# Patient Record
Sex: MALE | Race: WHITE | NOT HISPANIC OR LATINO | Employment: OTHER | URBAN - METROPOLITAN AREA
[De-identification: names, ages, dates, MRNs, and addresses within clinical notes are randomized per-mention and may not be internally consistent; named-entity substitution may affect disease eponyms.]

---

## 2019-10-10 ENCOUNTER — HOSPITAL ENCOUNTER (INPATIENT)
Facility: HOSPITAL | Age: 84
LOS: 27 days | Discharge: NURSING, REHAB OR LONG-TERM CARE | DRG: 064 | End: 2019-11-06
Attending: EMERGENCY MEDICINE | Admitting: PSYCHIATRY & NEUROLOGY
Payer: MEDICARE

## 2019-10-10 DIAGNOSIS — R00.1 BRADYCARDIA: ICD-10-CM

## 2019-10-10 DIAGNOSIS — I65.22 OCCLUSION OF LEFT CAROTID ARTERY: ICD-10-CM

## 2019-10-10 DIAGNOSIS — I50.42 CHRONIC COMBINED SYSTOLIC AND DIASTOLIC CONGESTIVE HEART FAILURE: Primary | ICD-10-CM

## 2019-10-10 DIAGNOSIS — Z92.82 S/P ADMN TPA IN DIFF FAC W/N LAST 24 HR BEF ADM TO CRNT FAC: ICD-10-CM

## 2019-10-10 DIAGNOSIS — I63.89 ACUTE ARTERIAL ISCHEMIC STROKE, MULTIFOCAL, MULTIPLE VASCULAR TERRITORIES: ICD-10-CM

## 2019-10-10 DIAGNOSIS — I63.9 CEREBROVASCULAR ACCIDENT (CVA), UNSPECIFIED MECHANISM: ICD-10-CM

## 2019-10-10 DIAGNOSIS — R07.89 CHEST DISCOMFORT: ICD-10-CM

## 2019-10-10 DIAGNOSIS — I48.0 PAROXYSMAL ATRIAL FIBRILLATION: ICD-10-CM

## 2019-10-10 DIAGNOSIS — N18.9 CHRONIC KIDNEY DISEASE, UNSPECIFIED CKD STAGE: ICD-10-CM

## 2019-10-10 PROBLEM — I50.9 CHF (CONGESTIVE HEART FAILURE): Status: ACTIVE | Noted: 2019-10-10

## 2019-10-10 PROBLEM — I10 HYPERTENSION: Status: ACTIVE | Noted: 2019-10-10

## 2019-10-10 PROBLEM — E07.9 THYROID DISEASE: Status: ACTIVE | Noted: 2019-10-10

## 2019-10-10 LAB
BILIRUB UR QL STRIP: NEGATIVE
CHOLEST SERPL-MCNC: 115 MG/DL (ref 120–199)
CHOLEST/HDLC SERPL: 3.4 {RATIO} (ref 2–5)
CLARITY UR REFRACT.AUTO: CLEAR
COLOR UR AUTO: YELLOW
ESTIMATED AVG GLUCOSE: 117 MG/DL (ref 68–131)
GLUCOSE UR QL STRIP: NEGATIVE
HBA1C MFR BLD HPLC: 5.7 % (ref 4–5.6)
HDLC SERPL-MCNC: 34 MG/DL (ref 40–75)
HDLC SERPL: 29.6 % (ref 20–50)
HGB UR QL STRIP: ABNORMAL
KETONES UR QL STRIP: NEGATIVE
LDLC SERPL CALC-MCNC: 69 MG/DL (ref 63–159)
LEUKOCYTE ESTERASE UR QL STRIP: NEGATIVE
MICROSCOPIC COMMENT: ABNORMAL
NITRITE UR QL STRIP: NEGATIVE
NONHDLC SERPL-MCNC: 81 MG/DL
PH UR STRIP: 6 [PH] (ref 5–8)
PROT UR QL STRIP: NEGATIVE
RBC #/AREA URNS AUTO: 17 /HPF (ref 0–4)
SP GR UR STRIP: 1.03 (ref 1–1.03)
SQUAMOUS #/AREA URNS AUTO: 0 /HPF
TRIGL SERPL-MCNC: 60 MG/DL (ref 30–150)
URN SPEC COLLECT METH UR: ABNORMAL

## 2019-10-10 PROCEDURE — 99291 CRITICAL CARE FIRST HOUR: CPT | Mod: ,,, | Performed by: EMERGENCY MEDICINE

## 2019-10-10 PROCEDURE — 99285 EMERGENCY DEPT VISIT HI MDM: CPT | Mod: 25

## 2019-10-10 PROCEDURE — 12000002 HC ACUTE/MED SURGE SEMI-PRIVATE ROOM

## 2019-10-10 PROCEDURE — 81001 URINALYSIS AUTO W/SCOPE: CPT

## 2019-10-10 PROCEDURE — 80061 LIPID PANEL: CPT

## 2019-10-10 PROCEDURE — 86901 BLOOD TYPING SEROLOGIC RH(D): CPT

## 2019-10-10 PROCEDURE — 84443 ASSAY THYROID STIM HORMONE: CPT

## 2019-10-10 PROCEDURE — 96360 HYDRATION IV INFUSION INIT: CPT

## 2019-10-10 PROCEDURE — 99291 PR CRITICAL CARE, E/M 30-74 MINUTES: ICD-10-PCS | Mod: ,,, | Performed by: EMERGENCY MEDICINE

## 2019-10-10 PROCEDURE — 25500020 PHARM REV CODE 255: Performed by: EMERGENCY MEDICINE

## 2019-10-10 PROCEDURE — 83036 HEMOGLOBIN GLYCOSYLATED A1C: CPT

## 2019-10-10 PROCEDURE — 63600175 PHARM REV CODE 636 W HCPCS: Performed by: PHYSICIAN ASSISTANT

## 2019-10-10 RX ORDER — POTASSIUM CHLORIDE 7.45 MG/ML
60 INJECTION INTRAVENOUS
Status: DISCONTINUED | OUTPATIENT
Start: 2019-10-10 | End: 2019-10-13

## 2019-10-10 RX ORDER — ATORVASTATIN CALCIUM 20 MG/1
40 TABLET, FILM COATED ORAL DAILY
Status: DISCONTINUED | OUTPATIENT
Start: 2019-10-11 | End: 2019-11-06 | Stop reason: HOSPADM

## 2019-10-10 RX ORDER — MAGNESIUM SULFATE HEPTAHYDRATE 40 MG/ML
2 INJECTION, SOLUTION INTRAVENOUS
Status: DISCONTINUED | OUTPATIENT
Start: 2019-10-10 | End: 2019-10-13

## 2019-10-10 RX ORDER — POTASSIUM CHLORIDE 7.45 MG/ML
80 INJECTION INTRAVENOUS
Status: DISCONTINUED | OUTPATIENT
Start: 2019-10-10 | End: 2019-10-13

## 2019-10-10 RX ORDER — POTASSIUM CHLORIDE 7.45 MG/ML
40 INJECTION INTRAVENOUS
Status: DISCONTINUED | OUTPATIENT
Start: 2019-10-10 | End: 2019-10-13

## 2019-10-10 RX ORDER — LEVOTHYROXINE SODIUM 25 UG/1
25 TABLET ORAL DAILY
Status: DISCONTINUED | OUTPATIENT
Start: 2019-10-11 | End: 2019-11-06 | Stop reason: HOSPADM

## 2019-10-10 RX ORDER — SODIUM CHLORIDE 0.9 % (FLUSH) 0.9 %
10 SYRINGE (ML) INJECTION
Status: DISCONTINUED | OUTPATIENT
Start: 2019-10-10 | End: 2019-11-06 | Stop reason: HOSPADM

## 2019-10-10 RX ORDER — MAGNESIUM SULFATE HEPTAHYDRATE 40 MG/ML
4 INJECTION, SOLUTION INTRAVENOUS
Status: DISCONTINUED | OUTPATIENT
Start: 2019-10-10 | End: 2019-10-13

## 2019-10-10 RX ADMIN — SODIUM CHLORIDE 1000 ML: 0.9 INJECTION, SOLUTION INTRAVENOUS at 09:10

## 2019-10-10 RX ADMIN — IOHEXOL 100 ML: 350 INJECTION, SOLUTION INTRAVENOUS at 09:10

## 2019-10-11 PROBLEM — I25.10 CAD (CORONARY ARTERY DISEASE): Status: ACTIVE | Noted: 2019-10-11

## 2019-10-11 PROBLEM — E03.9 ACQUIRED HYPOTHYROIDISM: Status: ACTIVE | Noted: 2019-10-10

## 2019-10-11 PROBLEM — I48.91 ATRIAL FIBRILLATION: Status: ACTIVE | Noted: 2019-10-11

## 2019-10-11 PROBLEM — I48.0 PAROXYSMAL ATRIAL FIBRILLATION: Status: ACTIVE | Noted: 2019-10-11

## 2019-10-11 PROBLEM — I50.42 CHRONIC COMBINED SYSTOLIC AND DIASTOLIC CONGESTIVE HEART FAILURE: Status: ACTIVE | Noted: 2019-10-10

## 2019-10-11 PROBLEM — G93.6 CYTOTOXIC BRAIN EDEMA: Status: ACTIVE | Noted: 2019-10-11

## 2019-10-11 PROBLEM — I63.412 CEREBROVASCULAR ACCIDENT (CVA) DUE TO EMBOLISM OF LEFT MIDDLE CEREBRAL ARTERY: Status: ACTIVE | Noted: 2019-10-10

## 2019-10-11 LAB
ABO + RH BLD: NORMAL
ALBUMIN SERPL BCP-MCNC: 3.5 G/DL (ref 3.5–5.2)
ALP SERPL-CCNC: 69 U/L (ref 55–135)
ALT SERPL W/O P-5'-P-CCNC: 11 U/L (ref 10–44)
ANION GAP SERPL CALC-SCNC: 11 MMOL/L (ref 8–16)
ASCENDING AORTA: 3.33 CM
AST SERPL-CCNC: 16 U/L (ref 10–40)
AV INDEX (PROSTH): 0.44
AV MEAN GRADIENT: 4 MMHG
AV PEAK GRADIENT: 7 MMHG
AV VALVE AREA: 1.39 CM2
AV VELOCITY RATIO: 0.52
BASOPHILS # BLD AUTO: 0.03 K/UL (ref 0–0.2)
BASOPHILS NFR BLD: 0.3 % (ref 0–1.9)
BILIRUB SERPL-MCNC: 0.7 MG/DL (ref 0.1–1)
BLD GP AB SCN CELLS X3 SERPL QL: NORMAL
BSA FOR ECHO PROCEDURE: 2.04 M2
BUN SERPL-MCNC: 27 MG/DL (ref 8–23)
CALCIUM SERPL-MCNC: 8.8 MG/DL (ref 8.7–10.5)
CHLORIDE SERPL-SCNC: 103 MMOL/L (ref 95–110)
CO2 SERPL-SCNC: 23 MMOL/L (ref 23–29)
CREAT SERPL-MCNC: 2 MG/DL (ref 0.5–1.4)
CV ECHO LV RWT: 0.43 CM
DIFFERENTIAL METHOD: ABNORMAL
DOP CALC AO PEAK VEL: 1.28 M/S
DOP CALC AO VTI: 25 CM
DOP CALC LVOT AREA: 3.1 CM2
DOP CALC LVOT DIAMETER: 2 CM
DOP CALC LVOT PEAK VEL: 0.67 M/S
DOP CALC LVOT STROKE VOLUME: 34.73 CM3
DOP CALCLVOT PEAK VEL VTI: 11.06 CM
ECHO LV POSTERIOR WALL: 1.01 CM (ref 0.6–1.1)
EOSINOPHIL # BLD AUTO: 0.1 K/UL (ref 0–0.5)
EOSINOPHIL NFR BLD: 1 % (ref 0–8)
ERYTHROCYTE [DISTWIDTH] IN BLOOD BY AUTOMATED COUNT: 13.5 % (ref 11.5–14.5)
EST. GFR  (AFRICAN AMERICAN): 34.2 ML/MIN/1.73 M^2
EST. GFR  (NON AFRICAN AMERICAN): 29.6 ML/MIN/1.73 M^2
FRACTIONAL SHORTENING: 14 % (ref 28–44)
GLUCOSE SERPL-MCNC: 101 MG/DL (ref 70–110)
HCT VFR BLD AUTO: 42.8 % (ref 40–54)
HGB BLD-MCNC: 12.9 G/DL (ref 14–18)
IMM GRANULOCYTES # BLD AUTO: 0.04 K/UL (ref 0–0.04)
IMM GRANULOCYTES NFR BLD AUTO: 0.4 % (ref 0–0.5)
INTERVENTRICULAR SEPTUM: 1.03 CM (ref 0.6–1.1)
LA MAJOR: 7.76 CM
LA MINOR: 6.41 CM
LA WIDTH: 4.46 CM
LEFT ATRIUM SIZE: 4.5 CM
LEFT ATRIUM VOLUME INDEX: 58.8 ML/M2
LEFT ATRIUM VOLUME: 119.77 CM3
LEFT INTERNAL DIMENSION IN SYSTOLE: 4.07 CM (ref 2.1–4)
LEFT VENTRICLE DIASTOLIC VOLUME INDEX: 50.62 ML/M2
LEFT VENTRICLE DIASTOLIC VOLUME: 103.11 ML
LEFT VENTRICLE MASS INDEX: 83 G/M2
LEFT VENTRICLE SYSTOLIC VOLUME INDEX: 35.8 ML/M2
LEFT VENTRICLE SYSTOLIC VOLUME: 72.91 ML
LEFT VENTRICULAR INTERNAL DIMENSION IN DIASTOLE: 4.71 CM (ref 3.5–6)
LEFT VENTRICULAR MASS: 169.55 G
LYMPHOCYTES # BLD AUTO: 1.1 K/UL (ref 1–4.8)
LYMPHOCYTES NFR BLD: 11.9 % (ref 18–48)
MAGNESIUM SERPL-MCNC: 1.9 MG/DL (ref 1.6–2.6)
MCH RBC QN AUTO: 26.8 PG (ref 27–31)
MCHC RBC AUTO-ENTMCNC: 30.1 G/DL (ref 32–36)
MCV RBC AUTO: 89 FL (ref 82–98)
MONOCYTES # BLD AUTO: 1.1 K/UL (ref 0.3–1)
MONOCYTES NFR BLD: 11 % (ref 4–15)
NEUTROPHILS # BLD AUTO: 7.2 K/UL (ref 1.8–7.7)
NEUTROPHILS NFR BLD: 75.4 % (ref 38–73)
NRBC BLD-RTO: 0 /100 WBC
PHOSPHATE SERPL-MCNC: 4.3 MG/DL (ref 2.7–4.5)
PLATELET # BLD AUTO: 158 K/UL (ref 150–350)
PMV BLD AUTO: 10.3 FL (ref 9.2–12.9)
POCT GLUCOSE: 97 MG/DL (ref 70–110)
POTASSIUM SERPL-SCNC: 4.4 MMOL/L (ref 3.5–5.1)
PROT SERPL-MCNC: 6.3 G/DL (ref 6–8.4)
RA MAJOR: 5.01 CM
RA PRESSURE: 15 MMHG
RA WIDTH: 3.97 CM
RBC # BLD AUTO: 4.81 M/UL (ref 4.6–6.2)
RETIRED EF AND QEF - SEE NOTES: 30 %
RIGHT VENTRICULAR END-DIASTOLIC DIMENSION: 4.45 CM
SINUS: 3.72 CM
SODIUM SERPL-SCNC: 137 MMOL/L (ref 136–145)
STJ: 3.3 CM
TDI LATERAL: 0.09 M/S
TDI SEPTAL: 0.06 M/S
TDI: 0.08 M/S
TRICUSPID ANNULAR PLANE SYSTOLIC EXCURSION: 1.37 CM
TSH SERPL DL<=0.005 MIU/L-ACNC: 3.03 UIU/ML (ref 0.4–4)
WBC # BLD AUTO: 9.56 K/UL (ref 3.9–12.7)

## 2019-10-11 PROCEDURE — 12000002 HC ACUTE/MED SURGE SEMI-PRIVATE ROOM

## 2019-10-11 PROCEDURE — 97530 THERAPEUTIC ACTIVITIES: CPT

## 2019-10-11 PROCEDURE — 25000003 PHARM REV CODE 250: Performed by: STUDENT IN AN ORGANIZED HEALTH CARE EDUCATION/TRAINING PROGRAM

## 2019-10-11 PROCEDURE — 99223 1ST HOSP IP/OBS HIGH 75: CPT | Mod: AI,GC,, | Performed by: PSYCHIATRY & NEUROLOGY

## 2019-10-11 PROCEDURE — 97161 PT EVAL LOW COMPLEX 20 MIN: CPT

## 2019-10-11 PROCEDURE — 63600175 PHARM REV CODE 636 W HCPCS: Performed by: PSYCHIATRY & NEUROLOGY

## 2019-10-11 PROCEDURE — 83735 ASSAY OF MAGNESIUM: CPT

## 2019-10-11 PROCEDURE — 99223 PR INITIAL HOSPITAL CARE,LEVL III: ICD-10-PCS | Mod: AI,GC,, | Performed by: PSYCHIATRY & NEUROLOGY

## 2019-10-11 PROCEDURE — 25000003 PHARM REV CODE 250: Performed by: PSYCHIATRY & NEUROLOGY

## 2019-10-11 PROCEDURE — 85025 COMPLETE CBC W/AUTO DIFF WBC: CPT

## 2019-10-11 PROCEDURE — 84100 ASSAY OF PHOSPHORUS: CPT

## 2019-10-11 PROCEDURE — 97165 OT EVAL LOW COMPLEX 30 MIN: CPT

## 2019-10-11 PROCEDURE — 92523 SPEECH SOUND LANG COMPREHEN: CPT

## 2019-10-11 PROCEDURE — 92610 EVALUATE SWALLOWING FUNCTION: CPT

## 2019-10-11 PROCEDURE — 80053 COMPREHEN METABOLIC PANEL: CPT

## 2019-10-11 RX ORDER — HYDRALAZINE HYDROCHLORIDE 20 MG/ML
10 INJECTION INTRAMUSCULAR; INTRAVENOUS EVERY 6 HOURS PRN
Status: DISCONTINUED | OUTPATIENT
Start: 2019-10-11 | End: 2019-10-20

## 2019-10-11 RX ORDER — DONEPEZIL HYDROCHLORIDE 5 MG/1
5 TABLET, FILM COATED ORAL NIGHTLY
Status: DISCONTINUED | OUTPATIENT
Start: 2019-10-11 | End: 2019-11-06 | Stop reason: HOSPADM

## 2019-10-11 RX ORDER — CARVEDILOL 6.25 MG/1
6.25 TABLET ORAL 2 TIMES DAILY
Status: DISCONTINUED | OUTPATIENT
Start: 2019-10-11 | End: 2019-10-19

## 2019-10-11 RX ADMIN — LEVOTHYROXINE SODIUM 25 MCG: 25 TABLET ORAL at 09:10

## 2019-10-11 RX ADMIN — HYDRALAZINE HYDROCHLORIDE 10 MG: 20 INJECTION INTRAMUSCULAR; INTRAVENOUS at 07:10

## 2019-10-11 RX ADMIN — ATORVASTATIN CALCIUM 40 MG: 20 TABLET, FILM COATED ORAL at 09:10

## 2019-10-11 RX ADMIN — CARVEDILOL 6.25 MG: 6.25 TABLET, FILM COATED ORAL at 08:10

## 2019-10-11 RX ADMIN — CARVEDILOL 6.25 MG: 6.25 TABLET, FILM COATED ORAL at 11:10

## 2019-10-11 RX ADMIN — DONEPEZIL HYDROCHLORIDE 5 MG: 5 TABLET, FILM COATED ORAL at 10:10

## 2019-10-11 NOTE — HPI
86 y/o PMH of hypothyroidism, AFib, CAD with stents? on Plavix, sternotomy scar (unknown procedure), dementia, HTN presenting via transfer from [facility] for stroke evaluation s/p TPA. History provided mostly by daughter. According to his her, patient noted to have right-sided weakness while eating dinner at [facility] this evening at approximately 7:00pm. Patient was evaluated by telestroke at OSH and decision was made to give TPA. TPA bolus given at 8pm and infusion started at 8:10pm. Upon arrival to [facility], patient AAOx3 and following commands. His responses are somewhat delayed but he is able to verbalized correct answers. Daughter at bedside unable to elaborate on PMH and is a poor historian. Patient lives in [place] but all medical care in [place].

## 2019-10-11 NOTE — CONSULTS
[facility]  Vascular Neurology  Comprehensive Stroke Center  Consult Note    Inpatient consult to Vascular (Stroke) Neurology  Consult performed by: Santiago Donnelly MD  Consult ordered by: Leela Quintero MD        Assessment/Plan:     Patient is a 85 y.o. year old male with:    * Cerebrovascular accident (CVA) due to embolism of left middle cerebral artery  86 y/o male with PMH of chronic Afib, CAD s/p stent, HTN, CHF, hypothyroidism who presented with acute onset RSW, aphasia and L gaze. LKN 7:05pm. Was seen at [facility] where his NIHSS was documented as 12. Given tPA and transferred to [facility]. NIHSS on arrival was 8. CTA MP showed a chronically occluded L ICA from it's origin to the supraclinoid segment with filling of the MCA via the AComm. He was also noted to have an occluded V4 with an intermittently occluded basilar. No EVT.    Does not appear to be on AC. Has been prescribed Plavix and statin but unclear if he takes them.     Etiology - Atheroembolic vs Cardioembolic    -- Admit to NCC  -- Recommend VAS US of bilateral carotids with Vascular surgery consult. Could be amenable to intervention if there is still some flow. If not, initiate maximal medical therapy with DAPT (after post tPA 24 hr period) + statin   -- SBP<180. Cardene as needed.    Antithrombotics for secondary stroke prevention: Antiplatelets: None: Hold all Antithrombotics x 24 hours after IV t-PA administration    Statins for secondary stroke prevention and hyperlipidemia, if present:   Statins: Atorvastatin- 40 mg daily    Aggressive risk factor modification: HTN     Rehab efforts: The patient has been evaluated by a stroke team provider and the therapy needs have been fully considered based off the presenting complaints and exam findings. The following therapy evaluations are needed: PT evaluate and treat, OT evaluate and treat, SLP evaluate and treat, PM&R evaluate for appropriate  placement    Diagnostics ordered/pending: Carotid ultrasound to assess vasculature, MRI head without contrast to assess brain parenchyma, TTE to assess cardiac function/status     VTE prophylaxis: None: Reason for No Pharmacological VTE Prophylaxis: Mechanical prophylaxis: Place SCDs, post-tPA    BP parameters: Infarct: Post tPA, SBP <180        CAD (coronary artery disease)  Stroke risk factor  -- Noted on Care Everywhere  -- S/p RCA stent  -- Prescribed Plavix, statin and Ranexa but unclear if he's taking them.      Atrial fibrillation  Stroke risk factor  -- Per Care Everywhere; not on AC.  -- Noted on EKG at admit.  -- Rate controlled.      CHF (congestive heart failure)  Stroke risk factor  -- pro BNP from Feb 2019 >3000  -- On Lasix and Aldactone at home.  -- TTE pending    Hypothyroidism  Stroke risk factor  -- Continue Synthroid 25mcg daily    Hypertension  Stroke risk factor  -- SBP<180      STROKE DOCUMENTATION     Acute Stroke Times   Last Known Normal Date: 10/10/19  Last Known Normal Time: 1905  Symptom Onset Date: 10/10/19  Symptom Onset Time: 1905  Stroke Team Called Date: 10/10/19  Stroke Team Called Time: 2120  Stroke Team Arrival Date: 10/10/19  Stroke Team Arrival Time: 2122    NIH Scale:  1a. Level of Consciousness: 0-->Alert, keenly responsive  1b. LOC Questions: 2-->Answers neither question correctly  1c. LOC Commands: 2-->Performs neither task correctly  2. Best Gaze: 0-->Normal  3. Visual: 0-->No visual loss  4. Facial Palsy: 0-->Normal symmetrical movements  5a. Motor Arm, Left: 1-->Drift, limb holds 90 (or 45) degrees, but drifts down before full 10 seconds, does not hit bed or other support  5b. Motor Arm, Right: 0-->No drift, limb holds 90 (or 45) degrees for full 10 secs  6a. Motor Leg, Left: 1-->Drift, leg falls by the end of the 5-sec period but does not hit bed  6b. Motor Leg, Right: 1-->Drift, leg falls by the end of the 5-sec period but does not hit bed  7. Limb Ataxia:  0-->Absent  8. Sensory: 0-->Normal, no sensory loss  9. Best Language: 1-->Mild-to-moderate aphasia, some obvious loss of fluency or facility of comprehension, without significant limitation on ideas expressed or form of expression. Reduction of speech and/or comprehension, however, makes conversation. . . (see row details)  10. Dysarthria: 0-->Normal  11. Extinction and Inattention (formerly Neglect): 0-->No abnormality  Total (NIH Stroke Scale): 8    Modified Isabel Score: 1  [place] Coma Scale:14   ABCD2 Score:    [id number] Score:   HAS -BLED Score:   ICH Score:   Hunt & Stroud Classification:       Thrombolysis Candidate? Yes, given prior to arrival at outside hospital    Delays to Thrombolysis?  No    Interventional Revascularization Candidate?   Is the patient eligible for mechanical endovascular reperfusion (LYNSEY)?  Chronically occluded L ICA      Hemorrhagic change of an Ischemic Stroke: Does this patient have an ischemic stroke with hemorrhagic changes? No     Subjective:     History of Present Illness:  84 y/o male with PMH of chronic Afib, CAD s/p RCA stent, HTN, CHF hypothyroidism who was transferred from [facility] for post-tPA care and possible IR intervention.  LKN 7:05pm. History obtained from chart review and daughter at bedside.    Patient was with his daughter at a steakhouse today. He got up to use the bathroom and upon returning, she noted that he was leaning to the right. He was unable to move the right side and was not following commands. He was taken to [facility] ER where he was noted to have RSW with aphasia and L gaze preference. NIHSS per ED physician's note was 12. CT head showed no bleed. Was given tPA after a telestroke consultation and transferred to [facility]. Upon arrival, his NIHSS was 8. CTA MP showed a chronically occluded L ICA with retrograde filling through the AComm, severe stenosis of V4 and intermittently occluded basilar. He was admitted to [facility] for further care.      Patient's  daughter reports that he takes Aricept, Lasix, Synthroid, Aldactone and a few others which she is unaware of. Patient does not recall if he takes any other meds.  Compliance unclear.  Per Care everywhere, he has a history of Afib but does not appear to be on AC.   Has a RCA stent and Plavix + statin are listed among his meds but unclear if he is taking them.    Chews tobacco daily.         Past Medical History:   Diagnosis Date    CHF (congestive heart failure)     Hypertension     Stroke     TIA    Thyroid disease      Past Surgical History:   Procedure Laterality Date    AORTA SURGERY      CORONARY ANGIOPLASTY WITH STENT PLACEMENT      REPAIR OF ANEURYSM       History reviewed. No pertinent family history.  Social History     Tobacco Use    Smoking status: Never Smoker    Smokeless tobacco: Current User     Types: Chew   Substance Use Topics    Alcohol use: Not Currently    Drug use: Never     Review of patient's allergies indicates:   Allergen Reactions    Iodine and iodide containing products        Medications: I have reviewed the current medication administration record.      (Not in a hospital admission)    Review of Systems   Constitutional: Negative for chills and fever.   Musculoskeletal: Positive for arthralgias.        L shoulder pain   Neurological: Positive for speech difficulty. Negative for facial asymmetry.   Psychiatric/Behavioral: Positive for confusion. Negative for agitation.     Objective:     Vital Signs (Most Recent):  Pulse: 90 (10/10/19 2310)  Resp: 20 (10/10/19 2310)  BP: 122/86 (10/10/19 2310)  SpO2: 96 % (10/10/19 2310)    Vital Signs Range (Last 24H):  Temp:  [98.5 °F (36.9 °C)]   Pulse:  [82-96]   Resp:  [12-25]   BP: (111-183)/()   SpO2:  [95 %-99 %]     Physical Exam   Constitutional: No distress.   Eyes: EOM are normal.   Cardiovascular: Normal rate.   Pulmonary/Chest: Effort normal.   Neurological: He is alert.   Nursing note and vitals reviewed.      Neurological  Exam:   LOC: alert  Language: Mild receptive aphasia.    Articulation: No dysarthria  Orientation: Not oriented to place, Not oriented to time  EOM (CN III, IV, VI): Full/intact  Facial Sensation (CN V): Normal  Facial Movement (CN VII): Symmetric facial expression    Motor: Leg left  Normal 5/5  Arm right  Normal 5/5  Leg right Normal 5/5  Cebellar: No evidence of appendicular or axial ataxia  Sensation: Intact to light touch, temperature and vibration      Laboratory:  CMP:   Recent Labs   Lab 10/10/19  1934   CALCIUM 9.2   ALBUMIN 4.2   PROT 7.3   *   K 4.1   CO2 30   CL 96   BUN 25*   CREATININE 2.01*   ALKPHOS 67   ALT 16   AST 24   BILITOT 0.9     CBC:   Recent Labs   Lab 10/10/19  1934   WBC 7.41   RBC 5.01   HGB 13.5*   HCT 43.1      MCV 86   MCH 26.9*   MCHC 31.3*     Lipid Panel:   Recent Labs   Lab 10/10/19  2249   CHOL 115*   LDLCALC 69.0   HDL 34*   TRIG 60     Coagulation:   Recent Labs   Lab 10/10/19  1934   INR 1.2   APTT 34.8     Hgb A1C:   Recent Labs   Lab 10/10/19  2249   HGBA1C 5.7*     TSH: No results for input(s): TSH in the last 168 hours.    Diagnostic Results:      Brain/Vessel imaging:    CTA Head and Neck (10/10/2019) -          Occluded left ICA with reconstitution of the distal/supraclinoid segment by retrograde flow through [id number].  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  Right vertebral artery supply the basilar artery.  Basilar artery is small in caliber with wall irregularity and intermittent occlusions.    Partially calcified hyperattenuating lesion in the lateral aspect of the cavernous sinus abutting the distal right ICA, likely representing calcified meningioma.    Generalized cerebral volume loss and remote lacunar type infarct in the right caudate head.    Cardiac Evaluation:     TTE pending      Santiago Villalobos  MD [place]  Comprehensive Stroke Center  Department of Vascular Neurology   [facility]

## 2019-10-11 NOTE — PLAN OF CARE
Problem: Occupational Therapy Goal  Goal: Occupational Therapy Goal  Description  Goals to be met by: 10/21     Patient will increase functional independence with ADLs by performing:    UE Dressing with Modified [place].  LE Dressing with Modified [place].  Grooming while standing with Modified [place].  Toileting from toilet with Modified Wilkin for hygiene and clothing management.   Supine to sit with Modified Wilkin.  Step transfer with Modified Wilkin     Outcome: Ongoing, Progressing       OT eval completed.

## 2019-10-11 NOTE — ED TRIAGE NOTES
Avery Douglas, a 85 y.o. male presents to the ED as a transfer from [facility] post TPA administration. Per PT's daughter PT was confused yesterday when he got off plane but, that the confusion was intermittent and tonight when they were eating at a restaurant PT went to the restroom and when he came back he began to shaking on the right side, feel out the morales to the floor and then couldn't speak or get himself up off the ground. PT was taken to the ED and given TPA. PT daughter stated she did not know PT was given TPA.     Triage note:  Chief Complaint   Patient presents with    Stoke Tx     Pt arrives via EMS as tx from [facility]. TPA bolus at 2010. TPA infusion at 2011     Review of patient's allergies indicates:   Allergen Reactions    Iodine and iodide containing products      Past Medical History:   Diagnosis Date    CHF (congestive heart failure)     Hypertension     Stroke     TIA    Thyroid disease      Adult Physical Assessment  LOC: Avery Douglas, 85 y.o. male verified via two identifiers.  The patient is awake, alert, can answer orientation questions correctly but does seem confused. PT mistook call light for denture container.   APPEARANCE: Patient resting comfortably and appears to be in no acute distress at this time. Patient is clean and well groomed, patient's clothing is properly fastened.  SKIN:The skin is warm and dry, color consistent with ethnicity, patient has normal skin turgor and moist mucus membranes, large skin tear to left forearm, bruises to right elbow, left thigh (from foleybag being under PT's leg during transport with EMS), and bilateral forearms.   MUSCULOSKELETAL: Patient moving all extremities well, no obvious swelling or deformities noted.  RESPIRATORY: Airway is open and patent, respirations are spontaneous, patient has a normal effort and rate, no accessory muscle use noted.  CARDIAC: Patient has a normal rate and rhythm, no periphreal edema noted in any extremity,  capillary refill < 3 seconds in all extremities  ABDOMEN: Soft and non tender to palpation, no abdominal distention noted. Bowel sounds present in all four quadrants.  NEUROLOGIC: see TPA monitoring

## 2019-10-11 NOTE — ASSESSMENT & PLAN NOTE
Stroke risk factor  -- pro BNP from Feb 2019 >3000  -- On Lasix and Aldactone at home.  -- TTE pending

## 2019-10-11 NOTE — PT/OT/SLP EVAL
Speech Language Pathology Evaluation  Cognitive/Bedside Swallow    Patient Name:  Avery Douglas   MRN:  [id number]   ED 15/15    Admitting Diagnosis: Cerebrovascular accident (CVA) due to embolism of left middle cerebral artery    Recommendations:                  General Recommendations:  Dysphagia therapy and Cognitive-linguistic therapy  Diet recommendations:  Mechanical soft, Thin   Aspiration Precautions: 1 bite/sip at a time, Eliminate distractions, Feed only when awake/alert, Frequent oral care, HOB to 90 degrees, Meds whole 1 at a time, Monitor for s/s of aspiration, Remain upright 30 minutes post meal, Small bites/sips and Standard aspiration precautions   General Precautions: Standard, aspiration, fall  Communication strategies:  none    History:     Past Medical History:   Diagnosis Date    CHF (congestive heart failure)     Hypertension     Stroke     TIA    Thyroid disease        Past Surgical History:   Procedure Laterality Date    AORTA SURGERY      CORONARY ANGIOPLASTY WITH STENT PLACEMENT      REPAIR OF ANEURYSM       MD Note 10/11: Cerebrovascular accident (CVA) due to embolism of left middle cerebral artery  86 y/o male with PMH of chronic Afib, CAD s/p stent, HTN, CHF, hypothyroidism who presented with acute onset RSW, aphasia and L gaze. AVELINO 7:05pm. Was seen at [facility] where his NIHSS was documented as 12. Given tPA and transferred to [facility]. NIHSS on arrival was 8. CTA MP showed a chronically occluded L ICA from it's origin to the supraclinoid segment with filling of the MCA via the AComm. He was also noted to have an occluded V4 with an intermittently occluded basilar. No EVT.     Does not appear to be on AC. Has been prescribed Plavix and statin but unclear if he takes them.      Etiology - Atheroembolic vs Cardioembolic      Chest X-Rays: 1. Right pleural effusion with right basilar consolidation/atelectasis.  2. Suspected skin fold artifact overlying the left lung.    Prior diet:  "reg/thin per patient    Occupation/hobbies/homemaking: Patient retired .     Subjective     Patient awake and cooperative with family member sleeping on floor of ED room. RN aware and looking for recliner.    Patient states, "Some kind of oil rig." -when asked where he was located    Objective:     COGNITIVE STATUS:  Behavioral Observations: cooperative and distractible-  Memory:  · Immediate: WFL  · Short Term: impaired  · Long Term: impaired  Orientation: patient oriented to name, , age; not oriented to current year, month, date, time, situation, or place  Attention: impaired sustained and divided attention  Problem Solving: impaired problem solving  Insight Awareness: poor insight into deficits  Sequencing:   · Functional Events: tba  · Mental Manipulation: tba  Pragmatics: wfl  Simple Money/Time Management: tba    LANGUAGE:   Receptive Language:  Simple y/n Questions: WFL  Complex y/n Questions: impaired; 70% acc  Identification: wfl  1 Step Directions: wfl  2 Step Directions: impaired; 50% acc  Complex Directions: impaired; 0% acc    Expressive Language:   Naming:   · Divergent: impaired  · Convergent: wfl  · Confrontational: wfl-delayed responses  Automatic Speech: wfl  Sentence Completion: wfl  Responsive Speech: wfl  Repetition: wfl    Motor Speech: wfl    Voice: wfl    Reading: tba     Writing: tba    Visual-Spatial: tba      Oral Musculature Evaluation  · Oral Musculature: WFL  · Dentition: upper and lower dentures, uses dentures to eat(loose dentures)  · Mucosal Quality: adequate  · Lingual Strength and Mobility: WFL  · Buccal Strength and Mobility: WFL  · Volitional Cough: elicited  · Volitional Swallow: timely  · Voice Prior to PO Intake: clear    Bedside Swallow Eval:   Consistencies Assessed:  · Thin liquids ice chip x1, teaspoon sip x1, cup sips x3, straw sips x6  · Puree teaspoon bites x3  · Solids bites of nash cracker x2     Oral Phase:   With nash cracker; likely 2/2 loose " dentition; patient report requiring denture adhesive which is not present in hospital  · Oral residue  · Slow oral transit time    Pharyngeal Phase:   · O2 sats remained consistent  · no overt clinical signs/symptoms of aspiration  · no overt clinical signs/symptoms of pharyngeal dysphagia    Compensatory Strategies  · None    Treatment: SLP provided patient education on SLP recommendations, SLP role, s/s and risks of aspiration, safe swallow precautions, and POC. Patient v/u of all discussed, but will likely require reinforcement 2/2 cognitive impairments. Family member sleeping throughout assessment.    Assessment:     Avery Douglas is a 85 y.o. male with an SLP diagnosis of Dysphagia and Cognitive-Linguistic Impairment. ST will continue to follow.     Goals:   Multidisciplinary Problems     SLP Goals        Problem: SLP Goal    Goal Priority Disciplines Outcome   SLP Goal     SLP Ongoing, Progressing   Description:  Speech Therapy Short Term Goals  Goal expected to be met by 10/25  1. Pt will participate in an ongoing assessment to determine the least restrictive and safest diet with possible updated goals to follow pending results.  2. The patient will answer orientation questions x4 with 90% acc no cues.   3. The patient will name 8 items in a concrete category given min cues.   4. The patient will answer complex y/n questions with 85% acc no cues.   5. The patient will follow 2+ step directions with 75% acc given 1 redirection.   6. The patient will participate in an ongoing assessment of speech, language, and cognition with updated goals to follow pending results and progress.                     Plan:     · Patient to be seen:  4 x/week   · Plan of Care expires:  11/09/19  · Plan of Care reviewed with:  patient   · SLP Follow-Up:  Yes       Discharge recommendations:  Discharge Facility/Level of Care Needs: (tbd)   Barriers to Discharge:  Level of Skilled Assistance Needed   and Safety Awareness      Time  Tracking:     SLP Treatment Date:   10/11/19  Speech Start Time:  0808  Speech Stop Time:  0833     Speech Total Time (min):  25 min    Billable Minutes: Eval 15  and Eval Swallow and Oral Function 10    THIAGO Zimmerman, CCC-SLP   Pager: [phone]  10/11/2019

## 2019-10-11 NOTE — ASSESSMENT & PLAN NOTE
Stroke risk factor  -- Per Care Everywhere; not on AC.  -- Noted on EKG at admit.  -- Rate controlled.

## 2019-10-11 NOTE — ASSESSMENT & PLAN NOTE
Stroke risk factor  -- Noted on Care Everywhere  -- S/p RCA stent  -- Prescribed Plavix, statin and Ranexa but unclear if he's taking them.

## 2019-10-11 NOTE — ASSESSMENT & PLAN NOTE
86 y/o male with PMH of chronic Afib, CAD s/p stent, HTN, CHF, hypothyroidism who presented with acute onset RSW, aphasia and L gaze. MAURON 7:05pm. Was seen at [facility] where his NIHSS was documented as 12. Given tPA and transferred to [facility] [place] [facility]. NIHSS on arrival was 8. CTA MP showed a chronically occluded L ICA from it's origin to the supraclinoid segment with filling of the MCA via the AComm. He was also noted to have an occluded V4 with an intermittently occluded basilar. No EVT.    Does not appear to be on AC. Has been prescribed Plavix and statin but unclear if he takes them.     Etiology - Atheroembolic vs Cardioembolic    -- Admit to NCC  -- Recommend VAS US of bilateral carotids with Vascular surgery consult. Could be amenable to intervention if there is still some flow. If not, initiate maximal medical therapy with DAPT (after post tPA 24 hr period) + statin   -- SBP<180. Cardene as needed.    Antithrombotics for secondary stroke prevention: Antiplatelets: None: Hold all Antithrombotics x 24 hours after IV t-PA administration    Statins for secondary stroke prevention and hyperlipidemia, if present:   Statins: Atorvastatin- 40 mg daily    Aggressive risk factor modification: HTN     Rehab efforts: The patient has been evaluated by a stroke team provider and the therapy needs have been fully considered based off the presenting complaints and exam findings. The following therapy evaluations are needed: PT evaluate and treat, OT evaluate and treat, SLP evaluate and treat, PM&R evaluate for appropriate placement    Diagnostics ordered/pending: Carotid ultrasound to assess vasculature, MRI head without contrast to assess brain parenchyma, TTE to assess cardiac function/status     VTE prophylaxis: None: Reason for No Pharmacological VTE Prophylaxis: Mechanical prophylaxis: Place SCDs, post-tPA    BP parameters: Infarct: Post tPA, SBP <180

## 2019-10-11 NOTE — SUBJECTIVE & OBJECTIVE
Past Medical History:   Diagnosis Date    CHF (congestive heart failure)     Hypertension     Stroke     TIA    Thyroid disease      Past Surgical History:   Procedure Laterality Date    AORTA SURGERY      CORONARY ANGIOPLASTY WITH STENT PLACEMENT      REPAIR OF ANEURYSM       History reviewed. No pertinent family history.  Social History     Tobacco Use    Smoking status: Never Smoker    Smokeless tobacco: Current User     Types: Chew   Substance Use Topics    Alcohol use: Not Currently    Drug use: Never     Review of patient's allergies indicates:   Allergen Reactions    Iodine and iodide containing products        Medications: I have reviewed the current medication administration record.      (Not in a hospital admission)    Review of Systems   Constitutional: Negative for chills and fever.   Musculoskeletal: Positive for arthralgias.        L shoulder pain   Neurological: Positive for speech difficulty. Negative for facial asymmetry.   Psychiatric/Behavioral: Positive for confusion. Negative for agitation.     Objective:     Vital Signs (Most Recent):  Pulse: 90 (10/10/19 2310)  Resp: 20 (10/10/19 2310)  BP: 122/86 (10/10/19 2310)  SpO2: 96 % (10/10/19 2310)    Vital Signs Range (Last 24H):  Temp:  [98.5 °F (36.9 °C)]   Pulse:  [82-96]   Resp:  [12-25]   BP: (111-183)/()   SpO2:  [95 %-99 %]     Physical Exam   Constitutional: No distress.   Eyes: EOM are normal.   Cardiovascular: Normal rate.   Pulmonary/Chest: Effort normal.   Neurological: He is alert.   Nursing note and vitals reviewed.      Neurological Exam:   LOC: alert  Language: Mild receptive aphasia.    Articulation: No dysarthria  Orientation: Not oriented to place, Not oriented to time  EOM (CN III, IV, VI): Full/intact  Facial Sensation (CN V): Normal  Facial Movement (CN VII): Symmetric facial expression    Motor: Leg left  Normal 5/5  Arm right  Normal 5/5  Leg right Normal 5/5  Cebellar: No evidence of appendicular or  axial ataxia  Sensation: Intact to light touch, temperature and vibration      Laboratory:  CMP:   Recent Labs   Lab 10/10/19  1934   CALCIUM 9.2   ALBUMIN 4.2   PROT 7.3   *   K 4.1   CO2 30   CL 96   BUN 25*   CREATININE 2.01*   ALKPHOS 67   ALT 16   AST 24   BILITOT 0.9     CBC:   Recent Labs   Lab 10/10/19  1934   WBC 7.41   RBC 5.01   HGB 13.5*   HCT 43.1      MCV 86   MCH 26.9*   MCHC 31.3*     Lipid Panel:   Recent Labs   Lab 10/10/19  2249   CHOL 115*   LDLCALC 69.0   HDL 34*   TRIG 60     Coagulation:   Recent Labs   Lab 10/10/19  1934   INR 1.2   APTT 34.8     Hgb A1C:   Recent Labs   Lab 10/10/19  2249   HGBA1C 5.7*     TSH: No results for input(s): TSH in the last 168 hours.    Diagnostic Results:      Brain/Vessel imaging:    CTA Head and Neck (10/10/2019) -          Occluded left ICA with reconstitution of the distal/supraclinoid segment by retrograde flow through [id number].  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  Right vertebral artery supply the basilar artery.  Basilar artery is small in caliber with wall irregularity and intermittent occlusions.    Partially calcified hyperattenuating lesion in the lateral aspect of the cavernous sinus abutting the distal right ICA, likely representing calcified meningioma.    Generalized cerebral volume loss and remote lacunar type infarct in the right caudate head.    Cardiac Evaluation:     TTE pending

## 2019-10-11 NOTE — ED NOTES
Assumed care for pt after recieving report from night RN. Pt. resting in bed in NAD. RR e/u. Continuous cardiac, BP, and O2 monitoring in progress. VS being monitoring continuously per MD orders. Bed in low, locked position with rails up and call bell in reach. Will continue to monitor.

## 2019-10-11 NOTE — PLAN OF CARE
Problem: SLP Goal  Goal: SLP Goal  Description  Speech Therapy Short Term Goals  Goal expected to be met by 10/25  1. Pt will participate in an ongoing assessment to determine the least restrictive and safest diet with possible updated goals to follow pending results.  2. The patient will answer orientation questions x4 with 90% acc no cues.   3. The patient will name 8 items in a concrete category given min cues.   4. The patient will answer complex y/n questions with 85% acc no cues.   5. The patient will follow 2+ step directions with 75% acc given 1 redirection.   6. The patient will participate in an ongoing assessment of speech, language, and cognition with updated goals to follow pending results and progress.    Outcome: Ongoing, Progressing     Bedside swallow study and Fvfvrl-Ddxnxexp-Ciaqztbpu Evaluation completed. Recommend: mechanical soft diet, thin liquids, whole po medications, dentures in place, assistance setting up meal tray, standard aspiration precautions, and monitor with meals. ST will continue to follow for an ongoing assessment of swallowing and for noted moderate cognitive-linguistic impairments.   AROLDO Jensen., CCC-SLP  Pager: [phone]  10/11/2019

## 2019-10-11 NOTE — ED NOTES
Pt found standing out of bed. Pt assisted back into bed. Pt resting in bed on continuous pulseox, cardiac monitor, and bp cuff. Call light within reach.

## 2019-10-11 NOTE — PT/OT/SLP EVAL
Occupational Therapy   Evaluation    Name: Izaiah Douglas  MRN: [id number]  Admitting Diagnosis:  Cerebrovascular accident (CVA) due to embolism of left middle cerebral artery      Recommendations:     Discharge Recommendations: rehabilitation facility  Discharge Equipment Recommendations:  (TBD)  Barriers to discharge:  (unknown)    Assessment:     Izaiah Douglas is a 85 y.o. male with a medical diagnosis of Cerebrovascular accident (CVA) due to embolism of left middle cerebral artery.  He presents with decreased independence with ADL's & mobility.  Pt is at risk for falls. Performance deficits affecting function: weakness, impaired endurance, impaired self care skills, impaired sensation, impaired functional mobilty, impaired balance, impaired cognition, decreased coordination, decreased lower extremity function, decreased upper extremity function, decreased safety awareness, abnormal tone, impaired coordination, impaired fine motor, decreased ROM.      Rehab Prognosis: Fair; patient would benefit from acute skilled OT services to address these deficits and reach maximum level of function.       Plan:     Patient to be seen 3 x/week to address the above listed problems via self-care/home management, therapeutic activities, therapeutic exercises, cognitive retraining, sensory integration, neuromuscular re-education  · Plan of Care Expires: 11/10/19  · Plan of Care Reviewed with: patient    Subjective     Chief Complaint: none stated  Patient/Family Comments/goals: none stated    Occupational Profile:  Living Environment & PLOF: Pt resides with spouse in [place], [place] in 2 story house with bed & bath on 1st floor & 2-3 steps 1 rail to enter.  Pt was independent with ADL's & uses 2 canes with ambulation.  Pt has a walk-in shower for bathing. Pt reported that he does not drive much.  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  Will need to verify history with family  due to noted confusion at times.  Equipment Used at Home:  (2 canes)  Assistance upon Discharge: unknown    Pain/Comfort:  · Pain Rating 1: 0/10  · Pain Rating Post-Intervention 1: 0/10    Patients cultural, spiritual, Shinto conflicts given the current situation: no    Objective:     Communicated with: RN prior to session.  Patient found supine with blood pressure cuff, mario catheter, telemetry, pulse ox (continuous), peripheral IV upon OT entry to room. Family member sleeping on floor in room.    General Precautions: Standard, aspiration, fall, NPO     Occupational Performance:    Bed Mobility:    · Patient completed Supine to Sit with maximal assistance  · Patient completed Sit to Supine with moderate assistance    Functional Mobility/Transfers:  · Patient completed Sit <> Stand Transfer with minimum assistance  with  no assistive device   · Functional Mobility: CGA taking side steps to the left    Activities of Daily Living:  · Upper Body Dressing: maximal assistance seated EOB    Cognitive/Visual Perceptual:  Cognitive/Psychosocial Skills:     -       Oriented to: Person   -       Follows Commands/attention:Inattentive and Follows one-step commands  -       Communication: dysarthria  -       Memory: Impaired STM  -       Safety awareness/insight to disability: impaired   -       Mood/Affect/Coping skills/emotional control: Appropriate to situation    Physical Exam:  Postural examination/scapula alignment:    -       Rounded shoulders  -       Forward head  -       Posterior pelvic tilt  Sensation:    -       Impaired  light/touch LUE  Dominant hand:    -       right  Upper Extremity Range of Motion:  WFL BUE except chronic decreased left shoulder flexion  Upper Extremity Strength: WFL except 2/5 left shoulder flexion   Strength: BUE WFL    AMPAC 6 Click ADL:  AMPAC Total Score: 12    Treatment & Education:  Provided education regarding role of OT, POC, & discharge recommendations with pt verbalizing  understanding.  Pt had no further questions & when asked whether there were any concerns pt reported none.      Education:    Patient left supine with all lines intact, call button in reach, RN notified, RN & pt's family member present and white board updated.    GOALS:   Multidisciplinary Problems     Occupational Therapy Goals        Problem: Occupational Therapy Goal    Goal Priority Disciplines Outcome Interventions   Occupational Therapy Goal     OT, PT/OT Ongoing, Progressing    Description:  Goals to be met by: 10/21     Patient will increase functional independence with ADLs by performing:    UE Dressing with Modified Wilkinson.  LE Dressing with Modified Wilkinson.  Grooming while standing with Modified Wilkinson.  Toileting from toilet with Modified Wilkinson for hygiene and clothing management.   Supine to sit with Modified Wilkinson.  Step transfer with Modified Wilkinson                      History:     Past Medical History:   Diagnosis Date    CHF (congestive heart failure)     Hypertension     Stroke     TIA    Thyroid disease        Past Surgical History:   Procedure Laterality Date    AORTA SURGERY      CORONARY ANGIOPLASTY WITH STENT PLACEMENT      REPAIR OF ANEURYSM         Time Tracking:     OT Date of Treatment: 10/11/19  OT Start Time: 0915  OT Stop Time: 0936  OT Total Time (min): 21 min    Billable Minutes:Evaluation 21    BRIGHT Mancilla  10/11/2019

## 2019-10-11 NOTE — ED NOTES
Provider called about PT's SBP being 211; provider gave verbal to wait 15-30 minutes to confirm high reading then call back to get order for IV hydralazine.

## 2019-10-11 NOTE — H&P
[facility]  Neurocritical Care  History & Physical    Admit Date: 10/10/2019  Service Date: 10/11/2019  Length of Stay: 1    Subjective:     Chief Complaint: <principal problem not specified>    History of Present Illness: 86 y/o PMH of hypothyroidism, AFib, CAD with stents? on Plavix, sternotomy scar (unknown procedure), dementia, HTN presenting via transfer from [facility] for stroke evaluation s/p TPA. History provided mostly by daughter. According to his her, patient noted to have right-sided weakness while eating dinner at [facility] this evening at approximately 7:00pm. Patient was evaluated by telestroke at OSH and decision was made to give TPA. TPA bolus given at 8pm and infusion started at 8:10pm. Upon arrival to [facility], patient AAOx3 and following commands. His responses are somewhat delayed but he is able to verbalized correct answers. Daughter at bedside unable to elaborate on PMH and is a poor historian. Patient lives in [place] but all medical care in [place].     Past Medical History:   Diagnosis Date    CHF (congestive heart failure)     Hypertension     Stroke     TIA    Thyroid disease      Past Surgical History:   Procedure Laterality Date    AORTA SURGERY      CORONARY ANGIOPLASTY WITH STENT PLACEMENT      REPAIR OF ANEURYSM        Current Facility-Administered Medications on File Prior to Encounter   Medication Dose Route Frequency Provider Last Rate Last Dose    [COMPLETED] alteplase (ACTIVASE) 100 mg injection 73 mg  0.81 mg/kg Intravenous ED 1 Time Fidel Butterfield MD 73 mL/hr at 10/10/19 2011 73 mg at 10/10/19 2011    [COMPLETED] ALTEPLASE IV BOLUS bolus from vial 8 mg  0.09 mg/kg Intravenous ED 1 Time Fidel Butterfield MD   8 mg at 10/10/19 2010     Current Outpatient Medications on File Prior to Encounter   Medication Sig Dispense Refill    donepezil (ARICEPT ODT) 5 MG TbDL Take 5 mg by mouth nightly.      furosemide (LASIX) 20 MG tablet Take 20 mg by mouth  2 (two) times daily.      levothyroxine (SYNTHROID) 25 MCG tablet Take 25 mcg by mouth once daily.      spironolactone (ALDACTONE) 50 MG tablet Take 50 mg by mouth once daily.        Allergies: Iodine and iodide containing products    History reviewed. No pertinent family history.  Social History     Tobacco Use    Smoking status: Never Smoker    Smokeless tobacco: Current User     Types: Chew   Substance Use Topics    Alcohol use: Not Currently    Drug use: Never     Review of Systems   Constitutional: Positive for activity change.   HENT: Negative.    Eyes: Negative.    Respiratory: Negative for shortness of breath.    Cardiovascular: Negative for chest pain.   Gastrointestinal: Negative for abdominal distention and abdominal pain.   Endocrine: Negative.    Genitourinary: Negative.    Musculoskeletal: Negative.    Skin: Negative.    Allergic/Immunologic: Negative.    Neurological: Negative for dizziness, facial asymmetry, numbness and headaches.   Hematological: Negative.      Objective:     Vitals:    Pulse: 92  BP: (!) 176/106  Resp: 14  SpO2: 98 %    Temp  Min: 98.5 °F (36.9 °C)  Max: 98.5 °F (36.9 °C)  Pulse  Min: 82  Max: 96  BP  Min: 144/110  Max: 183/95  MAP (mmHg)  Min: 118  Max: 130  Resp  Min: 14  Max: 25  SpO2  Min: 96 %  Max: 99 %    No intake/output data recorded.           Physical Exam   Constitutional: He is oriented to person, place, and time. He appears well-developed and well-nourished.   HENT:   Head: Normocephalic and atraumatic.   Eyes: Pupils are equal, round, and reactive to light. Conjunctivae and EOM are normal.   Neck: Normal range of motion. Neck supple.   Cardiovascular: Normal rate.   Pulmonary/Chest: Effort normal and breath sounds normal. No respiratory distress.   Abdominal: Soft. Bowel sounds are normal. He exhibits no distension. There is no tenderness.   Musculoskeletal: Normal range of motion.   Neurological: He is alert and oriented to person, place, and time. No cranial  nerve deficit or sensory deficit.   5/5 strength in both upper and lower extremities   Skin: Skin is warm and dry.           Assessment/Plan:     Neuro  Cerebrovascular accident (CVA)  - s/p tPA at 2000 at OSH  - Admit to NICU for q1 neuro checks  - SBP goal > 180  - CT with no acute abnormalities, chronic ischemic changes  - CTA with occlusion of left ICA, bilateral carotid dopplers pending  - MRI ordered 18 hours post tPA  - TTE pending  - A1C pending          The patient is being Prophylaxed for:  Venous Thromboembolism with: Mechanical  Stress Ulcer with: Not Applicable   Ventilator Pneumonia with: none    Activity Orders          Diet NPO: NPO starting at 10/10 2221        Full Code    Leela Quintero MD  Neurocritical Care  [facility]

## 2019-10-11 NOTE — ED NOTES
Pt. Resting in bed in NAD. RR e/u. Continuous cardiac, BP, and O2 monitoring in progress. VS being monitoring continuously per MD orders. Explanation of care/wait provided to family at bedside. Bed in low, locked position with rails up and call bell in reach. Will continue to monitor.

## 2019-10-11 NOTE — ASSESSMENT & PLAN NOTE
- s/p tPA at 2000 at OSH  - Admit to NICU for q1 neuro checks  - SBP goal > 180  - CT with no acute abnormalities, chronic ischemic changes  - CTA with occlusion of left ICA, bilateral carotid dopplers pending  - MRI ordered 18 hours post tPA  - TTE pending  - A1C pending

## 2019-10-11 NOTE — HPI
86 y/o male with PMH of chronic Afib, CAD s/p RCA stent, HTN, CHF hypothyroidism who was transferred from [facility] for post-tPA care and possible IR intervention.  LKN 7:05pm. History obtained from chart review and daughter at bedside.    Patient was with his daughter at a steakhouse today. He got up to use the bathroom and upon returning, she noted that he was leaning to the right. He was unable to move the right side and was not following commands. He was taken to [facility] ER where he was noted to have RSW with aphasia and L gaze preference. NIHSS per ED physician's note was 12. CT head showed no bleed. Was given tPA after a telestroke consultation and [place] [facility] Upon arrival, his NIHSS was 8. CTA MP showed a chronically occluded L ICA with retrograde filling through the AComm, severe stenosis of V4 and intermittently occluded basilar. He was admitted to [facility] for further care.      Patient's daughter reports that he takes Aricept, Lasix, Synthroid, Aldactone and a few others which she is unaware of. Patient does not recall if he takes any other meds.  Compliance unclear.  Per Care everywhere, he has a history of Afib but does not appear to be on AC.   Has a RCA stent and Plavix + statin are listed among his meds but unclear if he is taking them.    Chews tobacco daily.

## 2019-10-11 NOTE — CONSULTS
Inpatient consult to Physical Medicine Rehab  Consult performed by: ANGELES Mast  Consult ordered by: Leela Quintero MD  Reason for consult: assess rehab needs      Consult received.  Reviewed patient history and current admission.  Rehab team following.  Full consult to follow.    ROGELIO Coates, KARENP-C  Physical Medicine & Rehabilitation   10/11/2019  Spectralink: [id number]

## 2019-10-11 NOTE — ED NOTES
Provider made aware of PT's increasing B/P. Provider ok with BP and stated to call back if it gets over 200 SBP.

## 2019-10-11 NOTE — SUBJECTIVE & OBJECTIVE
Past Medical History:   Diagnosis Date    CHF (congestive heart failure)     Hypertension     Stroke     TIA    Thyroid disease      Past Surgical History:   Procedure Laterality Date    AORTA SURGERY      CORONARY ANGIOPLASTY WITH STENT PLACEMENT      REPAIR OF ANEURYSM        Current Facility-Administered Medications on File Prior to Encounter   Medication Dose Route Frequency Provider Last Rate Last Dose    [COMPLETED] alteplase (ACTIVASE) 100 mg injection 73 mg  0.81 mg/kg Intravenous ED 1 Time Fidel Butterfield MD 73 mL/hr at 10/10/19 2011 73 mg at 10/10/19 2011    [COMPLETED] ALTEPLASE IV BOLUS bolus from vial 8 mg  0.09 mg/kg Intravenous ED 1 Time Fidel Butterfield MD   8 mg at 10/10/19 2010     Current Outpatient Medications on File Prior to Encounter   Medication Sig Dispense Refill    donepezil (ARICEPT ODT) 5 MG TbDL Take 5 mg by mouth nightly.      furosemide (LASIX) 20 MG tablet Take 20 mg by mouth 2 (two) times daily.      levothyroxine (SYNTHROID) 25 MCG tablet Take 25 mcg by mouth once daily.      spironolactone (ALDACTONE) 50 MG tablet Take 50 mg by mouth once daily.        Allergies: Iodine and iodide containing products    History reviewed. No pertinent family history.  Social History     Tobacco Use    Smoking status: Never Smoker    Smokeless tobacco: Current User     Types: Chew   Substance Use Topics    Alcohol use: Not Currently    Drug use: Never     Review of Systems   Constitutional: Positive for activity change.   HENT: Negative.    Eyes: Negative.    Respiratory: Negative for shortness of breath.    Cardiovascular: Negative for chest pain.   Gastrointestinal: Negative for abdominal distention and abdominal pain.   Endocrine: Negative.    Genitourinary: Negative.    Musculoskeletal: Negative.    Skin: Negative.    Allergic/Immunologic: Negative.    Neurological: Negative for dizziness, facial asymmetry, numbness and headaches.   Hematological: Negative.      Objective:      Vitals:    Pulse: 92  BP: (!) 176/106  Resp: 14  SpO2: 98 %    Temp  Min: 98.5 °F (36.9 °C)  Max: 98.5 °F (36.9 °C)  Pulse  Min: 82  Max: 96  BP  Min: 144/110  Max: 183/95  MAP (mmHg)  Min: 118  Max: 130  Resp  Min: 14  Max: 25  SpO2  Min: 96 %  Max: 99 %    No intake/output data recorded.           Physical Exam   Constitutional: He is oriented to person, place, and time. He appears well-developed and well-nourished.   HENT:   Head: Normocephalic and atraumatic.   Eyes: Pupils are equal, round, and reactive to light. Conjunctivae and EOM are normal.   Neck: Normal range of motion. Neck supple.   Cardiovascular: Normal rate.   Pulmonary/Chest: Effort normal and breath sounds normal. No respiratory distress.   Abdominal: Soft. Bowel sounds are normal. He exhibits no distension. There is no tenderness.   Musculoskeletal: Normal range of motion.   Neurological: He is alert and oriented to person, place, and time. No cranial nerve deficit or sensory deficit.   5/5 strength in both upper and lower extremities   Skin: Skin is warm and dry.

## 2019-10-11 NOTE — ED PROVIDER NOTES
Encounter Date: 10/10/2019       History     Chief Complaint   Patient presents with    Stoke Tx     Pt arrives via EMS as tx from [facility]. TPA bolus at 2010. TPA infusion at 2011     85 year old male with medical history of hypothyroidism, AFib, CAD with stents on Plavix, dementia, HTN presenting to the ED via transfer from [facility] for stroke evaluation s/p TPA. History provided by patient and medical records. No family at bedside on initial evaluation. Patient noted to have right-sided weakness while eating dinner at [facility] [place] this evening. Onset of symptoms unclear, OSH records state symptoms began approximately 7:06pm. Patient was evaluated by telestroke at OSH and decision was made to give TPA.        Review of patient's allergies indicates:   Allergen Reactions    Iodine and iodide containing products      Past Medical History:   Diagnosis Date    CHF (congestive heart failure)     Hypertension     Stroke     TIA    Thyroid disease      Past Surgical History:   Procedure Laterality Date    AORTA SURGERY      CORONARY ANGIOPLASTY WITH STENT PLACEMENT      REPAIR OF ANEURYSM       History reviewed. No pertinent family history.  Social History     Tobacco Use    Smoking status: Never Smoker    Smokeless tobacco: Current User     Types: Chew   Substance Use Topics    Alcohol use: Not Currently    Drug use: Never     Review of Systems   Unable to perform ROS: Acuity of condition   Psychiatric/Behavioral: Positive for confusion.     Physical Exam     Initial Vitals [10/10/19 2125]   BP Pulse Resp Temp SpO2   (!) 176/106 92 14 -- 98 %      MAP       --         Physical Exam    Constitutional: He appears well-developed and well-nourished. He is not diaphoretic.   HENT:   Head: Normocephalic and atraumatic.   Mouth/Throat: Oropharynx is clear and moist. No oropharyngeal exudate.   Eyes: EOM are normal. Pupils are equal, round, and reactive to light.   Neck: Normal range of motion. Neck  supple.   Cardiovascular: Normal rate and regular rhythm.   Pulmonary/Chest: Breath sounds normal. No respiratory distress. He has no wheezes.   Abdominal: Soft. He exhibits no distension. There is no tenderness.   Genitourinary:   Genitourinary Comments: Langston in place   Neurological: He is alert.   Oriented to person only. Difficult neurological exam. Intermittently responds to commands appropriately. Able to lift all extremities against gravity.    Skin: Skin is warm and dry.   Superficial skin tear to L distal forearm       ED Course   Procedures  Labs Reviewed   LIPID PANEL - Abnormal; Notable for the following components:       Result Value    Cholesterol 115 (*)     HDL 34 (*)     All other components within normal limits   HEMOGLOBIN A1C - Abnormal; Notable for the following components:    Hemoglobin A1C 5.7 (*)     All other components within normal limits   URINALYSIS, REFLEX TO URINE CULTURE - Abnormal; Notable for the following components:    Occult Blood UA 2+ (*)     All other components within normal limits    Narrative:     Preferred Collection Type->Urine, Clean Catch   URINALYSIS MICROSCOPIC - Abnormal; Notable for the following components:    RBC, UA 17 (*)     All other components within normal limits    Narrative:     Preferred Collection Type->Urine, Clean Catch   TSH   TYPE & SCREEN   TYPE & SCREEN   POCT GLUCOSE MONITORING CONTINUOUS          Imaging Results          CTA STROKE MULTI-PHASE (Final result)  Result time 10/10/19 23:39:43    Final result by Samy Raman MD (10/10/19 23:39:43)                 Impression:      CT head:    No acute intracranial abnormality.    Generalized cerebral volume loss and remote lacunar type infarct in the right caudate head.    Cyst in the posterior fossa with mass effect on the left cerebellar hemisphere.    Partially calcified hyperattenuating lesion in the lateral aspect of the cavernous sinus abutting the distal right ICA, likely representing  calcified meningioma.    CTA head:    Limited evaluation due to suboptimal contrast timing.    Occluded left ICA with reconstitution of the distal/supraclinoid segment by retrograde flow through [id number].    Occluded intracranial segment of the left vertebral terminating at the level of left PICA origin.  Right vertebral artery supply the basilar artery.  Basilar artery is small in caliber with wall irregularity and intermittent occlusions.    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    Miscellaneous:    Right pleural effusion with associated compressive atelectasis.    1 cm sub solid nodule in the right lung apex.  For a part solid nodule 6 mm or greater, Fleischner Society 2017 guidelines recommend follow up with non-contrast chest CT at 3-6 months to confirm persistence. If this nodule is unchanged and the solid component remains <6 mm, annual follow up CT should be performed for 5 years; however, persistence of a part-solid nodule with solid component ?6 mm should be considered highly suspicious and may warrant further workup with PET/CT, biopsy, or resection.    COMMUNICATION  This critical result was discovered/received at 11:15.  The critical information above was relayed directly by Dr. Mckenna By telephone to Santiago VALLADARES on 10/10/2019 at 11:32.    Electronically signed by resident: Telly Mckenna  Date:    10/10/2019  Time:    22:01    Electronically signed by: Samy Raman MD  Date:    10/10/2019  Time:    23:39             Narrative:    EXAMINATION:  CTA STROKE MULTI-PHASE    CLINICAL HISTORY:  Focal neuro deficit, new, fixed or worsening, <6 hours;    TECHNIQUE:  Non contrast low dose axial images were obtained thought the head. CT angiogram was performed from the level of the kristin to the top of the head following the IV administration of 100mL of Omnipaque 350.   Sagittal and coronal reconstructions and maximum intensity projection  reconstructions were performed. Arterial stenosis percentages are based on NASCET measurement criteria.  Two additional phases of immediate post-contrast CTA images were performed through the head alone.    COMPARISON:  CT head 10/10/2019    FINDINGS:  Examination mildly degraded by patient motion artifact.    Intracranial Compartment:    Prominence of the ventricles and sulci compatible with mild generalized cerebral volume loss.  No hydrocephalus. No extra-axial blood or fluid collections.    Remote lacunar type infarct in the right caudate head.  Cyst in the posterior fossa with mass effect on the left cerebellar hemisphere.  Partially calcified hyperattenuating lesion in the right aspect of the cavernous sinus demonstrate hypoenhancement in relation to the adjacent vessels, likely representing calcified meningioma.  No hemorrhage, edema, or major vascular distribution infarct.    Skull/Extracranial Contents (limited evaluation): No fracture. Orbital and intra orbital content are unremarkable.  Mastoid air cells and paranasal sinuses are essentially clear.    Non-Vascular Structures of the Neck/Thoracic Inlet (limited evaluation): Partially visualized sternotomy wires.  Right pleural effusion with associated compressive atelectasis.  1.3 sub solid nodule in the right lung apex (series 5, image 231).  Ground-glass opacity in the lingula.      CTA:    Suboptimal contrast timing with contrast mainly in the pulmonary artery.    Three vessels left aortic arch.  Atherosclerotic calcification at the carotid bulbs bilaterally without significant stenosis on the right ICA.  Non opacification of the left ICA, consistent with proximal ICA occlusion.  Reconstitution of the distal/supraclinoid segment from retrograde flow through [id number].  Right ICA is normal in caliber without significant stenosis or dissection.  Note is made of saccular aneurysm projecting superiorly from the right supraclinoid ICA, measures 0.4 cm  in transverse dimension and 0.3 cm in craniocaudad dimension (coronal series 13, image 89 and sagittal series 14, image 106).    Partially calcified hyperattenuating mass abutting the inferior and lateral margin of the supraclinoid segment of the right ICA.  This lesion demonstrate hypoenhancement in correlation to the ICA and adjacent vessel, likely representing partially calcified meningioma.    Limited evaluation of the cervical vertebral arteries due to suboptimal contrast timing.    Extensive atherosclerotic plaques throughout the vertebrobasilar system with severe atherosclerotic plaque of the intracranial segment of the left vertebral artery and probable occlusion terminating at the level of left PICA origin.  Fetal origin of the right and left PCA.  Basilar artery is small in caliber with wall irregularity and intermittent occlusions.    The anterior, middle, and posterior cerebral arteries are within normal limits, without evidence of significant stenosis or focal occlusion.                                 Medical Decision Making:   History:   Old Medical Records: I decided to obtain old medical records.  Old Records Summarized: records from clinic visits.  Clinical Tests:   Lab Tests: Ordered and Reviewed  Radiological Study: Ordered and Reviewed  Medical Tests: Ordered and Reviewed  Other:   I have discussed this case with another health care provider.       <> Summary of the Discussion: Vascular Neurology       APC / Resident Notes:   85 year old male with medical history of hypothyroidism, AFib, CAD with stents on Plavix, dementia, HTN presenting to the ED via transfer from [facility] for stroke evaluation s/p TPA. DDx includes but not limited to ischemic stroke, TIA, UTI, complex migraine, seizure d/o. Patient evaluated by stroke team on ED arrival and brought emergently for CTA multi-phase. CKD noted at baseline. Will give contrast given acuity of patient's condition. Will give fluids.     CTA  without acute intracranial abnormalities. Left ICA occluded with reconstitution flow. Vascular Neurology evaluated patient and is he is not an IR candidate. MRI brain pending. Patient will be admitted to NICU for further management s/p TPA administration. Patient expresses understanding and agreeable to the plan. I have discussed the care of this patient with my supervising physician.          Attending Attestation:     Physician Attestation Statement for NP/PA:   I have conducted a face to face encounter with this patient in addition to the NP/PA, due to Medical Complexity    Other NP/PA Attestation Additions:    History of Present Illness: Patient presents with acute stroke.  Was at home and had confusion right-sided weakness.  Given tPA at outside facility and sent here.   Physical Exam: Patient has an inconsistent neuro exam.  Moves his arms equally.  At times he has right leg weakness at times he has left leg weakness.   Medical Decision Making: Patient with acute stroke after tPA.  Symptoms seem to be improving.  Sent emergently for CT a multi phase.  Noted his low creatinine.  GFR was on the lower side of the border line.  Considering his acute stroke and potential morbidity from this will order the CTA and give IV fluids.  Vascular neurology is at the bedside.     Attending Critical Care:   Critical Care Times:   Direct Patient Care (initial evaluation, reassessments, and time considering the case)................................................................18 minutes.   Additional History from reviewing old medical records or taking additional history from the family, EMS, PCP, etc.......................4 minutes.   Ordering, Reviewing, and Interpreting Diagnostic Studies...............................................................................................................3 minutes.    Documentation..................................................................................................................................................................................3 minutes.   Consultation with other Physicians. .................................................................................................................................................4 minutes.   ==============================================================  · Total Critical Care Time - exclusive of procedural time: 32 minutes.  ==============================================================                 Clinical Impression:       ICD-10-CM ICD-9-CM   1. Cerebrovascular accident (CVA), unspecified mechanism I63.9 434.91   2. S/P admn tPA in diff fac w/n last 24 hr bef adm to crnt fac Z92.82 V45.88   3. Chronic kidney disease, unspecified CKD stage N18.9 585.9         Disposition:   Disposition: Admitted  Condition: Serious                        Isaias Morris PA-C  10/10/19 0160       Júnior Haider MD  10/15/19 7591

## 2019-10-11 NOTE — PLAN OF CARE
Problem: Physical Therapy Goal  Goal: Physical Therapy Goal  Description  Goals to be met by: 10/25/19     Patient will increase functional independence with mobility by performin. Supine to sit with Minimal Assistance.  2. Sit to supine with Minimal Assistance.  3. Sit to stand transfer with Stand-by Assistance.  4. Bed to chair transfer with Supervision using Single-point Cane PRN.  5. Gait  x 100 feet with Supervision using Single-point Cane PRN.   6. Ascend/descend 3 stair with right Handrails Supervision.   7. Lower extremity exercise program x 20 reps per handout, with assistance as needed.     Outcome: Ongoing, Progressing     PT goals established

## 2019-10-11 NOTE — PT/OT/SLP EVAL
"Physical Therapy Evaluation    Patient Name:  Izaiah Douglas   MRN:  [id number]    Recommendations:     Discharge Recommendations:  home health PT   Discharge Equipment Recommendations: none   Barriers to discharge: None    Assessment:     Izaiah Douglas is a 85 y.o. male admitted with a medical diagnosis of Cerebrovascular accident (CVA) due to embolism of left middle cerebral artery.  He presents with the following impairments/functional limitations:  weakness, impaired endurance, impaired self care skills, impaired sensation, impaired functional mobilty, gait instability, impaired balance, impaired cognition, decreased ROM, decreased upper extremity function, decreased safety awareness, edema.    No amb outside of room, as pt is being closely monitored in the ER.  Currently requires CGA-Lars with upright mobility, increased A for bed mobility.  Pt follows directions well, though noted confusion and impaired orientation.     Rehab Prognosis: Good; patient would benefit from acute skilled PT services to address these deficits and reach maximum level of function.    Recent Surgery: * No surgery found *      Plan:     During this hospitalization, patient to be seen 3 x/week to address the identified rehab impairments via gait training, therapeutic activities, therapeutic exercises, neuromuscular re-education and progress toward the following goals:    · Plan of Care Expires:  11/08/19    Subjective     Chief Complaint: Fatigue  Patient/Family Comments/goals: "Oh, that must be my dog." - referring to his family member sleeping on the floor  Pain/Comfort:  · Pain Rating 1: 0/10    Patients cultural, spiritual, Zoroastrian conflicts given the current situation: no    Living Environment:  As per the pt:  Pt lives with spouse, 2SH, sleeps and bathes on the 1st floor, 3 ALLIE with RHRs.   Prior to admission, patients level of function required use of 2 canes to amb.  Equipment used at home: cane, straight.  DME owned (not " currently used): rolling walker and none.  Upon discharge, patient will have assistance from spouse.    Objective:     Communicated with nursing prior to session.  Patient found supine with peripheral IV, mario catheter  upon PT entry to room.    General Precautions: Standard, aspiration, fall   Orthopedic Precautions:N/A   Braces: N/A     Exams:  · Cognitive Exam:  Patient is oriented to Person  · Fine Motor Coordination:    · -       Intact  Left hand thumb/finger opposition skills and Right hand thumb/finger opposition skills  · Gross Motor Coordination:  WFL  · Postural Exam:  Patient presented with the following abnormalities:    · -       Rounded shoulders  · Sensation:    · -       Impaired: Diminished  light/touch L LE  · Skin Integrity/Edema:      · -       Edema: Pitting B LEs L>R    · RUE ROM: WFL  · RUE Strength: WFL  · LUE ROM: Deficits: sh ROM impaired, approx 5 deg sh flex with AAROM  · LUE Strength: WFL except sh MMT  · RLE ROM: WFL  · RLE Strength: WFL  · LLE ROM: WFL  · LLE Strength: WFL    Functional Mobility:  · Bed Mobility:     · Rolling Left:  minimum assistance  · Scooting: moderate assistance  · Supine to Sit: maximal assistance  · Sit to Supine: moderate assistance  · Transfers:     · Sit to Stand:  minimum assistance with ed on hand placement  · Gait: Pt amb a few steps laterally, at EOB, to the R, CGA without AD  · Balance: CGA: dynamic standing balance without AD      Therapeutic Activities and Exercises:   Whiteboard updated    AM-PAC 6 CLICK MOBILITY  Total Score:18     Patient left supine with all lines intact, call button in reach, nursing notified and sleeping family member present.    GOALS:   Multidisciplinary Problems     Physical Therapy Goals        Problem: Physical Therapy Goal    Goal Priority Disciplines Outcome Goal Variances Interventions   Physical Therapy Goal     PT, PT/OT Ongoing, Progressing     Description:  Goals to be met by: 10/25/19     Patient will increase  functional independence with mobility by performin. Supine to sit with Minimal Assistance.  2. Sit to supine with Minimal Assistance.  3. Sit to stand transfer with Stand-by Assistance.  4. Bed to chair transfer with Supervision using Single-point Cane PRN.  5. Gait  x 100 feet with Supervision using Single-point Cane PRN.   6. Ascend/descend 3 stair with right Handrails Supervision.   7. Lower extremity exercise program x 20 reps per handout, with assistance as needed.                      History:     Past Medical History:   Diagnosis Date    CHF (congestive heart failure)     Hypertension     Stroke     TIA    Thyroid disease        Past Surgical History:   Procedure Laterality Date    AORTA SURGERY      CORONARY ANGIOPLASTY WITH STENT PLACEMENT      REPAIR OF ANEURYSM         Time Tracking:     PT Received On: 10/11/19  PT Start Time: 915     PT Stop Time: 40  PT Total Time (min): 25 min     Billable Minutes: Evaluation 10 and Therapeutic Activity 11      Cecilia Francis, PT  10/11/2019

## 2019-10-11 NOTE — ED NOTES
Neuro critical care paged, notified pt family asking if pt able to eat. MD states will look over SPL note before making a decision.

## 2019-10-12 PROBLEM — I67.2 INTRACRANIAL ATHEROSCLEROSIS: Status: ACTIVE | Noted: 2019-10-12

## 2019-10-12 PROBLEM — I63.89 ACUTE ARTERIAL ISCHEMIC STROKE, MULTIFOCAL, MULTIPLE VASCULAR TERRITORIES: Status: ACTIVE | Noted: 2019-10-10

## 2019-10-12 PROBLEM — R91.1 NODULE OF RIGHT LUNG: Status: ACTIVE | Noted: 2019-10-12

## 2019-10-12 PROBLEM — I65.22 OCCLUSION OF LEFT CAROTID ARTERY: Status: ACTIVE | Noted: 2019-10-12

## 2019-10-12 PROBLEM — R79.89 CREATININE ELEVATION: Status: ACTIVE | Noted: 2019-10-12

## 2019-10-12 LAB
ALBUMIN SERPL BCP-MCNC: 3.4 G/DL (ref 3.5–5.2)
ALP SERPL-CCNC: 72 U/L (ref 55–135)
ALT SERPL W/O P-5'-P-CCNC: 9 U/L (ref 10–44)
ANION GAP SERPL CALC-SCNC: 12 MMOL/L (ref 8–16)
AST SERPL-CCNC: 16 U/L (ref 10–40)
BASOPHILS # BLD AUTO: 0.03 K/UL (ref 0–0.2)
BASOPHILS NFR BLD: 0.3 % (ref 0–1.9)
BILIRUB SERPL-MCNC: 1.1 MG/DL (ref 0.1–1)
BUN SERPL-MCNC: 25 MG/DL (ref 8–23)
CALCIUM SERPL-MCNC: 9.2 MG/DL (ref 8.7–10.5)
CHLORIDE SERPL-SCNC: 101 MMOL/L (ref 95–110)
CO2 SERPL-SCNC: 22 MMOL/L (ref 23–29)
CREAT SERPL-MCNC: 1.6 MG/DL (ref 0.5–1.4)
DIFFERENTIAL METHOD: ABNORMAL
EOSINOPHIL # BLD AUTO: 0.2 K/UL (ref 0–0.5)
EOSINOPHIL NFR BLD: 1.8 % (ref 0–8)
ERYTHROCYTE [DISTWIDTH] IN BLOOD BY AUTOMATED COUNT: 13.5 % (ref 11.5–14.5)
EST. GFR  (AFRICAN AMERICAN): 44.7 ML/MIN/1.73 M^2
EST. GFR  (NON AFRICAN AMERICAN): 38.7 ML/MIN/1.73 M^2
GLUCOSE SERPL-MCNC: 84 MG/DL (ref 70–110)
HCT VFR BLD AUTO: 41.9 % (ref 40–54)
HGB BLD-MCNC: 13.1 G/DL (ref 14–18)
IMM GRANULOCYTES # BLD AUTO: 0.04 K/UL (ref 0–0.04)
IMM GRANULOCYTES NFR BLD AUTO: 0.4 % (ref 0–0.5)
LYMPHOCYTES # BLD AUTO: 1.1 K/UL (ref 1–4.8)
LYMPHOCYTES NFR BLD: 11 % (ref 18–48)
MAGNESIUM SERPL-MCNC: 1.9 MG/DL (ref 1.6–2.6)
MCH RBC QN AUTO: 26.9 PG (ref 27–31)
MCHC RBC AUTO-ENTMCNC: 31.3 G/DL (ref 32–36)
MCV RBC AUTO: 86 FL (ref 82–98)
MONOCYTES # BLD AUTO: 1 K/UL (ref 0.3–1)
MONOCYTES NFR BLD: 10.2 % (ref 4–15)
NEUTROPHILS # BLD AUTO: 7.8 K/UL (ref 1.8–7.7)
NEUTROPHILS NFR BLD: 76.3 % (ref 38–73)
NRBC BLD-RTO: 0 /100 WBC
PHOSPHATE SERPL-MCNC: 4.1 MG/DL (ref 2.7–4.5)
PLATELET # BLD AUTO: 163 K/UL (ref 150–350)
PMV BLD AUTO: 10.7 FL (ref 9.2–12.9)
POCT GLUCOSE: 106 MG/DL (ref 70–110)
POCT GLUCOSE: 129 MG/DL (ref 70–110)
POCT GLUCOSE: 84 MG/DL (ref 70–110)
POCT GLUCOSE: 98 MG/DL (ref 70–110)
POTASSIUM SERPL-SCNC: 4.3 MMOL/L (ref 3.5–5.1)
PROT SERPL-MCNC: 6.3 G/DL (ref 6–8.4)
RBC # BLD AUTO: 4.87 M/UL (ref 4.6–6.2)
SODIUM SERPL-SCNC: 135 MMOL/L (ref 136–145)
WBC # BLD AUTO: 10.21 K/UL (ref 3.9–12.7)

## 2019-10-12 PROCEDURE — 36415 COLL VENOUS BLD VENIPUNCTURE: CPT

## 2019-10-12 PROCEDURE — 25000003 PHARM REV CODE 250: Performed by: STUDENT IN AN ORGANIZED HEALTH CARE EDUCATION/TRAINING PROGRAM

## 2019-10-12 PROCEDURE — 25000003 PHARM REV CODE 250: Performed by: PSYCHIATRY & NEUROLOGY

## 2019-10-12 PROCEDURE — 25000003 PHARM REV CODE 250: Performed by: NURSE PRACTITIONER

## 2019-10-12 PROCEDURE — 83735 ASSAY OF MAGNESIUM: CPT

## 2019-10-12 PROCEDURE — 99233 PR SUBSEQUENT HOSPITAL CARE,LEVL III: ICD-10-PCS | Mod: GC,,, | Performed by: PSYCHIATRY & NEUROLOGY

## 2019-10-12 PROCEDURE — 85025 COMPLETE CBC W/AUTO DIFF WBC: CPT

## 2019-10-12 PROCEDURE — 63600175 PHARM REV CODE 636 W HCPCS: Performed by: PSYCHIATRY & NEUROLOGY

## 2019-10-12 PROCEDURE — 80053 COMPREHEN METABOLIC PANEL: CPT

## 2019-10-12 PROCEDURE — 84100 ASSAY OF PHOSPHORUS: CPT

## 2019-10-12 PROCEDURE — 20600001 HC STEP DOWN PRIVATE ROOM

## 2019-10-12 PROCEDURE — 99233 SBSQ HOSP IP/OBS HIGH 50: CPT | Mod: GC,,, | Performed by: PSYCHIATRY & NEUROLOGY

## 2019-10-12 RX ADMIN — CARVEDILOL 6.25 MG: 6.25 TABLET, FILM COATED ORAL at 08:10

## 2019-10-12 RX ADMIN — LEVOTHYROXINE SODIUM 25 MCG: 25 TABLET ORAL at 08:10

## 2019-10-12 RX ADMIN — ATORVASTATIN CALCIUM 40 MG: 20 TABLET, FILM COATED ORAL at 08:10

## 2019-10-12 RX ADMIN — APIXABAN 2.5 MG: 2.5 TABLET, FILM COATED ORAL at 08:10

## 2019-10-12 RX ADMIN — HYDRALAZINE HYDROCHLORIDE 10 MG: 20 INJECTION INTRAMUSCULAR; INTRAVENOUS at 08:10

## 2019-10-12 RX ADMIN — DONEPEZIL HYDROCHLORIDE 5 MG: 5 TABLET, FILM COATED ORAL at 08:10

## 2019-10-12 NOTE — HOSPITAL COURSE
10/12: Etiology likely cardioembolic. Will start Eliquis 2.5 mg BID. Creatinine trending down - may start patient on Eliquis 5 mg BID if continue to decrease. Left ICA chronically occluded; no need to vasculare intervention.   10/13: Spoke with PT; patient would benefit from inpatient rehab placement. Norvasc 5 mg ordered. Urine studies ordered for hyponatremia - suspect to be due to dehydration.   10/14: Patient with SIADH but sodium stable; 1 L fluid restriction ordered  10/15: Irritation around right hand peripheral IV site. Sodium 131 today. Discussed with daughter that he is on fluid restriction and she will stop providing him with outside drinks.     10/16: Sodium increasing to 132. Bactroban ordered for right hand IV infiltration. Working on placement.   10/17: Sodium continues to improve. Creatinine 1.5 likely near baseline. Decreased Eliquis to 2.5 mg. Right hand erythema stable.   10/19: Sodium 134. Awake and alert but remains confused. No pain. Pending placement  10/20: Complained of chest discomfort overnight. EKG showed no ST wave changes; troponin normal.   10/21: Renal function improving. Hyponatremic, continue 1L fluid restriction. Will likely restart lasix tomorrow; no current signs of fluid overload. Neurological exam unchanged.   10/22: Reviewed outside imaging, renal function likely at baseline; restarted home Lasix at reduced dose of 20 mg daily and continue to monitor daily weights and strict I/O's.   10/23: Attempting to transfer patient to a bed on NSU floor. Staff physician to contact patient's insurance company to discuss further details regarding SS-SNF placement.  10/24: Staff physician contacted patient's insurance company yesterday, however no answer; left voicemail. Will re-attempt phone call today. Remains medically and neurologically stable for discharge.   10/25: Called Dr. Krishna; no answer. Will re-attempt tomorrow. Patient's daughter contacted by SAW[facility]MALINA. She is unable to provided 24  hour care. Patient accepted to [facility], need insurance authorization  10/26-10/30: No acute events overnight. Neurologically unchanged. Awaiting placement.   10/31: No acute events overnight. CM informed patient will have insurance as of 11/01.   11/04: Patient agitated today. Once time dose of seroquel ordered. Will monitor for effect and consider adding day time dose if necessary.   11/05: Patient agitated overnight. UA ordered to rule out confusion r/t UTI.   11/6 elevated WBC on U/A.  Discharged on 3 days of bactrim.

## 2019-10-12 NOTE — PROGRESS NOTES
Pt transferred from ED. Family at bedside. VSS. Bed alarm set. Skin intact. Will continue to monitor.

## 2019-10-12 NOTE — ASSESSMENT & PLAN NOTE
86 y/o male with PMH of chronic Afib, CAD s/p stent, HTN, CHF, hypothyroidism who presented with acute onset RSW, aphasia and L gaze. LKN 7:05pm. Was seen at [facility] where his NIHSS was documented as 12. Given tPA and transferred to [facility]. NIHSS on arrival was 8. CTA MP showed a chronically occluded L ICA from it's origin to the supraclinoid segment with filling of the MCA via the AComm. He was also noted to have an occluded V4 with an intermittently occluded basilar. No EVT.    Per chart review patient has documented Afib since 2018 and was not started on AC. He appears to have chronic occluded ICA w/ substantial burden of both extracranial and intracranial atherosclerotic disease. Etiology at this time likely cardioembolic. Patient started on Eliquis 2.5 mg BID.     Antithrombotics for secondary stroke prevention:  Eliquis 2.5 mg BID    Statins for secondary stroke prevention and hyperlipidemia, if present:   Statins: Atorvastatin- 40 mg daily    Aggressive risk factor modification: HTN     Rehab efforts: The patient has been evaluated by a stroke team provider and the therapy needs have been fully considered based off the presenting complaints and exam findings. The following therapy evaluations are needed: PT evaluate and treat, OT evaluate and treat, SLP evaluate and treat, PM&R evaluate for appropriate placement - rehab     Diagnostics ordered/pending: NA    VTE prophylaxis: Eliquis 2.5 mg BID    BP parameters: Infarct: Post tPA, SBP <180

## 2019-10-12 NOTE — CONSULTS
[facility]  Adult Nutrition  Consult Note    SUMMARY     Recommendations    Recommendation/Intervention:   1.) ADAT to renal (predialysis) diabetic diet.   2.) If nutrition support warranted, suggest Suplena @ 20mL/hr advancing 10mL q4h to goal rate 50mL/hr to provide 2160 kcals, 54gm protein and 886mL of free water. If GRV >500mL, hold TF q4h then restart regimen. TF to meet 100% EEN and 83% EPN.   3.) Daily weights.     Goals: 1.) Pt to return to nutritionally adequate diet by follow up  Nutrition Goal Status: new  Communication of RD Recs: (POC)    Reason for Assessment    Reason For Assessment: consult  Diagnosis: cardiac disease(CVA)  Relevant Medical History: HTN, thyroid disease, CHF, stroke, dementia  General Information Comments: Pt currently NPO with RN and MD to bedside. Unable to complete assessment. Per chart, s/p tPA infusion awaiting SLP swallow evaluation prior to advancing diet. NFPE unable to be performed.   Nutrition Discharge Planning: Unable to determine at this time    Nutrition Risk Screen    Nutrition Risk Screen: dysphagia or difficulty swallowing    Nutrition/Diet History    Spiritual, Cultural Beliefs, Yazdanism Practices, Values that Affect Care: no    Anthropometrics    Temp: 98 °F (36.7 °C)  Height Method: Stated  Height: 6' (182.9 cm)  Height (inches): 72 in  Weight Method: Stated  Weight: 81.6 kg (180 lb)  Weight (lb): 180 lb  Ideal Body Weight (IBW), Male: 178 lb  % Ideal Body Weight, Male (lb): 101.12 lb  BMI (Calculated): 24.5       Lab/Procedures/Meds    Pertinent Labs Reviewed: reviewed  Pertinent Labs Comments: HbA1C 5.7, Na 135, BUN 25, Cr 1.6, GFR 38.7, ALT 9  Pertinent Medications Reviewed: reviewed  Pertinent Medications Comments: statin      Estimated/Assessed Needs    Weight Used For Calorie Calculations: 81.6 kg (179 lb 14.3 oz)  Energy Calorie Requirements (kcal): 5652-1326  Energy Need Method: Kcal/kg(25-30 kcal/kg)  Protein Requirements:  65-82(g/day)  Weight Used For Protein Calculations: 81.6 kg (179 lb 14.3 oz)(0.8-1.0 g/kg)     Estimated Fluid Requirement Method: RDA Method(or per MD)  RDA Method (mL): 2040  CHO Requirement: 50% of total kcals      Nutrition Prescription Ordered    Current Diet Order: NPO    Evaluation of Received Nutrient/Fluid Intake    I/O: -0.5L since admit  Comments: LBM 10/9  % Intake of Estimated Energy Needs: 0 - 25 %  % Meal Intake: NPO    Nutrition Risk    Level of Risk/Frequency of Follow-up: high     Assessment and Plan  Nutrition Problem  Inadequate oral intake    Related to (etiology):   Current diet    Signs and Symptoms (as evidenced by):   <75% of nutritional needs being met    Interventions(treatment strategy):  Collaboration of nutrition care with other providers  Referral of care  Modified diet- renal, DM  Initiate enteral nutrition-Suplena @ 50mL/hr if diet not advanced within 48hr.     Nutrition Diagnosis Status:   New         Monitor and Evaluation    Food and Nutrient Intake: energy intake  Food and Nutrient Adminstration: diet order  Anthropometric Measurements: weight, weight change, body mass index  Biochemical Data, Medical Tests and Procedures: electrolyte and renal panel, gastrointestinal profile, glucose/endocrine profile  Nutrition-Focused Physical Findings: overall appearance     Malnutrition Assessment  Unable to assess at this time.     Nutrition Follow-Up    RD Follow-up?: Yes

## 2019-10-12 NOTE — ASSESSMENT & PLAN NOTE
Trending down   2.01-->2.0-->1.6  Continue to monitor  Encouraging PO intake - at this time holding IV fluids due to low EF

## 2019-10-12 NOTE — ASSESSMENT & PLAN NOTE
Area of cytotoxic cerebral edema identified when reviewing brain imaging in the territory of the R middle cerebral artery. There is not mass effect associated with it. We will continue to monitor the patients clinical exam for any worsening of symptoms which may indicate expansion of the stroke or the area of the edema resulting in the clinical change. 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

## 2019-10-12 NOTE — SUBJECTIVE & OBJECTIVE
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 Will start Eliquis 2.5 mg BID. Creatinine trending down - may start patient on Eliquis 5 mg BID if continue to decrease. L ICA chronically occluded - no need to vascular intervention.     HPI, Past Medical, Family, and Social History remains the same as documented in the initial encounter.     Review of Systems   Constitutional: Negative for fever.   Eyes: Negative for visual disturbance.   Respiratory: Negative for shortness of breath.    Cardiovascular: Negative for chest pain.   Gastrointestinal: Negative for nausea and vomiting.   Neurological: Positive for weakness. Negative for facial asymmetry and speech difficulty.   Psychiatric/Behavioral: Negative for agitation and confusion.     Scheduled Meds:   apixaban  5 mg Oral BID    atorvastatin  40 mg Oral Daily    carvedilol  6.25 mg Oral BID    donepezil  5 mg Oral QHS    levothyroxine  25 mcg Oral Daily     Continuous Infusions:  PRN Meds:calcium gluconate IVPB, calcium gluconate IVPB, calcium gluconate IVPB, hydrALAZINE, magnesium sulfate IVPB, magnesium sulfate IVPB, potassium chloride in water **AND** potassium chloride in water **AND** potassium chloride in water, sodium chloride 0.9%, sodium phosphate IVPB, sodium phosphate IVPB, sodium phosphate IVPB    Objective:     Vital Signs (Most Recent):  Temp: 98 °F (36.7 °C) (10/12/19 1118)  Pulse: 73 (10/12/19 1118)  Resp: 18 (10/12/19 1118)  BP: (!) 174/84 (10/12/19 1118)  SpO2: (!) 92 % (10/12/19 1118)  BP Location: Right arm    Vital Signs Range (Last 24H):  Temp:  [97.5 °F (36.4 °C)-98 °F (36.7 °C)]   Pulse:  [67-82]   Resp:  [14-23]   BP: (146-189)/()   SpO2:  [92 %-98 %]   BP Location: Right arm    Physical Exam   HENT:   Head: Normocephalic.   Eyes: Pupils are equal, round, and reactive  to light. EOM are normal.   Cardiovascular: Normal rate.   Pulmonary/Chest: Effort normal.   Abdominal: Soft.   Musculoskeletal:   LUE weakness   Neurological: He is alert.   Skin: Skin is warm. Capillary refill takes less than 2 seconds.   Psychiatric: He has a normal mood and affect.       Neurological Exam:   LOC: alert  Attention Span: Good   Language: No aphasia  Articulation: No dysarthria  Orientation: Person, Place, Time   Visual Fields: Full  EOM (CN III, IV, VI): Full/intact  Pupils (CN II, III): PERRL  Facial Movement (CN VII): Symmetric facial expression    Motor: Arm left  Paresis: 3/5  Leg left  Normal 5/5  Arm right  Normal 5/5  Leg right Normal 5/5  Cebellar: No evidence of appendicular or axial ataxia  Sensation: Intact to light touch, temperature and vibration    Laboratory:  CMP:   Recent Labs   Lab 10/12/19  0447   CALCIUM 9.2   ALBUMIN 3.4*   PROT 6.3   *   K 4.3   CO2 22*      BUN 25*   CREATININE 1.6*   ALKPHOS 72   ALT 9*   AST 16   BILITOT 1.1*     BMP:   Recent Labs   Lab 10/12/19  0447   *   K 4.3      CO2 22*   BUN 25*   CREATININE 1.6*   CALCIUM 9.2     CBC:   Recent Labs   Lab 10/12/19  0447   WBC 10.21   RBC 4.87   HGB 13.1*   HCT 41.9      MCV 86   MCH 26.9*   MCHC 31.3*     Lipid Panel:   Recent Labs   Lab 10/10/19  2249   CHOL 115*   LDLCALC 69.0   HDL 34*   TRIG 60     Coagulation:   Recent Labs   Lab 10/10/19  1934   INR 1.2   APTT 34.8     Platelet Aggregation Study: No results for input(s): PLTAGG, PLTAGINTERP, PLTAGREGLACO, ADPPLTAGGREG in the last 168 hours.  Hgb A1C:   Recent Labs   Lab 10/10/19  2249   HGBA1C 5.7*     TSH:   Recent Labs   Lab 10/10/19  2249   TSH 3.032       Diagnostic Results     Brain Imaging   MRI brain 10/11  The examination is limited due to motion artifact however there are areas of abnormal T2/FLAIR/diffusion signal abnormality consistent with areas of acute ischemia/infarct involving the cerebral hemispheres  bilaterally, as discussed above.    The suspected intracranial meningioma adjacent to the supraclinoid right internal carotid artery seen on the CT examination is not well evaluated on this examination in part due to the motion artifact.    There is signal abnormality associated with the left internal carotid artery likely corresponding to the appearance of occlusion on the CTA examination.    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    Clinical and historical correlation is needed however when the patient can better tolerate imaging additional follow-up brain MRI can be done if clinically warranted.      Vessel Imaging   CTA   CT head:    No acute intracranial abnormality.    Generalized cerebral volume loss and remote lacunar type infarct in the right caudate head.    Cyst in the posterior fossa with mass effect on the left cerebellar hemisphere.    Partially calcified hyperattenuating lesion in the lateral aspect of the cavernous sinus abutting the distal right ICA, likely representing calcified meningioma.    CTA head:    Limited evaluation due to suboptimal contrast timing.    Occluded left ICA with reconstitution of the distal/supraclinoid segment by retrograde flow through [id number].    Occluded intracranial segment of the left vertebral terminating at the level of left PICA origin.  Right vertebral artery supply the basilar artery.  Basilar artery is small in caliber with wall irregularity and intermittent occlusions.    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    Miscellaneous:    Right pleural effusion with associated compressive atelectasis.    1 cm sub solid nodule in the right lung apex.  For a part solid nodule 6 mm or greater, Fleischner Society 2017 guidelines recommend follow up with non-contrast chest CT at 3-6 months to confirm persistence. If  this nodule is unchanged and the solid component remains <6 mm, annual follow up CT should be performed for 5 years; however, persistence of a part-solid nodule with solid component ?6 mm should be considered highly suspicious and may warrant further workup with PET/CT, biopsy, or resection.    Cardiac Imaging   TTE 10/11  · Moderately decreased left ventricular systolic function. The estimated ejection fraction is 35%  · Concentric left ventricular remodeling.  · Left ventricular diastolic dysfunction.  · Local segmental wall motion abnormalities.  · Severe left atrial enlargement.  · Mild right atrial enlargement.  · Mild aortic regurgitation.  · Moderate aortic valve stenosis but difficult to appreciate in the setting of AF, depressed EF, and low stroke volume.  · Aortic valve area is 1.39 cm2; peak velocity is 1.28 m/s; mean gradient is 4 mmHg.  · Elevated central venous pressure (15 mm Hg).

## 2019-10-12 NOTE — ASSESSMENT & PLAN NOTE
Stroke risk factor  -- Noted on Care Everywhere  -- S/p RCA stent  -- Prescribed Plavix, statin and Ranexa but unclear if he's taking them  -- Eliquis started 10/12  -- Atorvastatin 40 mg daily

## 2019-10-12 NOTE — ASSESSMENT & PLAN NOTE
Stroke risk factor  -- Per Care Everywhere; not on AC.  -- Noted on EKG at admit.  -- Rate controlled  -- Eliquis 2.5 mg started 10/12 - low dose due to age and creatinine; however, creantinine improving (will increase dosage if continue to improve)

## 2019-10-12 NOTE — ED NOTES
Telemetry Verification   Patient placed on Telemetry Box  Verified with War Room  Box # [id number] csu   Monitor Tech kathleen   Rate 70   Rhythm afib

## 2019-10-12 NOTE — PLAN OF CARE
Pt free of falls/trauma/injuries.  Denies c/o SOB; O2Sats remain stable on room air. I  Generalized skin remains CDI;  with the exception of some bruising that the family or pt were unable to identify cause. 2+ edema noted to BLEs.  TEDs applied / in place.  I   Wt  remains stable.  Electrolytes replaced as ordered.  PT/OT following; pt has been a 1 person assist with transfers from the bed to the bedside commode. Is confused to situation and time but easily reoriented . Family staying with him states he does have a history of dementia. Pt tolerating plan of care.

## 2019-10-12 NOTE — PLAN OF CARE
Recommendations     Recommendation/Intervention:   1.) ADAT to renal (predialysis) diabetic diet.   2.) If nutrition support warranted, suggest Suplena @ 20mL/hr advancing 10mL q4h to goal rate 50mL/hr to provide 2160 kcals, 54gm protein and 886mL of free water. If GRV >500mL, hold TF q4h then restart regimen. TF to meet 100% EEN and 83% EPN.   3.) Daily weights.     Goals: 1.) Pt to return to nutritionally adequate diet by follow up  Nutrition Goal Status: new  Communication of RD Recs: (POC)

## 2019-10-13 PROBLEM — F03.90 DEMENTIA: Status: ACTIVE | Noted: 2019-10-13

## 2019-10-13 PROBLEM — E87.1 HYPONATREMIA: Status: ACTIVE | Noted: 2019-10-13

## 2019-10-13 LAB
ALBUMIN SERPL BCP-MCNC: 3.4 G/DL (ref 3.5–5.2)
ALP SERPL-CCNC: 71 U/L (ref 55–135)
ALT SERPL W/O P-5'-P-CCNC: 10 U/L (ref 10–44)
ANION GAP SERPL CALC-SCNC: 10 MMOL/L (ref 8–16)
AST SERPL-CCNC: 18 U/L (ref 10–40)
BASOPHILS # BLD AUTO: 0.02 K/UL (ref 0–0.2)
BASOPHILS NFR BLD: 0.2 % (ref 0–1.9)
BILIRUB SERPL-MCNC: 1.1 MG/DL (ref 0.1–1)
BUN SERPL-MCNC: 27 MG/DL (ref 8–23)
CALCIUM SERPL-MCNC: 9.2 MG/DL (ref 8.7–10.5)
CHLORIDE SERPL-SCNC: 99 MMOL/L (ref 95–110)
CO2 SERPL-SCNC: 22 MMOL/L (ref 23–29)
CREAT SERPL-MCNC: 1.5 MG/DL (ref 0.5–1.4)
DIFFERENTIAL METHOD: ABNORMAL
EOSINOPHIL # BLD AUTO: 0.2 K/UL (ref 0–0.5)
EOSINOPHIL NFR BLD: 2.8 % (ref 0–8)
ERYTHROCYTE [DISTWIDTH] IN BLOOD BY AUTOMATED COUNT: 13.6 % (ref 11.5–14.5)
EST. GFR  (AFRICAN AMERICAN): 48.4 ML/MIN/1.73 M^2
EST. GFR  (NON AFRICAN AMERICAN): 41.8 ML/MIN/1.73 M^2
GLUCOSE SERPL-MCNC: 87 MG/DL (ref 70–110)
HCT VFR BLD AUTO: 45.6 % (ref 40–54)
HGB BLD-MCNC: 13.4 G/DL (ref 14–18)
IMM GRANULOCYTES # BLD AUTO: 0.03 K/UL (ref 0–0.04)
IMM GRANULOCYTES NFR BLD AUTO: 0.3 % (ref 0–0.5)
LYMPHOCYTES # BLD AUTO: 1.2 K/UL (ref 1–4.8)
LYMPHOCYTES NFR BLD: 13.4 % (ref 18–48)
MAGNESIUM SERPL-MCNC: 2 MG/DL (ref 1.6–2.6)
MCH RBC QN AUTO: 26.1 PG (ref 27–31)
MCHC RBC AUTO-ENTMCNC: 29.4 G/DL (ref 32–36)
MCV RBC AUTO: 89 FL (ref 82–98)
MONOCYTES # BLD AUTO: 1 K/UL (ref 0.3–1)
MONOCYTES NFR BLD: 11.2 % (ref 4–15)
NEUTROPHILS # BLD AUTO: 6.2 K/UL (ref 1.8–7.7)
NEUTROPHILS NFR BLD: 72.1 % (ref 38–73)
NRBC BLD-RTO: 0 /100 WBC
PHOSPHATE SERPL-MCNC: 3.7 MG/DL (ref 2.7–4.5)
PLATELET # BLD AUTO: 147 K/UL (ref 150–350)
PMV BLD AUTO: 10.4 FL (ref 9.2–12.9)
POCT GLUCOSE: 103 MG/DL (ref 70–110)
POCT GLUCOSE: 80 MG/DL (ref 70–110)
POTASSIUM SERPL-SCNC: 4.8 MMOL/L (ref 3.5–5.1)
PROT SERPL-MCNC: 6.4 G/DL (ref 6–8.4)
RBC # BLD AUTO: 5.13 M/UL (ref 4.6–6.2)
SODIUM SERPL-SCNC: 131 MMOL/L (ref 136–145)
WBC # BLD AUTO: 8.66 K/UL (ref 3.9–12.7)

## 2019-10-13 PROCEDURE — 36415 COLL VENOUS BLD VENIPUNCTURE: CPT

## 2019-10-13 PROCEDURE — 83935 ASSAY OF URINE OSMOLALITY: CPT

## 2019-10-13 PROCEDURE — 83735 ASSAY OF MAGNESIUM: CPT

## 2019-10-13 PROCEDURE — 97530 THERAPEUTIC ACTIVITIES: CPT

## 2019-10-13 PROCEDURE — 20600001 HC STEP DOWN PRIVATE ROOM

## 2019-10-13 PROCEDURE — 99233 SBSQ HOSP IP/OBS HIGH 50: CPT | Mod: GC,,, | Performed by: PSYCHIATRY & NEUROLOGY

## 2019-10-13 PROCEDURE — 93005 ELECTROCARDIOGRAM TRACING: CPT

## 2019-10-13 PROCEDURE — 25000003 PHARM REV CODE 250: Performed by: PSYCHIATRY & NEUROLOGY

## 2019-10-13 PROCEDURE — 80053 COMPREHEN METABOLIC PANEL: CPT

## 2019-10-13 PROCEDURE — 84100 ASSAY OF PHOSPHORUS: CPT

## 2019-10-13 PROCEDURE — 99233 PR SUBSEQUENT HOSPITAL CARE,LEVL III: ICD-10-PCS | Mod: GC,,, | Performed by: PSYCHIATRY & NEUROLOGY

## 2019-10-13 PROCEDURE — 97116 GAIT TRAINING THERAPY: CPT

## 2019-10-13 PROCEDURE — 84300 ASSAY OF URINE SODIUM: CPT

## 2019-10-13 PROCEDURE — 63600175 PHARM REV CODE 636 W HCPCS: Performed by: PSYCHIATRY & NEUROLOGY

## 2019-10-13 PROCEDURE — 25000003 PHARM REV CODE 250: Performed by: STUDENT IN AN ORGANIZED HEALTH CARE EDUCATION/TRAINING PROGRAM

## 2019-10-13 PROCEDURE — 93010 EKG 12-LEAD: ICD-10-PCS | Mod: ,,, | Performed by: INTERNAL MEDICINE

## 2019-10-13 PROCEDURE — 93010 ELECTROCARDIOGRAM REPORT: CPT | Mod: ,,, | Performed by: INTERNAL MEDICINE

## 2019-10-13 PROCEDURE — 85025 COMPLETE CBC W/AUTO DIFF WBC: CPT

## 2019-10-13 PROCEDURE — 25000003 PHARM REV CODE 250: Performed by: NURSE PRACTITIONER

## 2019-10-13 RX ORDER — SENNOSIDES 8.6 MG/1
8.6 TABLET ORAL DAILY
Status: DISCONTINUED | OUTPATIENT
Start: 2019-10-13 | End: 2019-11-06 | Stop reason: HOSPADM

## 2019-10-13 RX ORDER — AMLODIPINE BESYLATE 5 MG/1
5 TABLET ORAL DAILY
Status: DISCONTINUED | OUTPATIENT
Start: 2019-10-13 | End: 2019-10-19

## 2019-10-13 RX ADMIN — APIXABAN 2.5 MG: 2.5 TABLET, FILM COATED ORAL at 09:10

## 2019-10-13 RX ADMIN — ATORVASTATIN CALCIUM 40 MG: 20 TABLET, FILM COATED ORAL at 08:10

## 2019-10-13 RX ADMIN — CARVEDILOL 6.25 MG: 6.25 TABLET, FILM COATED ORAL at 09:10

## 2019-10-13 RX ADMIN — DONEPEZIL HYDROCHLORIDE 5 MG: 5 TABLET, FILM COATED ORAL at 09:10

## 2019-10-13 RX ADMIN — APIXABAN 2.5 MG: 2.5 TABLET, FILM COATED ORAL at 08:10

## 2019-10-13 RX ADMIN — CARVEDILOL 6.25 MG: 6.25 TABLET, FILM COATED ORAL at 08:10

## 2019-10-13 RX ADMIN — LEVOTHYROXINE SODIUM 25 MCG: 25 TABLET ORAL at 08:10

## 2019-10-13 RX ADMIN — HYDRALAZINE HYDROCHLORIDE 10 MG: 20 INJECTION INTRAMUSCULAR; INTRAVENOUS at 08:10

## 2019-10-13 RX ADMIN — AMLODIPINE BESYLATE 5 MG: 5 TABLET ORAL at 02:10

## 2019-10-13 NOTE — NURSING
Pt did stand and void 400 ml family member forgot that the urine was needed for testing. Will get specimen on next void. Eunice PERALTA is aware urine hasn't been collected and stated to collect next time he voids. Clean urinal has been provided for pt.

## 2019-10-13 NOTE — PLAN OF CARE
Pt free of falls and injury during shift. POC reviewed with pt. VS stable. A-fib on telemetry. Pt disoriented to place, time, situation. Pt swallowed meds with ease.Bed alarm set, family at bedside. No acute events noted at this time. No complaints. Bed low and locked, call light with in reach. Will continue to monitor.

## 2019-10-13 NOTE — PLAN OF CARE
"Izaiah Douglas tolerated treatment well today. He was OOBTC with his friend present upon my entry to room. He is pleasantly confused, has difficulty answering more complex questions in regards to prior level and discharge options but he can follow simple commands and answer how he's feeling, etc. Ambulates 120 ft in hallways today with rolling walker and CGA of therapist, requires min (A) for all turning with walker today; pt stating "I can walk better with my two walking sticks." Considering his LUE weakness (more weak proximally, only able to lift L shoulder to ~60 deg flex actively at best), I don't feel he can coordinate use of bilateral canes for ambulation (which is how he walks at baseline). Discussed d/c options with friend at bedside, states he's unable to provide 24/7 assistance (lives in [place], [place]). He would benefit from IP rehab to improve LUE strength/ROM, improve overall balance, endurance mobility post stroke event. Discussed PT role, POC, goals and recommendations (IP rehab; increased POC frequency from 3x to 4x/week) with patient, friend, and NP Eunice; verbalized understanding. I ordered rolling walker from C&D to come to patient's room, safe to use with nursing to assist with mobility (updated RN Kimberley on his mobility and use of walker with staff). Izaiah Douglas will continue to benefit from acute PT services to promote mobility during this admission and improve return to PLOF.    Problem: Physical Therapy Goal  Goal: Physical Therapy Goal  Description  Goals to be met by: 10/25/19     Patient will increase functional independence with mobility by performin. Supine to sit with Minimal Assistance - Not met  2. Sit to supine with Minimal Assistance - Not met  3. Sit to stand transfer with Stand-by Assistance - Not met  4. Bed to chair transfer with Supervision using Single-point Cane PRN - Discontinue  5. Gait  x 100 feet with Supervision using Single-point Cane PRN - Discontinue  6. " Ascend/descend 3 stairs with right Handrail with Supervision - Not met  7. Lower extremity exercise program x 20 reps per handout, with assistance as needed - Not met    8. Added on 10/13: Bed to chair transfer with RW and supervision - Not met  9. Added on 10/13: Gait x 300 ft with RW and SBA (including turns and straight lines) - Not met   Outcome: Ongoing, Progressing    Ivan Barnett, PT  10/13/2019

## 2019-10-13 NOTE — PROGRESS NOTES
[facility]  Vascular Neurology  Comprehensive Stroke Center  Progress Note    Assessment/Plan:     * Acute arterial ischemic stroke, multifocal, multiple vascular territories  84 y/o male with PMH of chronic Afib, CAD s/p stent, HTN, CHF, hypothyroidism who presented with acute onset RSW, aphasia and L gaze. AVELINO 7:05pm. Was seen at [facility] where his NIHSS was documented as 12. Given tPA and transferred to [facility]. NIHSS on arrival was 8. CTA MP showed a chronically occluded L ICA from it's origin to the supraclinoid segment with filling of the MCA via the AComm. He was also noted to have an occluded V4 with an intermittently occluded basilar. No EVT.    Per chart review patient has documented Afib since 2018 and was not started on AC. He appears to have chronic occluded ICA w/ substantial burden of both extracranial and intracranial atherosclerotic disease. Etiology at this time likely cardioembolic. Patient started on Eliquis 2.5 mg BID.     Antithrombotics for secondary stroke prevention:  Eliquis 2.5 mg BID    Statins for secondary stroke prevention and hyperlipidemia, if present:   Statins: Atorvastatin- 40 mg daily    Aggressive risk factor modification: HTN     Rehab efforts: The patient has been evaluated by a stroke team provider and the therapy needs have been fully considered based off the presenting complaints and exam findings. The following therapy evaluations are needed: PT evaluate and treat, OT evaluate and treat, SLP evaluate and treat, PM&R evaluate for appropriate placement - rehab     Diagnostics ordered/pending: NA    VTE prophylaxis: Eliquis 2.5 mg BID    BP parameters: Infarct: Post tPA, SBP <180        Hyponatremia  Sodium 131  Urine studies ordered     Intracranial atherosclerosis  Noted on CTA head   Started on eliquis and Atorvastatin 40 mg     Occlusion of left carotid artery  Seen on CTA  Suspect chronic occlusion   No acute concern at this time     Nodule of right  lung  1 cm nodule of R lung seen on CTA  Will need follow up in 3- 6 months     Creatinine elevation  Trending down   2.01-->2.0-->1.6-->1.5  Continue to monitor    Per Care Everywhere patient with intermittent increased creatinine - patient with admission to [facility] in May with creatinine of 3.0 likely due to dehydration. When he was admitted in July creatinine 1.5    Encouraging PO intake - at this time holding IV fluids due to low EF      Cytotoxic brain edema  Area of cytotoxic cerebral edema identified when reviewing brain imaging in the territory of the R middle cerebral artery. There is not mass effect associated with it. We will continue to monitor the patients clinical exam for any worsening of symptoms which may indicate expansion of the stroke or the area of the edema resulting in the clinical change. The pattern is suggestive of cardioembolic etiology        CAD (coronary artery disease)  Stroke risk factor  -- Noted on Care Everywhere  -- S/p RCA stent  -- Prescribed Plavix, statin and Ranexa but unclear if he's taking them  -- Eliquis started 10/12  -- Atorvastatin 40 mg daily       Paroxysmal atrial fibrillation  Stroke risk factor  -- Per Care Everywhere; not on AC - Dr. Menjivar cardiologist reports patient not a good candidate due to risk of falls, non-compliance, and dementia   -- Noted on EKG at admit.  -- Rate controlled  -- Eliquis 2.5 mg started 10/12 - low dose due to age and creatinine    Chronic combined systolic and diastolic congestive heart failure  Stroke risk factor  -- pro BNP from Feb 2019 >3000  -- On Lasix and Aldactone at home.  -- TTE 35%  -- Per chart review EF previously 35%   -- Will slowly start to resume home CHF medications if creatinine continues to improve     Acquired hypothyroidism  Stroke risk factor  -- Continue Synthroid 25mcg daily    Essential hypertension  Stroke risk factor  -- SBP<180  -- Coreg 6.25 mg BID  -- Norvasc 5 mg daily started 10/13  -- Monitor heart rate           10/12 - Etiology likely cardioembolic. Will start Eliquis 2.5 mg BID. Creatinine trending down - may start patient on Eliquis 5 mg BID if continue to decrease. L ICA chronically occluded - no need to vasculare intervention.   10/13 - Spoke with PT - patient would benefit from inpatient rehab placement. Norvasc 5 mg ordered. Urine studies ordered for hyponatremia - suspect to be due to dehydration.     STROKE DOCUMENTATION   Acute Stroke Times   Last Known Normal Date: 10/10/19  Last Known Normal Time: 1905  Symptom Onset Date: 10/10/19  Symptom Onset Time: 1905  Stroke Team Called Date: 10/10/19  Stroke Team Called Time: 2120  Stroke Team Arrival Date: 10/10/19  Stroke Team Arrival Time: 2122    NIH Scale:  1a. Level of Consciousness: 0-->Alert, keenly responsive  1b. LOC Questions: 1-->Answers one question correctly  1c. LOC Commands: 0-->Performs both tasks correctly  2. Best Gaze: 0-->Normal  3. Visual: 0-->No visual loss  4. Facial Palsy: 0-->Normal symmetrical movements  5a. Motor Arm, Left: 2-->Some effort against gravity, limb cannot get to or maintain (if cued) 90 (or 45) degrees, drifts down to bed, but has some effort against gravity  5b. Motor Arm, Right: 0-->No drift, limb holds 90 (or 45) degrees for full 10 secs  6a. Motor Leg, Left: 0-->No drift, leg holds 30 degree position for full 5 secs  6b. Motor Leg, Right: 0-->No drift, leg holds 30 degree position for full 5 secs  7. Limb Ataxia: 0-->Absent  8. Sensory: 0-->Normal, no sensory loss  9. Best Language: 0-->No aphasia, normal  10. Dysarthria: 0-->Normal  11. Extinction and Inattention (formerly Neglect): 0-->No abnormality  Total (NIH Stroke Scale): 3       Modified Collingsworth Score: 1  Holley Coma Scale:    ABCD2 Score:    [id number] Score:   HAS -BLED Score:   ICH Score:   Hunt & Stroud Classification:      Hemorrhagic change of an Ischemic Stroke: Does this patient have an ischemic stroke with hemorrhagic changes? No     Neurologic Chief  Complaint: Bilateral acute infarcts    Subjective:     Interval History: Patient is seen for follow-up neurological assessment and treatment recommendations:     IVYON. Started on Eliquis 10/12. Family updated.     HPI, Past Medical, Family, and Social History remains the same as documented in the initial encounter.     Review of Systems   Constitutional: Negative for fever.   Eyes: Negative for visual disturbance.   Respiratory: Negative for shortness of breath.    Cardiovascular: Negative for chest pain.   Gastrointestinal: Negative for nausea and vomiting.   Neurological: Positive for weakness. Negative for facial asymmetry and speech difficulty.   Psychiatric/Behavioral: Negative for agitation and confusion.     Scheduled Meds:   apixaban  2.5 mg Oral BID    atorvastatin  40 mg Oral Daily    carvedilol  6.25 mg Oral BID    donepezil  5 mg Oral QHS    levothyroxine  25 mcg Oral Daily     Continuous Infusions:  PRN Meds:calcium gluconate IVPB, calcium gluconate IVPB, calcium gluconate IVPB, hydrALAZINE, magnesium sulfate IVPB, magnesium sulfate IVPB, potassium chloride in water **AND** potassium chloride in water **AND** potassium chloride in water, sodium chloride 0.9%, sodium phosphate IVPB, sodium phosphate IVPB, sodium phosphate IVPB    Objective:     Vital Signs (Most Recent):  Temp: 98 °F (36.7 °C) (10/13/19 0728)  Pulse: 72 (10/13/19 0728)  Resp: 18 (10/13/19 0728)  BP: (!) 164/81 (10/13/19 0900)  SpO2: (!) 93 % (10/13/19 0900)  BP Location: Right arm    Vital Signs Range (Last 24H):  Temp:  [97.6 °F (36.4 °C)-98 °F (36.7 °C)]   Pulse:  [54-77]   Resp:  [14-18]   BP: (162-195)/(79-99)   SpO2:  [93 %-97 %]   BP Location: Right arm    Physical Exam   HENT:   Head: Normocephalic.   Eyes: Pupils are equal, round, and reactive to light. EOM are normal.   Cardiovascular: Normal rate.   Pulmonary/Chest: Effort normal.   Abdominal: Soft.   Musculoskeletal:   LUE weakness   Neurological: He is alert.   Skin:  Skin is warm. Capillary refill takes less than 2 seconds.   Psychiatric: He has a normal mood and affect.       Neurological Exam:   LOC: alert  Attention Span: Good   Language: No aphasia  Articulation: No dysarthria  Orientation: Person  Visual Fields: Full  EOM (CN III, IV, VI): Full/intact  Pupils (CN II, III): PERRL  Facial Movement (CN VII): Symmetric facial expression    Motor: Arm left  Paresis: 3/5  Leg left  Normal 5/5  Arm right  Normal 5/5  Leg right Normal 5/5  Cebellar: No evidence of appendicular or axial ataxia  Sensation: Intact to light touch, temperature and vibration    Laboratory:  CMP:   Recent Labs   Lab 10/13/19  0510   CALCIUM 9.2   ALBUMIN 3.4*   PROT 6.4   *   K 4.8   CO2 22*   CL 99   BUN 27*   CREATININE 1.5*   ALKPHOS 71   ALT 10   AST 18   BILITOT 1.1*     BMP:   Recent Labs   Lab 10/13/19  0510   *   K 4.8   CL 99   CO2 22*   BUN 27*   CREATININE 1.5*   CALCIUM 9.2     CBC:   Recent Labs   Lab 10/13/19  0510   WBC 8.66   RBC 5.13   HGB 13.4*   HCT 45.6   *   MCV 89   MCH 26.1*   MCHC 29.4*     Lipid Panel:   Recent Labs   Lab 10/10/19  2249   CHOL 115*   LDLCALC 69.0   HDL 34*   TRIG 60     Coagulation:   Recent Labs   Lab 10/10/19  1934   INR 1.2   APTT 34.8     Platelet Aggregation Study: No results for input(s): PLTAGG, PLTAGINTERP, PLTAGREGLACO, ADPPLTAGGREG in the last 168 hours.  Hgb A1C:   Recent Labs   Lab 10/10/19  2249   HGBA1C 5.7*     TSH:   Recent Labs   Lab 10/10/19  2249   TSH 3.032       Diagnostic Results     Brain Imaging   MRI brain 10/11  The examination is limited due to motion artifact however there are areas of abnormal T2/FLAIR/diffusion signal abnormality consistent with areas of acute ischemia/infarct involving the cerebral hemispheres bilaterally, as discussed above.    The suspected intracranial meningioma adjacent to the supraclinoid right internal carotid artery seen on the CT examination is not well evaluated on this examination in  part due to the motion artifact.    There is signal abnormality associated with the left internal carotid artery likely corresponding to the appearance of occlusion on the CTA examination.    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    Clinical and historical correlation is needed however when the patient can better tolerate imaging additional follow-up brain MRI can be done if clinically warranted.      Vessel Imaging   CTA   CT head:    No acute intracranial abnormality.    Generalized cerebral volume loss and remote lacunar type infarct in the right caudate head.    Cyst in the posterior fossa with mass effect on the left cerebellar hemisphere.    Partially calcified hyperattenuating lesion in the lateral aspect of the cavernous sinus abutting the distal right ICA, likely representing calcified meningioma.    CTA head:    Limited evaluation due to suboptimal contrast timing.    Occluded left ICA with reconstitution of the distal/supraclinoid segment by retrograde flow through [id number].    Occluded intracranial segment of the left vertebral terminating at the level of left PICA origin.  Right vertebral artery supply the basilar artery.  Basilar artery is small in caliber with wall irregularity and intermittent occlusions.    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    Miscellaneous:    Right pleural effusion with associated compressive atelectasis.    1 cm sub solid nodule in the right lung apex.  For a part solid nodule 6 mm or greater, Fleischner Society 2017 guidelines recommend follow up with non-contrast chest CT at 3-6 months to confirm persistence. If this nodule is unchanged and the solid component remains <6 mm, annual follow up CT should be performed for 5 years; however, persistence of a part-solid nodule with solid component ?6 mm should be  considered highly suspicious and may warrant further workup with PET/CT, biopsy, or resection.    Cardiac Imaging   TTE 10/11  · Moderately decreased left ventricular systolic function. The estimated ejection fraction is 35%  · Concentric left ventricular remodeling.  · Left ventricular diastolic dysfunction.  · Local segmental wall motion abnormalities.  · Severe left atrial enlargement.  · Mild right atrial enlargement.  · Mild aortic regurgitation.  · Moderate aortic valve stenosis but difficult to appreciate in the setting of AF, depressed EF, and low stroke volume.  · Aortic valve area is 1.39 cm2; peak velocity is 1.28 m/s; mean gradient is 4 mmHg.  · Elevated central venous pressure (15 mm Hg).           Eunice Downs NP  Comprehensive Stroke Center  Department of Vascular Neurology   [facility]

## 2019-10-13 NOTE — ASSESSMENT & PLAN NOTE
Stroke risk factor  -- Per Care Everywhere; not on AC - Dr. Menjivar cardiologist reports patient not a good candidate due to risk of falls, non-compliance, and dementia   -- Noted on EKG at admit.  -- Rate controlled  -- Eliquis 2.5 mg started 10/12 - low dose due to age and creatinine

## 2019-10-13 NOTE — ASSESSMENT & PLAN NOTE
Stroke risk factor  -- SBP<180  -- Coreg 6.25 mg BID  -- Norvasc 5 mg daily started 10/13  -- Monitor heart rate

## 2019-10-13 NOTE — PROGRESS NOTES
Felicia PERALTA made aware of pt having a 2.2 second  pause on tele, pt baseline rhythm afib. Pt sleeping asymptomatic.

## 2019-10-13 NOTE — ASSESSMENT & PLAN NOTE
86 y/o male with PMH of chronic Afib, CAD s/p stent, HTN, CHF, hypothyroidism who presented with acute onset RSW, aphasia and L gaze. LKN 7:05pm. Was seen at [facility] where his NIHSS was documented as 12. Given tPA and transferred to [facility]. NIHSS on arrival was 8. CTA MP showed a chronically occluded L ICA from it's origin to the supraclinoid segment with filling of the MCA via the AComm. He was also noted to have an occluded V4 with an intermittently occluded basilar. No EVT.    Per chart review patient has documented Afib since 2018 and was not started on AC. He appears to have chronic occluded ICA w/ substantial burden of both extracranial and intracranial atherosclerotic disease. Etiology at this time likely cardioembolic. Patient started on Eliquis 2.5 mg BID.     Antithrombotics for secondary stroke prevention:  Eliquis 2.5 mg BID    Statins for secondary stroke prevention and hyperlipidemia, if present:   Statins: Atorvastatin- 40 mg daily    Aggressive risk factor modification: HTN     Rehab efforts: The patient has been evaluated by a stroke team provider and the therapy needs have been fully considered based off the presenting complaints and exam findings. The following therapy evaluations are needed: PT evaluate and treat, OT evaluate and treat, SLP evaluate and treat, PM&R evaluate for appropriate placement - rehab     Diagnostics ordered/pending: NA    VTE prophylaxis: Eliquis 2.5 mg BID    BP parameters: Infarct: Post tPA, SBP <180

## 2019-10-13 NOTE — PT/OT/SLP PROGRESS
"Physical Therapy  Treatment    Izaiah Douglas   [id number]    Time Tracking:     PT Received On: 10/13/19   PT Start Time: 1050   PT Stop Time: 1115   PT Total Time (min): 25 min    Billable Minutes: Gait Training 10 and Therapeutic Activity 15      Recommendations:     Discharge recommendations: Inpatient Rehabilitation     Equipment recommendations: None (has 2 single point canes and rolling walker at home)    Barriers to Discharge: LUE weakness; discussed d/c possiblities with patient and his friend at bedside today. Friend is unable to provide 24/7 assistance at home, I fear due to patient's baseline dementia he'll attempt to get up on his own and fall considering his LUE weakness, uses 2 canes to ambulate at baseline. Discussed with NP Eunice    Patient Information:     Recent Surgery: * No surgery found *      Diagnosis: Acute arterial ischemic stroke, multifocal, multiple vascular territories    Length of Stay: 3 days    General Precautions: Standard, aspiration, fall  Orthopedic Precautions: None    Assessment:     Izaiah Douglas tolerated treatment well today. He was OOBTC with his friend present upon my entry to room. He is pleasantly confused, has difficulty answering more complex questions in regards to prior level and discharge options but he can follow simple commands and answer how he's feeling, etc. Ambulates 120 ft in hallways today with rolling walker and CGA of therapist, requires min (A) for all turning with walker today; pt stating "I can walk better with my two walking sticks." Considering his LUE weakness (more weak proximally, only able to lift L shoulder to ~60 deg flex actively at best), I don't feel he can coordinate use of bilateral canes for ambulation. Discussed d/c options with friend at bedside, states he's unable to provide 24/7 assistance (lives in [place], [place]). He would benefit from IP rehab to improve LUE strength/ROM, improve overall balance, endurance mobility post stroke event. " Discussed PT role, POC, goals and recommendations (IP rehab; increased POC frequency from 3x to 4x/week) with patient, friend, and NP Eunice; verbalized understanding. I ordered rolling walker from C&D to come to patient's room, safe to use with nursing to assist with mobility (updated HEATHER Chu on his mobility and use of walker with staff). Izaiah Douglas will continue to benefit from acute PT services to promote mobility during this admission and improve return to PLOF.    Problem List: weakness, decreased endurance, impaired self-care skills, impaired mobility, decreased sitting or standing balance, gait instability, decreased cognition and decreased coordination    Rehab Prognosis: Good; patient would benefit from acute skilled PT services to address these deficits and reach maximum level of function.    Plan:     Alter POC for patient to be seen 4x/week (increase frequency) to address the above listed problems via gait training, therapeutic activities, therapeutic exercises, neuromuscular re-education    Plan of Care Expires: 11/08/19  Plan of Care reviewed with: patient, friend    Subjective:     Communicated with HEATHER Chu prior to treatment, appropriate to see for treatment.    Pt found sitting up in bedside chair upon PT entry to room, agreeable to treatment.    Does this patient have any cultural, spiritual, [employer] conflicts given the current situation? Patient/family has no barriers to learning. Patient/family verbalizes understanding of his/her program and goals and demonstrates them correctly. No cultural, spiritual, or educational needs identified.    Objective:     Patient found with: telemetry    Pain:  Pain Rating 1: 0/10  Pain Rating Post-Intervention 1: 0/10    Functional Mobility:    · Bed Mobility:  · OOBTC before and after; NT today    · Transfers:  · Sit to Stand: CGA from bedside chair with RW x 1 trial(s)  · Stand to Sit: CGA to bedside chair with RW x 1 trial(s)    · Gait:  · 120 feet  " in hallways today with rolling walker and CGA of therapist, requires min (A) for all turning with walker today; pt stating "I can walk better with my two walking sticks." Considering his LUE weakness (more weak proximally, only able to lift L shoulder to ~60 deg flex actively at best), I don't feel he can coordinate use of bilateral canes for ambulation    · Assist level: Min Assist  · Device: Rolling walker    · Balance:  · Static Sit: Stand-By Assist at edge of BS chair    · Static Stand: Contact-Guard Assist with Rolling walker    Additional Therapeutic Activity/Exercises:     1. Assessed UE and LE ROM and strength at edge of bedside chair prior to mobility today.   A. RUE is WFL for ROM and strength   B. LUE shoulder flex 2/5, elbow flex/ext 3+/5, wrist/digit flex/ext 3+/5(stronger distally than proximally)   C. 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 LLE is WFL for ROM and strength    2. Discussed PT role, POC, goals and recommendations (IP rehab; increased POC frequency from 3x to 4x/week) with patient, friend, and NP Eunice; verbalized understanding.    3. I ordered rolling walker from C&D to come to patient's room, safe to use with nursing to assist with mobility (updated RN Kimberley on his mobility and use of walker with staff).    4. Spoke with friend about time OOBTC, encouraged him to get into chair with nsg for meals during day and stay in chair for 1.5-2 hours after each meal. Friend verbalized understanding. Also encouraged patient to perform AROM of LUE as much as possible during the day to assist with return of motor function.    5. Whiteboard was updated.    AM-PAC 6 CLICK MOBILITY  Turning over in bed (including adjusting bedclothes, sheets and blankets)?: 3  Sitting down on and standing up from a chair with arms (e.g., wheelchair, bedside commode, etc.): 3  Moving from lying on back to sitting on the side of the bed?: 3  Moving to and from a bed to a chair " (including a wheelchair)?: 3  Need to walk in hospital room?: 3  Climbing 3-5 steps with a railing?: 2  Basic Mobility Total Score: 17    Patient was left reclined in bedside chair with all lines intact, call button in reach, RN notified and friend present.    GOALS:   Multidisciplinary Problems     Physical Therapy Goals        Problem: Physical Therapy Goal    Goal Priority Disciplines Outcome Goal Variances Interventions   Physical Therapy Goal     PT, PT/OT Ongoing, Progressing     Description:  Goals to be met by: 10/25/19     Patient will increase functional independence with mobility by performin. Supine to sit with Minimal Assistance - Not met  2. Sit to supine with Minimal Assistance - Not met  3. Sit to stand transfer with Stand-by Assistance - Not met  4. Bed to chair transfer with Supervision using Single-point Cane PRN - Discontinue  5. Gait  x 100 feet with Supervision using Single-point Cane PRN - Discontinue  6. Ascend/descend 3 stairs with right Handrail with Supervision - Not met  7. Lower extremity exercise program x 20 reps per handout, with assistance as needed - Not met    8. Added on 10/13: Bed to chair transfer with RW and supervision - Not met  9. Added on 10/13: Gait x 300 ft with RW and SBA (including turns and straight lines) - Not met                  Ivan Barnett, PT   10/13/2019

## 2019-10-13 NOTE — PLAN OF CARE
Pt free of falls/trauma/injuries.  Denies c/o SOB; O2Sats remain stable on room air. I  Generalized skin remains CDI;  with the exception of some bruising that the family or pt were unable to identify cause. 2+ edema noted to BLEs.  TEDs applied / in place.  Pt did have tech to not reapply the YOCASTA hose or SCDs stating they were just to tight. I   Wt  remains stable.  Electrolytes replaced as ordered.  PT/OT following; pt has been a 1 person assist with transfers from the bed to the bedside commode. Is confused to situation and time but easily reoriented . Family staying with him states he does have a history of dementia. Family at bedside continues to encourage PT to get back in bed but wants to stay up in chair with feet reclined. He will then call someone to the room and states he needs to go to the bathroom and the Pt will be asleep.  Pt tolerating plan of care.

## 2019-10-13 NOTE — ASSESSMENT & PLAN NOTE
Stroke risk factor  -- pro BNP from Feb 2019 >3000  -- On Lasix and Aldactone at home.  -- TTE 35%  -- Per chart review EF previously 35%   -- Will slowly start to resume home CHF medications if creatinine continues to improve

## 2019-10-13 NOTE — ASSESSMENT & PLAN NOTE
Trending down   2.01-->2.0-->1.6-->1.5  Continue to monitor    Per Care Everywhere patient with intermittent increased creatinine - patient with admission to [facility] in May with creatinine of 3.0 likely due to dehydration. When he was admitted in July creatinine 1.5    Encouraging PO intake - at this time holding IV fluids due to low EF

## 2019-10-13 NOTE — SUBJECTIVE & OBJECTIVE
Neurologic Chief Complaint: Bilateral acute infarcts    Subjective:     Interval History: Patient is seen for follow-up neurological assessment and treatment recommendations:     SIRIA. Started on Eliquis 10/12. Family updated.     HPI, Past Medical, Family, and Social History remains the same as documented in the initial encounter.     Review of Systems   Constitutional: Negative for fever.   Eyes: Negative for visual disturbance.   Respiratory: Negative for shortness of breath.    Cardiovascular: Negative for chest pain.   Gastrointestinal: Negative for nausea and vomiting.   Neurological: Positive for weakness. Negative for facial asymmetry and speech difficulty.   Psychiatric/Behavioral: Negative for agitation and confusion.     Scheduled Meds:   apixaban  2.5 mg Oral BID    atorvastatin  40 mg Oral Daily    carvedilol  6.25 mg Oral BID    donepezil  5 mg Oral QHS    levothyroxine  25 mcg Oral Daily     Continuous Infusions:  PRN Meds:calcium gluconate IVPB, calcium gluconate IVPB, calcium gluconate IVPB, hydrALAZINE, magnesium sulfate IVPB, magnesium sulfate IVPB, potassium chloride in water **AND** potassium chloride in water **AND** potassium chloride in water, sodium chloride 0.9%, sodium phosphate IVPB, sodium phosphate IVPB, sodium phosphate IVPB    Objective:     Vital Signs (Most Recent):  Temp: 98 °F (36.7 °C) (10/13/19 0728)  Pulse: 72 (10/13/19 0728)  Resp: 18 (10/13/19 0728)  BP: (!) 164/81 (10/13/19 0900)  SpO2: (!) 93 % (10/13/19 0900)  BP Location: Right arm    Vital Signs Range (Last 24H):  Temp:  [97.6 °F (36.4 °C)-98 °F (36.7 °C)]   Pulse:  [54-77]   Resp:  [14-18]   BP: (162-195)/(79-99)   SpO2:  [93 %-97 %]   BP Location: Right arm    Physical Exam   HENT:   Head: Normocephalic.   Eyes: Pupils are equal, round, and reactive to light. EOM are normal.   Cardiovascular: Normal rate.   Pulmonary/Chest: Effort normal.   Abdominal: Soft.   Musculoskeletal:   LUE weakness   Neurological: He is  alert.   Skin: Skin is warm. Capillary refill takes less than 2 seconds.   Psychiatric: He has a normal mood and affect.       Neurological Exam:   LOC: alert  Attention Span: Good   Language: No aphasia  Articulation: No dysarthria  Orientation: Person  Visual Fields: Full  EOM (CN III, IV, VI): Full/intact  Pupils (CN II, III): PERRL  Facial Movement (CN VII): Symmetric facial expression    Motor: Arm left  Paresis: 3/5  Leg left  Normal 5/5  Arm right  Normal 5/5  Leg right Normal 5/5  Cebellar: No evidence of appendicular or axial ataxia  Sensation: Intact to light touch, temperature and vibration    Laboratory:  CMP:   Recent Labs   Lab 10/13/19  0510   CALCIUM 9.2   ALBUMIN 3.4*   PROT 6.4   *   K 4.8   CO2 22*   CL 99   BUN 27*   CREATININE 1.5*   ALKPHOS 71   ALT 10   AST 18   BILITOT 1.1*     BMP:   Recent Labs   Lab 10/13/19  0510   *   K 4.8   CL 99   CO2 22*   BUN 27*   CREATININE 1.5*   CALCIUM 9.2     CBC:   Recent Labs   Lab 10/13/19  0510   WBC 8.66   RBC 5.13   HGB 13.4*   HCT 45.6   *   MCV 89   MCH 26.1*   MCHC 29.4*     Lipid Panel:   Recent Labs   Lab 10/10/19  2249   CHOL 115*   LDLCALC 69.0   HDL 34*   TRIG 60     Coagulation:   Recent Labs   Lab 10/10/19  1934   INR 1.2   APTT 34.8     Platelet Aggregation Study: No results for input(s): PLTAGG, PLTAGINTERP, PLTAGREGLACO, ADPPLTAGGREG in the last 168 hours.  Hgb A1C:   Recent Labs   Lab 10/10/19  2249   HGBA1C 5.7*     TSH:   Recent Labs   Lab 10/10/19  2249   TSH 3.032       Diagnostic Results     Brain Imaging   MRI brain 10/11  The examination is limited due to motion artifact however there are areas of abnormal T2/FLAIR/diffusion signal abnormality consistent with areas of acute ischemia/infarct involving the cerebral hemispheres bilaterally, as discussed above.    The suspected intracranial meningioma adjacent to the supraclinoid right internal carotid artery seen on the CT examination is not well evaluated on this  examination in part due to the motion artifact.    There is signal abnormality associated with the left internal carotid artery likely corresponding to the appearance of occlusion on the CTA examination.    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    Clinical and historical correlation is needed however when the patient can better tolerate imaging additional follow-up brain MRI can be done if clinically warranted.      Vessel Imaging   CTA   CT head:    No acute intracranial abnormality.    Generalized cerebral volume loss and remote lacunar type infarct in the right caudate head.    Cyst in the posterior fossa with mass effect on the left cerebellar hemisphere.    Partially calcified hyperattenuating lesion in the lateral aspect of the cavernous sinus abutting the distal right ICA, likely representing calcified meningioma.    CTA head:    Limited evaluation due to suboptimal contrast timing.    Occluded left ICA with reconstitution of the distal/supraclinoid segment by retrograde flow through [id number].    Occluded intracranial segment of the left vertebral terminating at the level of left PICA origin.  Right vertebral artery supply the basilar artery.  Basilar artery is small in caliber with wall irregularity and intermittent occlusions.    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    Miscellaneous:    Right pleural effusion with associated compressive atelectasis.    1 cm sub solid nodule in the right lung apex.  For a part solid nodule 6 mm or greater, Fleischner Society 2017 guidelines recommend follow up with non-contrast chest CT at 3-6 months to confirm persistence. If this nodule is unchanged and the solid component remains <6 mm, annual follow up CT should be performed for 5 years; however, persistence of a part-solid nodule with solid component ?6 mm  should be considered highly suspicious and may warrant further workup with PET/CT, biopsy, or resection.    Cardiac Imaging   TTE 10/11  · Moderately decreased left ventricular systolic function. The estimated ejection fraction is 35%  · Concentric left ventricular remodeling.  · Left ventricular diastolic dysfunction.  · Local segmental wall motion abnormalities.  · Severe left atrial enlargement.  · Mild right atrial enlargement.  · Mild aortic regurgitation.  · Moderate aortic valve stenosis but difficult to appreciate in the setting of AF, depressed EF, and low stroke volume.  · Aortic valve area is 1.39 cm2; peak velocity is 1.28 m/s; mean gradient is 4 mmHg.  · Elevated central venous pressure (15 mm Hg).

## 2019-10-14 PROBLEM — E22.2 SIADH (SYNDROME OF INAPPROPRIATE ADH PRODUCTION): Status: ACTIVE | Noted: 2019-10-14

## 2019-10-14 LAB
ALBUMIN SERPL BCP-MCNC: 3.2 G/DL (ref 3.5–5.2)
ALP SERPL-CCNC: 67 U/L (ref 55–135)
ALT SERPL W/O P-5'-P-CCNC: 9 U/L (ref 10–44)
ANION GAP SERPL CALC-SCNC: 10 MMOL/L (ref 8–16)
AST SERPL-CCNC: 17 U/L (ref 10–40)
BASOPHILS # BLD AUTO: 0.02 K/UL (ref 0–0.2)
BASOPHILS NFR BLD: 0.2 % (ref 0–1.9)
BILIRUB SERPL-MCNC: 1.2 MG/DL (ref 0.1–1)
BUN SERPL-MCNC: 25 MG/DL (ref 8–23)
CALCIUM SERPL-MCNC: 8.8 MG/DL (ref 8.7–10.5)
CHLORIDE SERPL-SCNC: 99 MMOL/L (ref 95–110)
CO2 SERPL-SCNC: 23 MMOL/L (ref 23–29)
CREAT SERPL-MCNC: 1.4 MG/DL (ref 0.5–1.4)
DIFFERENTIAL METHOD: ABNORMAL
EOSINOPHIL # BLD AUTO: 0.3 K/UL (ref 0–0.5)
EOSINOPHIL NFR BLD: 3.6 % (ref 0–8)
ERYTHROCYTE [DISTWIDTH] IN BLOOD BY AUTOMATED COUNT: 13.5 % (ref 11.5–14.5)
EST. GFR  (AFRICAN AMERICAN): 52.6 ML/MIN/1.73 M^2
EST. GFR  (NON AFRICAN AMERICAN): 45.5 ML/MIN/1.73 M^2
GLUCOSE SERPL-MCNC: 86 MG/DL (ref 70–110)
HCT VFR BLD AUTO: 42 % (ref 40–54)
HGB BLD-MCNC: 13.1 G/DL (ref 14–18)
IMM GRANULOCYTES # BLD AUTO: 0.02 K/UL (ref 0–0.04)
IMM GRANULOCYTES NFR BLD AUTO: 0.2 % (ref 0–0.5)
LYMPHOCYTES # BLD AUTO: 1.2 K/UL (ref 1–4.8)
LYMPHOCYTES NFR BLD: 14.9 % (ref 18–48)
MAGNESIUM SERPL-MCNC: 1.9 MG/DL (ref 1.6–2.6)
MCH RBC QN AUTO: 27 PG (ref 27–31)
MCHC RBC AUTO-ENTMCNC: 31.2 G/DL (ref 32–36)
MCV RBC AUTO: 86 FL (ref 82–98)
MONOCYTES # BLD AUTO: 0.9 K/UL (ref 0.3–1)
MONOCYTES NFR BLD: 10.4 % (ref 4–15)
NEUTROPHILS # BLD AUTO: 5.9 K/UL (ref 1.8–7.7)
NEUTROPHILS NFR BLD: 70.7 % (ref 38–73)
NRBC BLD-RTO: 0 /100 WBC
OSMOLALITY UR: 524 MOSM/KG (ref 50–1200)
PHOSPHATE SERPL-MCNC: 3.3 MG/DL (ref 2.7–4.5)
PLATELET # BLD AUTO: 156 K/UL (ref 150–350)
PMV BLD AUTO: 10.7 FL (ref 9.2–12.9)
POTASSIUM SERPL-SCNC: 4 MMOL/L (ref 3.5–5.1)
PROT SERPL-MCNC: 6.1 G/DL (ref 6–8.4)
RBC # BLD AUTO: 4.86 M/UL (ref 4.6–6.2)
SODIUM SERPL-SCNC: 132 MMOL/L (ref 136–145)
SODIUM UR-SCNC: 105 MMOL/L (ref 20–250)
WBC # BLD AUTO: 8.33 K/UL (ref 3.9–12.7)

## 2019-10-14 PROCEDURE — 99233 SBSQ HOSP IP/OBS HIGH 50: CPT | Mod: GC,,, | Performed by: PSYCHIATRY & NEUROLOGY

## 2019-10-14 PROCEDURE — 97116 GAIT TRAINING THERAPY: CPT

## 2019-10-14 PROCEDURE — 25000003 PHARM REV CODE 250: Performed by: STUDENT IN AN ORGANIZED HEALTH CARE EDUCATION/TRAINING PROGRAM

## 2019-10-14 PROCEDURE — 97535 SELF CARE MNGMENT TRAINING: CPT

## 2019-10-14 PROCEDURE — 84100 ASSAY OF PHOSPHORUS: CPT

## 2019-10-14 PROCEDURE — 25000003 PHARM REV CODE 250: Performed by: NURSE PRACTITIONER

## 2019-10-14 PROCEDURE — 99222 1ST HOSP IP/OBS MODERATE 55: CPT | Mod: ,,, | Performed by: NURSE PRACTITIONER

## 2019-10-14 PROCEDURE — 36415 COLL VENOUS BLD VENIPUNCTURE: CPT

## 2019-10-14 PROCEDURE — 83735 ASSAY OF MAGNESIUM: CPT

## 2019-10-14 PROCEDURE — 97530 THERAPEUTIC ACTIVITIES: CPT

## 2019-10-14 PROCEDURE — 20600001 HC STEP DOWN PRIVATE ROOM

## 2019-10-14 PROCEDURE — 25000003 PHARM REV CODE 250: Performed by: PSYCHIATRY & NEUROLOGY

## 2019-10-14 PROCEDURE — 85025 COMPLETE CBC W/AUTO DIFF WBC: CPT

## 2019-10-14 PROCEDURE — 99233 PR SUBSEQUENT HOSPITAL CARE,LEVL III: ICD-10-PCS | Mod: GC,,, | Performed by: PSYCHIATRY & NEUROLOGY

## 2019-10-14 PROCEDURE — 80053 COMPREHEN METABOLIC PANEL: CPT

## 2019-10-14 PROCEDURE — 25000003 PHARM REV CODE 250: Performed by: PHYSICIAN ASSISTANT

## 2019-10-14 PROCEDURE — 99222 PR INITIAL HOSPITAL CARE,LEVL II: ICD-10-PCS | Mod: ,,, | Performed by: NURSE PRACTITIONER

## 2019-10-14 RX ADMIN — SENNOSIDES 8.6 MG: 8.6 TABLET, FILM COATED ORAL at 08:10

## 2019-10-14 RX ADMIN — APIXABAN 5 MG: 5 TABLET, FILM COATED ORAL at 08:10

## 2019-10-14 RX ADMIN — ATORVASTATIN CALCIUM 40 MG: 20 TABLET, FILM COATED ORAL at 08:10

## 2019-10-14 RX ADMIN — CARVEDILOL 6.25 MG: 6.25 TABLET, FILM COATED ORAL at 08:10

## 2019-10-14 RX ADMIN — APIXABAN 2.5 MG: 2.5 TABLET, FILM COATED ORAL at 08:10

## 2019-10-14 RX ADMIN — LEVOTHYROXINE SODIUM 25 MCG: 25 TABLET ORAL at 08:10

## 2019-10-14 RX ADMIN — AMLODIPINE BESYLATE 5 MG: 5 TABLET ORAL at 08:10

## 2019-10-14 RX ADMIN — DONEPEZIL HYDROCHLORIDE 5 MG: 5 TABLET, FILM COATED ORAL at 08:10

## 2019-10-14 NOTE — PLAN OF CARE
Problem: Occupational Therapy Goal  Goal: Occupational Therapy Goal  Description  Goals to be met by: 10/21     Patient will increase functional independence with ADLs by performing:    UE Dressing with Modified [place].  LE Dressing with Modified [place].  Grooming while standing with Modified [place].  Toileting from toilet with Modified [place] for hygiene and clothing management.   Supine to sit with Modified [place].  Step transfer with Modified [place]     Outcome: Ongoing, Progressing     Goals remain appropriate

## 2019-10-14 NOTE — ASSESSMENT & PLAN NOTE
-  Presented with confusion, speech difficulty, and right-sided weakness s/p tPA  -  Found to have chronic L ICA occlusion and acute infarction within bilateral cerebral hemispheres  -  Management per Vascular Neurology--> etiology likely cardioembolic- started on Eliquis  See hospital course for functional, cognitive/speech/language, and nutrition/swallow status.    Recommendations  -  Encourage mobility, OOB in chair, and early ambulation as appropriate   -  PT/OT evaluate and treat  -  SLP speech and cognitive evaluate and treat  -  Monitor mood and sleep disturbances  -  Establish consistent sleep-wake cycle  -  Monitor for bowel and bladder dysfunction  -  Monitor for shoulder pain and subluxation  -  Monitor for spasticity  -  DVT prophylaxis  -  Monitor for and prevent skin breakdown and pressure ulcers  · Early mobility, repositioning/weight shifting every 20-30 minutes when sitting, turn patient every 2 hours, proper mattress/overlay and chair cushioning, pressure relief/heel protector boots

## 2019-10-14 NOTE — PLAN OF CARE
Problem: Physical Therapy Goal  Goal: Physical Therapy Goal  Description  Goals to be met by: 10/25/19     Patient will increase functional independence with mobility by performin. Supine to sit with Minimal Assistance - Not met  2. Sit to supine with Minimal Assistance - Not met  3. Sit to stand transfer with Stand-by Assistance - Not met  4. Bed to chair transfer with Supervision using Single-point Cane PRN - Discontinue  5. Gait  x 100 feet with Supervision using Single-point Cane PRN - Discontinue  6. Ascend/descend 3 stairs with right Handrail with Supervision - Not met  7. Lower extremity exercise program x 20 reps per handout, with assistance as needed - Not met    8. Added on 10/13: Bed to chair transfer with RW and supervision - Not met  9. Added on 10/13: Gait x 300 ft with RW and SBA (including turns and straight lines) - Not met   Outcome: Ongoing, Progressing.  Patient shows improved ednurance, but decreased judgement affects transfer ability and safety with functional mobility.

## 2019-10-14 NOTE — PLAN OF CARE
Patient is alert and oriented to self only. Patient has no complaints of pain or discomfort. Patient has left side hand grasp moderately weak. No facial dropping noted. Fall precautions in place including side rails x3 and bed alarm/chair alarm and [employer] camera in room. Patient is ambulating with assist x1 and walker. Vital signs are WNL, SBP <180. Plan of care discussed with patient. Patient is free of falls/ trauma/ injury. All questions and concerns addressed.

## 2019-10-14 NOTE — HOSPITAL COURSE
10/11/19:  Evaluated by PT, OT, and SLP.  Found to have cognitive-linguistic impairment and dysphagia.  SLP recommendation: mechanical soft diet and thin liquids.  Bed mobility min-maxA.  Sit to stand Murphy.  Ambulated a few lateral steps CGA.  UBD maxA.      10/13/19:  Participated with PT.  Sit to stand and stand to sit CGA with RW.  Ambulated 120 ft CGA with RW and Murphy for all turning with walker.  10/14/19:  Participated with PT and OT.  Bed mobility SBA-Murphy.  Sit to stand and transfers Murphy with RW.  Ambulated 110 ft x 2 Murphy with RW and chair to follow.  LBD modA while seated EOB.

## 2019-10-14 NOTE — CONSULTS
[facility]  Physical Medicine & Rehab  Consult Note    Patient Name: Izaiah Douglas  MRN: [id number]  Admission Date: 10/10/2019  Hospital Length of Stay: 4 days  Attending Physician: Izaiah Gonzales MD     Inpatient consult to Physical Medicine & Rehabilitation  Consult performed by: Bhargavi Gamboa NP  Consult requested by:  Iaziah Gonzales MD    Collaborating Physician: Neisha Alexander MD  Reason for Consult:  assess rehabilitation needs    Consults  Subjective:     Principal Problem: Acute arterial ischemic stroke, multifocal, multiple vascular territories    HPI: Per chart review, Avery Douglas is an 85-year-old male with PMHx of HTN, CAD s/p stents, a-fib, CHF, TIA, hypothyroidism, and dementia.  Patient presented to [facility] with confusion, speech difficulty, and right-sided weakness.  On arrival, CTH impression without acute pathology, and IV tPA administered.  Transferred to [facility] on 10/10/19 for further evaluation and management.  Found to have chronic L ICA occlusion and acute infarction within bilateral cerebral hemispheres.  Vascular Neurology following and etiology likely cardioembolic, and he was started on Eliquis.      Functional History: Patient lives in [place], [place], with his wife in a 2 story home with 3 steps to enter.  Bed and bathroom on 1st floor.  Prior to admission, he was (I) with ADLs and mod(I) with mobility.  Used 2 canes with functional mobility.  DME: [facility].    Hospital Course:   10/11/19:  Evaluated by PT, OT, and SLP.  Found to have cognitive-linguistic impairment and dysphagia.  SLP recommendation: mechanical soft diet and thin liquids.  Bed mobility min-maxA.  Sit to stand Murphy.  Ambulated a few lateral steps CGA.  UBD maxA.      10/13/19:  Participated with PT.  Sit to stand and stand to sit CGA with RW.  Ambulated 120 ft CGA with RW and Murphy for all turning with walker.    Past Medical History:   Diagnosis Date    CHF (congestive heart failure)      Hypertension     Stroke     TIA    Thyroid disease      Past Surgical History:   Procedure Laterality Date    AORTA SURGERY      CORONARY ANGIOPLASTY WITH STENT PLACEMENT      REPAIR OF ANEURYSM       Review of patient's allergies indicates:   Allergen Reactions    Iodine and iodide containing products        Scheduled Medications:    amLODIPine  5 mg Oral Daily    apixaban  5 mg Oral BID    atorvastatin  40 mg Oral Daily    carvedilol  6.25 mg Oral BID    donepezil  5 mg Oral QHS    levothyroxine  25 mcg Oral Daily    senna  8.6 mg Oral Daily       PRN Medications: hydrALAZINE, sodium chloride 0.9%    Family History     None        Tobacco Use    Smoking status: Never Smoker    Smokeless tobacco: Current User     Types: Chew   Substance and Sexual Activity    Alcohol use: Not Currently    Drug use: Never    Sexual activity: Yes     Review of Systems   Constitutional: Positive for activity change. Negative for fatigue and fever.   HENT: Negative for drooling, hearing loss, trouble swallowing and voice change.    Eyes: Negative for pain and visual disturbance.   Respiratory: Negative for cough and shortness of breath.    Cardiovascular: Negative for chest pain and palpitations.   Gastrointestinal: Negative for abdominal pain, nausea and vomiting.   Genitourinary: Negative for difficulty urinating and flank pain.   Musculoskeletal: Positive for gait problem. Negative for back pain and neck pain.   Skin: Negative for rash and wound.   Neurological: Positive for weakness. Negative for numbness and headaches.   Psychiatric/Behavioral: Positive for confusion. Negative for agitation. The patient is not nervous/anxious.      Objective:     Vital Signs (Most Recent):  Temp: 97.6 °F (36.4 °C) (10/14/19 0753)  Pulse: 81 (10/14/19 0753)  Resp: 18 (10/14/19 0753)  BP: (!) 175/91 (10/14/19 0753)  SpO2: 96 % (10/14/19 0753)    Vital Signs (24h Range):  Temp:  [97.1 °F (36.2 °C)-98 °F (36.7 °C)] 97.6 °F (36.4  °C)  Pulse:  [70-85] 81  Resp:  [16-18] 18  SpO2:  [96 %-97 %] 96 %  BP: (152-175)/(74-92) 175/91     Body mass index is 26.13 kg/m².    Physical Exam   Constitutional: He appears well-developed and well-nourished. No distress.   HENT:   Head: Normocephalic and atraumatic.   Right Ear: External ear normal.   Left Ear: External ear normal.   Nose: Nose normal.   Eyes: Right eye exhibits no discharge. Left eye exhibits no discharge. No scleral icterus.   Neck: Normal range of motion.   Cardiovascular: Normal rate and intact distal pulses.   Pulmonary/Chest: Effort normal. No respiratory distress.   Abdominal: Soft. He exhibits no distension. There is no tenderness.   Musculoskeletal: He exhibits deformity. He exhibits no edema or tenderness.   Neurological: He is alert. He is disoriented. No sensory deficit. He exhibits normal muscle tone.   -  Mental Status:  Follows commands.  Identifies 2/3 objects correctly.  -  Speech and language:  no aphasia or dysarthria.    -  Facial movement (CN VII): symmetrical.  -  Motor:  RUE: 5/5.  LUE: proximal 2/5, distal 4-/5.  RLE: 5/5.  LLE: 5/5.   Skin: Skin is warm and dry. No rash noted.   Psychiatric: He has a normal mood and affect. His behavior is normal. Cognition and memory are impaired.   Camera sitter at bedside   Vitals reviewed.    Diagnostic Results:   Labs: Reviewed  X-Ray: Reviewed  CT: Reviewed  MRI: Reviewed    Assessment/Plan:     * Acute arterial ischemic stroke, multifocal, multiple vascular territories  -  Presented with confusion, speech difficulty, and right-sided weakness s/p tPA  -  Found to have chronic L ICA occlusion and acute infarction within bilateral cerebral hemispheres  -  Management per Vascular Neurology--> etiology likely cardioembolic- started on Eliquis  See hospital course for functional, cognitive/speech/language, and nutrition/swallow status.    Recommendations  -  Encourage mobility, OOB in chair, and early ambulation as appropriate   -   PT/OT evaluate and treat  -  SLP speech and cognitive evaluate and treat  -  Monitor mood and sleep disturbances  -  Establish consistent sleep-wake cycle  -  Monitor for bowel and bladder dysfunction  -  Monitor for shoulder pain and subluxation  -  Monitor for spasticity  -  DVT prophylaxis  -  Monitor for and prevent skin breakdown and pressure ulcers  · Early mobility, repositioning/weight shifting every 20-30 minutes when sitting, turn patient every 2 hours, proper mattress/overlay and chair cushioning, pressure relief/heel protector boots    Occlusion of left carotid artery  -  Found on imaging  -  Management per Vascular Neurology--> suspect chronic occlusion- no acute intervention    Paroxysmal atrial fibrillation  -  Stroke risk factor  -  Management per Vascular Neurology--> started on Eliquis    Post-acute recommendation: Inpatient Rehab    Thank you for your consult.     ANGELES Berg  Department of Physical Medicine & Rehab  [facility]

## 2019-10-14 NOTE — SUBJECTIVE & OBJECTIVE
Neurologic Chief Complaint: right sided weakness and aphasia    Subjective:     Interval History: Patient is seen for follow-up neurological assessment and treatment recommendations: Patient with SIADH but sodium stable.  1 liter fluid restriction.      HPI, Past Medical, Family, and Social History remains the same as documented in the initial encounter.     Review of Systems   Constitutional: Negative for fever.   Eyes: Negative for visual disturbance.   Respiratory: Negative for shortness of breath.    Cardiovascular: Negative for chest pain.   Gastrointestinal: Negative for nausea and vomiting.   Neurological: Positive for weakness. Negative for facial asymmetry and speech difficulty.   Psychiatric/Behavioral: Negative for agitation and confusion.     Scheduled Meds:   amLODIPine  5 mg Oral Daily    apixaban  5 mg Oral BID    atorvastatin  40 mg Oral Daily    carvedilol  6.25 mg Oral BID    donepezil  5 mg Oral QHS    levothyroxine  25 mcg Oral Daily    senna  8.6 mg Oral Daily     Continuous Infusions:  PRN Meds:hydrALAZINE, sodium chloride 0.9%    Objective:     Vital Signs (Most Recent):  Temp: 98.7 °F (37.1 °C) (10/14/19 1129)  Pulse: 71 (10/14/19 1129)  Resp: 18 (10/14/19 1129)  BP: (!) 156/82 (10/14/19 1129)  SpO2: 96 % (10/14/19 1129)  BP Location: Left arm    Vital Signs Range (Last 24H):  Temp:  [97.1 °F (36.2 °C)-98.7 °F (37.1 °C)]   Pulse:  [54-81]   Resp:  [16-18]   BP: (153-175)/(74-91)   SpO2:  [96 %-97 %]   BP Location: Left arm    Physical Exam   HENT:   Head: Normocephalic.   Eyes: Pupils are equal, round, and reactive to light. EOM are normal.   Cardiovascular: Normal rate.   Pulmonary/Chest: Effort normal.   Abdominal: Soft.   Musculoskeletal:   LUE weakness   Neurological: He is alert.   Skin: Skin is warm. Capillary refill takes less than 2 seconds.   Psychiatric: He has a normal mood and affect.       Neurological Exam:   LOC: alert  Attention Span: Good   Language: No  aphasia  Articulation: No dysarthria  Orientation: Person, not month or age   Visual Fields: Full  EOM (CN III, IV, VI): Full/intact  Pupils (CN II, III): PERRL  Facial Movement (CN VII): Symmetric facial expression    Motor: Arm left  Paresis: 5/5  Leg left  Normal 5/5  Arm right  Normal 5/5  Leg right Normal 5/5  Cebellar: No evidence of appendicular or axial ataxia  Sensation: Intact to light touch, temperature and vibration    Laboratory:  CMP:   Recent Labs   Lab 10/14/19  0501   CALCIUM 8.8   ALBUMIN 3.2*   PROT 6.1   *   K 4.0   CO2 23   CL 99   BUN 25*   CREATININE 1.4   ALKPHOS 67   ALT 9*   AST 17   BILITOT 1.2*     BMP:   Recent Labs   Lab 10/14/19  0501   *   K 4.0   CL 99   CO2 23   BUN 25*   CREATININE 1.4   CALCIUM 8.8     CBC:   Recent Labs   Lab 10/14/19  0501   WBC 8.33   RBC 4.86   HGB 13.1*   HCT 42.0      MCV 86   MCH 27.0   MCHC 31.2*     Lipid Panel:   Recent Labs   Lab 10/10/19  2249   CHOL 115*   LDLCALC 69.0   HDL 34*   TRIG 60     Coagulation:   Recent Labs   Lab 10/10/19  1934   INR 1.2   APTT 34.8     Platelet Aggregation Study: No results for input(s): PLTAGG, PLTAGINTERP, PLTAGREGLACO, ADPPLTAGGREG in the last 168 hours.  Hgb A1C:   Recent Labs   Lab 10/10/19  2249   HGBA1C 5.7*     TSH:   Recent Labs   Lab 10/10/19  2249   TSH 3.032       Diagnostic Results     Brain Imaging   MRI brain 10/10  Restricted diffusion in the right frontal lobe and small cortical area of the left MCA  Cytotoxic cerebral edema        Vessel Imaging   CTA MP 10/10/19 2154  Occluded left ICA with reconstitution of the distal/supraclinoid segment by retrograde flow through [id number].    Occluded intracranial segment of the left vertebral terminating at the level of left PICA origin.  Right vertebral artery supply the basilar artery.  Basilar artery is small in caliber with wall irregularity and intermittent occlusions.    Saccular aneurysm arising from the supraclinoid segment of the  right ICA, as detailed above.    Miscellaneous:    Right pleural effusion with associated compressive atelectasis.    1 cm sub solid nodule in the right lung apex.  For a part solid nodule 6 mm or greater, Fleischner Society 2017 guidelines recommend follow up with non-contrast chest CT at 3-6 months to confirm persistence. If this nodule is unchanged and the solid component remains <6 mm, annual follow up CT should be performed for 5 years; however, persistence of a part-solid nodule with solid component ?6 mm should be considered highly suspicious and may warrant further workup with PET/CT, biopsy, or resection    CT head 10/10/19 1950  Chronic involutional and chronic ischemic changes.  There is partially calcified mass demonstrated lateral to the cavernous sinus medial right temporal lobe adjacent to ICA.  This would favor calcified meningioma.       Cardiac Imaging   TTE 10/11  · Moderately decreased left ventricular systolic function. The estimated ejection fraction is 35%  · Concentric left ventricular remodeling.  · Left ventricular diastolic dysfunction.  · Local segmental wall motion abnormalities.  · Severe left atrial enlargement.  · Mild right atrial enlargement.  · Mild aortic regurgitation.  · Moderate aortic valve stenosis but difficult to appreciate in the setting of AF, depressed EF, and low stroke volume.  · Aortic valve area is 1.39 cm2; peak velocity is 1.28 m/s; mean gradient is 4 mmHg.  · Elevated central venous pressure (15 mm Hg).

## 2019-10-14 NOTE — ASSESSMENT & PLAN NOTE
Stroke risk factor  -- pro BNP from Feb 2019 >3000  -- On Lasix and Aldactone at home.  -- TTE 35%   -- Per chart review EF previously 35%   -- Will slowly start to resume home CHF medications if creatinine continues to improve   Needs to follow up with cardiologist as outpatient

## 2019-10-14 NOTE — ASSESSMENT & PLAN NOTE
Na 132 today  Urine studies suggestive of SIADH  Urine osm 105  Osmolality 524  I liter fluid restriction

## 2019-10-14 NOTE — PLAN OF CARE
POST ACUTE HOSPITALIZATION PLAN IS FOR REHAB      10/14/19 1234   Discharge Reassessment   Assessment Type Discharge Planning Reassessment   Provided patient/caregiver education on the expected discharge date and the discharge plan No   Do you have any problems affording any of your prescribed medications? No   Discharge Plan A Rehab   Discharge Plan B Rehab

## 2019-10-14 NOTE — ASSESSMENT & PLAN NOTE
Area of cytotoxic cerebral edema identified when reviewing brain imaging in the territory of the R/L middle cerebral artery. There is not mass effect associated with it. We will continue to monitor the patients clinical exam for any worsening of symptoms which may indicate expansion of the stroke or the area of the edema resulting in the clinical change. 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

## 2019-10-14 NOTE — SUBJECTIVE & OBJECTIVE
Past Medical History:   Diagnosis Date    CHF (congestive heart failure)     Hypertension     Stroke     TIA    Thyroid disease      Past Surgical History:   Procedure Laterality Date    AORTA SURGERY      CORONARY ANGIOPLASTY WITH STENT PLACEMENT      REPAIR OF ANEURYSM       Review of patient's allergies indicates:   Allergen Reactions    Iodine and iodide containing products        Scheduled Medications:    amLODIPine  5 mg Oral Daily    apixaban  5 mg Oral BID    atorvastatin  40 mg Oral Daily    carvedilol  6.25 mg Oral BID    donepezil  5 mg Oral QHS    levothyroxine  25 mcg Oral Daily    senna  8.6 mg Oral Daily       PRN Medications: hydrALAZINE, sodium chloride 0.9%    Family History     None        Tobacco Use    Smoking status: Never Smoker    Smokeless tobacco: Current User     Types: Chew   Substance and Sexual Activity    Alcohol use: Not Currently    Drug use: Never    Sexual activity: Yes     Review of Systems   Constitutional: Positive for activity change. Negative for fatigue and fever.   HENT: Negative for drooling, hearing loss, trouble swallowing and voice change.    Eyes: Negative for pain and visual disturbance.   Respiratory: Negative for cough and shortness of breath.    Cardiovascular: Negative for chest pain and palpitations.   Gastrointestinal: Negative for abdominal pain, nausea and vomiting.   Genitourinary: Negative for difficulty urinating and flank pain.   Musculoskeletal: Positive for gait problem. Negative for back pain and neck pain.   Skin: Negative for rash and wound.   Neurological: Positive for weakness. Negative for numbness and headaches.   Psychiatric/Behavioral: Positive for confusion. Negative for agitation. The patient is not nervous/anxious.      Objective:     Vital Signs (Most Recent):  Temp: 97.6 °F (36.4 °C) (10/14/19 0753)  Pulse: 81 (10/14/19 0753)  Resp: 18 (10/14/19 0753)  BP: (!) 175/91 (10/14/19 0753)  SpO2: 96 % (10/14/19 0753)    Vital  Signs (24h Range):  Temp:  [97.1 °F (36.2 °C)-98 °F (36.7 °C)] 97.6 °F (36.4 °C)  Pulse:  [70-85] 81  Resp:  [16-18] 18  SpO2:  [96 %-97 %] 96 %  BP: (152-175)/(74-92) 175/91     Body mass index is 26.13 kg/m².    Physical Exam   Constitutional: He appears well-developed and well-nourished. No distress.   HENT:   Head: Normocephalic and atraumatic.   Right Ear: External ear normal.   Left Ear: External ear normal.   Nose: Nose normal.   Eyes: Right eye exhibits no discharge. Left eye exhibits no discharge. No scleral icterus.   Neck: Normal range of motion.   Cardiovascular: Normal rate and intact distal pulses.   Pulmonary/Chest: Effort normal. No respiratory distress.   Abdominal: Soft. He exhibits no distension. There is no tenderness.   Musculoskeletal: He exhibits deformity. He exhibits no edema or tenderness.   Neurological: He is alert. He is disoriented. No sensory deficit. He exhibits normal muscle tone.   -  Mental Status:  Follows commands.  Identifies 2/3 objects correctly.  -  Speech and language:  no aphasia or dysarthria.    -  Facial movement (CN VII): symmetrical.  -  Motor:  RUE: 5/5.  LUE: proximal 2/5, distal 4-/5.  RLE: 5/5.  LLE: 5/5.   Skin: Skin is warm and dry. No rash noted.   Psychiatric: He has a normal mood and affect. His behavior is normal. Cognition and memory are impaired.   Camera sitter at bedside   Vitals reviewed.         Diagnostic Results:   Labs: Reviewed  X-Ray: Reviewed  CT: Reviewed  MRI: Reviewed

## 2019-10-14 NOTE — PLAN OF CARE
Pt remained free of injuries, falls, and trauma. VS stable. Pt in Afib on tele monitor. Eliquis given. Pt able to take medicine and swallow without difficulty. Neuro checks performed q4hrs. Pt still only oriented to self. No changes in neuro status. No complaints of pain or sob. Tele sitter in room. Had to redirect pt back to bed several times and reorient pt. Dentures in cups beside bed. Pt had bowel movement at 0100. Will continue to monitor.

## 2019-10-14 NOTE — PROGRESS NOTES
Notified NOMAN Hernandez that patient's HR 48-53, manual HR obtained at 65. Will continue to monitor.

## 2019-10-14 NOTE — PLAN OF CARE
10/14/19 1028   Post-Acute Status   Post-Acute Authorization Placement   Post-Acute Placement Status Referrals Sent       [facility]  SW in contact with CM and Medical staff. Will continue to follow and offer support as needed.     Noah Thomas LMSW  [facility]   Ext. [phone]

## 2019-10-14 NOTE — ASSESSMENT & PLAN NOTE
Stroke risk factor  -- Noted on Care Everywhere  -- S/p RCA stent 7/2018  -- Prescribed Plavix, statin and Ranexa but unclear if he's taking them  -- Eliquis started 10/12  -- Atorvastatin 40 mg daily

## 2019-10-14 NOTE — PT/OT/SLP PROGRESS
Occupational Therapy   Treatment    Name: Izaiah Douglas  MRN: [id number]  Admitting Diagnosis:  Acute arterial ischemic stroke, multifocal, multiple vascular territories       Recommendations:     Discharge Recommendations: rehabilitation facility  Discharge Equipment Recommendations:  (TBD)  Barriers to discharge:  (unknown)    Assessment:     Izaiah Douglas is a 85 y.o. male with a medical diagnosis of Acute arterial ischemic stroke, multifocal, multiple vascular territories.  He presents with fair participation and motivation.  Pt continues to demonstrate deficits in ADL's & balance.  Pt continues to be at risk for falls. Performance deficits affecting function are weakness, impaired endurance, impaired self care skills, impaired functional mobilty, impaired balance, impaired cognition, decreased upper extremity function, decreased lower extremity function, decreased coordination, decreased safety awareness.     Rehab Prognosis:  Fair; patient would benefit from acute skilled OT services to address these deficits and reach maximum level of function.       Plan:     Patient to be seen 3 x/week to address the above listed problems via self-care/home management, therapeutic activities, therapeutic exercises, neuromuscular re-education, cognitive retraining, sensory integration  · Plan of Care Expires: 11/10/19  · Plan of Care Reviewed with: patient    Subjective     Pain/Comfort:  · Pain Rating 1: 0/10  · Pain Rating Post-Intervention 1: 0/10    Objective:     Communicated with: RN prior to session.  Patient found supine with telemetry, bed alarm upon OT entry to room.  No family present.    General Precautions: Standard, aspiration, fall     Occupational Performance:     Bed Mobility:    · Patient completed Scooting/Bridging with minimum assistance scooting forward on EOB & up HOB while supine  · Patient completed Supine to Sit with stand by assistance  · Patient completed Sit to Supine with contact guard assistance      Functional Mobility/Transfers:  · Patient completed Sit <> Stand Transfer with contact guard assistance  with  no assistive device from EOB  · Functional Mobility: CGA taking steps to the right along EOB    Activities of Daily Living:  · Feeding:  set up (A) while seated in bed with HOB elevated fully    · Lower Body Dressing: moderate assistance donning socks seated EOB with (A) for balance & to initiate socks over toes      AMPAC 6 Click ADL: 15    Treatment & Education:  Pt required SBA-CGA for postural control while seated EOB.  Provided verbal & physical cues to facilitate postural control.  Provided education regarding safety during ADL's & transfers.  Pt had no further questions & when asked whether there were any concerns pt reported none.    Patient left supine with all lines intact, call button in reach, bed alarm on, RN notified, [employer] (notified [employer] personnel of end of session) present and white board updated.Education:      GOALS:   Multidisciplinary Problems     Occupational Therapy Goals        Problem: Occupational Therapy Goal    Goal Priority Disciplines Outcome Interventions   Occupational Therapy Goal     OT, PT/OT Ongoing, Progressing    Description:  Goals to be met by: 10/21     Patient will increase functional independence with ADLs by performing:    UE Dressing with Modified [place].  LE Dressing with Modified [place].  Grooming while standing with Modified [place].  Toileting from toilet with Modified [place] for hygiene and clothing management.   Supine to sit with Modified [place].  Step transfer with Modified [place]                      Time Tracking:     OT Date of Treatment: 10/14/19  OT Start Time: 1131  OT Stop Time: 1152  OT Total Time (min): 21 min    Billable Minutes:Self Care/Home Management 20    BRIGHT Mancilla  10/14/2019

## 2019-10-14 NOTE — HPI
Per chart review, Avery Douglas is an 85-year-old male with PMHx of HTN, CAD s/p stents, a-fib, CHF, TIA, hypothyroidism, and dementia.  Patient presented to [facility] with confusion, speech difficulty, and right-sided weakness.  On arrival, CTH impression without acute pathology, and IV tPA administered.  Transferred to [facility] on 10/10/19 for further evaluation and management.  Found to have chronic L ICA occlusion and acute infarction within bilateral cerebral hemispheres.  Vascular Neurology following and etiology likely cardioembolic, and he was started on Eliquis.      Functional History: Patient lives in [place], [place], with his wife in a 2 story home with 3 steps to enter.  Bed and bathroom on 1st floor.  Prior to admission, he was (I) with ADLs and mod(I) with mobility.  Used 2 canes with functional mobility.  DME: [facility].

## 2019-10-14 NOTE — PT/OT/SLP PROGRESS
"Physical Therapy Treatment    Patient Name:  Izaiah Douglas   MRN:  [id number]    Recommendations:     Discharge Recommendations:  rehabilitation facility   Discharge Equipment Recommendations: none   Barriers to discharge: Inaccessible home    Assessment:     Izaiah Douglas is a 85 y.o. male admitted with a medical diagnosis of Acute arterial ischemic stroke, multifocal, multiple vascular territories.  He presents with the following impairments/functional limitations:  weakness, impaired endurance, impaired self care skills, impaired functional mobilty, gait instability, decreased safety awareness, impaired cognition, decreased lower extremity function, impaired cardiopulmonary response to activity, decreased coordination . Patient required max cues to transfer safely, no due to strength deficits, but due to decreased motor planning. Patient ambulates with decreased step length and base of support, with min unsteadiness noted.    Rehab Prognosis: Fair; patient would benefit from acute skilled PT services to address these deficits and reach maximum level of function.    Recent Surgery: * No surgery found *      Plan:     During this hospitalization, patient to be seen 4 x/week to address the identified rehab impairments via gait training, therapeutic activities, therapeutic exercises, neuromuscular re-education and progress toward the following goals:    · Plan of Care Expires:  11/08/19    Subjective     Chief Complaint: none stated  Patient/Family Comments/goals: care giver " For him to get stronger".  Pain/Comfort:  · Pain Rating 1: 0/10  · Pain Rating Post-Intervention 1: 0/10      Objective:     Communicated with nsg prior to session.  Patient found HOB elevated with telemetry, bed alarm upon PT entry to room.     General Precautions: Standard, aspiration, fall   Orthopedic Precautions:N/A   Braces: N/A     Functional Mobility:  · Bed Mobility:     · Rolling Left:  minimum assistance  · Scooting: minimum " assistance  · Supine to Sit: minimum assistance  · Transfers:     · Sit to Stand:  minimum assistance with rolling walker  · Bed to Chair: minimum assistance with  rolling walker  using  Stand Pivot  · Gait: 110 ft x 2 trials with RW and min assistance, with chair follow and cues to correct posture and proper RW management.      AM-PAC 6 CLICK MOBILITY  Turning over in bed (including adjusting bedclothes, sheets and blankets)?: 3  Sitting down on and standing up from a chair with arms (e.g., wheelchair, bedside commode, etc.): 3  Moving from lying on back to sitting on the side of the bed?: 3  Moving to and from a bed to a chair (including a wheelchair)?: 3  Need to walk in hospital room?: 3  Climbing 3-5 steps with a railing?: 2  Basic Mobility Total Score: 17       Therapeutic Activities and Exercises:   Donned a second gown and gripping socks. Patient sat at EOB x 10 minutes to preapre for treatment.    Patient left up in chair with all lines intact, call button in reach, chair alarm on, RN notified and caregiver present..    GOALS:   Multidisciplinary Problems     Physical Therapy Goals        Problem: Physical Therapy Goal    Goal Priority Disciplines Outcome Goal Variances Interventions   Physical Therapy Goal     PT, PT/OT Ongoing, Progressing     Description:  Goals to be met by: 10/25/19     Patient will increase functional independence with mobility by performin. Supine to sit with Minimal Assistance - Not met  2. Sit to supine with Minimal Assistance - Not met  3. Sit to stand transfer with Stand-by Assistance - Not met  4. Bed to chair transfer with Supervision using Single-point Cane PRN - Discontinue  5. Gait  x 100 feet with Supervision using Single-point Cane PRN - Discontinue  6. Ascend/descend 3 stairs with right Handrail with Supervision - Not met  7. Lower extremity exercise program x 20 reps per handout, with assistance as needed - Not met    8. Added on 10/13: Bed to chair transfer with  RW and supervision - Not met  9. Added on 10/13: Gait x 300 ft with RW and SBA (including turns and straight lines) - Not met                    Time Tracking:     PT Received On: 10/14/19  PT Start Time: 1438     PT Stop Time: 1504  PT Total Time (min): 26 min     Billable Minutes: Gait Training 15 and Therapeutic Activity 11    Treatment Type: Treatment  PT/PTA: PTA     PTA Visit Number: 1     Mike Alex, ESTELA  10/14/2019

## 2019-10-14 NOTE — ASSESSMENT & PLAN NOTE
-  Found on imaging  -  Management per Vascular Neurology--> suspect chronic occlusion- no acute intervention

## 2019-10-14 NOTE — PLAN OF CARE
10/14/19 1430   Post-Acute Status   Post-Acute Authorization Placement   Post-Acute Placement Status Pending Payor Review        [facility] [place] [facility] and get approval for inpt rehab.  Awaiting their auth.  SW in contact with CM and Medical staff. Will continue to follow and offer support as needed.     Noah Thomas, JOLENE  [facility]   Ext. [phone]

## 2019-10-14 NOTE — ASSESSMENT & PLAN NOTE
86 y/o male with PMH of chronic Afib, CAD s/p stent, HTN, CHF, hypothyroidism who presented who received IV-tPA at OSH for RSW, aphasia and L gaze deviation.  CTA MP showed a chronically occluded L ICA from it's origin to the supraclinoid segment with filling of the MCA via the AComm. He was also noted to have an occluded V4 with an intermittently occluded basilar. No EVT.    Documented Afib since 2018 and was not started on AC. He appears to have chronic occluded ICA w/ substantial burden of both extracranial and intracranial atherosclerotic disease. Etiology  likely cardioembolic.    Antithrombotics for secondary stroke prevention:  Eliquis 2.5 mg BID (creatine clearance 47)    Statins for secondary stroke prevention and hyperlipidemia, if present:   Statins: Atorvastatin- 40 mg daily    Aggressive risk factor modification: HTN     Rehab efforts: The patient has been evaluated by a stroke team provider and the therapy needs have been fully considered based off the presenting complaints and exam findings. The following therapy evaluations are needed: PT evaluate and treat, OT evaluate and treat, SLP evaluate and treat, PM&R evaluate for appropriate placement - rehab     Diagnostics ordered/pending: NA    VTE prophylaxis: Eliquis 2.5 mg BID    BP parameters: Infarct: Post tPA, SBP <180

## 2019-10-14 NOTE — PROGRESS NOTES
[facility]  Vascular Neurology  Comprehensive Stroke Center  Progress Note    Assessment/Plan:     * Acute arterial ischemic stroke, multifocal, multiple vascular territories  86 y/o male with PMH of chronic Afib, CAD s/p stent, HTN, CHF, hypothyroidism who presented who received IV-tPA at OSH for RSW, aphasia and L gaze deviation.  CTA MP showed a chronically occluded L ICA from it's origin to the supraclinoid segment with filling of the MCA via the AComm. He was also noted to have an occluded V4 with an intermittently occluded basilar. No EVT.    Documented Afib since 2018 and was not started on AC. He appears to have chronic occluded ICA w/ substantial burden of both extracranial and intracranial atherosclerotic disease. Etiology likely cardioembolic.    Antithrombotics for secondary stroke prevention:  Eliquis 2.5 mg BID (creatine clearance 47)    Statins for secondary stroke prevention and hyperlipidemia, if present:   Statins: Atorvastatin- 40 mg daily    Aggressive risk factor modification: HTN     Rehab efforts: The patient has been evaluated by a stroke team provider and the therapy needs have been fully considered based off the presenting complaints and exam findings. The following therapy evaluations are needed: PT evaluate and treat, OT evaluate and treat, SLP evaluate and treat, PM&R evaluate for appropriate placement - rehab     Diagnostics ordered/pending: NA    VTE prophylaxis: Eliquis 2.5 mg BID    BP parameters: Infarct: Post tPA, SBP <180        SIADH (syndrome of inappropriate ADH production)  Na 132 today  Urine studies suggestive of SIADH  Urine osm 105  Osmolality 524  I liter fluid restriction       Dementia  Continue Donepezil 5 mg daily       Occlusion of left carotid artery  Seen on CTA  Suspect chronic occlusion   No acute concern at this time     Nodule of right lung  1 cm nodule of R lung seen on CTA  Will need follow up in 3- 6 months       Cytotoxic brain  edema  Area of cytotoxic cerebral edema identified when reviewing brain imaging in the territory of the R/L middle cerebral artery. There is not mass effect associated with it. We will continue to monitor the patients clinical exam for any worsening of symptoms which may indicate expansion of the stroke or the area of the edema resulting in the clinical change. The pattern is suggestive of cardioembolic etiology      CAD (coronary artery disease)  Stroke risk factor  -- Noted on Care Everywhere  -- S/p RCA stent 7/2018  -- Prescribed Plavix, statin and Ranexa but unclear if he's taking them  -- Eliquis started 10/12  -- Atorvastatin 40 mg daily       Paroxysmal atrial fibrillation  Stroke risk factor  -- Per Care Everywhere; not on AC - Dr. Menjivar cardiologist reports patient not a good candidate due to risk of falls, non-compliance, and dementia   -- Noted on EKG at admit.  -- Rate controlled  -- Eliquis 2.5 mg started 10/12 - low dose due to age and creatinine    Chronic combined systolic and diastolic congestive heart failure  Stroke risk factor  -- pro BNP from Feb 2019 >3000  -- On Lasix and Aldactone at home.  -- TTE 35%   -- Per chart review EF previously 35%   -- Will slowly start to resume home CHF medications if creatinine continues to improve   Needs to follow up with cardiologist as outpatient     Acquired hypothyroidism  Stroke risk factor  -- Continue Synthroid 25mcg daily    Essential hypertension  Stroke risk factor  -- SBP<180  -- Coreg 6.25 mg BID  -- Norvasc 5 mg daily started 10/13           10/12 - Etiology likely cardioembolic. Will start Eliquis 2.5 mg BID. Creatinine trending down - may start patient on Eliquis 5 mg BID if continue to decrease. L ICA chronically occluded - no need to vasculare intervention.   10/13 - Spoke with PT - patient would benefit from inpatient rehab placement. Norvasc 5 mg ordered. Urine studies ordered for hyponatremia - suspect to be due to dehydration.   10/14  Patient with SIADH but sodium stable.  1 liter fluid restriction.      STROKE DOCUMENTATION   Acute Stroke Times   Last Known Normal Date: 10/10/19  Last Known Normal Time: 1905  Symptom Onset Date: 10/10/19  Symptom Onset Time: 1905  Stroke Team Called Date: 10/10/19  Stroke Team Called Time: 2120  Stroke Team Arrival Date: 10/10/19  Stroke Team Arrival Time: 2122    NIH Scale:  1a. Level of Consciousness: 0-->Alert, keenly responsive  1b. LOC Questions: 2-->Answers neither question correctly  1c. LOC Commands: 0-->Performs both tasks correctly  2. Best Gaze: 0-->Normal  3. Visual: 0-->No visual loss  4. Facial Palsy: 0-->Normal symmetrical movements  5a. Motor Arm, Left: 3-->No effort against gravity, limb falls(good strength but with significant neglect )  5b. Motor Arm, Right: 0-->No drift, limb holds 90 (or 45) degrees for full 10 secs  6a. Motor Leg, Left: 1-->Drift, leg falls by the end of the 5-sec period but does not hit bed  6b. Motor Leg, Right: 0-->No drift, leg holds 30 degree position for full 5 secs  7. Limb Ataxia: 0-->Absent  8. Sensory: 0-->Normal, no sensory loss  9. Best Language: 0-->No aphasia, normal  10. Dysarthria: 0-->Normal  11. Extinction and Inattention (formerly Neglect): 1-->Visual, tactile, auditory, spatial, or personal inattention or extinction to bilateral simultaneous stimulation in one of the sensory modalities  Total (NIH Stroke Scale): 7       Modified Isabel Score: 1  Holley Coma Scale:    ABCD2 Score:    [id number] Score:   HAS -BLED Score:   ICH Score:   Hunt & Strodu Classification:      Hemorrhagic change of an Ischemic Stroke: Does this patient have an ischemic stroke with hemorrhagic changes? No     Neurologic Chief Complaint: right sided weakness and aphasia    Subjective:     Interval History: Patient is seen for follow-up neurological assessment and treatment recommendations: Patient with SIADH but sodium stable.  1 liter fluid restriction.      HPI, Past Medical,  Family, and Social History remains the same as documented in the initial encounter.     Review of Systems   Constitutional: Negative for fever.   Eyes: Negative for visual disturbance.   Respiratory: Negative for shortness of breath.    Cardiovascular: Negative for chest pain.   Gastrointestinal: Negative for nausea and vomiting.   Neurological: Positive for weakness. Negative for facial asymmetry and speech difficulty.   Psychiatric/Behavioral: Negative for agitation and confusion.     Scheduled Meds:   amLODIPine  5 mg Oral Daily    apixaban  5 mg Oral BID    atorvastatin  40 mg Oral Daily    carvedilol  6.25 mg Oral BID    donepezil  5 mg Oral QHS    levothyroxine  25 mcg Oral Daily    senna  8.6 mg Oral Daily     Continuous Infusions:  PRN Meds:hydrALAZINE, sodium chloride 0.9%    Objective:     Vital Signs (Most Recent):  Temp: 98.7 °F (37.1 °C) (10/14/19 1129)  Pulse: 71 (10/14/19 1129)  Resp: 18 (10/14/19 1129)  BP: (!) 156/82 (10/14/19 1129)  SpO2: 96 % (10/14/19 1129)  BP Location: Left arm    Vital Signs Range (Last 24H):  Temp:  [97.1 °F (36.2 °C)-98.7 °F (37.1 °C)]   Pulse:  [54-81]   Resp:  [16-18]   BP: (153-175)/(74-91)   SpO2:  [96 %-97 %]   BP Location: Left arm    Physical Exam   HENT:   Head: Normocephalic.   Eyes: Pupils are equal, round, and reactive to light. EOM are normal.   Cardiovascular: Normal rate.   Pulmonary/Chest: Effort normal.   Abdominal: Soft.   Musculoskeletal:   LUE weakness   Neurological: He is alert.   Skin: Skin is warm. Capillary refill takes less than 2 seconds.   Psychiatric: He has a normal mood and affect.       Neurological Exam:   LOC: alert  Attention Span: Good   Language: No aphasia  Articulation: No dysarthria  Orientation: Person, not month or age   Visual Fields: Full  EOM (CN III, IV, VI): Full/intact  Pupils (CN II, III): PERRL  Facial Movement (CN VII): Symmetric facial expression    Motor: Arm left  Paresis: 5/5  Leg left  Normal 5/5  Arm right   Normal 5/5  Leg right Normal 5/5  Cebellar: No evidence of appendicular or axial ataxia  Sensation: Intact to light touch, temperature and vibration    Laboratory:  CMP:   Recent Labs   Lab 10/14/19  0501   CALCIUM 8.8   ALBUMIN 3.2*   PROT 6.1   *   K 4.0   CO2 23   CL 99   BUN 25*   CREATININE 1.4   ALKPHOS 67   ALT 9*   AST 17   BILITOT 1.2*     BMP:   Recent Labs   Lab 10/14/19  0501   *   K 4.0   CL 99   CO2 23   BUN 25*   CREATININE 1.4   CALCIUM 8.8     CBC:   Recent Labs   Lab 10/14/19  0501   WBC 8.33   RBC 4.86   HGB 13.1*   HCT 42.0      MCV 86   MCH 27.0   MCHC 31.2*     Lipid Panel:   Recent Labs   Lab 10/10/19  2249   CHOL 115*   LDLCALC 69.0   HDL 34*   TRIG 60     Coagulation:   Recent Labs   Lab 10/10/19  1934   INR 1.2   APTT 34.8     Platelet Aggregation Study: No results for input(s): PLTAGG, PLTAGINTERP, PLTAGREGLACO, ADPPLTAGGREG in the last 168 hours.  Hgb A1C:   Recent Labs   Lab 10/10/19  2249   HGBA1C 5.7*     TSH:   Recent Labs   Lab 10/10/19  2249   TSH 3.032       Diagnostic Results     Brain Imaging   MRI brain 10/10  Restricted diffusion in the right frontal lobe and small cortical area of the left MCA  Cytotoxic cerebral edema        Vessel Imaging   CTA MP 10/10/19 2154  Occluded left ICA with reconstitution of the distal/supraclinoid segment by retrograde flow through [id number].    Occluded intracranial segment of the left vertebral terminating at the level of left PICA origin.  Right vertebral artery supply the basilar artery.  Basilar artery is small in caliber with wall irregularity and intermittent occlusions.    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    Miscellaneous:    Right pleural effusion with associated compressive atelectasis.    1 cm sub solid nodule in the right lung apex.  For a part solid nodule 6 mm or greater, Fleischner Society 2017 guidelines recommend follow up with non-contrast  chest CT at 3-6 months to confirm persistence. If this nodule is unchanged and the solid component remains <6 mm, annual follow up CT should be performed for 5 years; however, persistence of a part-solid nodule with solid component ?6 mm should be considered highly suspicious and may warrant further workup with PET/CT, biopsy, or resection    CT head 10/10/19 1950  Chronic involutional and chronic ischemic changes.  There is partially calcified mass demonstrated lateral to the cavernous sinus medial right temporal lobe adjacent to ICA.  This would favor calcified meningioma.       Cardiac Imaging   TTE 10/11  · Moderately decreased left ventricular systolic function. The estimated ejection fraction is 35%  · Concentric left ventricular remodeling.  · Left ventricular diastolic dysfunction.  · Local segmental wall motion abnormalities.  · Severe left atrial enlargement.  · Mild right atrial enlargement.  · Mild aortic regurgitation.  · Moderate aortic valve stenosis but difficult to appreciate in the setting of AF, depressed EF, and low stroke volume.  · Aortic valve area is 1.39 cm2; peak velocity is 1.28 m/s; mean gradient is 4 mmHg.  · Elevated central venous pressure (15 mm Hg).           Gertrudis Hernandez PA-C  Comprehensive Stroke Center  Department of Vascular Neurology   [facility]

## 2019-10-14 NOTE — PLAN OF CARE
CM contacted by Cristine Marks from the patients insurance company THe insurance company is requesting all therapy notes and the request for I/P rehab SW brandon Thomas is faxing the requested info to [phone] Ms Moranlio stated that a peer to peer would be necessary after the information was reviewed CM notified Gertrudis Hernandez PA-C of the above with the contact info as follows Dr Krishna @ [phone]   CM will continue to follow

## 2019-10-15 LAB
ALBUMIN SERPL BCP-MCNC: 3.2 G/DL (ref 3.5–5.2)
ALP SERPL-CCNC: 69 U/L (ref 55–135)
ALT SERPL W/O P-5'-P-CCNC: 10 U/L (ref 10–44)
ANION GAP SERPL CALC-SCNC: 9 MMOL/L (ref 8–16)
AST SERPL-CCNC: 16 U/L (ref 10–40)
BASOPHILS # BLD AUTO: 0.03 K/UL (ref 0–0.2)
BASOPHILS NFR BLD: 0.4 % (ref 0–1.9)
BILIRUB SERPL-MCNC: 1.1 MG/DL (ref 0.1–1)
BUN SERPL-MCNC: 22 MG/DL (ref 8–23)
CALCIUM SERPL-MCNC: 8.7 MG/DL (ref 8.7–10.5)
CHLORIDE SERPL-SCNC: 98 MMOL/L (ref 95–110)
CO2 SERPL-SCNC: 24 MMOL/L (ref 23–29)
CREAT SERPL-MCNC: 1.4 MG/DL (ref 0.5–1.4)
DIFFERENTIAL METHOD: ABNORMAL
EOSINOPHIL # BLD AUTO: 0.3 K/UL (ref 0–0.5)
EOSINOPHIL NFR BLD: 3.5 % (ref 0–8)
ERYTHROCYTE [DISTWIDTH] IN BLOOD BY AUTOMATED COUNT: 13.4 % (ref 11.5–14.5)
EST. GFR  (AFRICAN AMERICAN): 52.6 ML/MIN/1.73 M^2
EST. GFR  (NON AFRICAN AMERICAN): 45.5 ML/MIN/1.73 M^2
GLUCOSE SERPL-MCNC: 79 MG/DL (ref 70–110)
HCT VFR BLD AUTO: 41.9 % (ref 40–54)
HGB BLD-MCNC: 12.6 G/DL (ref 14–18)
IMM GRANULOCYTES # BLD AUTO: 0.03 K/UL (ref 0–0.04)
IMM GRANULOCYTES NFR BLD AUTO: 0.4 % (ref 0–0.5)
LYMPHOCYTES # BLD AUTO: 1.4 K/UL (ref 1–4.8)
LYMPHOCYTES NFR BLD: 18.1 % (ref 18–48)
MAGNESIUM SERPL-MCNC: 2 MG/DL (ref 1.6–2.6)
MCH RBC QN AUTO: 26.3 PG (ref 27–31)
MCHC RBC AUTO-ENTMCNC: 30.1 G/DL (ref 32–36)
MCV RBC AUTO: 88 FL (ref 82–98)
MONOCYTES # BLD AUTO: 0.8 K/UL (ref 0.3–1)
MONOCYTES NFR BLD: 10.6 % (ref 4–15)
NEUTROPHILS # BLD AUTO: 5.2 K/UL (ref 1.8–7.7)
NEUTROPHILS NFR BLD: 67 % (ref 38–73)
NRBC BLD-RTO: 0 /100 WBC
PHOSPHATE SERPL-MCNC: 3.4 MG/DL (ref 2.7–4.5)
PLATELET # BLD AUTO: 163 K/UL (ref 150–350)
PMV BLD AUTO: 10.7 FL (ref 9.2–12.9)
POTASSIUM SERPL-SCNC: 4.4 MMOL/L (ref 3.5–5.1)
PROT SERPL-MCNC: 6 G/DL (ref 6–8.4)
RBC # BLD AUTO: 4.79 M/UL (ref 4.6–6.2)
SODIUM SERPL-SCNC: 131 MMOL/L (ref 136–145)
WBC # BLD AUTO: 7.75 K/UL (ref 3.9–12.7)

## 2019-10-15 PROCEDURE — 97803 MED NUTRITION INDIV SUBSEQ: CPT

## 2019-10-15 PROCEDURE — 25000003 PHARM REV CODE 250: Performed by: STUDENT IN AN ORGANIZED HEALTH CARE EDUCATION/TRAINING PROGRAM

## 2019-10-15 PROCEDURE — 25000003 PHARM REV CODE 250: Performed by: PHYSICIAN ASSISTANT

## 2019-10-15 PROCEDURE — 36415 COLL VENOUS BLD VENIPUNCTURE: CPT

## 2019-10-15 PROCEDURE — 20600001 HC STEP DOWN PRIVATE ROOM

## 2019-10-15 PROCEDURE — 80053 COMPREHEN METABOLIC PANEL: CPT

## 2019-10-15 PROCEDURE — 25000003 PHARM REV CODE 250: Performed by: PSYCHIATRY & NEUROLOGY

## 2019-10-15 PROCEDURE — 99233 PR SUBSEQUENT HOSPITAL CARE,LEVL III: ICD-10-PCS | Mod: GC,,, | Performed by: PSYCHIATRY & NEUROLOGY

## 2019-10-15 PROCEDURE — 99232 PR SUBSEQUENT HOSPITAL CARE,LEVL II: ICD-10-PCS | Mod: ,,, | Performed by: NURSE PRACTITIONER

## 2019-10-15 PROCEDURE — 92526 ORAL FUNCTION THERAPY: CPT

## 2019-10-15 PROCEDURE — 83735 ASSAY OF MAGNESIUM: CPT

## 2019-10-15 PROCEDURE — 99232 SBSQ HOSP IP/OBS MODERATE 35: CPT | Mod: ,,, | Performed by: NURSE PRACTITIONER

## 2019-10-15 PROCEDURE — 25000003 PHARM REV CODE 250: Performed by: NURSE PRACTITIONER

## 2019-10-15 PROCEDURE — 84100 ASSAY OF PHOSPHORUS: CPT

## 2019-10-15 PROCEDURE — 99233 SBSQ HOSP IP/OBS HIGH 50: CPT | Mod: GC,,, | Performed by: PSYCHIATRY & NEUROLOGY

## 2019-10-15 PROCEDURE — 85025 COMPLETE CBC W/AUTO DIFF WBC: CPT

## 2019-10-15 RX ORDER — FUROSEMIDE 20 MG/1
20 TABLET ORAL DAILY
Status: DISCONTINUED | OUTPATIENT
Start: 2019-10-15 | End: 2019-10-20

## 2019-10-15 RX ADMIN — CARVEDILOL 6.25 MG: 6.25 TABLET, FILM COATED ORAL at 09:10

## 2019-10-15 RX ADMIN — FUROSEMIDE 20 MG: 20 TABLET ORAL at 09:10

## 2019-10-15 RX ADMIN — SENNOSIDES 8.6 MG: 8.6 TABLET, FILM COATED ORAL at 09:10

## 2019-10-15 RX ADMIN — ATORVASTATIN CALCIUM 40 MG: 20 TABLET, FILM COATED ORAL at 09:10

## 2019-10-15 RX ADMIN — LEVOTHYROXINE SODIUM 25 MCG: 25 TABLET ORAL at 09:10

## 2019-10-15 RX ADMIN — DONEPEZIL HYDROCHLORIDE 5 MG: 5 TABLET, FILM COATED ORAL at 09:10

## 2019-10-15 RX ADMIN — APIXABAN 5 MG: 5 TABLET, FILM COATED ORAL at 09:10

## 2019-10-15 RX ADMIN — AMLODIPINE BESYLATE 5 MG: 5 TABLET ORAL at 09:10

## 2019-10-15 NOTE — SUBJECTIVE & OBJECTIVE
Neurologic Chief Complaint: right sided weakness and aphasia    Subjective:     Interval History: Patient is seen for follow-up neurological assessment and treatment recommendations: Irritation around right hand peripheral IV site.  Sodium 131 today due to SIADH.  Discussed with daughter that he is on fluid restriction and she will stop providing him with outside drinks.          HPI, Past Medical, Family, and Social History remains the same as documented in the initial encounter.     Review of Systems   Constitutional: Negative for fever.   Eyes: Negative for visual disturbance.   Respiratory: Negative for shortness of breath.    Cardiovascular: Negative for chest pain.   Gastrointestinal: Negative for nausea and vomiting.   Neurological: Positive for weakness. Negative for facial asymmetry and speech difficulty.   Psychiatric/Behavioral: Negative for agitation and confusion.     Scheduled Meds:   amLODIPine  5 mg Oral Daily    apixaban  5 mg Oral BID    atorvastatin  40 mg Oral Daily    carvedilol  6.25 mg Oral BID    donepezil  5 mg Oral QHS    furosemide  20 mg Oral Daily    levothyroxine  25 mcg Oral Daily    senna  8.6 mg Oral Daily     Continuous Infusions:  PRN Meds:hydrALAZINE, influenza, sodium chloride 0.9%    Objective:     Vital Signs (Most Recent):  Temp: 97.4 °F (36.3 °C) (10/15/19 0931)  Pulse: 76 (10/15/19 1154)  Resp: 18 (10/15/19 1154)  BP: (!) 151/72 (10/15/19 1154)  SpO2: 95 % (10/15/19 1154)  BP Location: Right arm    Vital Signs Range (Last 24H):  Temp:  [97.4 °F (36.3 °C)-98.6 °F (37 °C)]   Pulse:  [55-85]   Resp:  [16-18]   BP: (148-174)/(72-90)   SpO2:  [94 %-98 %]   BP Location: Right arm    Physical Exam   HENT:   Head: Normocephalic.   Eyes: Pupils are equal, round, and reactive to light. EOM are normal.   Cardiovascular: Normal rate.   Pulmonary/Chest: Effort normal.   Abdominal: Soft.   Musculoskeletal:   Good strength but with neglect of left arm    Neurological: He is alert.    Skin: Skin is warm. Capillary refill takes less than 2 seconds.   Psychiatric: He has a normal mood and affect.       Neurological Exam:   LOC: alert  Attention Span: Good   Language: mild aphasia  Articulation: No dysarthria  Orientation: Person, not month or age   Visual Fields: Full  EOM (CN III, IV, VI): Full/intact  Pupils (CN II, III): PERRL  Facial Movement (CN VII): Symmetric facial expression    Motor: Arm left  Paresis: 5/5  Leg left  Normal 5/5  Arm right  Normal 5/5  Leg right Normal 5/5  Cebellar: No evidence of appendicular or axial ataxia  Sensation: Intact to light touch, temperature and vibration    Laboratory:  CMP:   Recent Labs   Lab 10/15/19  0430   CALCIUM 8.7   ALBUMIN 3.2*   PROT 6.0   *   K 4.4   CO2 24   CL 98   BUN 22   CREATININE 1.4   ALKPHOS 69   ALT 10   AST 16   BILITOT 1.1*     BMP:   Recent Labs   Lab 10/15/19  0430   *   K 4.4   CL 98   CO2 24   BUN 22   CREATININE 1.4   CALCIUM 8.7     CBC:   Recent Labs   Lab 10/15/19  0430   WBC 7.75   RBC 4.79   HGB 12.6*   HCT 41.9      MCV 88   MCH 26.3*   MCHC 30.1*     Lipid Panel:   Recent Labs   Lab 10/10/19  2249   CHOL 115*   LDLCALC 69.0   HDL 34*   TRIG 60     Coagulation:   Recent Labs   Lab 10/10/19  1934   INR 1.2   APTT 34.8     Platelet Aggregation Study: No results for input(s): PLTAGG, PLTAGINTERP, PLTAGREGLACO, ADPPLTAGGREG in the last 168 hours.  Hgb A1C:   Recent Labs   Lab 10/10/19  2249   HGBA1C 5.7*     TSH:   Recent Labs   Lab 10/10/19  2249   TSH 3.032       Diagnostic Results     Brain Imaging   MRI brain 10/10  Restricted diffusion in the right frontal lobe and small cortical area of the left MCA  Cytotoxic cerebral edema        Vessel Imaging   CTA MP 10/10/19 2154  Occluded left ICA with reconstitution of the distal/supraclinoid segment by retrograde flow through [id number].    Occluded intracranial segment of the left vertebral terminating at the level of left PICA origin.  Right  vertebral artery supply the basilar artery.  Basilar artery is small in caliber with wall irregularity and intermittent occlusions.    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    Miscellaneous:    Right pleural effusion with associated compressive atelectasis.    1 cm sub solid nodule in the right lung apex.  For a part solid nodule 6 mm or greater, Fleischner Society 2017 guidelines recommend follow up with non-contrast chest CT at 3-6 months to confirm persistence. If this nodule is unchanged and the solid component remains <6 mm, annual follow up CT should be performed for 5 years; however, persistence of a part-solid nodule with solid component ?6 mm should be considered highly suspicious and may warrant further workup with PET/CT, biopsy, or resection    CT head 10/10/19 1950  Chronic involutional and chronic ischemic changes.  There is partially calcified mass demonstrated lateral to the cavernous sinus medial right temporal lobe adjacent to ICA.  This would favor calcified meningioma.       Cardiac Imaging   TTE 10/11  · Moderately decreased left ventricular systolic function. The estimated ejection fraction is 35%  · Concentric left ventricular remodeling.  · Left ventricular diastolic dysfunction.  · Local segmental wall motion abnormalities.  · Severe left atrial enlargement.  · Mild right atrial enlargement.  · Mild aortic regurgitation.  · Moderate aortic valve stenosis but difficult to appreciate in the setting of AF, depressed EF, and low stroke volume.  · Aortic valve area is 1.39 cm2; peak velocity is 1.28 m/s; mean gradient is 4 mmHg.  · Elevated central venous pressure (15 mm Hg).

## 2019-10-15 NOTE — PHYSICIAN QUERY
PT Name: Izaiah Douglas  MR #: [id number]  Physician Query Form - Renal Condition Clarification     CDS/: Charlotte Burton RN, CDS              Contact information: [email or web address]    This form is a permanent document in the medical record.     QueryDate: October 15, 2019    By submitting this query, we are merely seeking further clarification of documentation. Please utilize your independent clinical judgment when addressing the question(s) below.    The Medical record contains the following:   Indicator Supporting Clinical Findings Location in Medical Record    Kidney (Renal) Insufficiency     x Kidney (Renal) Failure / Injury -- CKD noted at baseline.   Noted his low creatinine.  GFR was on the lower side of the border line.     --Clinical Impression:          -Chronic kidney disease, unspecified CKD stage    ED Provider Note 10/10    Nephrotoxic Agents     x BUN/Creatinine GFR --Creatinine elevation        -Trending down        -2.01-->2.0-->1.6        -Continue to monitor        -Encouraging PO intake - at this time holding IV fluids due to low EF      --Per Care Everywhere patient with intermittent increased creatinine - patient with admission to [facility] in May with creatinine of 3.0 likely due to dehydration. When he was admitted in July creatinine 1.5     --SCr: 2->1.6->1.4  --BUN: 25->22  --GFR: 29->38.7->45.5   Vas Neuro Note 10/12              Vas Neuro Note 10/13              Labs 10/10->10/15    Urine: Casts         Eosinophils     x Dehydration --Urine studies ordered for hyponatremia - suspect to be due todehydration.   10/14 Patient with SIADH but sodium stable.    Vas Neuro Note 10/14    Nausea/Vomiting      Dialysis/CRRT      Treatment:     x Other:  --Recommendation/Intervention:      1.) ADAT to renal (predialysis) diabetic diet.  --Modified diet- renal, DM     RD C/s 10/12   Acute Kidney Injury / Acute Renal Failure has different defining criteria. A generally accepted guideline   is:   A greater than 100% (2X) rise in serum creatinine from baseline* occurring during the course of a single hospital stay.   *Baseline as determined by the providers judgment and consideration of previous lab values and other documentation, if available.    A diagnosis of Acute Kidney Injury/ Acute Renal Failure should incorporate abnormal labs and clinical findings that are clinically significant      References: 1. Lexis et al. Acute renal failure-definition, outcome measures, animal models, fluid therapy and information technology needs: the Second International Consensus Conference of the Acute Dialysis Quality Initiative (ADQI) Group. Crit Care 2004; 8:B204; 2. Solomon et al. Acute Kidney Injury Network: report of an initiative to improve outcomes in acute kidney injury. Crit Care 2007; 11:R31; 3. Kidney Disease: Improving Global Outcomes (KDIGO). Acute Kidney Injury Work Group. KDIGO clinical practice guidelines for acute kidney injury. Kidney Int Suppl 2012; 2:1.    The clinical guidelines noted below is only a system guideline, it does not replace the providers clinical judgment.  Provider, please specify the diagnosis or diagnoses associated with above clinical findings.      Please clarify the renal diagnosis (select all that apply):     [   ] Other Acute Kidney Failure/Injury (please specify): ____________     [   ] Unspecified Acute Kidney Failure/Injury      [ x  ] Acute Renal Insufficiency  Consider if SCr rise is transient and normalizes quickly with no efforts at real resuscitation of vital signs and perfusion   [   ] Chronic Kidney Disease (CKD) stage 3   Moderately reduced kidney function eGFR 30-59    [   ] Chronic Kidney Disease (CKD) stage 4  Severely reduced kidney function eGFR 15-29   [   ] Other (please specify): _________________________________   [   ]  Clinically Undetermined       Please document in your progress notes daily for the duration of treatment until resolved and include  in your discharge summary.

## 2019-10-15 NOTE — PLAN OF CARE
Advance diet to regular with thin liquids.     Problem: SLP Goal  Goal: SLP Goal  Description  Speech Therapy Short Term Goals  Goal expected to be met by 10/25  1. Pt will participate in an ongoing assessment to determine the least restrictive and safest diet with possible updated goals to follow pending results.  2. The patient will answer orientation questions x4 with 90% acc no cues.   3. The patient will name 8 items in a concrete category given min cues.   4. The patient will answer complex y/n questions with 85% acc no cues.   5. The patient will follow 2+ step directions with 75% acc given 1 redirection.   6. The patient will participate in an ongoing assessment of speech, language, and cognition with updated goals to follow pending results and progress.    Outcome: Ongoing, Progressing

## 2019-10-15 NOTE — PLAN OF CARE
Recommendations     Recommendation:   1. Continue current diet as tolerated, with texture per SLP recommendations and fluid restriction per MD. Consider adding cardiac diet restrictions. If renal labs are a concern, consider renal diet restrictions.   2. RD to monitor & follow up.     Goals: 1.) Pt to return to nutritionally adequate diet by follow up  Nutrition Goal Status: goal met  Communication of RD Recs: other (comment)(POC)

## 2019-10-15 NOTE — PLAN OF CARE
Pt remained free of injuries, falls, and trauma. VS stable. Pt O2 sats remaining above 97% on room air. Neuro checks performed q4h. No changes in neuro status. Pt still disoriented to time, place, and situation. Pt reoriented multiple times during shift. Bed alarm set and Tele sitter at bedside. Eliquis dose increased to 5mg and given with bedtime meds. All questions and concerns addressed. Will continue to monitor.

## 2019-10-15 NOTE — PROGRESS NOTES
[facility]  Adult Nutrition  Progress Note    SUMMARY       Recommendations    Recommendation:   1. Continue current diet as tolerated, with texture per SLP recommendations and fluid restriction per MD. Consider adding cardiac diet restrictions. If renal labs are a concern, consider renal diet restrictions.   2. RD to monitor & follow up.    Goals: 1.) Pt to return to nutritionally adequate diet by follow up  Nutrition Goal Status: goal met  Communication of RD Recs: other (comment)(POC)    Reason for Assessment    Reason For Assessment: RD follow-up  Diagnosis: cardiac disease(CVA)  Relevant Medical History: HTN, thyroid disease, CHF, stroke, dementia  Interdisciplinary Rounds: did not attend  General Information Comments: Pt advanced to mechanical soft diet yesterday. Pt endorses good appetite/intake PTA and during this admission. Pt denies wt changes PTA and states his UBW is 190#. Wt history is limited per chart. Pt appears nourished, NPFE not indicated at this time.   Nutrition Discharge Planning: d/c on cardiac diet, texture per SLP recommendations    Nutrition Risk Screen    Nutrition Risk Screen: no indicators present    Nutrition/Diet History    Spiritual, Cultural Beliefs, Quaker Practices, Values that Affect Care: no    Anthropometrics    Temp: 97.6 °F (36.4 °C)  Height Method: Stated  Height: 6' (182.9 cm)  Height (inches): 72 in  Weight Method: Standard Scale  Weight: 87.1 kg (191 lb 14.6 oz)  Weight (lb): 191.91 lb  Ideal Body Weight (IBW), Male: 178 lb  % Ideal Body Weight, Male (lb): 100.82 lb  BMI (Calculated): 24.4    Lab/Procedures/Meds    Pertinent Labs Reviewed: reviewed  Pertinent Labs Comments: Na 131, GFR 45.5, Alb 3.2, A1c 5.7  Pertinent Medications Reviewed: reviewed  Pertinent Medications Comments: statin, lasix    Estimated/Assessed Needs    Weight Used For Calorie Calculations: 87.1 kg (192 lb 0.3 oz)  Energy Calorie Requirements (kcal): 1993 kcal/day  Energy Need  Method: [place](x 1.25 PAL)  Protein Requirements: 87 g/day(1 g/kg)  Weight Used For Protein Calculations: 87.1 kg (192 lb 0.3 oz)     Estimated Fluid Requirement Method: RDA Method(or per MD)  RDA Method (mL): 1993  CHO Requirement: 50% of total kcals    Nutrition Prescription Ordered    Current Diet Order: Mechanical soft  Nutrition Order Comments: 1000 mL FR    Evaluation of Received Nutrient/Fluid Intake    I/O: -654 mL since admit  Comments: LBM 10/13  Tolerance: tolerating  % Intake of Estimated Energy Needs: 75 - 100 %  % Meal Intake: 75 - 100 %    Nutrition Risk    Level of Risk/Frequency of Follow-up: low     Assessment and Plan    Nutrition Problem  Inadequate oral intake     Related to (etiology):   Current diet     Signs and Symptoms (as evidenced by):   <75% of nutritional needs being met     Interventions(treatment strategy):  Collaboration of nutrition care with other providers  Referral of care  Modified diet- renal, DM  Initiate enteral nutrition-Suplena @ 50mL/hr if diet not advanced within 48hr.      Nutrition Diagnosis Status:   Improving - diet advanced 10/14    Monitor and Evaluation    Food and Nutrient Intake: energy intake  Food and Nutrient Adminstration: diet order  Anthropometric Measurements: weight, weight change, body mass index  Biochemical Data, Medical Tests and Procedures: electrolyte and renal panel, gastrointestinal profile, glucose/endocrine profile  Nutrition-Focused Physical Findings: overall appearance     Malnutrition Assessment  Pt does not meet criteria for malnutrition at this time.    Nutrition Follow-Up    RD Follow-up?: Yes

## 2019-10-15 NOTE — PLAN OF CARE
Neuro checks q 4 hours. Pt ambulated today down the hallway using walker as assistance. No complaints of SOB reported. No falls occurred. Pt up in chair during shift. Plan of care reviewed with pt and daughter. VSS, no reports of pain, no N/V. Pt in bed at lowest position with wheels locked and 2x upper side rails raised. Tele sitter in place, bed alarm activated, and call light within reach. Will continue to monitor.

## 2019-10-15 NOTE — SUBJECTIVE & OBJECTIVE
Interval History 10/15/2019:  Patient is seen for follow-up rehab evaluation and recommendations: Camera sitter at bedside.  Participating with therapy.      HPI, Past Medical, Family, and Social History remains the same as documented in the initial encounter.    Scheduled Medications:    amLODIPine  5 mg Oral Daily    apixaban  5 mg Oral BID    atorvastatin  40 mg Oral Daily    carvedilol  6.25 mg Oral BID    donepezil  5 mg Oral QHS    furosemide  20 mg Oral Daily    levothyroxine  25 mcg Oral Daily    senna  8.6 mg Oral Daily     PRN Medications: hydrALAZINE, influenza, sodium chloride 0.9%    Review of Systems   Constitutional: Positive for activity change. Negative for fatigue and fever.   HENT: Negative for trouble swallowing and voice change.    Eyes: Negative for pain and visual disturbance.   Respiratory: Negative for cough and shortness of breath.    Cardiovascular: Negative for chest pain and palpitations.   Gastrointestinal: Negative for nausea and vomiting.   Genitourinary: Negative for difficulty urinating and flank pain.   Musculoskeletal: Negative for back pain and neck pain.   Skin: Negative for rash and wound.   Neurological: Positive for weakness. Negative for numbness and headaches.   Psychiatric/Behavioral: Positive for confusion. Negative for agitation. The patient is not nervous/anxious.      Objective:     Vital Signs (Most Recent):  Temp: 97.4 °F (36.3 °C) (10/15/19 0931)  Pulse: (!) 55 (10/15/19 0931)  Resp: 16 (10/15/19 0931)  BP: (!) 148/73 (10/15/19 0931)  SpO2: 95 % (10/15/19 0931)    Vital Signs (24h Range):  Temp:  [97.4 °F (36.3 °C)-98.7 °F (37.1 °C)] 97.4 °F (36.3 °C)  Pulse:  [54-85] 55  Resp:  [16-18] 16  SpO2:  [94 %-98 %] 95 %  BP: (148-174)/(73-90) 148/73     Physical Exam   Constitutional: He appears well-developed and well-nourished. No distress.   HENT:   Head: Normocephalic and atraumatic.   Right Ear: External ear normal.   Left Ear: External ear normal.   Nose:  Nose normal.   Eyes: Right eye exhibits no discharge. Left eye exhibits no discharge. No scleral icterus.   Neck: Normal range of motion.   Cardiovascular: Normal rate and intact distal pulses.   Pulmonary/Chest: Effort normal. No respiratory distress.   Abdominal: Soft. He exhibits no distension. There is no tenderness.   Musculoskeletal: He exhibits deformity. He exhibits no edema or tenderness.   Neurological: He is alert. He is disoriented. No sensory deficit. He exhibits normal muscle tone.   LUE weakness   Skin: Skin is warm and dry. No rash noted.   Psychiatric: He has a normal mood and affect. His behavior is normal. Cognition and memory are impaired.   Camera sitter at bedside   Vitals reviewed.        Diagnostic Results:   Labs: Reviewed  X-Ray: Reviewed  CT: Reviewed  MRI: Reviewed

## 2019-10-15 NOTE — PT/OT/SLP PROGRESS
"Speech Language Pathology Treatment    Patient Name:  Izaiah Douglas   MRN:  [id number]  Admitting Diagnosis: Acute arterial ischemic stroke, multifocal, multiple vascular territories    Recommendations:                 General Recommendations:  Cognitive-linguistic therapy  Diet recommendations:  Regular, Liquid Diet Level: Thin   Aspiration Precautions: Standard aspiration precautions   General Precautions: Standard, aspiration, fall  Communication strategies:  none    Subjective     "The food is not too good"  Patient goals: rehab  Pain/Comfort:  · Pain Rating 1: 0/10  · Pain Rating Post-Intervention 1: 0/10    Objective:     Has the patient been evaluated by SLP for swallowing?   Yes  Keep patient NPO? No   Current Respiratory Status: room air      Pt. Seen at bedside with upper and low dental plates present.  HOB was raised to 90 degree angle and pt. Was observed self feeding cracker and cup of water.  Speech was 100% intelligible and vocal qualityand intensity were wfl.  Oral and pharyngeal phases of swallow were wfl with adequate mastication and bolus formation and no delay in transit or anterior loss of bolus.  No coughing,throat clearing or change in vocal quality was noted following the swallow.  Education provided to pt. And daughter re poc.     Assessment:     Izaiah Douglas is a 85 y.o. male with an SLP diagnosis of Cognitive-Linguistic Impairment.    Goals:   Multidisciplinary Problems     SLP Goals        Problem: SLP Goal    Goal Priority Disciplines Outcome   SLP Goal     SLP Ongoing, Progressing   Description:  Speech Therapy Short Term Goals  Goal expected to be met by 10/25  1. Pt will participate in an ongoing assessment to determine the least restrictive and safest diet with possible updated goals to follow pending results.  2. The patient will answer orientation questions x4 with 90% acc no cues.   3. The patient will name 8 items in a concrete category given min cues.   4. The patient will answer " complex y/n questions with 85% acc no cues.   5. The patient will follow 2+ step directions with 75% acc given 1 redirection.   6. The patient will participate in an ongoing assessment of speech, language, and cognition with updated goals to follow pending results and progress.                     Plan:     · Patient to be seen:  4 x/week   · Plan of Care expires:  11/09/19  · Plan of Care reviewed with:  daughter, patient   · SLP Follow-Up:  Yes       Discharge recommendations:  rehabilitation facility       Time Tracking:     SLP Treatment Date:   10/15/19  Speech Start Time:  1000  Speech Stop Time:  1008     Speech Total Time (min):  8 min    Billable Minutes:Treatment swallowing dysfunction 8  Alba Pressley MA, CCC-SLP  10/15/2019

## 2019-10-15 NOTE — PHYSICIAN QUERY
"PT Name: Izaiah Douglas  MR #: [id number]    Physician Query Form - Atrial Fibrillation Specificity     CDS/: Charlotte Burton RN, CDS               Contact information: [email or web address]     This form is a permanent document in the medical record.     Query Date: October 15, 2019    By submitting this query, we are merely seeking further clarification of documentation. Please utilize your independent clinical judgment when addressing the question(s) below.    The medical record contains the following:   Indicators     Supporting Clinical Findings Location in Medical Record   x Atrial Fibrillation --Chronic atrial fibrillation in addition to substantial burden of both extracranial atherosclerotic disease and intracranialatherosclerotic disease   --Atrial fibrillation         - Per Care Everywhere; not on AC.         - Noted on EKG at admit.          - Rate controlled.      -- PMH of chronic Afib   --Paroxysmal atrial fibrillation       Vas Neuro c/s 10/11                Vas Neuro Note 10/14   x EKG results --Measurements  Intervals                              Axis            Rate:         88                       P:              HI:                                    QRS:          72  QRSD:         110                      T:            214  QT:           386                                      QTc:          467                                                                 Interpretive Statements  Atrial fibrillation  Minimal voltage criteria for LVH, may be normal variant    --Vent. Rate : 070 BPM     Atrial Rate : 357 BPM     P-R Int : 000 ms          QRS Dur : 114 ms      QT Int : 410 ms       P-R-T Axes : 000 057 141 degrees     QTc Int : 442 ms  Atrial fibrillation  Incomplete left bundle branch block   EKG OSH 10/10                      EKG 10/13   x Medication --Unclear re: why no listed home antithrombotic use, some indication from daughter that he may not be "up to date" on his medical " care      --Eliquis 2.5 mg started 10/12 - low dose due to age and creatinine    Vas Neuro c/s 10/11        Vas Neuro Note 10/14    Treatment      Other         Provider, please clarify the Atrial Fibrillation diagnosis.    [x  ] Chronic   [  ] Paroxysmal   [  ] Other (please specify):   [  ] Clinically Undetermined       Please document in your progress notes daily for the duration of treatment until resolved, and include in your discharge summary.

## 2019-10-15 NOTE — ASSESSMENT & PLAN NOTE
Na 131 today  Urine studies suggestive of SIADH  Urine osm 105  Osmolality 524  I liter fluid restriction

## 2019-10-15 NOTE — PROGRESS NOTES
[facility]  Vascular Neurology  Comprehensive Stroke Center  Progress Note    Assessment/Plan:     * Acute arterial ischemic stroke, multifocal, multiple vascular territories  86 y/o male with PMH of chronic Afib, CAD s/p stent, HTN, CHF, hypothyroidism who presented who received IV-tPA at OSH for RSW, aphasia and L gaze deviation.  CTA MP showed a chronically occluded L ICA from it's origin to the supraclinoid segment with filling of the MCA via the AComm. He was also noted to have an occluded V4 with an intermittently occluded basilar. No EVT.    Documented Afib since 2018 and was not started on AC. He appears to have chronic occluded ICA w/ substantial burden of both extracranial and intracranial atherosclerotic disease. Etiology  likely cardioembolic.    Antithrombotics for secondary stroke prevention:  Eliquis 2.5 mg BID (creatine clearance 47)     Statins for secondary stroke prevention and hyperlipidemia, if present:   Statins: Atorvastatin- 40 mg daily    Aggressive risk factor modification: HTN     Rehab efforts: The patient has been evaluated by a stroke team provider and the therapy needs have been fully considered based off the presenting complaints and exam findings. The following therapy evaluations are needed: PT evaluate and treat, OT evaluate and treat, SLP evaluate and treat, PM&R evaluate for appropriate placement - rehab     Diagnostics ordered/pending: NA    VTE prophylaxis: Eliquis 2.5 mg BID    BP parameters: Infarct: Post tPA, SBP <180        SIADH (syndrome of inappropriate ADH production)  Na 131 today  Urine studies suggestive of SIADH  Urine osm 105  Osmolality 524  I liter fluid restriction       Dementia  Continue Donepezil 5 mg daily     Intracranial atherosclerosis  Noted on CTA head   Started on eliquis and Atorvastatin 40 mg     Occlusion of left carotid artery  Seen on CTA  Suspect chronic occlusion   No acute concern at this time     Nodule of right lung  1 cm  nodule of R lung seen on CTA  Will need follow up in 3- 6 months   Discussed with daughter     Cytotoxic brain edema  Area of cytotoxic cerebral edema identified when reviewing brain imaging in the territory of the R/L middle cerebral artery. There is not mass effect associated with it. We will continue to monitor the patients clinical exam for any worsening of symptoms which may indicate expansion of the stroke or the area of the edema resulting in the clinical change. The pattern is suggestive of cardioembolic etiology        CAD (coronary artery disease)  Stroke risk factor  -- Noted on Care Everywhere  -- S/p RCA stent 7/2018  -- Prescribed Plavix, statin and Ranexa but unclear if he's taking them  -- Eliquis started 10/12  -- Atorvastatin 40 mg daily       Paroxysmal atrial fibrillation  Stroke risk factor  -- Per Care Everywhere; not on AC - Dr. Menjivar cardiologist reports patient not a good candidate due to risk of falls, non-compliance, and dementia   -- Noted on EKG at admit.  -- Rate controlled  -- Eliquis 2.5 mg started 10/12 - low dose due to age and creatinine    Chronic combined systolic and diastolic congestive heart failure  Stroke risk factor  -- pro BNP from Feb 2019 >3000  -- On Lasix and Aldactone at home.  -- TTE 35%   -- Per chart review EF previously 35%   -- Will slowly start to resume home CHF medications if creatinine continues to improve   Needs to follow up with cardiologist as outpatient     Acquired hypothyroidism  Stroke risk factor  -- Continue Synthroid 25mcg daily    Essential hypertension  Stroke risk factor  -- SBP<180  -- Coreg 6.25 mg BID  -- Norvasc 5 mg daily started 10/13           10/12 - Etiology likely cardioembolic. Will start Eliquis 2.5 mg BID. Creatinine trending down - may start patient on Eliquis 5 mg BID if continue to decrease. L ICA chronically occluded - no need to vasculare intervention.   10/13 - Spoke with PT - patient would benefit from inpatient rehab  placement. Norvasc 5 mg ordered. Urine studies ordered for hyponatremia - suspect to be due to dehydration.   10/14 Patient with SIADH but sodium stable.  1 liter fluid restriction.    10/15  Irritation around right hand peripheral IV site.  Sodium 131 today.  Discussed with daughter that he is on fluid restriction and she will stop providing him with outside drinks.         STROKE DOCUMENTATION   Acute Stroke Times   Last Known Normal Date: 10/10/19  Last Known Normal Time: 1905  Symptom Onset Date: 10/10/19  Symptom Onset Time: 1905  Stroke Team Called Date: 10/10/19  Stroke Team Called Time: 2120  Stroke Team Arrival Date: 10/10/19  Stroke Team Arrival Time: 2122    NIH Scale:  1a. Level of Consciousness: 0-->Alert, keenly responsive  1b. LOC Questions: 2-->Answers neither question correctly  1c. LOC Commands: 0-->Performs both tasks correctly  2. Best Gaze: 0-->Normal  3. Visual: 0-->No visual loss  4. Facial Palsy: 0-->Normal symmetrical movements  5a. Motor Arm, Left: 3-->No effort against gravity, limb falls  5b. Motor Arm, Right: 0-->No drift, limb holds 90 (or 45) degrees for full 10 secs  6a. Motor Leg, Left: 1-->Drift, leg falls by the end of the 5-sec period but does not hit bed  6b. Motor Leg, Right: 0-->No drift, leg holds 30 degree position for full 5 secs  7. Limb Ataxia: 0-->Absent  8. Sensory: 0-->Normal, no sensory loss  9. Best Language: 1-->Mild-to-moderate aphasia, some obvious loss of fluency or facility of comprehension, without significant limitation on ideas expressed or form of expression. Reduction of speech and/or comprehension, however, makes conversation. . . (see row details)  10. Dysarthria: 0-->Normal  11. Extinction and Inattention (formerly Neglect): 1-->Visual, tactile, auditory, spatial, or personal inattention or extinction to bilateral simultaneous stimulation in one of the sensory modalities  Total (NIH Stroke Scale): 8       Modified Isabel Score: 1  Holley Coma Scale:     ABCD2 Score:    [id number] Score:   HAS -BLED Score:   ICH Score:   Hunt & Stroud Classification:      Hemorrhagic change of an Ischemic Stroke: Does this patient have an ischemic stroke with hemorrhagic changes? No     Neurologic Chief Complaint: right sided weakness and aphasia    Subjective:     Interval History: Patient is seen for follow-up neurological assessment and treatment recommendations: Irritation around right hand peripheral IV site.  Sodium 131 today due to SIADH.  Discussed with daughter that he is on fluid restriction and she will stop providing him with outside drinks.          HPI, Past Medical, Family, and Social History remains the same as documented in the initial encounter.     Review of Systems   Constitutional: Negative for fever.   Eyes: Negative for visual disturbance.   Respiratory: Negative for shortness of breath.    Cardiovascular: Negative for chest pain.   Gastrointestinal: Negative for nausea and vomiting.   Neurological: Positive for weakness. Negative for facial asymmetry and speech difficulty.   Psychiatric/Behavioral: Negative for agitation and confusion.     Scheduled Meds:   amLODIPine  5 mg Oral Daily    apixaban  5 mg Oral BID    atorvastatin  40 mg Oral Daily    carvedilol  6.25 mg Oral BID    donepezil  5 mg Oral QHS    furosemide  20 mg Oral Daily    levothyroxine  25 mcg Oral Daily    senna  8.6 mg Oral Daily     Continuous Infusions:  PRN Meds:hydrALAZINE, influenza, sodium chloride 0.9%    Objective:     Vital Signs (Most Recent):  Temp: 97.4 °F (36.3 °C) (10/15/19 0931)  Pulse: 76 (10/15/19 1154)  Resp: 18 (10/15/19 1154)  BP: (!) 151/72 (10/15/19 1154)  SpO2: 95 % (10/15/19 1154)  BP Location: Right arm    Vital Signs Range (Last 24H):  Temp:  [97.4 °F (36.3 °C)-98.6 °F (37 °C)]   Pulse:  [55-85]   Resp:  [16-18]   BP: (148-174)/(72-90)   SpO2:  [94 %-98 %]   BP Location: Right arm    Physical Exam   HENT:   Head: Normocephalic.   Eyes: Pupils are equal,  round, and reactive to light. EOM are normal.   Cardiovascular: Normal rate.   Pulmonary/Chest: Effort normal.   Abdominal: Soft.   Musculoskeletal:   Good strength but with neglect of left arm    Neurological: He is alert.   Skin: Skin is warm. Capillary refill takes less than 2 seconds.   Psychiatric: He has a normal mood and affect.       Neurological Exam:   LOC: alert  Attention Span: Good   Language: mild aphasia  Articulation: No dysarthria  Orientation: Person, not month or age   Visual Fields: Full  EOM (CN III, IV, VI): Full/intact  Pupils (CN II, III): PERRL  Facial Movement (CN VII): Symmetric facial expression    Motor: Arm left  Paresis: 5/5  Leg left  Normal 5/5  Arm right  Normal 5/5  Leg right Normal 5/5  Cebellar: No evidence of appendicular or axial ataxia  Sensation: Intact to light touch, temperature and vibration    Laboratory:  CMP:   Recent Labs   Lab 10/15/19  0430   CALCIUM 8.7   ALBUMIN 3.2*   PROT 6.0   *   K 4.4   CO2 24   CL 98   BUN 22   CREATININE 1.4   ALKPHOS 69   ALT 10   AST 16   BILITOT 1.1*     BMP:   Recent Labs   Lab 10/15/19  0430   *   K 4.4   CL 98   CO2 24   BUN 22   CREATININE 1.4   CALCIUM 8.7     CBC:   Recent Labs   Lab 10/15/19  0430   WBC 7.75   RBC 4.79   HGB 12.6*   HCT 41.9      MCV 88   MCH 26.3*   MCHC 30.1*     Lipid Panel:   Recent Labs   Lab 10/10/19  2249   CHOL 115*   LDLCALC 69.0   HDL 34*   TRIG 60     Coagulation:   Recent Labs   Lab 10/10/19  1934   INR 1.2   APTT 34.8     Platelet Aggregation Study: No results for input(s): PLTAGG, PLTAGINTERP, PLTAGREGLACO, ADPPLTAGGREG in the last 168 hours.  Hgb A1C:   Recent Labs   Lab 10/10/19  2249   HGBA1C 5.7*     TSH:   Recent Labs   Lab 10/10/19  2249   TSH 3.032       Diagnostic Results     Brain Imaging   MRI brain 10/10  Restricted diffusion in the right frontal lobe and small cortical area of the left MCA  Cytotoxic cerebral edema        Vessel Imaging   CTA MP 10/10/19 2154  Occluded  left ICA with reconstitution of the distal/supraclinoid segment by retrograde flow through [id number].    Occluded intracranial segment of the left vertebral terminating at the level of left PICA origin.  Right vertebral artery supply the basilar artery.  Basilar artery is small in caliber with wall irregularity and intermittent occlusions.    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    Miscellaneous:    Right pleural effusion with associated compressive atelectasis.    1 cm sub solid nodule in the right lung apex.  For a part solid nodule 6 mm or greater, Fleischner Society 2017 guidelines recommend follow up with non-contrast chest CT at 3-6 months to confirm persistence. If this nodule is unchanged and the solid component remains <6 mm, annual follow up CT should be performed for 5 years; however, persistence of a part-solid nodule with solid component ?6 mm should be considered highly suspicious and may warrant further workup with PET/CT, biopsy, or resection    CT head 10/10/19 1950  Chronic involutional and chronic ischemic changes.  There is partially calcified mass demonstrated lateral to the cavernous sinus medial right temporal lobe adjacent to ICA.  This would favor calcified meningioma.       Cardiac Imaging   TTE 10/11  · Moderately decreased left ventricular systolic function. The estimated ejection fraction is 35%  · Concentric left ventricular remodeling.  · Left ventricular diastolic dysfunction.  · Local segmental wall motion abnormalities.  · Severe left atrial enlargement.  · Mild right atrial enlargement.  · Mild aortic regurgitation.  · Moderate aortic valve stenosis but difficult to appreciate in the setting of AF, depressed EF, and low stroke volume.  · Aortic valve area is 1.39 cm2; peak velocity is 1.28 m/s; mean gradient is 4 mmHg.  · Elevated central venous pressure (15 mm Hg).           Gertrudis Hernandez PA-C  Comprehensive  Stroke Center  Department of Vascular Neurology   [facility]

## 2019-10-15 NOTE — PLAN OF CARE
10/15/19 0853   Post-Acute Status   Post-Acute Authorization Placement   Post-Acute Placement Status Pending Payor Review       Pt from [place]. Pt's insurance is a [place] plan and they are reviewing the request for auth for rehab. The reviewer is Cristine Marks ([phone]).  Awaiting their determination.  SW in contact with CM and Medical staff. Will continue to follow and offer support as needed.     Noah Thomas, JOLENE  [facility]   Ext. [phone]

## 2019-10-15 NOTE — PLAN OF CARE
10/15/19 1600   Post-Acute Status   Post-Acute Authorization Placement   Post-Acute Placement Status Referrals Sent       Called the daughter Jose ([phone]) and she stated that her Dad moved down here to live. I instructed her to call a Medicare advantage plan to change her father.  Also instructed her to call medicare as well.  She doesn't want him to go back to [place] and he isn't safe to go home at this time.  SW in contact with CM and Medical staff. Will continue to follow and offer support as needed.     Noah Thomas, JOLENE  [facility]   Ext. [phone]

## 2019-10-15 NOTE — PROGRESS NOTES
[facility]  Physical Medicine & Rehab  Progress Note    Patient Name: Izaiah Douglas  MRN: [id number]  Admission Date: 10/10/2019  Length of Stay: 5 days  Attending Physician: Izaiah Gonzales MD    Subjective:     Principal Problem:Acute arterial ischemic stroke, multifocal, multiple vascular territories    Hospital Course:   10/11/19:  Evaluated by PT, OT, and SLP.  Found to have cognitive-linguistic impairment and dysphagia.  SLP recommendation: mechanical soft diet and thin liquids.  Bed mobility min-maxA.  Sit to stand Murphy.  Ambulated a few lateral steps CGA.  UBD maxA.      10/13/19:  Participated with PT.  Sit to stand and stand to sit CGA with RW.  Ambulated 120 ft CGA with RW and Murphy for all turning with walker.  10/14/19:  Participated with PT and OT.  Bed mobility SBA-Murphy.  Sit to stand and transfers Murphy with RW.  Ambulated 110 ft x 2 Murphy with RW and chair to follow.  LBD modA while seated EOB.       Interval History 10/15/2019:  Patient is seen for follow-up rehab evaluation and recommendations: Camera sitter at bedside.  Participating with therapy.      HPI, Past Medical, Family, and Social History remains the same as documented in the initial encounter.    Scheduled Medications:    amLODIPine  5 mg Oral Daily    apixaban  5 mg Oral BID    atorvastatin  40 mg Oral Daily    carvedilol  6.25 mg Oral BID    donepezil  5 mg Oral QHS    furosemide  20 mg Oral Daily    levothyroxine  25 mcg Oral Daily    senna  8.6 mg Oral Daily     PRN Medications: hydrALAZINE, influenza, sodium chloride 0.9%    Review of Systems   Constitutional: Positive for activity change. Negative for fatigue and fever.   HENT: Negative for trouble swallowing and voice change.    Eyes: Negative for pain and visual disturbance.   Respiratory: Negative for cough and shortness of breath.    Cardiovascular: Negative for chest pain and palpitations.   Gastrointestinal: Negative for nausea and vomiting.    Genitourinary: Negative for difficulty urinating and flank pain.   Musculoskeletal: Negative for back pain and neck pain.   Skin: Negative for rash and wound.   Neurological: Positive for weakness. Negative for numbness and headaches.   Psychiatric/Behavioral: Positive for confusion. Negative for agitation. The patient is not nervous/anxious.      Objective:     Vital Signs (Most Recent):  Temp: 97.4 °F (36.3 °C) (10/15/19 0931)  Pulse: (!) 55 (10/15/19 0931)  Resp: 16 (10/15/19 0931)  BP: (!) 148/73 (10/15/19 0931)  SpO2: 95 % (10/15/19 0931)    Vital Signs (24h Range):  Temp:  [97.4 °F (36.3 °C)-98.7 °F (37.1 °C)] 97.4 °F (36.3 °C)  Pulse:  [54-85] 55  Resp:  [16-18] 16  SpO2:  [94 %-98 %] 95 %  BP: (148-174)/(73-90) 148/73     Physical Exam   Constitutional: He appears well-developed and well-nourished. No distress.   HENT:   Head: Normocephalic and atraumatic.   Right Ear: External ear normal.   Left Ear: External ear normal.   Nose: Nose normal.   Eyes: Right eye exhibits no discharge. Left eye exhibits no discharge. No scleral icterus.   Neck: Normal range of motion.   Cardiovascular: Normal rate and intact distal pulses.   Pulmonary/Chest: Effort normal. No respiratory distress.   Abdominal: Soft. He exhibits no distension. There is no tenderness.   Musculoskeletal: He exhibits deformity. He exhibits no edema or tenderness.   Neurological: He is alert. He is disoriented. No sensory deficit. He exhibits normal muscle tone.   LUE weakness   Skin: Skin is warm and dry. No rash noted.   Psychiatric: He has a normal mood and affect. His behavior is normal. Cognition and memory are impaired.   Camera sitter at bedside   Vitals reviewed.    Diagnostic Results:   Labs: Reviewed  X-Ray: Reviewed  CT: Reviewed  MRI: Reviewed    Assessment/Plan:      * Acute arterial ischemic stroke, multifocal, multiple vascular territories  -  Presented with confusion, speech difficulty, and right-sided weakness s/p tPA  -  Found to  have chronic L ICA occlusion and acute infarction within bilateral cerebral hemispheres  -  Management per Vascular Neurology--> etiology likely cardioembolic- started on Eliquis  See hospital course for functional, cognitive/speech/language, and nutrition/swallow status.    Recommendations  -  Encourage mobility, OOB in chair, and early ambulation as appropriate   -  PT/OT evaluate and treat  -  SLP speech and cognitive evaluate and treat  -  Monitor mood and sleep disturbances  -  Establish consistent sleep-wake cycle  -  Monitor for bowel and bladder dysfunction  -  Monitor for shoulder pain and subluxation  -  Monitor for spasticity  -  DVT prophylaxis  -  Monitor for and prevent skin breakdown and pressure ulcers  · Early mobility, repositioning/weight shifting every 20-30 minutes when sitting, turn patient every 2 hours, proper mattress/overlay and chair cushioning, pressure relief/heel protector boots    Occlusion of left carotid artery  -  Found on imaging  -  Management per Vascular Neurology--> suspect chronic occlusion- no acute intervention    Paroxysmal atrial fibrillation  -  Stroke risk factor  -  Management per Vascular Neurology--> started on Eliquis    Post-acute recommendation: IRF- prefers facility on [facility]    ANGELES Berg  Department of Physical Medicine & Rehab   [facility]

## 2019-10-16 PROBLEM — L53.9 ERYTHEMA OF HAND: Status: ACTIVE | Noted: 2019-10-16

## 2019-10-16 PROBLEM — Z75.1 PERSON AWAITING ADMISSION TO ADEQUATE FACILITY ELSEWHERE: Status: ACTIVE | Noted: 2019-10-16

## 2019-10-16 LAB
ANION GAP SERPL CALC-SCNC: 8 MMOL/L (ref 8–16)
BUN SERPL-MCNC: 25 MG/DL (ref 8–23)
CALCIUM SERPL-MCNC: 8.8 MG/DL (ref 8.7–10.5)
CHLORIDE SERPL-SCNC: 99 MMOL/L (ref 95–110)
CO2 SERPL-SCNC: 25 MMOL/L (ref 23–29)
CREAT SERPL-MCNC: 1.4 MG/DL (ref 0.5–1.4)
EST. GFR  (AFRICAN AMERICAN): 52.6 ML/MIN/1.73 M^2
EST. GFR  (NON AFRICAN AMERICAN): 45.5 ML/MIN/1.73 M^2
GLUCOSE SERPL-MCNC: 83 MG/DL (ref 70–110)
POTASSIUM SERPL-SCNC: 4.4 MMOL/L (ref 3.5–5.1)
SODIUM SERPL-SCNC: 132 MMOL/L (ref 136–145)

## 2019-10-16 PROCEDURE — 92507 TX SP LANG VOICE COMM INDIV: CPT

## 2019-10-16 PROCEDURE — 36415 COLL VENOUS BLD VENIPUNCTURE: CPT

## 2019-10-16 PROCEDURE — 80048 BASIC METABOLIC PNL TOTAL CA: CPT

## 2019-10-16 PROCEDURE — 97530 THERAPEUTIC ACTIVITIES: CPT

## 2019-10-16 PROCEDURE — 25000003 PHARM REV CODE 250: Performed by: STUDENT IN AN ORGANIZED HEALTH CARE EDUCATION/TRAINING PROGRAM

## 2019-10-16 PROCEDURE — 20600001 HC STEP DOWN PRIVATE ROOM

## 2019-10-16 PROCEDURE — 99233 SBSQ HOSP IP/OBS HIGH 50: CPT | Mod: GC,,, | Performed by: PSYCHIATRY & NEUROLOGY

## 2019-10-16 PROCEDURE — 25000003 PHARM REV CODE 250: Performed by: PHYSICIAN ASSISTANT

## 2019-10-16 PROCEDURE — 99233 PR SUBSEQUENT HOSPITAL CARE,LEVL III: ICD-10-PCS | Mod: GC,,, | Performed by: PSYCHIATRY & NEUROLOGY

## 2019-10-16 PROCEDURE — 25000003 PHARM REV CODE 250: Performed by: NURSE PRACTITIONER

## 2019-10-16 PROCEDURE — 97116 GAIT TRAINING THERAPY: CPT

## 2019-10-16 PROCEDURE — 97535 SELF CARE MNGMENT TRAINING: CPT

## 2019-10-16 PROCEDURE — 92526 ORAL FUNCTION THERAPY: CPT

## 2019-10-16 PROCEDURE — 25000003 PHARM REV CODE 250: Performed by: PSYCHIATRY & NEUROLOGY

## 2019-10-16 RX ORDER — MUPIROCIN 20 MG/G
OINTMENT TOPICAL 3 TIMES DAILY
Status: DISPENSED | OUTPATIENT
Start: 2019-10-16 | End: 2019-10-26

## 2019-10-16 RX ADMIN — APIXABAN 5 MG: 5 TABLET, FILM COATED ORAL at 09:10

## 2019-10-16 RX ADMIN — FUROSEMIDE 20 MG: 20 TABLET ORAL at 09:10

## 2019-10-16 RX ADMIN — LEVOTHYROXINE SODIUM 25 MCG: 25 TABLET ORAL at 09:10

## 2019-10-16 RX ADMIN — AMLODIPINE BESYLATE 5 MG: 5 TABLET ORAL at 09:10

## 2019-10-16 RX ADMIN — ATORVASTATIN CALCIUM 40 MG: 20 TABLET, FILM COATED ORAL at 09:10

## 2019-10-16 RX ADMIN — SENNOSIDES 8.6 MG: 8.6 TABLET, FILM COATED ORAL at 09:10

## 2019-10-16 RX ADMIN — DONEPEZIL HYDROCHLORIDE 5 MG: 5 TABLET, FILM COATED ORAL at 09:10

## 2019-10-16 RX ADMIN — CARVEDILOL 6.25 MG: 6.25 TABLET, FILM COATED ORAL at 09:10

## 2019-10-16 RX ADMIN — MUPIROCIN: 20 OINTMENT TOPICAL at 04:10

## 2019-10-16 RX ADMIN — MUPIROCIN: 20 OINTMENT TOPICAL at 09:10

## 2019-10-16 NOTE — PT/OT/SLP PROGRESS
Physical Therapy Treatment    Patient Name:  Izaiah Douglas   MRN:  [id number]  Admitting Diagnosis:  Acute arterial ischemic stroke, multifocal, multiple vascular territories   Recent Surgery: * No surgery found *    Admit Date: 10/10/2019  Length of Stay: 6 days    Recommendations:     Discharge Recommendations:  rehabilitation facility   Discharge Equipment Recommendations: none   Barriers to discharge: None    Assessment:     Izaiah Douglas is a 85 y.o. male admitted with a medical diagnosis of Acute arterial ischemic stroke, multifocal, multiple vascular territories.  He presents with the following impairments/functional limitations:  weakness, impaired endurance, gait instability, impaired cognition, decreased safety awareness, impaired cardiopulmonary response to activity. Pt tolerated session well on this date. Pt demonstrates good functional strength and balance on this date, as seen  By total distance ambulated as well as decreased level of assist during transfers and ambulation. However, pt demonstrated poor endurance and required two standing rest breaks during ambulation. However, pt does have the strength to progress assistive device to bilateral SPC, which is what pt was using prior to admit. Pt is also a high fall risk at this time. Pt is very confused at this time and agitated when corrected on things such as AD usage, safety techniques, and direction. Pt also has poor safety awareness and does not recognize current functional limitations of poor endurance as well as balance. Pt is an excellent candidate for inpatient rehabilitation, as pt has a qualifying diagnosis, displays good activity tolerance, has experienced decline from PLOF, has good family support, and is motivated to participate in therapy.     Rehab Prognosis: Good; patient would benefit from acute skilled PT services to address these deficits and reach maximum level of function.    Recent Surgery: * No surgery found *      Plan:     During  this hospitalization, patient to be seen 4 x/week to address the identified rehab impairments via gait training, therapeutic activities, therapeutic exercises, neuromuscular re-education and progress toward the following goals:    · Plan of Care Expires:  11/08/19    Subjective     RN notified prior to session. Friend present upon PT entrance into room.    Chief Complaint: Pt had no complaints. Was willing to go for a walk and work with PT.   Patient/Family Comments/goals: Family hopes for pt to be able to go to rehab to return home safely.  Pain/Comfort:  · Pain Rating 1: 0/10  · Pain Rating Post-Intervention 1: 0/10      Objective:     Additional staff present: none    Patient found HOB elevated with: telemetry, bed alarm   Mental Status: Patient is oriented to AxOx1 (self only) and follows single step commands. Patient is Alert, Distractible, Cooperative and Confused during session.    General Precautions: Standard, Cardiac fall   Orthopedic Precautions:N/A   Braces: N/A   Body mass index is 25.91 kg/m².  Oxygen Device: Room Air    Outcome Measures:  AM-PAC 6 CLICK MOBILITY  Turning over in bed (including adjusting bedclothes, sheets and blankets)?: 4  Sitting down on and standing up from a chair with arms (e.g., wheelchair, bedside commode, etc.): 3  Moving from lying on back to sitting on the side of the bed?: 4  Moving to and from a bed to a chair (including a wheelchair)?: 4  Need to walk in hospital room?: 3  Climbing 3-5 steps with a railing?: 3  Basic Mobility Total Score: 21     Functional Mobility:    Bed Mobility:   · Rolling/Turning to Left: stand by assistance  · Scooting to HOB via supine bridge: supervision  · Supine to Sit: supervision; from Lt side of bed  · Scooting anteriorly to EOB to have both feet planted on floor: supervision  · Sit to Supine: supervision; to Lt side of bed    Transfers:   · Sit <> Stand Transfer: stand by assistance with rolling walker   · Stand <> Sit Transfer: stand by  assistance with rolling walker   · [id number] from EOB    Gait:  · Patient ambulated: 150 ft; 150 ft; 100 ft (standing rest breaks between trials)   · Patient required: standy by assistance  · Patient used:  rolling walker   · Gait Pattern observed: reciprocal gait  · Gait Deviation(s): occasional unsteady gait, decreased step length, wide base of support, flexed posture and increased benitez  · Impairments due to: impaired balance and confusion  · Comments: Pt was able to perform two 180 degree and one 90 degree turn with no overt LOB. Pt req'd cueing for safety and direction as well as RW steering. Pt was generally unsteady throughout ambulation trial which increased due to pt's impulsive ambulation nature and speed.    Education:   All questions answered within PT scope of practice   PT role in POC   Pt educated on proper body mechanics, safety techniques, and energy conservation with PT facilitation and cueing throughout session   Whiteboard updated with pt's current mobility status listed below   Safe to perform step transfer to/from chair SBA and RW w/ nursing/PCT   Pt to ambulate 3x/day SBA and RW with nsg/pct in order to maintain functional mobility   Call nursing/pct to transfer to chair/use bathroom.     Patient left HOB elevated with all lines intact, call button in reach, bed alarm on and daughter and friend present.    GOALS:   Multidisciplinary Problems     Physical Therapy Goals        Problem: Physical Therapy Goal    Goal Priority Disciplines Outcome Goal Variances Interventions   Physical Therapy Goal     PT, PT/OT Ongoing, Progressing     Description:  Goals to be met by: 10/25/19     Patient will increase functional independence with mobility by performin. Supine to sit with Minimal Assistance - met 10/16  1a. Supine to sit with Supervision with bed flat - not met  2. Sit to supine with Minimal Assistance - Not met  3. Sit to stand transfer with Stand-by Assistance - Not met  4. Bed  to chair transfer with Supervision using Single-point Cane PRN - Discontinue  5. Gait  x 100 feet with Supervision using Single-point Cane PRN - Discontinue  6. Ascend/descend 3 stairs with right Handrail with Supervision - Not met  7. Lower extremity exercise program x 20 reps per handout, with assistance as needed - Not met    8. Added on 10/13: Bed to chair transfer with RW and supervision - Not met  9. Added on 10/13: Gait x 300 ft with RW and SBA (including turns and straight lines) - met 10/16  9a. Gait x 300 ft with LRAD and SBA (including turns and straight lines) - not met                  Time Tracking:     PT Received On: 10/16/19  PT Start Time: 1430     PT Stop Time: 1443  PT Total Time (min): 13 min       Billable Minutes:   · Gait Training 13    Treatment Type: Treatment  PT/PTA: PT       Bhargavi Pollard PT, DPT  10/16/2019   Pager: [phone]

## 2019-10-16 NOTE — PLAN OF CARE
Problem: SLP Goal  Goal: SLP Goal  Description  Speech Therapy Short Term Goals  Goal expected to be met by 10/25  1. Pt will participate in an ongoing assessment to determine the least restrictive and safest diet with possible updated goals to follow pending results.  2. The patient will answer orientation questions x4 with 90% acc no cues.   3. The patient will name 8 items in a concrete category given min cues.   4. The patient will answer complex y/n questions with 85% acc no cues.   5. The patient will follow 2+ step directions with 75% acc given 1 redirection.   6. The patient will participate in an ongoing assessment of speech, language, and cognition with updated goals to follow pending results and progress.    Outcome: Ongoing, Progressing    Continue current  plan of care as goals remain appropriate.        THIAGO Camacho, CCC-SLP  Speech Language Pathologist  [phone]

## 2019-10-16 NOTE — PLAN OF CARE
"Spoke with Marilee, patient's wife, regarding patient's residential status. Wife states "whatever is needed for him for rehab". CM redirected question, " are you aware if he is becoming a resident". States "I do not know talk to his daughter Selam, she is the one making decisions. ". CM clarified and Marilee states she is "ok with what Selam decides".  "

## 2019-10-16 NOTE — ASSESSMENT & PLAN NOTE
Erythema of right hand from IV infiltration  Bactroban cream TID X 10 days  Nursing to elevate right hand during the day

## 2019-10-16 NOTE — PT/OT/SLP PROGRESS
"Occupational Therapy   Treatment    Name: Izaiah Douglas  MRN: [id number]  Admitting Diagnosis:  Acute arterial ischemic stroke, multifocal, multiple vascular territories       Recommendations:     Discharge Recommendations: rehabilitation facility  Discharge Equipment Recommendations:  (TBD)      Assessment:     Izaiah Douglas is a 85 y.o. male with a medical diagnosis of Acute arterial ischemic stroke, multifocal, multiple vascular territories.Performance deficits affecting function are weakness, impaired functional mobilty, gait instability, impaired self care skills, impaired endurance, impaired balance, decreased safety awareness, impaired cognition.   Pt tolerated session fairly well.   Rehab Prognosis:  Good; patient would benefit from acute skilled OT services to address these deficits and reach maximum level of function.       Plan:     Patient to be seen 3 x/week to address the above listed problems via self-care/home management, therapeutic activities, therapeutic exercises  · Plan of Care Expires: 11/10/19  · Plan of Care Reviewed with: patient    Subjective     "Give me my roller. I'll go walk" pt reports.     Pain/Comfort:  · Pain Rating 1: 0/10    Objective:     Communicated with: nsg prior to session.   Pt found sleeping in chair with daughter present.     General Precautions: Standard, fall     Occupational Performance:     Bed Mobility:    Sit>supine with SBA    Functional Mobility/Transfers:  · Sit>stand from chair with CGA x 2 trials  · Sit>stand from commode with MIN A     Activities of Daily Living:  · G/H: standing with CGA  LE dressing: pt refused. OT provided education re: purpose of performance of this task and pt stated "what part of NO do you not understand."  · UE dressing: MOD A   · Toileting MIN A       AMPA 6 Click ADL: 15    Treatment & Education:  Pt oriented to self and place as "hospital". Pt unaware of year or month and unable to provide PLOF. Daughter reports he was residing in " [place] with his spouse but will not be returning there at this time.     Pt completed functional mobility in room and hallway with SBA with RW approx 200 feet. Pt without LOB. Pt did need directional cues and cues to slow speed of movement.  Pt t/f to chair and then requested to return to bed.  Education provided re: OT POC and safety with functional mobility/ADL skills.     Patient left supine with all lines intact, call button in reach, bed alarm on and nsg presentEducation:      GOALS:   Multidisciplinary Problems     Occupational Therapy Goals        Problem: Occupational Therapy Goal    Goal Priority Disciplines Outcome Interventions   Occupational Therapy Goal     OT, PT/OT Ongoing, Progressing    Description:  Goals to be met by: 10/21     Patient will increase functional independence with ADLs by performing:    UE Dressing with Modified [place].  LE Dressing with Modified [place].  Grooming while standing with Modified [place].  Toileting from toilet with Modified [place] for hygiene and clothing management.   Supine to sit with Modified [place].  Step transfer with Modified [place]                      Time Tracking:     OT Date of Treatment: 10/16/19  OT Start Time: 1330  OT Stop Time:1400  OT Total Time (min): 30 min    Billable Minutes:Self Care/Home Management 15  Therapeutic Activity 15    BRIGHT Rodriguez  10/16/2019

## 2019-10-16 NOTE — PROGRESS NOTES
[facility]  Vascular Neurology  Comprehensive Stroke Center  Progress Note    Assessment/Plan:     * Acute arterial ischemic stroke, multifocal, multiple vascular territories  84 y/o male with PMH of chronic Afib, CAD s/p stent, HTN, CHF, hypothyroidism who presented who received IV-tPA at OSH for RSW, aphasia and L gaze deviation.  CTA MP showed a chronically occluded L ICA from it's origin to the supraclinoid segment with filling of the MCA via the AComm. He was also noted to have an occluded V4 with an intermittently occluded basilar. No EVT.    Documented Afib since 2018 and was not started on AC. He appears to have chronic occluded ICA w/ substantial burden of both extracranial and intracranial atherosclerotic disease. Etiology  likely cardioembolic.    Antithrombotics for secondary stroke prevention:  Eliquis 2.5 mg BID (creatine clearance 47)     Statins for secondary stroke prevention and hyperlipidemia, if present:   Statins: Atorvastatin- 40 mg daily    Aggressive risk factor modification: HTN     Rehab efforts: The patient has been evaluated by a stroke team provider and the therapy needs have been fully considered based off the presenting complaints and exam findings. The following therapy evaluations are needed: PT evaluate and treat, OT evaluate and treat, SLP evaluate and treat, PM&R evaluate for appropriate placement - rehab     Diagnostics ordered/pending: NA    VTE prophylaxis: Eliquis 2.5 mg BID    BP parameters: Infarct: Post tPA, SBP <180        Person awaiting admission to adequate facility elsewhere  Pending rehab placement  Difficult placement due to patient living in [place] and visiting daughter when stroke occurred - therapy teams recommending inpatient rehab    At this time patient is unable to fly back to [place]. He is not capable of flying due to inability to ambulate without trained medical assistance, urine and bowel incontinence, and decreased mental  capacity related to stroke and underlying diagnosis of dementia.     Erythema of hand  Erythema of right hand from IV infiltration  Bactroban cream TID X 10 days  Nursing to elevate right hand during the day     SIADH (syndrome of inappropriate ADH production)  Na 132 today  Urine studies suggestive of SIADH  Urine osm 105  Osmolality 524  1 liter fluid restriction       Dementia  Continue Donepezil 5 mg daily     Intracranial atherosclerosis  Noted on CTA head   Started on eliquis and Atorvastatin 40 mg     Occlusion of left carotid artery  Seen on CTA  Suspect chronic occlusion   No acute concern at this time     Nodule of right lung  1 cm nodule of R lung seen on CTA  Will need follow up in 3- 6 months   Discussed with daughter     Cytotoxic brain edema  Area of cytotoxic cerebral edema identified when reviewing brain imaging in the territory of the R/L middle cerebral artery. There is not mass effect associated with it. We will continue to monitor the patients clinical exam for any worsening of symptoms which may indicate expansion of the stroke or the area of the edema resulting in the clinical change. The pattern is suggestive of cardioembolic etiology        CAD (coronary artery disease)  Stroke risk factor  -- Noted on Care Everywhere  -- S/p RCA stent 7/2018  -- Prescribed Plavix, statin and Ranexa but unclear if he's taking them  -- Eliquis started 10/12  -- Atorvastatin 40 mg daily       Paroxysmal atrial fibrillation  Stroke risk factor  -- Per Care Everywhere; not on AC - Dr. Menjivar cardiologist reports patient not a good candidate due to risk of falls, non-compliance, and dementia   -- Noted on EKG at admit.  -- Rate controlled  -- Eliquis 2.5 mg started 10/12 - low dose due to age and creatinine    Chronic combined systolic and diastolic congestive heart failure  Stroke risk factor  -- pro BNP from Feb 2019 >3000  -- On Lasix and Aldactone at home.  -- TTE 35%   -- Per chart review EF previously 35%    -- Will slowly start to resume home CHF medications if creatinine continues to improve - lasix 20 mg resumed  Needs to follow up with cardiologist as outpatient     Acquired hypothyroidism  Stroke risk factor  -- Continue Synthroid 25mcg daily    Essential hypertension  Stroke risk factor  -- SBP<180  -- Coreg 6.25 mg BID  -- Norvasc 5 mg daily started 10/13           10/12 - Etiology likely cardioembolic. Will start Eliquis 2.5 mg BID. Creatinine trending down - may start patient on Eliquis 5 mg BID if continue to decrease. L ICA chronically occluded - no need to vasculare intervention.   10/13 - Spoke with PT - patient would benefit from inpatient rehab placement. Norvasc 5 mg ordered. Urine studies ordered for hyponatremia - suspect to be due to dehydration.   10/14 Patient with SIADH but sodium stable.  1 liter fluid restriction.    10/15  Irritation around right hand peripheral IV site.  Sodium 131 today.  Discussed with daughter that he is on fluid restriction and she will stop providing him with outside drinks.     10/16 - Sodium increasing 132. Bactroban ordered for right hand IV infiltration. Working on placement.     STROKE DOCUMENTATION   Acute Stroke Times   Last Known Normal Date: 10/10/19  Last Known Normal Time: 1905  Symptom Onset Date: 10/10/19  Symptom Onset Time: 1905  Stroke Team Called Date: 10/10/19  Stroke Team Called Time: 2120  Stroke Team Arrival Date: 10/10/19  Stroke Team Arrival Time: 2122    NIH Scale:  1a. Level of Consciousness: 0-->Alert, keenly responsive  1b. LOC Questions: 2-->Answers neither question correctly  1c. LOC Commands: 0-->Performs both tasks correctly  2. Best Gaze: 0-->Normal  3. Visual: 0-->No visual loss  4. Facial Palsy: 0-->Normal symmetrical movements  5a. Motor Arm, Left: 2-->Some effort against gravity, limb cannot get to or maintain (if cued) 90 (or 45) degrees, drifts down to bed, but has some effort against gravity  5b. Motor Arm, Right: 0-->No drift, limb  holds 90 (or 45) degrees for full 10 secs  6a. Motor Leg, Left: 1-->Drift, leg falls by the end of the 5-sec period but does not hit bed  6b. Motor Leg, Right: 0-->No drift, leg holds 30 degree position for full 5 secs  7. Limb Ataxia: 0-->Absent  8. Sensory: 0-->Normal, no sensory loss  9. Best Language: 1-->Mild-to-moderate aphasia, some obvious loss of fluency or facility of comprehension, without significant limitation on ideas expressed or form of expression. Reduction of speech and/or comprehension, however, makes conversation. . . (see row details)  10. Dysarthria: 0-->Normal  11. Extinction and Inattention (formerly Neglect): 1-->Visual, tactile, auditory, spatial, or personal inattention or extinction to bilateral simultaneous stimulation in one of the sensory modalities  Total (NIH Stroke Scale): 7       Modified Guthrie Score: 1  Cheney Coma Scale:    ABCD2 Score:    [id number] Score:   HAS -BLED Score:   ICH Score:   Hunt & Stroud Classification:      Hemorrhagic change of an Ischemic Stroke: Does this patient have an ischemic stroke with hemorrhagic changes? No     Neurologic Chief Complaint: right sided weakness and aphasia    Subjective:     Interval History: Patient is seen for follow-up neurological assessment and treatment recommendations:    Sodium increasing 132. Bactroban ordered for right hand IV infiltration. Working on placement.      HPI, Past Medical, Family, and Social History remains the same as documented in the initial encounter.     Review of Systems   Constitutional: Negative for fever.   Eyes: Negative for visual disturbance.   Respiratory: Negative for shortness of breath.    Cardiovascular: Negative for chest pain.   Gastrointestinal: Negative for nausea and vomiting.   Neurological: Positive for weakness. Negative for facial asymmetry and speech difficulty.   Psychiatric/Behavioral: Negative for agitation and confusion.     Scheduled Meds:   amLODIPine  5 mg Oral Daily     apixaban  5 mg Oral BID    atorvastatin  40 mg Oral Daily    carvedilol  6.25 mg Oral BID    donepezil  5 mg Oral QHS    furosemide  20 mg Oral Daily    levothyroxine  25 mcg Oral Daily    mupirocin   Topical (Top) TID    senna  8.6 mg Oral Daily     Continuous Infusions:  PRN Meds:hydrALAZINE, influenza, sodium chloride 0.9%    Objective:     Vital Signs (Most Recent):  Temp: 97.4 °F (36.3 °C) (10/16/19 0011)  Pulse: 76 (10/16/19 0750)  Resp: 16 (10/16/19 0011)  BP: (!) 166/89 (10/16/19 0011)  SpO2: (!) 92 % (10/16/19 0011)  BP Location: Right arm    Vital Signs Range (Last 24H):  Temp:  [97.4 °F (36.3 °C)-97.6 °F (36.4 °C)]   Pulse:  [54-76]   Resp:  [16-18]   BP: (143-166)/(72-89)   SpO2:  [92 %-97 %]   BP Location: Right arm    Physical Exam   HENT:   Head: Normocephalic.   Eyes: Pupils are equal, round, and reactive to light. EOM are normal.   Cardiovascular: Normal rate.   Pulmonary/Chest: Effort normal.   Abdominal: Soft.   Musculoskeletal:   Good strength but with neglect of left arm    Neurological: He is alert.   Skin: Skin is warm. Capillary refill takes less than 2 seconds.   Psychiatric: He has a normal mood and affect.       Neurological Exam:   LOC: alert  Attention Span: Good   Language: mild aphasia  Articulation: No dysarthria  Orientation: Person, not month or age   Visual Fields: Full  EOM (CN III, IV, VI): Full/intact  Pupils (CN II, III): PERRL  Facial Movement (CN VII): Symmetric facial expression    Motor: Arm left  Paresis: 3/5  Leg left  Normal 4/5  Arm right  Normal 5/5  Leg right Normal 5/5  Cebellar: No evidence of appendicular or axial ataxia  Sensation: Intact to light touch, temperature and vibration    Laboratory:  CMP:   Recent Labs   Lab 10/16/19  0433   CALCIUM 8.8   *   K 4.4   CO2 25   CL 99   BUN 25*   CREATININE 1.4     BMP:   Recent Labs   Lab 10/16/19  0433   *   K 4.4   CL 99   CO2 25   BUN 25*   CREATININE 1.4   CALCIUM 8.8     CBC:   Recent Labs   Lab  10/15/19  0430   WBC 7.75   RBC 4.79   HGB 12.6*   HCT 41.9      MCV 88   MCH 26.3*   MCHC 30.1*     Lipid Panel:   Recent Labs   Lab 10/10/19  2249   CHOL 115*   LDLCALC 69.0   HDL 34*   TRIG 60     Coagulation:   Recent Labs   Lab 10/10/19  1934   INR 1.2   APTT 34.8     Platelet Aggregation Study: No results for input(s): PLTAGG, PLTAGINTERP, PLTAGREGLACO, ADPPLTAGGREG in the last 168 hours.  Hgb A1C:   Recent Labs   Lab 10/10/19  2249   HGBA1C 5.7*     TSH:   Recent Labs   Lab 10/10/19  2249   TSH 3.032       Diagnostic Results     Brain Imaging   MRI brain 10/10  Restricted diffusion in the right frontal lobe and small cortical area of the left MCA  Cytotoxic cerebral edema        Vessel Imaging   CTA MP 10/10/19 2154  Occluded left ICA with reconstitution of the distal/supraclinoid segment by retrograde flow through [id number].    Occluded intracranial segment of the left vertebral terminating at the level of left PICA origin.  Right vertebral artery supply the basilar artery.  Basilar artery is small in caliber with wall irregularity and intermittent occlusions.    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    Miscellaneous:    Right pleural effusion with associated compressive atelectasis.    1 cm sub solid nodule in the right lung apex.  For a part solid nodule 6 mm or greater, Fleischner Society 2017 guidelines recommend follow up with non-contrast chest CT at 3-6 months to confirm persistence. If this nodule is unchanged and the solid component remains <6 mm, annual follow up CT should be performed for 5 years; however, persistence of a part-solid nodule with solid component ?6 mm should be considered highly suspicious and may warrant further workup with PET/CT, biopsy, or resection    CT head 10/10/19 1950  Chronic involutional and chronic ischemic changes.  There is partially calcified mass demonstrated lateral to the cavernous sinus medial  right temporal lobe adjacent to ICA.  This would favor calcified meningioma.       Cardiac Imaging   TTE 10/11  · Moderately decreased left ventricular systolic function. The estimated ejection fraction is 35%  · Concentric left ventricular remodeling.  · Left ventricular diastolic dysfunction.  · Local segmental wall motion abnormalities.  · Severe left atrial enlargement.  · Mild right atrial enlargement.  · Mild aortic regurgitation.  · Moderate aortic valve stenosis but difficult to appreciate in the setting of AF, depressed EF, and low stroke volume.  · Aortic valve area is 1.39 cm2; peak velocity is 1.28 m/s; mean gradient is 4 mmHg.  · Elevated central venous pressure (15 mm Hg).           Eunice Downs NP  Comprehensive Stroke Center  Department of Vascular Neurology   [facility]

## 2019-10-16 NOTE — PLAN OF CARE
Patient free from falls and injuries throughout shift.  AAO and VSS.  Patient denies chest pain and SOB.    Patient continues on eliquis 5 mg BID and Lasix PO 20 mg daily. No acute events overnight. Patient resting well at this time.  Plan of care discussed with patient.  Patient verbalizes understanding.  Will continue to monitor.

## 2019-10-16 NOTE — PT/OT/SLP PROGRESS
Speech Language Pathology Treatment    Patient Name:  Izaiah Douglas   MRN:  [id number]  Admitting Diagnosis: Acute arterial ischemic stroke, multifocal, multiple vascular territories    Recommendations:                 General Recommendations:  Dysphagia therapy and Cognitive-linguistic therapy  Diet recommendations:  Regular, Liquid Diet Level: Thin   Aspiration Precautions: 1 bite/sip at a time, small bites/sips, Feed only when awake/alert, HOB to 90 degrees, Meds whole 1 at a time, Monitor for s/s of aspiration, Remain upright 30 minutes post meal, and Standard aspiration precautions   General Precautions: Standard, aspiration, fall  Communication strategies:  provide increased time to answer    Subjective     Pt seen this am with daughter present.  Pt's daughter indicated pt with limited sleep last night.    Pain/Comfort:  · Pain Rating 1: 0/10  · Pain Rating Post-Intervention 1: 0/10    Objective:     Has the patient been evaluated by SLP for swallowing?   Yes  Keep patient NPO? No   Current Respiratory Status: room air      Pt roused to stimulation and participated in session.  Daughter reported 1 isolated event of pt coughing with a bite of pizza that was brought in to pt.  Daughter denied other difficulty.  Education was provided to pt/daughter regarding aspiration precautions, strategies and recommended consistencies to reduce risk for difficulty.  Encouraged pt/daughter to avoid dry/bready consistencies if possible at this time.  Daughter verbalized desire to remain on regular diet and assist pt with menu selection.  Pt was observed swallowing cracker trials with thin water rinses for ongoing assessment this session.  No overt s/s of aspiration were observed.    Mr. Douglas was oriented to self only. He had difficulty stating accurate place, month or year.  Information was reviewed.  Pt recalled basic personal information with fair to good ability.  Pt followed 1 and 2 step verbal commands but had difficulty  following 3 steps.  He required mod-max assistance to list 8 items in given categories. Education provided regarding risk for neglect and strategies to improve.  Further education provided regarding cognitive / linguistic stimulation.  Daughter verbalized understanding. Pt left resting comfortably with daughter present.    Assessment:     Izaiah Douglas is a 85 y.o. male with an SLP diagnosis of Dysphagia and Cognitive-Linguistic Impairment.      Goals:   Multidisciplinary Problems     SLP Goals        Problem: SLP Goal    Goal Priority Disciplines Outcome   SLP Goal     SLP Ongoing, Progressing   Description:  Speech Therapy Short Term Goals  Goal expected to be met by 10/25  1. Pt will participate in an ongoing assessment to determine the least restrictive and safest diet with possible updated goals to follow pending results.  2. The patient will answer orientation questions x4 with 90% acc no cues.   3. The patient will name 8 items in a concrete category given min cues.   4. The patient will answer complex y/n questions with 85% acc no cues.   5. The patient will follow 2+ step directions with 75% acc given 1 redirection.   6. The patient will participate in an ongoing assessment of speech, language, and cognition with updated goals to follow pending results and progress.                     Plan:     · Patient to be seen:  4 x/week   · Plan of Care expires:  11/09/19  · Plan of Care reviewed with:  patient, daughter   · SLP Follow-Up:  Yes       Discharge recommendations:  rehabilitation facility       Time Tracking:     SLP Treatment Date:   10/16/19  Speech Start Time:  0915  Speech Stop Time:  0934     Speech Total Time (min):  19 min    Billable Minutes: Speech Therapy Individual 10 and Treatment Swallowing Dysfunction 9    THIAGO Camacho, CCC-SLP  Speech Language Pathologist  [phone]  10/16/2019

## 2019-10-16 NOTE — ASSESSMENT & PLAN NOTE
Stroke risk factor  -- pro BNP from Feb 2019 >3000  -- On Lasix and Aldactone at home.  -- TTE 35%   -- Per chart review EF previously 35%   -- Will slowly start to resume home CHF medications if creatinine continues to improve - lasix 20 mg resumed  Needs to follow up with cardiologist as outpatient

## 2019-10-16 NOTE — ASSESSMENT & PLAN NOTE
Pending rehab placement  Difficult placement due to patient living in [place] and visiting daughter when stroke occurred - therapy teams recommending inpatient rehab    At this time patient is unable to fly back to [place]. He is not capable of flying due to inability to ambulate without trained medical assistance, urine and bowel incontinence, and decreased mental capacity related to stroke and underlying diagnosis of dementia.

## 2019-10-16 NOTE — SUBJECTIVE & OBJECTIVE
Neurologic Chief Complaint: right sided weakness and aphasia    Subjective:     Interval History: Patient is seen for follow-up neurological assessment and treatment recommendations:    Sodium increasing 132. Bactroban ordered for right hand IV infiltration. Working on placement.      HPI, Past Medical, Family, and Social History remains the same as documented in the initial encounter.     Review of Systems   Constitutional: Negative for fever.   Eyes: Negative for visual disturbance.   Respiratory: Negative for shortness of breath.    Cardiovascular: Negative for chest pain.   Gastrointestinal: Negative for nausea and vomiting.   Neurological: Positive for weakness. Negative for facial asymmetry and speech difficulty.   Psychiatric/Behavioral: Negative for agitation and confusion.     Scheduled Meds:   amLODIPine  5 mg Oral Daily    apixaban  5 mg Oral BID    atorvastatin  40 mg Oral Daily    carvedilol  6.25 mg Oral BID    donepezil  5 mg Oral QHS    furosemide  20 mg Oral Daily    levothyroxine  25 mcg Oral Daily    mupirocin   Topical (Top) TID    senna  8.6 mg Oral Daily     Continuous Infusions:  PRN Meds:hydrALAZINE, influenza, sodium chloride 0.9%    Objective:     Vital Signs (Most Recent):  Temp: 97.4 °F (36.3 °C) (10/16/19 0011)  Pulse: 76 (10/16/19 0750)  Resp: 16 (10/16/19 0011)  BP: (!) 166/89 (10/16/19 0011)  SpO2: (!) 92 % (10/16/19 0011)  BP Location: Right arm    Vital Signs Range (Last 24H):  Temp:  [97.4 °F (36.3 °C)-97.6 °F (36.4 °C)]   Pulse:  [54-76]   Resp:  [16-18]   BP: (143-166)/(72-89)   SpO2:  [92 %-97 %]   BP Location: Right arm    Physical Exam   HENT:   Head: Normocephalic.   Eyes: Pupils are equal, round, and reactive to light. EOM are normal.   Cardiovascular: Normal rate.   Pulmonary/Chest: Effort normal.   Abdominal: Soft.   Musculoskeletal:   Good strength but with neglect of left arm    Neurological: He is alert.   Skin: Skin is warm. Capillary refill takes less than 2  seconds.   Psychiatric: He has a normal mood and affect.       Neurological Exam:   LOC: alert  Attention Span: Good   Language: mild aphasia  Articulation: No dysarthria  Orientation: Person, not month or age   Visual Fields: Full  EOM (CN III, IV, VI): Full/intact  Pupils (CN II, III): PERRL  Facial Movement (CN VII): Symmetric facial expression    Motor: Arm left  Paresis: 3/5  Leg left  Normal 4/5  Arm right  Normal 5/5  Leg right Normal 5/5  Cebellar: No evidence of appendicular or axial ataxia  Sensation: Intact to light touch, temperature and vibration    Laboratory:  CMP:   Recent Labs   Lab 10/16/19  0433   CALCIUM 8.8   *   K 4.4   CO2 25   CL 99   BUN 25*   CREATININE 1.4     BMP:   Recent Labs   Lab 10/16/19  0433   *   K 4.4   CL 99   CO2 25   BUN 25*   CREATININE 1.4   CALCIUM 8.8     CBC:   Recent Labs   Lab 10/15/19  0430   WBC 7.75   RBC 4.79   HGB 12.6*   HCT 41.9      MCV 88   MCH 26.3*   MCHC 30.1*     Lipid Panel:   Recent Labs   Lab 10/10/19  2249   CHOL 115*   LDLCALC 69.0   HDL 34*   TRIG 60     Coagulation:   Recent Labs   Lab 10/10/19  1934   INR 1.2   APTT 34.8     Platelet Aggregation Study: No results for input(s): PLTAGG, PLTAGINTERP, PLTAGREGLACO, ADPPLTAGGREG in the last 168 hours.  Hgb A1C:   Recent Labs   Lab 10/10/19  2249   HGBA1C 5.7*     TSH:   Recent Labs   Lab 10/10/19  2249   TSH 3.032       Diagnostic Results     Brain Imaging   MRI brain 10/10  Restricted diffusion in the right frontal lobe and small cortical area of the left MCA  Cytotoxic cerebral edema        Vessel Imaging   CTA MP 10/10/19 2154  Occluded left ICA with reconstitution of the distal/supraclinoid segment by retrograde flow through [id number].    Occluded intracranial segment of the left vertebral terminating at the level of left PICA origin.  Right vertebral artery supply the basilar artery.  Basilar artery is small in caliber with wall irregularity and intermittent  occlusions.    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    Miscellaneous:    Right pleural effusion with associated compressive atelectasis.    1 cm sub solid nodule in the right lung apex.  For a part solid nodule 6 mm or greater, Fleischner Society 2017 guidelines recommend follow up with non-contrast chest CT at 3-6 months to confirm persistence. If this nodule is unchanged and the solid component remains <6 mm, annual follow up CT should be performed for 5 years; however, persistence of a part-solid nodule with solid component ?6 mm should be considered highly suspicious and may warrant further workup with PET/CT, biopsy, or resection    CT head 10/10/19 1950  Chronic involutional and chronic ischemic changes.  There is partially calcified mass demonstrated lateral to the cavernous sinus medial right temporal lobe adjacent to ICA.  This would favor calcified meningioma.       Cardiac Imaging   TTE 10/11  · Moderately decreased left ventricular systolic function. The estimated ejection fraction is 35%  · Concentric left ventricular remodeling.  · Left ventricular diastolic dysfunction.  · Local segmental wall motion abnormalities.  · Severe left atrial enlargement.  · Mild right atrial enlargement.  · Mild aortic regurgitation.  · Moderate aortic valve stenosis but difficult to appreciate in the setting of AF, depressed EF, and low stroke volume.  · Aortic valve area is 1.39 cm2; peak velocity is 1.28 m/s; mean gradient is 4 mmHg.  · Elevated central venous pressure (15 mm Hg).

## 2019-10-16 NOTE — PLAN OF CARE
Goals remain appropriate.   Problem: Occupational Therapy Goal  Goal: Occupational Therapy Goal  Description  Goals to be met by: 10/21     Patient will increase functional independence with ADLs by performing:    UE Dressing with Modified [place].  LE Dressing with Modified [place].  Grooming while standing with Modified [place].  Toileting from toilet with Modified [place] for hygiene and clothing management.   Supine to sit with Modified [place].  Step transfer with Modified [place]     Outcome: Ongoing, Progressing

## 2019-10-16 NOTE — PLAN OF CARE
Discharge Recommendation: Rehab.    2 goals met today. PT goals still appropriate.    Appropriate transfer level with nursing staff: Bed <> Chair:  Step Transfer with stand by assistance with Rolling Walker.    Problem: Physical Therapy Goal  Goal: Physical Therapy Goal  Description  Goals to be met by: 10/25/19     Patient will increase functional independence with mobility by performin. Supine to sit with Minimal Assistance - met 10/16  1a. Supine to sit with Supervision with bed flat - not met  2. Sit to supine with Minimal Assistance - Not met  3. Sit to stand transfer with Stand-by Assistance - Not met  4. Bed to chair transfer with Supervision using Single-point Cane PRN - Discontinue  5. Gait  x 100 feet with Supervision using Single-point Cane PRN - Discontinue  6. Ascend/descend 3 stairs with right Handrail with Supervision - Not met  7. Lower extremity exercise program x 20 reps per handout, with assistance as needed - Not met    8. Added on 10/13: Bed to chair transfer with RW and supervision - Not met  9. Added on 10/13: Gait x 300 ft with RW and SBA (including turns and straight lines) - met 10/16  9a. Gait x 300 ft with LRAD and SBA (including turns and straight lines) - not met   Outcome: Ongoing, Progressing    Bhargavi Pollard, PT, DPT  10/16/2019  Pager: [phone]

## 2019-10-17 LAB
ANION GAP SERPL CALC-SCNC: 8 MMOL/L (ref 8–16)
BUN SERPL-MCNC: 26 MG/DL (ref 8–23)
CALCIUM SERPL-MCNC: 8.9 MG/DL (ref 8.7–10.5)
CHLORIDE SERPL-SCNC: 96 MMOL/L (ref 95–110)
CO2 SERPL-SCNC: 29 MMOL/L (ref 23–29)
CREAT SERPL-MCNC: 1.5 MG/DL (ref 0.5–1.4)
EST. GFR  (AFRICAN AMERICAN): 48.4 ML/MIN/1.73 M^2
EST. GFR  (NON AFRICAN AMERICAN): 41.8 ML/MIN/1.73 M^2
GLUCOSE SERPL-MCNC: 72 MG/DL (ref 70–110)
POTASSIUM SERPL-SCNC: 4.9 MMOL/L (ref 3.5–5.1)
SODIUM SERPL-SCNC: 133 MMOL/L (ref 136–145)

## 2019-10-17 PROCEDURE — 97116 GAIT TRAINING THERAPY: CPT

## 2019-10-17 PROCEDURE — 25000003 PHARM REV CODE 250: Performed by: STUDENT IN AN ORGANIZED HEALTH CARE EDUCATION/TRAINING PROGRAM

## 2019-10-17 PROCEDURE — 80048 BASIC METABOLIC PNL TOTAL CA: CPT

## 2019-10-17 PROCEDURE — 20600001 HC STEP DOWN PRIVATE ROOM

## 2019-10-17 PROCEDURE — 25000003 PHARM REV CODE 250: Performed by: FAMILY MEDICINE

## 2019-10-17 PROCEDURE — 25000003 PHARM REV CODE 250: Performed by: PHYSICIAN ASSISTANT

## 2019-10-17 PROCEDURE — 99233 PR SUBSEQUENT HOSPITAL CARE,LEVL III: ICD-10-PCS | Mod: GC,,, | Performed by: PSYCHIATRY & NEUROLOGY

## 2019-10-17 PROCEDURE — 99233 SBSQ HOSP IP/OBS HIGH 50: CPT | Mod: GC,,, | Performed by: PSYCHIATRY & NEUROLOGY

## 2019-10-17 PROCEDURE — 92507 TX SP LANG VOICE COMM INDIV: CPT

## 2019-10-17 PROCEDURE — 25000003 PHARM REV CODE 250: Performed by: NURSE PRACTITIONER

## 2019-10-17 PROCEDURE — 36415 COLL VENOUS BLD VENIPUNCTURE: CPT

## 2019-10-17 PROCEDURE — 25000003 PHARM REV CODE 250: Performed by: PSYCHIATRY & NEUROLOGY

## 2019-10-17 RX ADMIN — MUPIROCIN: 20 OINTMENT TOPICAL at 08:10

## 2019-10-17 RX ADMIN — DONEPEZIL HYDROCHLORIDE 5 MG: 5 TABLET, FILM COATED ORAL at 08:10

## 2019-10-17 RX ADMIN — ATORVASTATIN CALCIUM 40 MG: 20 TABLET, FILM COATED ORAL at 08:10

## 2019-10-17 RX ADMIN — APIXABAN 5 MG: 5 TABLET, FILM COATED ORAL at 08:10

## 2019-10-17 RX ADMIN — LEVOTHYROXINE SODIUM 25 MCG: 25 TABLET ORAL at 08:10

## 2019-10-17 RX ADMIN — FUROSEMIDE 20 MG: 20 TABLET ORAL at 08:10

## 2019-10-17 RX ADMIN — AMLODIPINE BESYLATE 5 MG: 5 TABLET ORAL at 08:10

## 2019-10-17 RX ADMIN — CARVEDILOL 6.25 MG: 6.25 TABLET, FILM COATED ORAL at 08:10

## 2019-10-17 RX ADMIN — APIXABAN 2.5 MG: 2.5 TABLET, FILM COATED ORAL at 08:10

## 2019-10-17 RX ADMIN — SENNOSIDES 8.6 MG: 8.6 TABLET, FILM COATED ORAL at 08:10

## 2019-10-17 RX ADMIN — MUPIROCIN: 20 OINTMENT TOPICAL at 02:10

## 2019-10-17 NOTE — PT/OT/SLP PROGRESS
"Speech Language Pathology Treatment    Patient Name:  Izaiah Douglas   MRN:  [id number]  Admitting Diagnosis: Acute arterial ischemic stroke, multifocal, multiple vascular territories    Recommendations:                 General Recommendations:  Speech/language therapy and Cognitive-linguistic therapy  Diet recommendations:  Regular, Liquid Diet Level: Thin   Aspiration Precautions: HOB to 90 degrees, Small bites/sips and Standard aspiration precautions   General Precautions: Standard, fall  Communication strategies:  provide increased time to answer    Subjective     "December" when asked the month  Patient goals: did not state    Pain/Comfort:  · Pain Rating 1: 0/10  · Pain Rating Post-Intervention 1: 0/10    Objective:     Has the patient been evaluated by SLP for swallowing?   Yes  Keep patient NPO? No   Current Respiratory Status: room air      Pt seen bedside with family friend present. Pt was awake/alert but slow to respond. Pt oriented to person and hospital only with poor recall of month once provided. Pt with poor recall of personal information including information about his children, their names and his profession. He also needed cues to recall where he lives. Pt followed 2 step commands with 100% acc. He was able to name an average of 4 items when given a category. He could name 2-3 items independently but then needed max cues to name more items. Speech was clear. White board updated.     Assessment:     Izaiah Douglas is a 85 y.o. male with an SLP diagnosis of Cognitive-Linguistic Impairment.  He presents with poor recall of orientation information.    Goals:   Multidisciplinary Problems     SLP Goals        Problem: SLP Goal    Goal Priority Disciplines Outcome   SLP Goal     SLP Ongoing, Progressing   Description:  Speech Therapy Short Term Goals  Goal expected to be met by 10/25  1. Pt will participate in an ongoing assessment to determine the least restrictive and safest diet with possible updated " goals to follow pending results.  2. The patient will answer orientation questions x4 with 90% acc no cues.   3. The patient will name 8 items in a concrete category given min cues.   4. The patient will answer complex y/n questions with 85% acc no cues.   5. The patient will follow 2+ step directions with 75% acc given 1 redirection.   6. The patient will participate in an ongoing assessment of speech, language, and cognition with updated goals to follow pending results and progress.                     Plan:     · Patient to be seen:  4 x/week   · Plan of Care expires:  11/09/19  · Plan of Care reviewed with:  patient, daughter   · SLP Follow-Up:  Yes       Discharge recommendations:  rehabilitation facility       Time Tracking:     SLP Treatment Date:   10/17/19  Speech Start Time:  1043  Speech Stop Time:  1057     Speech Total Time (min):  14 min    Billable Minutes: Speech Therapy Individual 14    THIAGO Cali, CCC-SLP  10/17/2019

## 2019-10-17 NOTE — ASSESSMENT & PLAN NOTE
84 y/o male with PMH of chronic Afib, CAD s/p stent, HTN, CHF, hypothyroidism who presented who received IV-tPA at OSH for RSW, aphasia and L gaze deviation.  CTA MP showed a chronically occluded L ICA from it's origin to the supraclinoid segment with filling of the MCA via the AComm. He was also noted to have an occluded V4 with an intermittently occluded basilar. No EVT.    Documented Afib since 2018 and was not started on AC. He appears to have chronic occluded ICA w/ substantial burden of both extracranial and intracranial atherosclerotic disease. Etiology likely cardioembolic.    Antithrombotics for secondary stroke prevention:  Eliquis 2.5 mg BID (creatine clearance 47)     Statins for secondary stroke prevention and hyperlipidemia, if present:   Statins: Atorvastatin- 40 mg daily    Aggressive risk factor modification: HTN     Rehab efforts: The patient has been evaluated by a stroke team provider and the therapy needs have been fully considered based off the presenting complaints and exam findings. The following therapy evaluations are needed: PT evaluate and treat, OT evaluate and treat, SLP evaluate and treat, PM&R evaluate for appropriate placement - rehab     Diagnostics ordered/pending: NA    VTE prophylaxis: Eliquis 2.5 mg BID    BP parameters: Infarct: Post tPA, SBP <180

## 2019-10-17 NOTE — NURSING
Spoke w/ Marika at [phone] in reference to Linda is requesting to speak with a physician only.  Number given to to Marika at Dr Krishna at  [phone] affliated w/ [employer]-

## 2019-10-17 NOTE — PLAN OF CARE
Problem: SLP Goal  Goal: SLP Goal  Description  Speech Therapy Short Term Goals  Goal expected to be met by 10/25  1. Pt will participate in an ongoing assessment to determine the least restrictive and safest diet with possible updated goals to follow pending results.  2. The patient will answer orientation questions x4 with 90% acc no cues.   3. The patient will name 8 items in a concrete category given min cues.   4. The patient will answer complex y/n questions with 85% acc no cues.   5. The patient will follow 2+ step directions with 75% acc given 1 redirection.   6. The patient will participate in an ongoing assessment of speech, language, and cognition with updated goals to follow pending results and progress.    Outcome: Ongoing, Progressing     Goals remain appropriate.    THIAGO Cali, CCC-SLP  10/17/2019

## 2019-10-17 NOTE — PT/OT/SLP PROGRESS
Physical Therapy Treatment    Patient Name:  Izaiah Douglas   MRN:  [id number]  Admitting Diagnosis:  Acute arterial ischemic stroke, multifocal, multiple vascular territories   Recent Surgery: * No surgery found *    Admit Date: 10/10/2019  Length of Stay: 7 days    Recommendations:     Discharge Recommendations:  rehabilitation facility   Discharge Equipment Recommendations: none   Barriers to discharge: None    Assessment:     Izaiah Douglas is a 85 y.o. male admitted with a medical diagnosis of Acute arterial ischemic stroke, multifocal, multiple vascular territories.  He presents with the following impairments/functional limitations:  weakness, impaired endurance, impaired functional mobilty, decreased lower extremity function, impaired cognition, impaired cardiopulmonary response to activity. Pt tolerated session well today. Pt more pleasant on this date and agreeable to PT. Pt's physical strength and mobility continues to improve. Pt is able to perform transfers from standard height chairs with only supervision. Pt also is demonstrating improved endurance and was able to walk 200 ft today before needing a rest break. However pt is still relying on RW at this time which is below pt's PLOF. Future ambulation sessions should focus on progressing to bilateral SPC, which is what pt previously uses. Pt's cognition, memory, and impulsivity continue to be pt's limiting factors. Pt is not able to perform dual tasking with ambulating and has poor short term memory causing pt to get lost in hospital. All of this makes pt an increased fall risk and unsafe to go home at this time. Pt is an excellent candidate for inpatient rehabilitation, as pt has a qualifying diagnosis, displays good activity tolerance, has experienced decline from PLOF, has good family support, and is motivated to participate in therapy.     Rehab Prognosis: Good; patient would benefit from acute skilled PT services to address these deficits and reach  "maximum level of function.    Recent Surgery: * No surgery found *      Plan:     During this hospitalization, patient to be seen 3 x/week to address the identified rehab impairments via gait training, therapeutic activities, therapeutic exercises, neuromuscular re-education and progress toward the following goals:    · Plan of Care Expires:  11/08/19    Subjective     RN notified prior to session. Friend present upon PT entrance into room.    Chief Complaint: Pt didn't have any complaints. Stated he was cold.  Patient/Family Comments/goals: Patient's friend says the goal is for patient to eventually return to [place].  Pain/Comfort:  · Pain Rating 1: 0/10  · Pain Rating Post-Intervention 1: 0/10      Objective:     Additional staff present: none    Patient found up in chair with: telemetry   Mental Status: Patient is oriented to [facility] (person only). Pt was able to state he was in a hospital but did not know the city and state he was in. Pt stated the month was December but when was given cueing that it was halloween time pt was able to correctly state October. When asked why he was in the hospital the pt stated "Well I must be sick if I'm in the hospital" showing some deductive reasoning. Pt was able to follow two-step commands. Patient is Alert, Cooperative and Confused during session.    General Precautions: Standard, Cardiac fall   Orthopedic Precautions:N/A   Braces: N/A   Body mass index is 25.62 kg/m².  Oxygen Device: Room Air    Outcome Measures:  AM-PAC 6 CLICK MOBILITY  Turning over in bed (including adjusting bedclothes, sheets and blankets)?: 4  Sitting down on and standing up from a chair with arms (e.g., wheelchair, bedside commode, etc.): 4  Moving from lying on back to sitting on the side of the bed?: 4  Moving to and from a bed to a chair (including a wheelchair)?: 3  Need to walk in hospital room?: 3  Climbing 3-5 steps with a railing?: 3  Basic Mobility Total Score: 21     Functional " Mobility:    Bed Mobility:   · Pt found/returned to bedside chair    Transfers:   · Sit <> Stand Transfer: supervision with rolling walker   · Stand <> Sit Transfer: supervision with rolling walker   · [id number] from EOB   · Sit <> Stand Transfer: minimum assistance with rolling walker   · Stand <> Sit Transfer: minimum assistance with rolling walker   · [id number] from toliet  · Pt req'd Min A to assist with stand as well as to steady walker. Pt with poor safety awareness with repeated attempts to pull on walker. Pt was able to follow cueing to take hand off of walker but then immediately placed back on.    Gait:  · Patient ambulated: 200 ft x 2 (Standing rest break between trials); 16 ft x 2 (to/from bathroom)   · Patient required: standy by assistance  · Patient used:  rolling walker   · Gait Pattern observed: reciprocal gait   · Gait Deviation(s): wide base of support, increased benitez, poor foot clearance, and steady gait  · Impairments due to: decreased safety awareness   · Comments: 2x200 ft trials involved dual task. Pt was asked to count up by 3's while ambulating. Pt was able to correctly name next two numbers in sequence after 1 and then req'd max verbal cueing and encouragement to continue with task. After rest break pt was given generative naming task while ambulating (name animals in zoo). Pt was able to name 2 objects but did not name anymore even with max cueing. Slight decay in gait speed and step length during dual tasking but no increase in unsteadiness.    Education:   All questions answered within PT scope of practice   PT role in POC   Pt educated on proper body mechanics, safety techniques, and energy conservation with PT facilitation and cueing throughout session   Whiteboard updated with pt's current mobility status listed below   Safe to perform step transfer to/from chair supervision and RW w/ nursing/PCT   Pt to ambulate 3x/day SBA and RW with nsg/family in order to maintain  functional mobility   Importance of OOB tolerance prn hrs/day to improve lung ventilation and expansion as well as strengthen postural musculature   Pt is safe to ambulate to bathroom for toileting with RN/family/friends. Friend and pt stated understanding. RN notified.    Patient left up in chair with all lines intact, call button in reach, RN notified and friend present.    GOALS:   Multidisciplinary Problems     Physical Therapy Goals        Problem: Physical Therapy Goal    Goal Priority Disciplines Outcome Goal Variances Interventions   Physical Therapy Goal     PT, PT/OT Ongoing, Progressing     Description:  Goals to be met by: 10/25/19     Patient will increase functional independence with mobility by performin. Supine to sit with Minimal Assistance - met 10/16  1a. Supine to sit with Supervision with bed flat - not met  2. Sit to supine with Minimal Assistance - Not met  3. Sit to stand transfer with Stand-by Assistance - Not met  4. Bed to chair transfer with Supervision using Single-point Cane PRN - Discontinue  5. Gait  x 100 feet with Supervision using Single-point Cane PRN - Discontinue  6. Ascend/descend 3 stairs with right Handrail with Supervision - Not met  7. Lower extremity exercise program x 20 reps per handout, with assistance as needed - Not met  8. Added on 10/13: Bed to chair transfer with RW and supervision - Not met  9. Added on 10/13: Gait x 300 ft with RW and SBA (including turns and straight lines) - met 10/16  9a. Gait x 300 ft with bilateral SPC and SBA (including turns and straight lines) (revised 10/17)- not met                   Time Tracking:     PT Received On: 10/17/19  PT Start Time: 1249     PT Stop Time: 1307  PT Total Time (min): 18 min       Billable Minutes:   · Gait Training 18    Treatment Type: Treatment  PT/PTA: PT       Bhargavi Pollard PT, DPT  10/17/2019   Pager: [phone]

## 2019-10-17 NOTE — ASSESSMENT & PLAN NOTE
Pending rehab placement  Difficult placement due to patient living in [place] and visiting daughter when stroke occurred - therapy teams recommending inpatient rehab    At this time patient is unable to fly back to [place]. He is not capable of flying due to inability to ambulate without trained medical assistance, impaired functional mobility, impaired cardiopulmonary response to activity, urine and bowel incontinence, and decreased mental capacity related to stroke and underlying diagnosis of dementia.     10/17 - Spoke to Dr. Krishna of [facility]. Requesting documentation of pt's current functional status stating why pt is unable to take commercial flight back to [place]. CM to send updated notes.

## 2019-10-17 NOTE — ASSESSMENT & PLAN NOTE
Na 133 today, improving  Urine studies suggestive of SIADH  Urine osm 105  Osmolality 524  I liter fluid restriction

## 2019-10-17 NOTE — PLAN OF CARE
Spoke with Corine from [employer] [phone], patient's insurance company. States the MD at insurance company would like to speak to MD or NP regarding patient dispo. Marika PERALTA notified.

## 2019-10-17 NOTE — SUBJECTIVE & OBJECTIVE
Neurologic Chief Complaint: right sided weakness and aphasia    Subjective:     Interval History: Patient is seen for follow-up neurological assessment and treatment recommendations:     Sodium continues to improve. Creatinine 1.5 likely near baseline, decreased eliquis to 2.5 mg. R hand erythema stable.     HPI, Past Medical, Family, and Social History remains the same as documented in the initial encounter.     Review of Systems   Constitutional: Negative for fever.   Eyes: Negative for visual disturbance.   Respiratory: Negative for shortness of breath.    Cardiovascular: Negative for chest pain.   Gastrointestinal: Negative for nausea and vomiting.   Neurological: Positive for weakness. Negative for facial asymmetry and speech difficulty.   Psychiatric/Behavioral: Negative for agitation and confusion.     Scheduled Meds:   amLODIPine  5 mg Oral Daily    apixaban  2.5 mg Oral BID    atorvastatin  40 mg Oral Daily    carvedilol  6.25 mg Oral BID    donepezil  5 mg Oral QHS    furosemide  20 mg Oral Daily    levothyroxine  25 mcg Oral Daily    mupirocin   Topical (Top) TID    senna  8.6 mg Oral Daily     Continuous Infusions:  PRN Meds:hydrALAZINE, influenza, sodium chloride 0.9%    Objective:     Vital Signs (Most Recent):  Temp: 97.8 °F (36.6 °C) (10/17/19 1148)  Pulse: (!) 57 (10/17/19 1148)  Resp: 18 (10/17/19 0740)  BP: (!) 165/84 (10/17/19 1148)  SpO2: (!) 94 % (10/17/19 1148)  BP Location: Right arm    Vital Signs Range (Last 24H):  Temp:  [97.5 °F (36.4 °C)-98 °F (36.7 °C)]   Pulse:  [49-72]   Resp:  [18]   BP: (127-172)/(58-88)   SpO2:  [94 %-96 %]   BP Location: Right arm    Physical Exam   HENT:   Head: Normocephalic.   Eyes: Pupils are equal, round, and reactive to light. EOM are normal.   Cardiovascular: Normal rate.   Pulmonary/Chest: Effort normal.   Abdominal: Soft.   Musculoskeletal:   Good strength but with neglect of left arm    Neurological: He is alert.   Skin: Skin is warm. Capillary  refill takes less than 2 seconds.   Psychiatric: He has a normal mood and affect.       Neurological Exam:   LOC: alert  Attention Span: Good   Language: mild aphasia  Articulation: No dysarthria  Orientation: Person, not month or age   Visual Fields: Full  EOM (CN III, IV, VI): Full/intact  Pupils (CN II, III): PERRL  Facial Movement (CN VII): Symmetric facial expression    Motor: Arm left  Paresis: 3/5  Leg left  Normal 4/5  Arm right  Normal 5/5  Leg right Normal 5/5  Cebellar: No evidence of appendicular or axial ataxia  Sensation: Intact to light touch, temperature and vibration    Laboratory:  CMP:   Recent Labs   Lab 10/17/19  0457   CALCIUM 8.9   *   K 4.9   CO2 29   CL 96   BUN 26*   CREATININE 1.5*     BMP:   Recent Labs   Lab 10/17/19  0457   *   K 4.9   CL 96   CO2 29   BUN 26*   CREATININE 1.5*   CALCIUM 8.9     CBC:   Recent Labs   Lab 10/15/19  0430   WBC 7.75   RBC 4.79   HGB 12.6*   HCT 41.9      MCV 88   MCH 26.3*   MCHC 30.1*     Lipid Panel:   Recent Labs   Lab 10/10/19  2249   CHOL 115*   LDLCALC 69.0   HDL 34*   TRIG 60     Coagulation:   Recent Labs   Lab 10/10/19  1934   INR 1.2   APTT 34.8     Platelet Aggregation Study: No results for input(s): PLTAGG, PLTAGINTERP, PLTAGREGLACO, ADPPLTAGGREG in the last 168 hours.  Hgb A1C:   Recent Labs   Lab 10/10/19  2249   HGBA1C 5.7*     TSH:   Recent Labs   Lab 10/10/19  2249   TSH 3.032       Diagnostic Results     Brain Imaging   MRI brain 10/10  Restricted diffusion in the right frontal lobe and small cortical area of the left MCA  Cytotoxic cerebral edema        Vessel Imaging   CTA MP 10/10/19 2154  Occluded left ICA with reconstitution of the distal/supraclinoid segment by retrograde flow through [id number].    Occluded intracranial segment of the left vertebral terminating at the level of left PICA origin.  Right vertebral artery supply the basilar artery.  Basilar artery is small in caliber with wall irregularity and  intermittent occlusions.    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    Miscellaneous:    Right pleural effusion with associated compressive atelectasis.    1 cm sub solid nodule in the right lung apex.  For a part solid nodule 6 mm or greater, Fleischner Society 2017 guidelines recommend follow up with non-contrast chest CT at 3-6 months to confirm persistence. If this nodule is unchanged and the solid component remains <6 mm, annual follow up CT should be performed for 5 years; however, persistence of a part-solid nodule with solid component ?6 mm should be considered highly suspicious and may warrant further workup with PET/CT, biopsy, or resection    CT head 10/10/19 1950  Chronic involutional and chronic ischemic changes.  There is partially calcified mass demonstrated lateral to the cavernous sinus medial right temporal lobe adjacent to ICA.  This would favor calcified meningioma.       Cardiac Imaging   TTE 10/11  · Moderately decreased left ventricular systolic function. The estimated ejection fraction is 35%  · Concentric left ventricular remodeling.  · Left ventricular diastolic dysfunction.  · Local segmental wall motion abnormalities.  · Severe left atrial enlargement.  · Mild right atrial enlargement.  · Mild aortic regurgitation.  · Moderate aortic valve stenosis but difficult to appreciate in the setting of AF, depressed EF, and low stroke volume.  · Aortic valve area is 1.39 cm2; peak velocity is 1.28 m/s; mean gradient is 4 mmHg.  · Elevated central venous pressure (15 mm Hg).

## 2019-10-17 NOTE — PLAN OF CARE
Neuro checks X4. Educated pt that HA is a s/s that may be associated with stroke. Pt ambulated in the hallway 3x today without difficulty, occurrences of falls, dizziness, or complaints of SOB. POC reviewed with patient and family at beside. VSS, no complaints of pain. Pt in bed at lowest position with wheels locked, 2x upper side rails raised, call light within reach. Bed alarm activated. Telesitter in place.

## 2019-10-17 NOTE — ASSESSMENT & PLAN NOTE
Erythema of right hand from IV infiltration  Bactroban cream TID X 10 days  Nursing to elevate right hand during the day    History of Present Illness


Consult date: 06/29/17


Requesting physician: SERGIO CORRIGAN


Consult reason: atrial fibrillation, known to you


History of present illness: 


The patient is a 36 YO male with a past medical history significant for atrial 

fibrillation (has only been on ; has tried Xarelto and Pradaxa, has not 

been on anticoagulation secondary to hematuria - was advised to start Eliquis 

and to see Dr. Aragon for treatment of his kidney stones but he has not yet 

followed up), NICMP (has declined AICD in the past), PAD, kidney stones, COPD, 

ROBINSON (does not currently have CPAP d/t insurance issues), obesity, and tobacco 

use. Pt has seen several cardiologists, Dr. Roberts with [facility] and [facility]. He was 

recently consulted in our office by Dr. Arnett. He presented with c/o right 

foot pain with numbness, tingling, and pain that is worsened with activity for 

approximately 5-6 weeks. Per pt report, he presented on 6/27 for scheduled OP 

peripheral angiogram with possible intervention and evidence for distal embolus 

involving the right anterior tibial, peroneal and posterior tibial arteries was 

found. Thrombectomy was attempted and EKOS with tPA was initiated and pt was 

admitted. Pt returned to cath lab on 6/28 for repeat angiogram and EKOS was d/c'

d. During this admission, he was noted to be in AFib with RVR and thus 

cardiology has been consulted. He was initiated on digoxin per Dr. Arnett and 

is in AFib with CVR on evaluation. He denies any cardiac complaints. 





LHC done 6/6/2016 at [facility] showed normal coronaries with mild plaque 

in the PDA, severe NICMP, EF 25%. Echo done 6/23/2017 showed EF 25-30%, no clot 

in LV noted, moderate LVH, RV moderately dilated, mild RV systolic dysfunction, 

LA severely enlarged, mild MR, mild TR, mild pulmonary HTN, RVSP 41mmHg, mild WI

, proximal ascending aorta mildly enlarged at 4cm. 











Past History


Past Medical History: atrial fib, COPD, heart failure, hypertension, other (

renal stones; sleep apnea)


Social history: lives with family, smoking





Medications and Allergies


 Allergies











Allergy/AdvReac Type Severity Reaction Status Date / Time


 


ibuprofen [From Motrin] Allergy  Swelling Verified 11/02/16 13:24


 


lisinopril Allergy  COUGH Verified 11/02/16 13:24


 


metoprolol Allergy  Nausea Verified 11/02/16 13:24


 


Iodinated Contrast Media - AdvReac  KIDNEYS Verified 11/02/16 13:24





IV Dye   SHUT DOWN  











 Home Medications











 Medication  Instructions  Recorded  Confirmed  Last Taken  Type


 


Digoxin [Lanoxin] 0.25 mg PO DAILY@1700 #30 tablet 01/05/15 06/27/17 06/26/17 Rx


 


Aspirin [Aspirin TAB] 325 mg PO QDAY 06/27/17 06/27/17 06/26/17 History


 


Furosemide [Lasix TAB] 40 mg PO QDAY 06/27/17 06/27/17 06/26/17 History


 


Losartan [Cozaar] 50 mg PO QDAY 06/27/17 06/27/17 06/26/17 History











Active Meds: 


Active Medications





Al Hydrox/Mg Hydrox/Simethicone (Alum-Mag Hydrox-Simeth [phone]/5ml)  30 

ml PO Q4H PRN


   PRN Reason: Indigestion


Bisacodyl (Dulcolax)  10 mg WI QDAY PRN


   PRN Reason: constipation unrelieved by MOM


Digoxin (Lanoxin)  0.125 mg PO DAILY@1700 LYNDA


   Last Admin: 06/28/17 15:00 Dose:  0.125 mg


Enoxaparin Sodium (Lovenox)  40 mg SUB-Q QDAY@2200 LYNDA


Furosemide (Lasix)  40 mg PO DAILY@0600 LYNDA


   Last Admin: 06/29/17 05:35 Dose:  40 mg


Diltiazem HCl (Cardizem/D5w 100mg/100ml)  100 mg in 100 mls @ 5 mls/hr IV TITR 

LYNDA; 5 MG/HR


   PRN Reason: Protocol


   Last Admin: 06/29/17 05:36 Dose:  5 mg/hr, 5 mls/hr


Losartan Potassium (Cozaar)  50 mg PO QDAY LYNDA


   Last Admin: 06/28/17 12:01 Dose:  50 mg


Magnesium Hydroxide (Milk Of Magnesia)  30 ml PO Q4H PRN


   PRN Reason: Constipation


Morphine Sulfate (Morphine)  2 mg IV Q4H PRN


   PRN Reason: Pain, Moderate (4-6)


   Last Admin: 06/27/17 19:00 Dose:  2 mg


Morphine Sulfate (Morphine)  4 mg IV Q4H PRN


   PRN Reason: Pain , Severe (7-10)


   Last Admin: 06/28/17 20:19 Dose:  4 mg


Ondansetron HCl (Zofran)  4 mg IV Q8H PRN


   PRN Reason: N/V unrelieved by Reglan


   Last Admin: 06/27/17 19:20 Dose:  4 mg


Oxycodone/Acetaminophen (Percocet 5/325)  2 tab PO Q4H PRN


   PRN Reason: Pain, Moderate (4-6)


   Last Admin: 06/29/17 05:54 Dose:  2 tab


Pantoprazole (Protonix)  40 mg PO QDAY LYNDA


Prednisone (Deltasone)  50 mg PO QDAY LYNDA


   Last Admin: 06/28/17 09:30 Dose:  50 mg











Review of Systems


All systems: negative


Musculoskeletal: other (RLE numbness, tingling, and pain)





Physical Examination


 Vital Signs











Temp Pulse Resp BP Pulse Ox


 


 98.2 F   81   20   141/105   95 


 


 06/27/17 12:53  06/27/17 12:53  06/27/17 12:53  06/27/17 12:53  06/27/17 12:53











General appearance: no acute distress


HEENT: Positive: PERRL, Normocephaly, Mucus Membranes Moist


Neck: Positive: neck supple, trachea midline


Cardiac: Positive: Reg Rate and Rhythm, S1/S2


Lungs: Positive: Normal Exam, clear to auscultation, Normal Breath Sounds


Neuro: Positive: Grossly Intact, Cranial Nerve 2-12 Intact


Abdomen: Positive: Unremarkable, Soft, Active Bowel Sounds.  Negative: Tender


Skin: Positive: Clear.  Negative: Rash, Wound


Musculoskeletal: No Fluid Collection, No Pain, Normal Range of Motion


Extremities: Absent: edema





Results





 06/29/17 00:09





 06/28/17 12:37


 Coagulation











  06/28/17 06/28/17 Range/Units





  12:29 23:10 


 


PT  13.9  15.0 H  (12.2-14.9)  Sec.


 


INR  1.02  1.12  (0.87-1.13)  


 


APTT  27.6  27.3  (24.2-36.6)  Sec.








 CBC











  06/28/17 06/29/17 Range/Units





  12:29 00:09 


 


WBC  17.2 H  16.7 H  (4.5-11.0)  K/mm3


 


RBC  5.79 H  5.63 H  (3.65-5.03)  M/mm3


 


Hgb  17.3 H  17.3 H  (11.8-15.2)  gm/dl


 


Hct  54.2 H  52.5 H  (35.5-45.6)  %


 


Plt Count  194  190  (140-440)  K/mm3


 


Lymph #  1.1 L  1.0 L  (1.2-5.4)  K/mm3


 


Mono #  1.3 H  0.6  (0.0-0.8)  K/mm3


 


Eos #  0.0  0.0  (0.0-0.4)  K/mm3


 


Baso #  0.1  0.1  (0.0-0.1)  K/mm3








 Comprehensive Metabolic Panel











  06/28/17 Range/Units





  12:37 


 


Sodium  137  (137-145)  mmol/L


 


Potassium  5.1 H  (3.6-5.0)  mmol/L


 


Chloride  99.3  ()  mmol/L


 


Carbon Dioxide  25  (22-30)  mmol/L


 


BUN  18  (9-20)  mg/dL


 


Creatinine  1.0  (0.8-1.5)  mg/dL


 


Glucose  191 H  ()  mg/dL


 


Calcium  9.5  (8.4-10.2)  mg/dL














- Imaging and Cardiology


Echo: report reviewed


Cardiac cath: report reviewed


EKG: pending





Assessment and Plan


Assessment:


PAD / RLE embolus - s/p EKOS; Await vascular recommendations for further eval/

management.  


Atrial fibrillation with RVR --> now with CVR  


NICMP - normal coronaries on LHC 6/2016; EF 25-30% on echo 6/2017. 


ROBINSON 


HTN


Leukocytosis - on prednisone per vascular specialist. 


Hyperkalemia 


H/o nephrolithiasis / hematura 


Tobacco use - cessation encouraged


Obesity


Noncompliance 





Plan:


Obtain 12-lead EKG. 


Pt with CHADS score 2 and thus systemic anticoagulation in regards to AFib is 

recommend. Pt reports that is is agreeable to anticoaguation at this time. Will 

await vascular recommendations and readdress systemic AC prior to discharge. 


Continue PO digoxin. 


Cont home losartan and PO lasix. 


Initiate lopressor, 25mg PO BID. Hold for HR <60 and/or SBP <100. 


Repeat BMP in AM. Obtain thyroid panel in AM. 


Assessment and plan reviewed with pt at bedside. 





The patient has been seen in conjunction with Dr. ELSA Reid who agrees with the 

assessment and plan of care.

## 2019-10-17 NOTE — PLAN OF CARE
Discharge Recommendation: Rehab.    0 goals met today. PT goals still appropriate.    Appropriate transfer level with nursing staff: Bed <> Chair:  Step Transfer with stand by assistance with Rolling Walker.    Problem: Physical Therapy Goal  Goal: Physical Therapy Goal  Description  Goals to be met by: 10/25/19     Patient will increase functional independence with mobility by performin. Supine to sit with Minimal Assistance - met 10/16  1a. Supine to sit with Supervision with bed flat - not met  2. Sit to supine with Minimal Assistance - Not met  3. Sit to stand transfer with Stand-by Assistance - Not met  4. Bed to chair transfer with Supervision using Single-point Cane PRN - Discontinue  5. Gait  x 100 feet with Supervision using Single-point Cane PRN - Discontinue  6. Ascend/descend 3 stairs with right Handrail with Supervision - Not met  7. Lower extremity exercise program x 20 reps per handout, with assistance as needed - Not met  8. Added on 10/13: Bed to chair transfer with RW and supervision - Not met  9. Added on 10/13: Gait x 300 ft with RW and SBA (including turns and straight lines) - met 10/16  9a. Gait x 300 ft with bilateral SPC and SBA (including turns and straight lines) (revised 10/17)- not met    Outcome: Ongoing, Progressing    Bhargavi Pollard PT, DPT  10/17/2019  Pager: [phone]

## 2019-10-17 NOTE — PROGRESS NOTES
[facility]  Vascular Neurology  Comprehensive Stroke Center  Progress Note    Assessment/Plan:     * Acute arterial ischemic stroke, multifocal, multiple vascular territories  86 y/o male with PMH of chronic Afib, CAD s/p stent, HTN, CHF, hypothyroidism who presented who received IV-tPA at OSH for RSW, aphasia and L gaze deviation.  CTA MP showed a chronically occluded L ICA from it's origin to the supraclinoid segment with filling of the MCA via the AComm. He was also noted to have an occluded V4 with an intermittently occluded basilar. No EVT.    Documented Afib since 2018 and was not started on AC. He appears to have chronic occluded ICA w/ substantial burden of both extracranial and intracranial atherosclerotic disease. Etiology likely cardioembolic.    Antithrombotics for secondary stroke prevention:  Eliquis 2.5 mg BID (creatine clearance 47)     Statins for secondary stroke prevention and hyperlipidemia, if present:   Statins: Atorvastatin- 40 mg daily    Aggressive risk factor modification: HTN     Rehab efforts: The patient has been evaluated by a stroke team provider and the therapy needs have been fully considered based off the presenting complaints and exam findings. The following therapy evaluations are needed: PT evaluate and treat, OT evaluate and treat, SLP evaluate and treat, PM&R evaluate for appropriate placement - rehab     Diagnostics ordered/pending: NA    VTE prophylaxis: Eliquis 2.5 mg BID    BP parameters: Infarct: Post tPA, SBP <180        Person awaiting admission to adequate facility elsewhere  Pending rehab placement  Difficult placement due to patient living in [place] and visiting daughter when stroke occurred - therapy teams recommending inpatient rehab    At this time patient is unable to fly back to [place]. He is not capable of flying due to inability to ambulate without trained medical assistance, impaired functional mobility, impaired cardiopulmonary  response to activity, urine and bowel incontinence, and decreased mental capacity related to stroke and underlying diagnosis of dementia.     10/17 - Spoke to Dr. Krishna of [employer]. Requesting documentation of pt's current functional status stating why pt is unable to take commercial flight back to [place]. CM to send updated notes.     Erythema of hand  Erythema of right hand from IV infiltration  Bactroban cream TID X 10 days  Nursing to elevate right hand during the day     SIADH (syndrome of inappropriate ADH production)  Na 133 today, improving  Urine studies suggestive of SIADH  Urine osm 105  Osmolality 524  I liter fluid restriction       Dementia  Continue Donepezil 5 mg daily     Intracranial atherosclerosis  Noted on CTA head   Started on eliquis and Atorvastatin 40 mg     Occlusion of left carotid artery  Seen on CTA  Suspect chronic occlusion   No acute concern at this time     Nodule of right lung  1 cm nodule of R lung seen on CTA  Will need follow up in 3- 6 months   Discussed with daughter     Cytotoxic brain edema  Area of cytotoxic cerebral edema identified when reviewing brain imaging in the territory of the R/L middle cerebral artery. There is not mass effect associated with it. We will continue to monitor the patients clinical exam for any worsening of symptoms which may indicate expansion of the stroke or the area of the edema resulting in the clinical change. The pattern is suggestive of cardioembolic etiology        CAD (coronary artery disease)  Stroke risk factor  -- Noted on Care Everywhere  -- S/p RCA stent 7/2018  -- Prescribed Plavix, statin and Ranexa but unclear if he's taking them  -- Eliquis started 10/12  -- Atorvastatin 40 mg daily       Paroxysmal atrial fibrillation  Stroke risk factor  -- Per Care Everywhere; not on AC - Dr. Menjivar cardiologist reports patient not a good candidate due to risk of falls, non-compliance, and dementia   -- Noted on EKG at  admit.  -- Rate controlled  -- Eliquis 2.5 mg started 10/12 - low dose due to age and creatinine    Chronic combined systolic and diastolic congestive heart failure  Stroke risk factor  -- pro BNP from Feb 2019 >3000  -- On Lasix and Aldactone at home.  -- TTE 35%   -- Per chart review EF previously 35%   -- Will slowly start to resume home CHF medications if creatinine continues to improve - lasix 20 mg resumed  Needs to follow up with cardiologist as outpatient     Acquired hypothyroidism  Stroke risk factor  -- Continue Synthroid 25mcg daily    Essential hypertension  Stroke risk factor  -- SBP<180  -- Coreg 6.25 mg BID  -- Norvasc 5 mg daily started 10/13           10/12 - Etiology likely cardioembolic. Will start Eliquis 2.5 mg BID. Creatinine trending down - may start patient on Eliquis 5 mg BID if continue to decrease. L ICA chronically occluded - no need to vasculare intervention.   10/13 - Spoke with PT - patient would benefit from inpatient rehab placement. Norvasc 5 mg ordered. Urine studies ordered for hyponatremia - suspect to be due to dehydration.   10/14 Patient with SIADH but sodium stable.  1 liter fluid restriction.    10/15  Irritation around right hand peripheral IV site.  Sodium 131 today.  Discussed with daughter that he is on fluid restriction and she will stop providing him with outside drinks.     10/16 - Sodium increasing 132. Bactroban ordered for right hand IV infiltration. Working on placement.   10/17 - Sodium continues to improve. Creatinine 1.5 likely near baseline, decreased eliquis to 2.5 mg. R hand erythema stable.     STROKE DOCUMENTATION   Acute Stroke Times   Last Known Normal Date: 10/10/19  Last Known Normal Time: 1905  Symptom Onset Date: 10/10/19  Symptom Onset Time: 1905  Stroke Team Called Date: 10/10/19  Stroke Team Called Time: 2120  Stroke Team Arrival Date: 10/10/19  Stroke Team Arrival Time: 2122    NIH Scale:  1a. Level of Consciousness: 0-->Alert, keenly  responsive  1b. LOC Questions: 2-->Answers neither question correctly  1c. LOC Commands: 0-->Performs both tasks correctly  2. Best Gaze: 0-->Normal  3. Visual: 0-->No visual loss  4. Facial Palsy: 0-->Normal symmetrical movements  5a. Motor Arm, Left: 2-->Some effort against gravity, limb cannot get to or maintain (if cued) 90 (or 45) degrees, drifts down to bed, but has some effort against gravity  5b. Motor Arm, Right: 0-->No drift, limb holds 90 (or 45) degrees for full 10 secs  6a. Motor Leg, Left: 1-->Drift, leg falls by the end of the 5-sec period but does not hit bed  6b. Motor Leg, Right: 0-->No drift, leg holds 30 degree position for full 5 secs  7. Limb Ataxia: 0-->Absent  8. Sensory: 0-->Normal, no sensory loss  9. Best Language: 1-->Mild-to-moderate aphasia, some obvious loss of fluency or facility of comprehension, without significant limitation on ideas expressed or form of expression. Reduction of speech and/or comprehension, however, makes conversation. . . (see row details)  10. Dysarthria: 0-->Normal  11. Extinction and Inattention (formerly Neglect): 1-->Visual, tactile, auditory, spatial, or personal inattention or extinction to bilateral simultaneous stimulation in one of the sensory modalities  Total (NIH Stroke Scale): 7       Modified Centerpoint Score: 1  Andale Coma Scale:    ABCD2 Score:    [id number] Score:   HAS -BLED Score:   ICH Score:   Hunt & Stroud Classification:      Hemorrhagic change of an Ischemic Stroke: Does this patient have an ischemic stroke with hemorrhagic changes? No     Neurologic Chief Complaint: right sided weakness and aphasia    Subjective:     Interval History: Patient is seen for follow-up neurological assessment and treatment recommendations:     Sodium continues to improve. Creatinine 1.5 likely near baseline, decreased eliquis to 2.5 mg. R hand erythema stable.     HPI, Past Medical, Family, and Social History remains the same as documented in the initial  encounter.     Review of Systems   Constitutional: Negative for fever.   Eyes: Negative for visual disturbance.   Respiratory: Negative for shortness of breath.    Cardiovascular: Negative for chest pain.   Gastrointestinal: Negative for nausea and vomiting.   Neurological: Positive for weakness. Negative for facial asymmetry and speech difficulty.   Psychiatric/Behavioral: Negative for agitation and confusion.     Scheduled Meds:   amLODIPine  5 mg Oral Daily    apixaban  2.5 mg Oral BID    atorvastatin  40 mg Oral Daily    carvedilol  6.25 mg Oral BID    donepezil  5 mg Oral QHS    furosemide  20 mg Oral Daily    levothyroxine  25 mcg Oral Daily    mupirocin   Topical (Top) TID    senna  8.6 mg Oral Daily     Continuous Infusions:  PRN Meds:hydrALAZINE, influenza, sodium chloride 0.9%    Objective:     Vital Signs (Most Recent):  Temp: 97.8 °F (36.6 °C) (10/17/19 1148)  Pulse: (!) 57 (10/17/19 1148)  Resp: 18 (10/17/19 0740)  BP: (!) 165/84 (10/17/19 1148)  SpO2: (!) 94 % (10/17/19 1148)  BP Location: Right arm    Vital Signs Range (Last 24H):  Temp:  [97.5 °F (36.4 °C)-98 °F (36.7 °C)]   Pulse:  [49-72]   Resp:  [18]   BP: (127-172)/(58-88)   SpO2:  [94 %-96 %]   BP Location: Right arm    Physical Exam   HENT:   Head: Normocephalic.   Eyes: Pupils are equal, round, and reactive to light. EOM are normal.   Cardiovascular: Normal rate.   Pulmonary/Chest: Effort normal.   Abdominal: Soft.   Musculoskeletal:   Good strength but with neglect of left arm    Neurological: He is alert.   Skin: Skin is warm. Capillary refill takes less than 2 seconds.   Psychiatric: He has a normal mood and affect.       Neurological Exam:   LOC: alert  Attention Span: Good   Language: mild aphasia  Articulation: No dysarthria  Orientation: Person, not month or age   Visual Fields: Full  EOM (CN III, IV, VI): Full/intact  Pupils (CN II, III): PERRL  Facial Movement (CN VII): Symmetric facial expression    Motor: Arm left   Paresis: 3/5  Leg left  Normal 4/5  Arm right  Normal 5/5  Leg right Normal 5/5  Cebellar: No evidence of appendicular or axial ataxia  Sensation: Intact to light touch, temperature and vibration    Laboratory:  CMP:   Recent Labs   Lab 10/17/19  0457   CALCIUM 8.9   *   K 4.9   CO2 29   CL 96   BUN 26*   CREATININE 1.5*     BMP:   Recent Labs   Lab 10/17/19  0457   *   K 4.9   CL 96   CO2 29   BUN 26*   CREATININE 1.5*   CALCIUM 8.9     CBC:   Recent Labs   Lab 10/15/19  0430   WBC 7.75   RBC 4.79   HGB 12.6*   HCT 41.9      MCV 88   MCH 26.3*   MCHC 30.1*     Lipid Panel:   Recent Labs   Lab 10/10/19  2249   CHOL 115*   LDLCALC 69.0   HDL 34*   TRIG 60     Coagulation:   Recent Labs   Lab 10/10/19  1934   INR 1.2   APTT 34.8     Platelet Aggregation Study: No results for input(s): PLTAGG, PLTAGINTERP, PLTAGREGLACO, ADPPLTAGGREG in the last 168 hours.  Hgb A1C:   Recent Labs   Lab 10/10/19  2249   HGBA1C 5.7*     TSH:   Recent Labs   Lab 10/10/19  2249   TSH 3.032       Diagnostic Results     Brain Imaging   MRI brain 10/10  Restricted diffusion in the right frontal lobe and small cortical area of the left MCA  Cytotoxic cerebral edema        Vessel Imaging   CTA MP 10/10/19 2154  Occluded left ICA with reconstitution of the distal/supraclinoid segment by retrograde flow through [id number].    Occluded intracranial segment of the left vertebral terminating at the level of left PICA origin.  Right vertebral artery supply the basilar artery.  Basilar artery is small in caliber with wall irregularity and intermittent occlusions.    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    Miscellaneous:    Right pleural effusion with associated compressive atelectasis.    1 cm sub solid nodule in the right lung apex.  For a part solid nodule 6 mm or greater, Fleischner Society 2017 guidelines recommend follow up with non-contrast chest CT at 3-6 months to  confirm persistence. If this nodule is unchanged and the solid component remains <6 mm, annual follow up CT should be performed for 5 years; however, persistence of a part-solid nodule with solid component ?6 mm should be considered highly suspicious and may warrant further workup with PET/CT, biopsy, or resection    CT head 10/10/19 1950  Chronic involutional and chronic ischemic changes.  There is partially calcified mass demonstrated lateral to the cavernous sinus medial right temporal lobe adjacent to ICA.  This would favor calcified meningioma.       Cardiac Imaging   TTE 10/11  · Moderately decreased left ventricular systolic function. The estimated ejection fraction is 35%  · Concentric left ventricular remodeling.  · Left ventricular diastolic dysfunction.  · Local segmental wall motion abnormalities.  · Severe left atrial enlargement.  · Mild right atrial enlargement.  · Mild aortic regurgitation.  · Moderate aortic valve stenosis but difficult to appreciate in the setting of AF, depressed EF, and low stroke volume.  · Aortic valve area is 1.39 cm2; peak velocity is 1.28 m/s; mean gradient is 4 mmHg.  · Elevated central venous pressure (15 mm Hg).           Ross Camp NP  Comprehensive Stroke Center  Department of Vascular Neurology   [facility]

## 2019-10-17 NOTE — PLAN OF CARE
Plan of care discussed with patient. Patient ambulating with walker and x1 assist, ambulating in halls x3, fall precautions in place. Telesitter present. Patient has no complaints of pain. Discussed medications and care. Eliquis decreased to 2.5 mg BID. Still being diuresed with 20 mg PO lasix. Patient has no questions at this time. Will continue to monitor.

## 2019-10-18 LAB
ANION GAP SERPL CALC-SCNC: 7 MMOL/L (ref 8–16)
BUN SERPL-MCNC: 27 MG/DL (ref 8–23)
CALCIUM SERPL-MCNC: 8.8 MG/DL (ref 8.7–10.5)
CHLORIDE SERPL-SCNC: 96 MMOL/L (ref 95–110)
CO2 SERPL-SCNC: 30 MMOL/L (ref 23–29)
CREAT SERPL-MCNC: 1.5 MG/DL (ref 0.5–1.4)
EST. GFR  (AFRICAN AMERICAN): 48.4 ML/MIN/1.73 M^2
EST. GFR  (NON AFRICAN AMERICAN): 41.8 ML/MIN/1.73 M^2
GLUCOSE SERPL-MCNC: 79 MG/DL (ref 70–110)
POTASSIUM SERPL-SCNC: 4.5 MMOL/L (ref 3.5–5.1)
SODIUM SERPL-SCNC: 133 MMOL/L (ref 136–145)

## 2019-10-18 PROCEDURE — 36415 COLL VENOUS BLD VENIPUNCTURE: CPT

## 2019-10-18 PROCEDURE — 92526 ORAL FUNCTION THERAPY: CPT

## 2019-10-18 PROCEDURE — 25000003 PHARM REV CODE 250: Performed by: FAMILY MEDICINE

## 2019-10-18 PROCEDURE — 80048 BASIC METABOLIC PNL TOTAL CA: CPT

## 2019-10-18 PROCEDURE — 25000003 PHARM REV CODE 250: Performed by: STUDENT IN AN ORGANIZED HEALTH CARE EDUCATION/TRAINING PROGRAM

## 2019-10-18 PROCEDURE — 99233 PR SUBSEQUENT HOSPITAL CARE,LEVL III: ICD-10-PCS | Mod: GC,,, | Performed by: PSYCHIATRY & NEUROLOGY

## 2019-10-18 PROCEDURE — 92507 TX SP LANG VOICE COMM INDIV: CPT

## 2019-10-18 PROCEDURE — 25000003 PHARM REV CODE 250: Performed by: PHYSICIAN ASSISTANT

## 2019-10-18 PROCEDURE — 25000003 PHARM REV CODE 250: Performed by: NURSE PRACTITIONER

## 2019-10-18 PROCEDURE — 25000003 PHARM REV CODE 250: Performed by: PSYCHIATRY & NEUROLOGY

## 2019-10-18 PROCEDURE — 99233 SBSQ HOSP IP/OBS HIGH 50: CPT | Mod: GC,,, | Performed by: PSYCHIATRY & NEUROLOGY

## 2019-10-18 PROCEDURE — 20600001 HC STEP DOWN PRIVATE ROOM

## 2019-10-18 RX ADMIN — CARVEDILOL 6.25 MG: 6.25 TABLET, FILM COATED ORAL at 08:10

## 2019-10-18 RX ADMIN — MUPIROCIN: 20 OINTMENT TOPICAL at 03:10

## 2019-10-18 RX ADMIN — FUROSEMIDE 20 MG: 20 TABLET ORAL at 09:10

## 2019-10-18 RX ADMIN — ATORVASTATIN CALCIUM 40 MG: 20 TABLET, FILM COATED ORAL at 09:10

## 2019-10-18 RX ADMIN — SENNOSIDES 8.6 MG: 8.6 TABLET, FILM COATED ORAL at 09:10

## 2019-10-18 RX ADMIN — MUPIROCIN: 20 OINTMENT TOPICAL at 09:10

## 2019-10-18 RX ADMIN — LEVOTHYROXINE SODIUM 25 MCG: 25 TABLET ORAL at 09:10

## 2019-10-18 RX ADMIN — APIXABAN 2.5 MG: 2.5 TABLET, FILM COATED ORAL at 09:10

## 2019-10-18 RX ADMIN — DONEPEZIL HYDROCHLORIDE 5 MG: 5 TABLET, FILM COATED ORAL at 08:10

## 2019-10-18 RX ADMIN — AMLODIPINE BESYLATE 5 MG: 5 TABLET ORAL at 09:10

## 2019-10-18 RX ADMIN — APIXABAN 2.5 MG: 2.5 TABLET, FILM COATED ORAL at 08:10

## 2019-10-18 RX ADMIN — MUPIROCIN: 20 OINTMENT TOPICAL at 08:10

## 2019-10-18 NOTE — PLAN OF CARE
POC discussed with pt and family friend at bedside. Pt in agreement of plan. Rehab placement pending, CM sending paper work to insurance to prove pt cannot take commercial flights due to physical status. Pt started on blood thinners. Was not on them before admit. Goal SBP <180. PRN meds available for BP over 180. Chronic afib on monitor. VSS. Monitoring hand for IV infiltration. Ambulates well with the walker and standby assist. Will continue to monitor.

## 2019-10-18 NOTE — PT/OT/SLP PROGRESS
"Speech Language Pathology Treatment    Patient Name:  Izaiah Douglas   MRN:  [id number]  Admitting Diagnosis: Acute arterial ischemic stroke, multifocal, multiple vascular territories    Recommendations:                 General Recommendations:  Cognitive-linguistic therapy  Diet recommendations:  Regular, Liquid Diet Level: Thin   Aspiration Precautions: Small bites/sips and Standard aspiration precautions   General Precautions: Standard, fall  Communication strategies:  provide increased time to answer and go to room if call light pushed    Subjective     Patient awake and alert during session  Communicated with nurse prior to session     Pain/Comfort:  · Pain Rating 1: 0/10  · Pain Rating Post-Intervention 1: 0/10    Objective:     Has the patient been evaluated by SLP for swallowing?   Yes  Keep patient NPO? No   Current Respiratory Status: room air      Patient seen for ongoing cognitive-linguistic therapy and monitoring of diet tolerance. He was sitting up in bed eating lunch during session. Patient tolerated PO trials of thin liquids x6 (via straw) and solids x6 (meat and vegetables) with no overt signs of aspiration. Both his oral and pharyngeal phases of swallowing were timely and efficient. During cog-ling therapy, patient continued to demonstrate significant confusion and disorientation. He was only oriented to self and "hospital", but could not orient to time or place despite max cueing. SLP educated patient extensively regarding place, situation and time, but following a 10-minute interval, patient was unable to recall any of this information despite max assist. He completed 5/6 simple word-finding tasks with min assist and 5/5 categorization questions independently. He demonstrated a significant decline in accuracy with critical thinking questions answered 2/4 problem solving questions with max assist. Patient's responses became increasingly delayed as session progressed and he required a higher level of " cueing for responses. SLP educated patient regarding POC from a ST perspective. He was left in room with call light within reach and friend present.     Assessment:     Iaziah Douglas is a 85 y.o. male with an SLP diagnosis of Cognitive-Linguistic Impairment. .    Goals:   Multidisciplinary Problems     SLP Goals        Problem: SLP Goal    Goal Priority Disciplines Outcome   SLP Goal     SLP Ongoing, Progressing   Description:  Speech Therapy Short Term Goals  Goal expected to be met by 10/25  1. Pt will participate in an ongoing assessment to determine the least restrictive and safest diet with possible updated goals to follow pending results.  2. The patient will answer orientation questions x4 with 90% acc no cues.   3. The patient will name 8 items in a concrete category given min cues.   4. The patient will answer complex y/n questions with 85% acc no cues.   5. The patient will follow 2+ step directions with 75% acc given 1 redirection.   6. The patient will participate in an ongoing assessment of speech, language, and cognition with updated goals to follow pending results and progress.                     Plan:     · Patient to be seen:  3 x/week   · Plan of Care expires:  11/09/19  · Plan of Care reviewed with:  patient   · SLP Follow-Up:  Yes       Discharge recommendations:  rehabilitation facility   Barriers to Discharge:  Level of Skilled Assistance Needed     Time Tracking:     SLP Treatment Date:   10/18/19  Speech Start Time:  1153  Speech Stop Time:  1211     Speech Total Time (min):  18 min    Billable Minutes: Speech Therapy Individual 10 and Treatment Swallowing Dysfunction 8    Royce Stearns CCC-SLP  Speech-Language Pathology  Pager: [phone]   10/18/2019

## 2019-10-18 NOTE — PROGRESS NOTES
[facility]  Vascular Neurology  Comprehensive Stroke Center  Progress Note    Assessment/Plan:     * Acute arterial ischemic stroke, multifocal, multiple vascular territories  84 y/o male with PMH of chronic Afib, CAD s/p stent, HTN, CHF, hypothyroidism who presented who received IV-tPA at OSH for RSW, aphasia and L gaze deviation.  CTA MP showed a chronically occluded L ICA from it's origin to the supraclinoid segment with filling of the MCA via the AComm. He was also noted to have an occluded V4 with an intermittently occluded basilar. No EVT.    Documented Afib since 2018 and was not started on AC. He appears to have chronic occluded ICA w/ substantial burden of both extracranial and intracranial atherosclerotic disease. Etiology likely cardioembolic.    Antithrombotics for secondary stroke prevention:  Eliquis 2.5 mg BID (creatine clearance 47)     Statins for secondary stroke prevention and hyperlipidemia, if present:   Statins: Atorvastatin- 40 mg daily    Aggressive risk factor modification: HTN     Rehab efforts: The patient has been evaluated by a stroke team provider and the therapy needs have been fully considered based off the presenting complaints and exam findings. The following therapy evaluations are needed: PT evaluate and treat, OT evaluate and treat, SLP evaluate and treat, PM&R evaluate for appropriate placement - rehab     Diagnostics ordered/pending: NA    VTE prophylaxis: Eliquis 2.5 mg BID    BP parameters: Infarct: Post tPA, SBP <180        Person awaiting admission to adequate facility elsewhere  Pending rehab placement  Difficult placement due to patient living in [place] and visiting daughter when stroke occurred - therapy teams recommending inpatient rehab    At this time patient is unable to fly back to [place]. He is not capable of flying due to inability to ambulate without trained medical assistance, impaired functional mobility, impaired cardiopulmonary  response to activity, urine and bowel incontinence, and decreased mental capacity related to stroke and underlying diagnosis of dementia.     10/17 - Spoke to Dr. Krishna of [email or web address]. Requesting documentation of pt's current functional status stating why pt is unable to take commercial flight back to [place]. CM to send updated notes.     Erythema of hand  Erythema of right hand from IV infiltration  Bactroban cream TID X 10 days  Nursing to elevate right hand during the day     SIADH (syndrome of inappropriate ADH production)  Na 133 today, improving  Urine studies suggestive of SIADH  Urine osm 105  Osmolality 524  I liter fluid restriction       Dementia  Continue Donepezil 5 mg daily     Intracranial atherosclerosis  Noted on CTA head   Started on eliquis and Atorvastatin 40 mg     Occlusion of left carotid artery  Seen on CTA  Suspect chronic occlusion   No acute concern at this time     Nodule of right lung  1 cm nodule of R lung seen on CTA  Will need follow up in 3- 6 months   Discussed with daughter     Cytotoxic brain edema  Area of cytotoxic cerebral edema identified when reviewing brain imaging in the territory of the R/L middle cerebral artery. There is not mass effect associated with it. We will continue to monitor the patients clinical exam for any worsening of symptoms which may indicate expansion of the stroke or the area of the edema resulting in the clinical change. The pattern is suggestive of cardioembolic etiology        CAD (coronary artery disease)  Stroke risk factor  -- Noted on Care Everywhere  -- S/p RCA stent 7/2018  -- Prescribed Plavix, statin and Ranexa but unclear if he's taking them  -- Eliquis started 10/12  -- Atorvastatin 40 mg daily       Paroxysmal atrial fibrillation  Stroke risk factor  -- Per Care Everywhere; not on AC - Dr. Menjivar cardiologist reports patient not a good candidate due to risk of falls, non-compliance, and dementia   -- Noted on EKG at  admit.  -- Rate controlled  -- Eliquis 2.5 mg started 10/12 - low dose due to age and creatinine    Chronic combined systolic and diastolic congestive heart failure  Stroke risk factor  -- pro BNP from Feb 2019 >3000  -- On Lasix and Aldactone at home.  -- TTE 35%   -- Per chart review EF previously 35%   -- Will slowly start to resume home CHF medications if creatinine continues to improve - lasix 20 mg resumed  Needs to follow up with cardiologist as outpatient     Acquired hypothyroidism  Stroke risk factor  -- Continue Synthroid 25mcg daily    Essential hypertension  Stroke risk factor  -- SBP<180  -- Coreg 6.25 mg BID  -- Norvasc 5 mg daily started 10/13           10/12 - Etiology likely cardioembolic. Will start Eliquis 2.5 mg BID. Creatinine trending down - may start patient on Eliquis 5 mg BID if continue to decrease. L ICA chronically occluded - no need to vasculare intervention.   10/13 - Spoke with PT - patient would benefit from inpatient rehab placement. Norvasc 5 mg ordered. Urine studies ordered for hyponatremia - suspect to be due to dehydration.   10/14 Patient with SIADH but sodium stable.  1 liter fluid restriction.    10/15  Irritation around right hand peripheral IV site.  Sodium 131 today.  Discussed with daughter that he is on fluid restriction and she will stop providing him with outside drinks.     10/16 - Sodium increasing 132. Bactroban ordered for right hand IV infiltration. Working on placement.   10/17 - Sodium continues to improve. Creatinine 1.5 likely near baseline, decreased eliquis to 2.5 mg. R hand erythema stable.     STROKE DOCUMENTATION   Acute Stroke Times   Last Known Normal Date: 10/10/19  Last Known Normal Time: 1905  Symptom Onset Date: 10/10/19  Symptom Onset Time: 1905  Stroke Team Called Date: 10/10/19  Stroke Team Called Time: 2120  Stroke Team Arrival Date: 10/10/19  Stroke Team Arrival Time: 2122    NIH Scale:  1a. Level of Consciousness: 0-->Alert, keenly  responsive  1b. LOC Questions: 2-->Answers neither question correctly  1c. LOC Commands: 0-->Performs both tasks correctly  2. Best Gaze: 0-->Normal  3. Visual: 0-->No visual loss  4. Facial Palsy: 0-->Normal symmetrical movements  5a. Motor Arm, Left: 2-->Some effort against gravity, limb cannot get to or maintain (if cued) 90 (or 45) degrees, drifts down to bed, but has some effort against gravity  5b. Motor Arm, Right: 0-->No drift, limb holds 90 (or 45) degrees for full 10 secs  6a. Motor Leg, Left: 1-->Drift, leg falls by the end of the 5-sec period but does not hit bed  6b. Motor Leg, Right: 0-->No drift, leg holds 30 degree position for full 5 secs  7. Limb Ataxia: 0-->Absent  8. Sensory: 0-->Normal, no sensory loss  9. Best Language: 1-->Mild-to-moderate aphasia, some obvious loss of fluency or facility of comprehension, without significant limitation on ideas expressed or form of expression. Reduction of speech and/or comprehension, however, makes conversation. . . (see row details)  10. Dysarthria: 0-->Normal  11. Extinction and Inattention (formerly Neglect): 1-->Visual, tactile, auditory, spatial, or personal inattention or extinction to bilateral simultaneous stimulation in one of the sensory modalities  Total (NIH Stroke Scale): 7       Modified Houston Score: 1  Franklin Coma Scale:    ABCD2 Score:    [id number] Score:   HAS -BLED Score:   ICH Score:   Hunt & Stroud Classification:      Hemorrhagic change of an Ischemic Stroke: Does this patient have an ischemic stroke with hemorrhagic changes? No     Neurologic Chief Complaint: right sided weakness and aphasia    Subjective:     Interval History: Patient is seen for follow-up neurological assessment and treatment recommendations:    Sodium continues to improve with fluid restriction. R hand erythema stable. Working on dispo.      HPI, Past Medical, Family, and Social History remains the same as documented in the initial encounter.     Review of Systems    Constitutional: Negative for fever.   Eyes: Negative for visual disturbance.   Respiratory: Negative for shortness of breath.    Cardiovascular: Negative for chest pain.   Gastrointestinal: Negative for nausea and vomiting.   Neurological: Positive for weakness. Negative for facial asymmetry and speech difficulty.   Psychiatric/Behavioral: Negative for agitation and confusion.     Scheduled Meds:   amLODIPine  5 mg Oral Daily    apixaban  2.5 mg Oral BID    atorvastatin  40 mg Oral Daily    carvedilol  6.25 mg Oral BID    donepezil  5 mg Oral QHS    furosemide  20 mg Oral Daily    levothyroxine  25 mcg Oral Daily    mupirocin   Topical (Top) TID    senna  8.6 mg Oral Daily     Continuous Infusions:  PRN Meds:hydrALAZINE, influenza, sodium chloride 0.9%    Objective:     Vital Signs (Most Recent):  Temp: 98.9 °F (37.2 °C) (10/18/19 1246)  Pulse: 66 (10/18/19 1246)  Resp: 18 (10/18/19 1246)  BP: 130/69 (10/18/19 1246)  SpO2: 100 % (10/18/19 1246)  BP Location: Right arm    Vital Signs Range (Last 24H):  Temp:  [97.2 °F (36.2 °C)-98.9 °F (37.2 °C)]   Pulse:  [58-82]   Resp:  [16-18]   BP: (128-172)/(60-90)   SpO2:  [90 %-100 %]   BP Location: Right arm    Physical Exam   HENT:   Head: Normocephalic.   Eyes: Pupils are equal, round, and reactive to light. EOM are normal.   Cardiovascular: Normal rate.   Pulmonary/Chest: Effort normal.   Abdominal: Soft.   Musculoskeletal:   Good strength but with neglect of left arm    Neurological: He is alert.   Skin: Skin is warm. Capillary refill takes less than 2 seconds.   Psychiatric: He has a normal mood and affect.       Neurological Exam:   LOC: alert  Attention Span: Good   Language: mild aphasia  Articulation: No dysarthria  Orientation: Person, not month or age   Visual Fields: Full  EOM (CN III, IV, VI): Full/intact  Pupils (CN II, III): PERRL  Facial Movement (CN VII): Symmetric facial expression    Motor: Arm left  Paresis: 3/5  Leg left  Normal 4/5  Arm right   Normal 5/5  Leg right Normal 5/5  Cebellar: No evidence of appendicular or axial ataxia  Sensation: Intact to light touch, temperature and vibration    Laboratory:  CMP:   Recent Labs   Lab 10/18/19  0450   CALCIUM 8.8   *   K 4.5   CO2 30*   CL 96   BUN 27*   CREATININE 1.5*     BMP:   Recent Labs   Lab 10/18/19  0450   *   K 4.5   CL 96   CO2 30*   BUN 27*   CREATININE 1.5*   CALCIUM 8.8     CBC:   Recent Labs   Lab 10/15/19  0430   WBC 7.75   RBC 4.79   HGB 12.6*   HCT 41.9      MCV 88   MCH 26.3*   MCHC 30.1*     Lipid Panel:   No results for input(s): CHOL, LDLCALC, HDL, TRIG in the last 168 hours.  Coagulation:   No results for input(s): PT, INR, APTT in the last 168 hours.  Platelet Aggregation Study: No results for input(s): PLTAGG, PLTAGINTERP, PLTAGREGLACO, ADPPLTAGGREG in the last 168 hours.  Hgb A1C:   No results for input(s): HGBA1C in the last 168 hours.  TSH:   No results for input(s): TSH in the last 168 hours.    Diagnostic Results     Brain Imaging   MRI brain 10/10  Restricted diffusion in the right frontal lobe and small cortical area of the left MCA  Cytotoxic cerebral edema        Vessel Imaging   CTA MP 10/10/19 2154  Occluded left ICA with reconstitution of the distal/supraclinoid segment by retrograde flow through [id number].    Occluded intracranial segment of the left vertebral terminating at the level of left PICA origin.  Right vertebral artery supply the basilar artery.  Basilar artery is small in caliber with wall irregularity and intermittent occlusions.    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    Miscellaneous:    Right pleural effusion with associated compressive atelectasis.    1 cm sub solid nodule in the right lung apex.  For a part solid nodule 6 mm or greater, Fleischner Society 2017 guidelines recommend follow up with non-contrast chest CT at 3-6 months to confirm persistence. If this nodule is  unchanged and the solid component remains <6 mm, annual follow up CT should be performed for 5 years; however, persistence of a part-solid nodule with solid component ?6 mm should be considered highly suspicious and may warrant further workup with PET/CT, biopsy, or resection    CT head 10/10/19 1950  Chronic involutional and chronic ischemic changes.  There is partially calcified mass demonstrated lateral to the cavernous sinus medial right temporal lobe adjacent to ICA.  This would favor calcified meningioma.       Cardiac Imaging   TTE 10/11  · Moderately decreased left ventricular systolic function. The estimated ejection fraction is 35%  · Concentric left ventricular remodeling.  · Left ventricular diastolic dysfunction.  · Local segmental wall motion abnormalities.  · Severe left atrial enlargement.  · Mild right atrial enlargement.  · Mild aortic regurgitation.  · Moderate aortic valve stenosis but difficult to appreciate in the setting of AF, depressed EF, and low stroke volume.  · Aortic valve area is 1.39 cm2; peak velocity is 1.28 m/s; mean gradient is 4 mmHg.  · Elevated central venous pressure (15 mm Hg).           Dejon Dykes MD  Comprehensive Stroke Center  Department of Vascular Neurology   [facility]

## 2019-10-18 NOTE — NURSING
Patient sitting up in the chair sleeping but easily aroused, unlabored breathing, Tele in place, left arm IV intact, dressing to right arm, and SCD's in place, family at bedside. WCTM

## 2019-10-18 NOTE — SUBJECTIVE & OBJECTIVE
Neurologic Chief Complaint: right sided weakness and aphasia    Subjective:     Interval History: Patient is seen for follow-up neurological assessment and treatment recommendations:    Sodium continues to improve with fluid restriction. R hand erythema stable. Working on dispo.      HPI, Past Medical, Family, and Social History remains the same as documented in the initial encounter.     Review of Systems   Constitutional: Negative for fever.   Eyes: Negative for visual disturbance.   Respiratory: Negative for shortness of breath.    Cardiovascular: Negative for chest pain.   Gastrointestinal: Negative for nausea and vomiting.   Neurological: Positive for weakness. Negative for facial asymmetry and speech difficulty.   Psychiatric/Behavioral: Negative for agitation and confusion.     Scheduled Meds:   amLODIPine  5 mg Oral Daily    apixaban  2.5 mg Oral BID    atorvastatin  40 mg Oral Daily    carvedilol  6.25 mg Oral BID    donepezil  5 mg Oral QHS    furosemide  20 mg Oral Daily    levothyroxine  25 mcg Oral Daily    mupirocin   Topical (Top) TID    senna  8.6 mg Oral Daily     Continuous Infusions:  PRN Meds:hydrALAZINE, influenza, sodium chloride 0.9%    Objective:     Vital Signs (Most Recent):  Temp: 98.9 °F (37.2 °C) (10/18/19 1246)  Pulse: 66 (10/18/19 1246)  Resp: 18 (10/18/19 1246)  BP: 130/69 (10/18/19 1246)  SpO2: 100 % (10/18/19 1246)  BP Location: Right arm    Vital Signs Range (Last 24H):  Temp:  [97.2 °F (36.2 °C)-98.9 °F (37.2 °C)]   Pulse:  [58-82]   Resp:  [16-18]   BP: (128-172)/(60-90)   SpO2:  [90 %-100 %]   BP Location: Right arm    Physical Exam   HENT:   Head: Normocephalic.   Eyes: Pupils are equal, round, and reactive to light. EOM are normal.   Cardiovascular: Normal rate.   Pulmonary/Chest: Effort normal.   Abdominal: Soft.   Musculoskeletal:   Good strength but with neglect of left arm    Neurological: He is alert.   Skin: Skin is warm. Capillary refill takes less than 2  seconds.   Psychiatric: He has a normal mood and affect.       Neurological Exam:   LOC: alert  Attention Span: Good   Language: mild aphasia  Articulation: No dysarthria  Orientation: Person, not month or age   Visual Fields: Full  EOM (CN III, IV, VI): Full/intact  Pupils (CN II, III): PERRL  Facial Movement (CN VII): Symmetric facial expression    Motor: Arm left  Paresis: 3/5  Leg left  Normal 4/5  Arm right  Normal 5/5  Leg right Normal 5/5  Cebellar: No evidence of appendicular or axial ataxia  Sensation: Intact to light touch, temperature and vibration    Laboratory:  CMP:   Recent Labs   Lab 10/18/19  0450   CALCIUM 8.8   *   K 4.5   CO2 30*   CL 96   BUN 27*   CREATININE 1.5*     BMP:   Recent Labs   Lab 10/18/19  0450   *   K 4.5   CL 96   CO2 30*   BUN 27*   CREATININE 1.5*   CALCIUM 8.8     CBC:   Recent Labs   Lab 10/15/19  0430   WBC 7.75   RBC 4.79   HGB 12.6*   HCT 41.9      MCV 88   MCH 26.3*   MCHC 30.1*     Lipid Panel:   No results for input(s): CHOL, LDLCALC, HDL, TRIG in the last 168 hours.  Coagulation:   No results for input(s): PT, INR, APTT in the last 168 hours.  Platelet Aggregation Study: No results for input(s): PLTAGG, PLTAGINTERP, PLTAGREGLACO, ADPPLTAGGREG in the last 168 hours.  Hgb A1C:   No results for input(s): HGBA1C in the last 168 hours.  TSH:   No results for input(s): TSH in the last 168 hours.    Diagnostic Results     Brain Imaging   MRI brain 10/10  Restricted diffusion in the right frontal lobe and small cortical area of the left MCA  Cytotoxic cerebral edema        Vessel Imaging   CTA MP 10/10/19 2154  Occluded left ICA with reconstitution of the distal/supraclinoid segment by retrograde flow through [id number].    Occluded intracranial segment of the left vertebral terminating at the level of left PICA origin.  Right vertebral artery supply the basilar artery.  Basilar artery is small in caliber with wall irregularity and intermittent  occlusions.    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    Miscellaneous:    Right pleural effusion with associated compressive atelectasis.    1 cm sub solid nodule in the right lung apex.  For a part solid nodule 6 mm or greater, Fleischner Society 2017 guidelines recommend follow up with non-contrast chest CT at 3-6 months to confirm persistence. If this nodule is unchanged and the solid component remains <6 mm, annual follow up CT should be performed for 5 years; however, persistence of a part-solid nodule with solid component ?6 mm should be considered highly suspicious and may warrant further workup with PET/CT, biopsy, or resection    CT head 10/10/19 1950  Chronic involutional and chronic ischemic changes.  There is partially calcified mass demonstrated lateral to the cavernous sinus medial right temporal lobe adjacent to ICA.  This would favor calcified meningioma.       Cardiac Imaging   TTE 10/11  · Moderately decreased left ventricular systolic function. The estimated ejection fraction is 35%  · Concentric left ventricular remodeling.  · Left ventricular diastolic dysfunction.  · Local segmental wall motion abnormalities.  · Severe left atrial enlargement.  · Mild right atrial enlargement.  · Mild aortic regurgitation.  · Moderate aortic valve stenosis but difficult to appreciate in the setting of AF, depressed EF, and low stroke volume.  · Aortic valve area is 1.39 cm2; peak velocity is 1.28 m/s; mean gradient is 4 mmHg.  · Elevated central venous pressure (15 mm Hg).

## 2019-10-18 NOTE — PLAN OF CARE
Problem: SLP Goal  Goal: SLP Goal  Description  Speech Therapy Short Term Goals  Goal expected to be met by 10/25  1. Pt will participate in an ongoing assessment to determine the least restrictive and safest diet with possible updated goals to follow pending results.  2. The patient will answer orientation questions x4 with 90% acc no cues.   3. The patient will name 8 items in a concrete category given min cues.   4. The patient will answer complex y/n questions with 85% acc no cues.   5. The patient will follow 2+ step directions with 75% acc given 1 redirection.   6. The patient will participate in an ongoing assessment of speech, language, and cognition with updated goals to follow pending results and progress.    Outcome: Ongoing, Progressing     Patient seen for ongoing cognitive-linguistic therapy and monitoring of diet tolerance.

## 2019-10-18 NOTE — PT/OT/SLP PROGRESS
Occupational Therapy      Patient Name:  Izaiah Douglas   MRN:  [id number]    Patient remains appropriate for OT services & will be treated next on 10/19/19 as able/appropriate.    BRIGHT Mancilla  10/18/2019

## 2019-10-18 NOTE — PLAN OF CARE
Spoke with Corine at 's insurance [employer] [phone]. hospitals has documentation patient unsafe to fly. Insurance company will approve SNF. Spoke with daughter Selam, chooses [facility].      10/18/19 1238   Discharge Reassessment   Assessment Type Discharge Planning Reassessment   Discharge Plan A Skilled Nursing Facility   Discharge Plan B Rehab   Anticipated Discharge Disposition SNF

## 2019-10-19 LAB
ANION GAP SERPL CALC-SCNC: 9 MMOL/L (ref 8–16)
BUN SERPL-MCNC: 27 MG/DL (ref 8–23)
CALCIUM SERPL-MCNC: 8.8 MG/DL (ref 8.7–10.5)
CHLORIDE SERPL-SCNC: 97 MMOL/L (ref 95–110)
CO2 SERPL-SCNC: 28 MMOL/L (ref 23–29)
CREAT SERPL-MCNC: 1.5 MG/DL (ref 0.5–1.4)
EST. GFR  (AFRICAN AMERICAN): 48.4 ML/MIN/1.73 M^2
EST. GFR  (NON AFRICAN AMERICAN): 41.8 ML/MIN/1.73 M^2
GLUCOSE SERPL-MCNC: 71 MG/DL (ref 70–110)
POTASSIUM SERPL-SCNC: 4.2 MMOL/L (ref 3.5–5.1)
SODIUM SERPL-SCNC: 134 MMOL/L (ref 136–145)
TROPONIN I SERPL DL<=0.01 NG/ML-MCNC: 0.01 NG/ML (ref 0–0.03)

## 2019-10-19 PROCEDURE — 25000003 PHARM REV CODE 250: Performed by: NURSE PRACTITIONER

## 2019-10-19 PROCEDURE — 25000003 PHARM REV CODE 250: Performed by: FAMILY MEDICINE

## 2019-10-19 PROCEDURE — 99233 SBSQ HOSP IP/OBS HIGH 50: CPT | Mod: GC,,, | Performed by: PSYCHIATRY & NEUROLOGY

## 2019-10-19 PROCEDURE — 80048 BASIC METABOLIC PNL TOTAL CA: CPT

## 2019-10-19 PROCEDURE — 93010 ELECTROCARDIOGRAM REPORT: CPT | Mod: ,,, | Performed by: INTERNAL MEDICINE

## 2019-10-19 PROCEDURE — 25000003 PHARM REV CODE 250: Performed by: PSYCHIATRY & NEUROLOGY

## 2019-10-19 PROCEDURE — 99233 PR SUBSEQUENT HOSPITAL CARE,LEVL III: ICD-10-PCS | Mod: GC,,, | Performed by: PSYCHIATRY & NEUROLOGY

## 2019-10-19 PROCEDURE — 93005 ELECTROCARDIOGRAM TRACING: CPT

## 2019-10-19 PROCEDURE — 93010 EKG 12-LEAD: ICD-10-PCS | Mod: ,,, | Performed by: INTERNAL MEDICINE

## 2019-10-19 PROCEDURE — 25000003 PHARM REV CODE 250: Performed by: PHYSICIAN ASSISTANT

## 2019-10-19 PROCEDURE — 36415 COLL VENOUS BLD VENIPUNCTURE: CPT

## 2019-10-19 PROCEDURE — 25000003 PHARM REV CODE 250: Performed by: STUDENT IN AN ORGANIZED HEALTH CARE EDUCATION/TRAINING PROGRAM

## 2019-10-19 PROCEDURE — 20600001 HC STEP DOWN PRIVATE ROOM

## 2019-10-19 PROCEDURE — 97535 SELF CARE MNGMENT TRAINING: CPT

## 2019-10-19 PROCEDURE — 84484 ASSAY OF TROPONIN QUANT: CPT

## 2019-10-19 PROCEDURE — 97530 THERAPEUTIC ACTIVITIES: CPT

## 2019-10-19 RX ORDER — CARVEDILOL 3.12 MG/1
3.12 TABLET ORAL 2 TIMES DAILY
Status: DISCONTINUED | OUTPATIENT
Start: 2019-10-19 | End: 2019-11-06 | Stop reason: HOSPADM

## 2019-10-19 RX ORDER — AMLODIPINE BESYLATE 10 MG/1
10 TABLET ORAL DAILY
Status: DISCONTINUED | OUTPATIENT
Start: 2019-10-20 | End: 2019-11-06 | Stop reason: HOSPADM

## 2019-10-19 RX ADMIN — MUPIROCIN: 20 OINTMENT TOPICAL at 03:10

## 2019-10-19 RX ADMIN — ATORVASTATIN CALCIUM 40 MG: 20 TABLET, FILM COATED ORAL at 09:10

## 2019-10-19 RX ADMIN — APIXABAN 2.5 MG: 2.5 TABLET, FILM COATED ORAL at 08:10

## 2019-10-19 RX ADMIN — MUPIROCIN: 20 OINTMENT TOPICAL at 09:10

## 2019-10-19 RX ADMIN — LEVOTHYROXINE SODIUM 25 MCG: 25 TABLET ORAL at 06:10

## 2019-10-19 RX ADMIN — APIXABAN 2.5 MG: 2.5 TABLET, FILM COATED ORAL at 09:10

## 2019-10-19 RX ADMIN — SENNOSIDES 8.6 MG: 8.6 TABLET, FILM COATED ORAL at 09:10

## 2019-10-19 RX ADMIN — DONEPEZIL HYDROCHLORIDE 5 MG: 5 TABLET, FILM COATED ORAL at 08:10

## 2019-10-19 RX ADMIN — CARVEDILOL 3.12 MG: 3.12 TABLET, FILM COATED ORAL at 08:10

## 2019-10-19 RX ADMIN — AMLODIPINE BESYLATE 5 MG: 5 TABLET ORAL at 09:10

## 2019-10-19 RX ADMIN — FUROSEMIDE 20 MG: 20 TABLET ORAL at 09:10

## 2019-10-19 RX ADMIN — CARVEDILOL 6.25 MG: 6.25 TABLET, FILM COATED ORAL at 09:10

## 2019-10-19 NOTE — PLAN OF CARE
Plan of care reviewed with patient. Pt remained free of falls/injuries/traumas during shift. Oriented to self only. VSS w/ SBP goal of <180. I&O's monitored. No c/o of cp, sob, or pain. Tele sitter in room. All questions answered, will continue to monitor.

## 2019-10-19 NOTE — ASSESSMENT & PLAN NOTE
-- Erythema of right hand from IV infiltration  -- Bactroban cream TID X 10 days  -- Elevation of R hand  -- No pain today.

## 2019-10-19 NOTE — ASSESSMENT & PLAN NOTE
86 y/o male with PMH of chronic Afib (not on AC), CAD s/p stent, HTN, CHF, hypothyroidism who presented who received IV-tPA at OSH for RSW, aphasia and L gaze deviation. NIHSS 8. CTA MP showed a chronically occluded L ICA from it's origin to the supraclinoid segment with filling of the MCA via the AComm along with substantial burden on intracranial and extracranial atherosclerotic disease. He was also noted to have an occluded V4 with an intermittently occluded basilar. No EVT.  TTE shows an EF of 35%    Etiology - Cardioembolic     -- Pending placement.    Antithrombotics for secondary stroke prevention:  Eliquis 2.5 mg BID (creatine clearance 47)     Statins for secondary stroke prevention and hyperlipidemia, if present:   Statins: Atorvastatin- 40 mg daily    Aggressive risk factor modification: HTN     Rehab efforts: The patient has been evaluated by a stroke team provider and the therapy needs have been fully considered based off the presenting complaints and exam findings. The following therapy evaluations are needed: PT evaluate and treat, OT evaluate and treat, SLP evaluate and treat, PM&R evaluate for appropriate placement - rehab     Diagnostics ordered/pending: NA    VTE prophylaxis: Eliquis 2.5 mg BID    BP parameters: SBP <160

## 2019-10-19 NOTE — PLAN OF CARE
Pt ambulated in the hallway using a walker with 2x assistance. Pt and family educated that HTN is a factor that may be associated with stroke. Neuro checks q 4 hours. Pt oriented to self but not time or place. Tele sitter in place and bed alarm activated. Decreased swelling in the right hand noted. VSS, no falls, no pains or complaints of SOB. Pt in bed at lowest position with 3x upper side rails raised, call light within reach. Will continue to monitor.

## 2019-10-19 NOTE — PLAN OF CARE
Continue OT plan of care.    Problem: Occupational Therapy Goal  Goal: Occupational Therapy Goal  Description  Goals to be met by: 10/21     Patient will increase functional independence with ADLs by performing:    UE Dressing with Modified [place].  LE Dressing with Modified [place].  Grooming while standing with Modified [place].  Toileting from toilet with Modified [place] for hygiene and clothing management.   Supine to sit with Modified [place].  Step transfer with Modified [place]     Outcome: Ongoing, Progressing

## 2019-10-19 NOTE — ASSESSMENT & PLAN NOTE
-- 1 cm nodule of R lung seen on CTA  -- Will need follow up in 3- 6 months   -- Discussed with daughter

## 2019-10-19 NOTE — PT/OT/SLP PROGRESS
Occupational Therapy   Treatment    Name: Izaiah Douglas  MRN: [id number]  Admitting Diagnosis:  Acute arterial ischemic stroke, multifocal, multiple vascular territories       Recommendations:     Discharge Recommendations: rehabilitation facility  Discharge Equipment Recommendations:  (TBD)  Barriers to discharge:  Decreased caregiver support    Assessment:     Izaiah Douglas is a 85 y.o. male with a medical diagnosis of Acute arterial ischemic stroke, multifocal, multiple vascular territories. He presents with performance deficits including weakness, impaired endurance, impaired self care skills, impaired functional mobilty, gait instability, impaired balance, decreased safety awareness, impaired cardiopulmonary response to activity. Pt progressing toward goals and would continue to benefit from OT to increase functional independence and safety. Recommend rehab upon D/C.    Rehab Prognosis:  Good; patient would benefit from acute skilled OT services to address these deficits and reach maximum level of function.       Plan:     Patient to be seen 3 x/week to address the above listed problems via self-care/home management, therapeutic activities, therapeutic exercises  · Plan of Care Expires: 11/10/19  · Plan of Care Reviewed with: patient, daughter    Subjective     Pain/Comfort:  · Pain Rating 1: 0/10  · Pain Rating Post-Intervention 1: 0/10    Objective:     Communicated with: RN prior to session. Patient found with HOB elevated with telemetry, SCD, bed alarm, peripheral IV, [employer] camera upon OT entry to room, daughter present.    General Precautions: Standard, fall   Orthopedic Precautions:N/A   Braces: N/A     Occupational Performance:     Bed Mobility:    · Patient completed Supine to Sit with stand by assistance with HOB elevated  · Patient completed Sit to Supine with stand by assistance     Functional Mobility/Transfers:  · Patient completed Sit <> Stand Transfer with SBA from EOB with RW and CGA from EOB  with hand-held assist and cues for hand placement  · Functional Mobility: Within room and hallway household distance with CGA using RW, cues for safety and navigation with RW-- pt was unable to to successfully return to room without cues for directions    Activities of Daily Living:  · Grooming: contact guard assistance standing at sink to wash hands  · Upper Body Dressing: moderate assistance to don/doff robe while standing  · Toileting: CGA-Min A for standing balance while using urinal without B UE support on walker      AMPAC 6 Click ADL: 15    Treatment & Education:  Pt oriented to self and stated he was in the hospital; completed standing ADLs and functional mobility as described above, requiring cues and assist for safety; discussed D/C recs with pt and daughter    Patient left HOB elevated with all lines intact, call button in reach, bed alarm on and daughter presentEducation:      GOALS:   Multidisciplinary Problems     Occupational Therapy Goals        Problem: Occupational Therapy Goal    Goal Priority Disciplines Outcome Interventions   Occupational Therapy Goal     OT, PT/OT Ongoing, Progressing    Description:  Goals to be met by: 10/21     Patient will increase functional independence with ADLs by performing:    UE Dressing with Modified [place].  LE Dressing with Modified [place].  Grooming while standing with Modified [place].  Toileting from toilet with Modified [place] for hygiene and clothing management.   Supine to sit with Modified [place].  Step transfer with Modified [place]                      Time Tracking:     OT Date of Treatment: 10/19/19  OT Start Time: 1418  OT Stop Time: 1441  OT Total Time (min): 23 min    Billable Minutes:Self Care/Home Management 10  Therapeutic Activity 13    BRIGHT Snider  10/19/2019

## 2019-10-19 NOTE — ASSESSMENT & PLAN NOTE
Pending rehab placement  Difficult placement due to patient living in [place] and visiting daughter when stroke occurred - therapy teams recommending inpatient rehab    At this time patient is unable to fly back to [place]. He is not capable of flying due to inability to ambulate without trained medical assistance, impaired functional mobility, impaired cardiopulmonary response to activity, urine and bowel incontinence, and decreased mental capacity related to stroke and underlying diagnosis of dementia.     10/17 - Spoke to Dr. Krishna of [facility]. Requesting documentation of pt's current functional status stating why pt is unable to take commercial flight back to [place]. CM to send updated notes.

## 2019-10-19 NOTE — PROGRESS NOTES
[facility]  Vascular Neurology  Comprehensive Stroke Center  Progress Note    Assessment/Plan:     * Acute arterial ischemic stroke, multifocal, multiple vascular territories  84 y/o male with PMH of chronic Afib (not on AC), CAD s/p stent, HTN, CHF, hypothyroidism who presented who received IV-tPA at OSH for RSW, aphasia and L gaze deviation. NIHSS 8. CTA MP showed a chronically occluded L ICA from it's origin to the supraclinoid segment with filling of the MCA via the AComm along with substantial burden on intracranial and extracranial atherosclerotic disease. He was also noted to have an occluded V4 with an intermittently occluded basilar. No EVT.  TTE shows an EF of 35%    Etiology - Cardioembolic     -- Pending placement.    Antithrombotics for secondary stroke prevention:  Eliquis 2.5 mg BID (creatine clearance 47)     Statins for secondary stroke prevention and hyperlipidemia, if present:   Statins: Atorvastatin- 40 mg daily    Aggressive risk factor modification: HTN     Rehab efforts: The patient has been evaluated by a stroke team provider and the therapy needs have been fully considered based off the presenting complaints and exam findings. The following therapy evaluations are needed: PT evaluate and treat, OT evaluate and treat, SLP evaluate and treat, PM&R evaluate for appropriate placement - rehab     Diagnostics ordered/pending: NA    VTE prophylaxis: Eliquis 2.5 mg BID    BP parameters: SBP <160        Person awaiting admission to adequate facility elsewhere  Pending rehab placement  Difficult placement due to patient living in [place] and visiting daughter when stroke occurred - therapy teams recommending inpatient rehab    At this time patient is unable to fly back to [place]. He is not capable of flying due to inability to ambulate without trained medical assistance, impaired functional mobility, impaired cardiopulmonary response to activity, urine and bowel  incontinence, and decreased mental capacity related to stroke and underlying diagnosis of dementia.     10/17 - Spoke to Dr. Krishna of [facility]. Requesting documentation of pt's current functional status stating why pt is unable to take commercial flight back to [place]. CM to send updated notes.     Erythema of hand  -- Erythema of right hand from IV infiltration  -- Bactroban cream TID X 10 days  -- Elevation of R hand  -- No pain today.    SIADH (syndrome of inappropriate ADH production)  -- Na 134 (improving)  -- 1L fluid restriction.      Dementia  -- Continue Donepezil 5 mg daily     Intracranial atherosclerosis  -- Noted on CTA head   -- On Eliquis and Atorvastatin 40 mg     Occlusion of left carotid artery  -- Seen on CTA  -- Suspect chronic occlusion     Nodule of right lung  -- 1 cm nodule of R lung seen on CTA  -- Will need follow up in 3- 6 months   -- Discussed with daughter     Cytotoxic brain edema  Area of cytotoxic cerebral edema identified when reviewing brain imaging in the territory of the R/L middle cerebral artery. There is not mass effect associated with it. We will continue to monitor the patients clinical exam for any worsening of symptoms which may indicate expansion of the stroke or the area of the edema resulting in the clinical change. The pattern is suggestive of cardioembolic etiology        CAD (coronary artery disease)  Stroke risk factor  -- Noted on Care Everywhere  -- S/p RCA stent 7/2018  -- Prescribed Plavix, statin and Ranexa but unclear if he's taking them  -- Eliquis started 10/12  -- Atorvastatin 40 mg daily       Paroxysmal atrial fibrillation  Stroke risk factor  -- Per Care Everywhere; not on AC - Dr. Menjivar cardiologist reports patient not a good candidate due to risk of falls, non-compliance, and dementia   -- Noted on EKG at admit.  -- Rate controlled  -- Eliquis 2.5 mg started 10/12 - low dose due to age and creatinine    Chronic combined systolic  and diastolic congestive heart failure  Stroke risk factor  -- pro BNP from Feb 2019 >3000  -- On Lasix and Aldactone at home.  -- TTE shows 35%   -- Resumes Lasix 20mg daily.   -- Continue Coreg 3.125mg BID.     Acquired hypothyroidism  Stroke risk factor  -- Continue Synthroid 25mcg daily    Essential hypertension  Stroke risk factor  -- SBP<160  -- Coreg 3.125 mg BID  -- Norvasc 10 mg daily.         10/12 - Etiology likely cardioembolic. Will start Eliquis 2.5 mg BID. Creatinine trending down - may start patient on Eliquis 5 mg BID if continue to decrease. L ICA chronically occluded - no need to vasculare intervention.   10/13 - Spoke with PT - patient would benefit from inpatient rehab placement. Norvasc 5 mg ordered. Urine studies ordered for hyponatremia - suspect to be due to dehydration.   10/14 Patient with SIADH but sodium stable.  1 liter fluid restriction.    10/15  Irritation around right hand peripheral IV site.  Sodium 131 today.  Discussed with daughter that he is on fluid restriction and she will stop providing him with outside drinks.     10/16 - Sodium increasing 132. Bactroban ordered for right hand IV infiltration. Working on placement.   10/17 - Sodium continues to improve. Creatinine 1.5 likely near baseline, decreased eliquis to 2.5 mg. R hand erythema stable.   10/19/2019 Sodium 134. Awake and alert but remains confused. No pain. Pending placement    STROKE DOCUMENTATION   Acute Stroke Times   Last Known Normal Date: 10/10/19  Last Known Normal Time: 1905  Symptom Onset Date: 10/10/19  Symptom Onset Time: 1905  Stroke Team Called Date: 10/10/19  Stroke Team Called Time: 2120  Stroke Team Arrival Date: 10/10/19  Stroke Team Arrival Time: 2122    NIH Scale:  1a. Level of Consciousness: 0-->Alert, keenly responsive  1b. LOC Questions: 2-->Answers neither question correctly  1c. LOC Commands: 0-->Performs both tasks correctly  2. Best Gaze: 0-->Normal  3. Visual: 0-->No visual loss  4. Facial  Palsy: 0-->Normal symmetrical movements  5a. Motor Arm, Left: 2-->Some effort against gravity, limb cannot get to or maintain (if cued) 90 (or 45) degrees, drifts down to bed, but has some effort against gravity  5b. Motor Arm, Right: 0-->No drift, limb holds 90 (or 45) degrees for full 10 secs  6a. Motor Leg, Left: 0-->No drift, leg holds 30 degree position for full 5 secs  6b. Motor Leg, Right: 0-->No drift, leg holds 30 degree position for full 5 secs  7. Limb Ataxia: 0-->Absent  8. Sensory: 0-->Normal, no sensory loss  9. Best Language: 0-->No aphasia, normal  10. Dysarthria: 0-->Normal  11. Extinction and Inattention (formerly Neglect): 0-->No abnormality  Total (NIH Stroke Scale): 4       Modified Isabel Score: 1  Holley Coma Scale:14   ABCD2 Score:    [id number] Score:   HAS -BLED Score:   ICH Score:   Hunt & Stroud Classification:      Hemorrhagic change of an Ischemic Stroke: Does this patient have an ischemic stroke with hemorrhagic changes? No     Neurologic Chief Complaint: RSW and aphasia    Subjective:     Interval History: Patient is seen for follow-up neurological assessment and treatment recommendations:     No acute events overnight.   SBP occasionally >160. Afebrile, no white count.  Remains confused since since admission; oriented only to person.  Na 134 (improved).   No pain.  Bradycardic intermittently. Norvasc increased and Coreg decreased.    HPI, Past Medical, Family, and Social History remains the same as documented in the initial encounter.     Review of Systems   Constitutional: Positive for fatigue. Negative for chills and fever.   HENT: Negative for trouble swallowing.    Eyes: Negative for visual disturbance.   Respiratory: Negative for shortness of breath.    Cardiovascular: Negative for chest pain.   Gastrointestinal: Negative for diarrhea and vomiting.   Genitourinary: Negative for difficulty urinating.   Musculoskeletal: Positive for arthralgias.        L shoulder pain (chronic)    Neurological: Positive for speech difficulty and weakness. Negative for dizziness, facial asymmetry, numbness and headaches.   Psychiatric/Behavioral: Positive for confusion.     Scheduled Meds:   amLODIPine  5 mg Oral Daily    apixaban  2.5 mg Oral BID    atorvastatin  40 mg Oral Daily    carvedilol  6.25 mg Oral BID    donepezil  5 mg Oral QHS    furosemide  20 mg Oral Daily    levothyroxine  25 mcg Oral Daily    mupirocin   Topical (Top) TID    senna  8.6 mg Oral Daily     Continuous Infusions:  PRN Meds:hydrALAZINE, influenza, sodium chloride 0.9%    Objective:     Vital Signs (Most Recent):  Temp: 97.7 °F (36.5 °C) (10/19/19 0855)  Pulse: 61 (10/19/19 0855)  Resp: 16 (10/19/19 0855)  BP: 120/60 (10/19/19 0855)  SpO2: 95 % (10/19/19 0855)  BP Location: Right arm    Vital Signs Range (Last 24H):  Temp:  [97.5 °F (36.4 °C)-98.9 °F (37.2 °C)]   Pulse:  [50-72]   Resp:  [16-18]   BP: (120-177)/(60-93)   SpO2:  [95 %-100 %]   BP Location: Right arm    Physical Exam   Constitutional: No distress.   Eyes: EOM are normal.   Cardiovascular: Normal rate.   Nursing note and vitals reviewed.      Neurological Exam:   LOC: alert  Language: No aphasia  Articulation: No dysarthria  Orientation: Not oriented to place, Not oriented to time  EOM (CN III, IV, VI): Full/intact  Facial Sensation (CN V): Normal  Facial Movement (CN VII): Symmetric facial expression    Motor: Arm left  Paresis: 3/5  Leg left  Normal 5/5  Arm right  Paresis: 4+/5  Leg right Paresis: 4+/5  Sensation: Intact to light touch, temperature and vibration    Laboratory:  CMP:   Recent Labs   Lab 10/19/19  0417   CALCIUM 8.8   *   K 4.2   CO2 28   CL 97   BUN 27*   CREATININE 1.5*     BMP:   Recent Labs   Lab 10/19/19  0417   *   K 4.2   CL 97   CO2 28   BUN 27*   CREATININE 1.5*   CALCIUM 8.8     CBC:   Recent Labs   Lab 10/15/19  0430   WBC 7.75   RBC 4.79   HGB 12.6*   HCT 41.9      MCV 88   MCH 26.3*   MCHC 30.1*     Lipid  Panel: No results for input(s): CHOL, LDLCALC, HDL, TRIG in the last 168 hours.  Coagulation: No results for input(s): PT, INR, APTT in the last 168 hours.  Platelet Aggregation Study: No results for input(s): PLTAGG, PLTAGINTERP, PLTAGREGLACO, ADPPLTAGGREG in the last 168 hours.  Hgb A1C: No results for input(s): HGBA1C in the last 168 hours.  TSH: No results for input(s): TSH in the last 168 hours.    Diagnostic Results     Brain/Vessel Imaging:    MRI Brain (10/11/2019) -             Areas of abnormal T2/FLAIR/diffusion signal abnormality consistent with areas of acute ischemia/infarct involving the cerebral hemispheres bilaterally.    There is signal abnormality associated with the left internal carotid artery likely corresponding to the appearance of occlusion on the CTA examination.    CTA Head and Neck (10/10/2019) -             Occluded left ICA with reconstitution of the distal/supraclinoid segment by retrograde flow through [id number].  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  Right vertebral artery supply the basilar artery.  Basilar artery is small in caliber with wall irregularity and intermittent occlusions.     Partially calcified hyperattenuating lesion in the lateral aspect of the cavernous sinus abutting the distal right ICA, likely representing calcified meningioma.     Generalized cerebral volume loss and remote lacunar type infarct in the right caudate head.    Cardiac Imaging:    TTE (10/11/2019) -     · Moderately decreased left ventricular systolic function. The estimated ejection fraction is 35%  · Concentric left ventricular remodeling.  · Left ventricular diastolic dysfunction.  · Local segmental wall motion abnormalities.  · Severe left atrial enlargement.  · Mild right atrial enlargement.  · Mild aortic regurgitation.  · Moderate aortic valve  stenosis but difficult to appreciate in the setting of AF, depressed EF, and low stroke volume.  · Aortic valve area is 1.39 cm2; peak velocity is 1.28 m/s; mean gradient is 4 mmHg.  · Elevated central venous pressure (15 mm Hg).           Santiago Donnelly MD  [facility]  Department of Vascular Neurology   [facility]

## 2019-10-19 NOTE — ASSESSMENT & PLAN NOTE
Stroke risk factor  -- pro BNP from Feb 2019 >3000  -- On Lasix and Aldactone at home.  -- TTE shows 35%   -- Resumes Lasix 20mg daily.   -- Continue Coreg 3.125mg BID.

## 2019-10-19 NOTE — SUBJECTIVE & OBJECTIVE
Neurologic Chief Complaint: RSW and aphasia    Subjective:     Interval History: Patient is seen for follow-up neurological assessment and treatment recommendations:     No acute events overnight.   SBP occasionally >160. Afebrile, no white count.  Remains confused since since admission; oriented only to person.  Na 134 (improved).   No pain.    HPI, Past Medical, Family, and Social History remains the same as documented in the initial encounter.     Review of Systems   Constitutional: Positive for fatigue. Negative for chills and fever.   HENT: Negative for trouble swallowing.    Eyes: Negative for visual disturbance.   Respiratory: Negative for shortness of breath.    Cardiovascular: Negative for chest pain.   Gastrointestinal: Negative for diarrhea and vomiting.   Genitourinary: Negative for difficulty urinating.   Musculoskeletal: Positive for arthralgias.        L shoulder pain (chronic)   Neurological: Positive for speech difficulty and weakness. Negative for dizziness, facial asymmetry, numbness and headaches.   Psychiatric/Behavioral: Positive for confusion.     Scheduled Meds:   amLODIPine  5 mg Oral Daily    apixaban  2.5 mg Oral BID    atorvastatin  40 mg Oral Daily    carvedilol  6.25 mg Oral BID    donepezil  5 mg Oral QHS    furosemide  20 mg Oral Daily    levothyroxine  25 mcg Oral Daily    mupirocin   Topical (Top) TID    senna  8.6 mg Oral Daily     Continuous Infusions:  PRN Meds:hydrALAZINE, influenza, sodium chloride 0.9%    Objective:     Vital Signs (Most Recent):  Temp: 97.7 °F (36.5 °C) (10/19/19 0855)  Pulse: 61 (10/19/19 0855)  Resp: 16 (10/19/19 0855)  BP: 120/60 (10/19/19 0855)  SpO2: 95 % (10/19/19 0855)  BP Location: Right arm    Vital Signs Range (Last 24H):  Temp:  [97.5 °F (36.4 °C)-98.9 °F (37.2 °C)]   Pulse:  [50-72]   Resp:  [16-18]   BP: (120-177)/(60-93)   SpO2:  [95 %-100 %]   BP Location: Right arm    Physical Exam   Constitutional: No distress.   Eyes: EOM are  normal.   Cardiovascular: Normal rate.   Nursing note and vitals reviewed.      Neurological Exam:   LOC: alert  Language: No aphasia  Articulation: No dysarthria  Orientation: Not oriented to place, Not oriented to time  EOM (CN III, IV, VI): Full/intact  Facial Sensation (CN V): Normal  Facial Movement (CN VII): Symmetric facial expression    Motor: Arm left  Paresis: 3/5  Leg left  Normal 5/5  Arm right  Paresis: 4+/5  Leg right Paresis: 4+/5  Sensation: Intact to light touch, temperature and vibration    Laboratory:  CMP:   Recent Labs   Lab 10/19/19  0417   CALCIUM 8.8   *   K 4.2   CO2 28   CL 97   BUN 27*   CREATININE 1.5*     BMP:   Recent Labs   Lab 10/19/19  0417   *   K 4.2   CL 97   CO2 28   BUN 27*   CREATININE 1.5*   CALCIUM 8.8     CBC:   Recent Labs   Lab 10/15/19  0430   WBC 7.75   RBC 4.79   HGB 12.6*   HCT 41.9      MCV 88   MCH 26.3*   MCHC 30.1*     Lipid Panel: No results for input(s): CHOL, LDLCALC, HDL, TRIG in the last 168 hours.  Coagulation: No results for input(s): PT, INR, APTT in the last 168 hours.  Platelet Aggregation Study: No results for input(s): PLTAGG, PLTAGINTERP, PLTAGREGLACO, ADPPLTAGGREG in the last 168 hours.  Hgb A1C: No results for input(s): HGBA1C in the last 168 hours.  TSH: No results for input(s): TSH in the last 168 hours.    Diagnostic Results     Brain/Vessel Imaging:    MRI Brain (10/11/2019) -             Areas of abnormal T2/FLAIR/diffusion signal abnormality consistent with areas of acute ischemia/infarct involving the cerebral hemispheres bilaterally.    There is signal abnormality associated with the left internal carotid artery likely corresponding to the appearance of occlusion on the CTA examination.    CTA Head and Neck (10/10/2019) -             Occluded left ICA with reconstitution of the distal/supraclinoid segment by retrograde flow through [id number].  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  Right vertebral artery supply the basilar artery.  Basilar artery is small in caliber with wall irregularity and intermittent occlusions.     Partially calcified hyperattenuating lesion in the lateral aspect of the cavernous sinus abutting the distal right ICA, likely representing calcified meningioma.     Generalized cerebral volume loss and remote lacunar type infarct in the right caudate head.    Cardiac Imaging:    TTE (10/11/2019) -     · Moderately decreased left ventricular systolic function. The estimated ejection fraction is 35%  · Concentric left ventricular remodeling.  · Left ventricular diastolic dysfunction.  · Local segmental wall motion abnormalities.  · Severe left atrial enlargement.  · Mild right atrial enlargement.  · Mild aortic regurgitation.  · Moderate aortic valve stenosis but difficult to appreciate in the setting of AF, depressed EF, and low stroke volume.  · Aortic valve area is 1.39 cm2; peak velocity is 1.28 m/s; mean gradient is 4 mmHg.  · Elevated central venous pressure (15 mm Hg).

## 2019-10-20 PROBLEM — N17.9 AKI (ACUTE KIDNEY INJURY): Status: ACTIVE | Noted: 2019-10-20

## 2019-10-20 LAB
ANION GAP SERPL CALC-SCNC: 8 MMOL/L (ref 8–16)
BUN SERPL-MCNC: 32 MG/DL (ref 8–23)
CALCIUM SERPL-MCNC: 8.8 MG/DL (ref 8.7–10.5)
CHLORIDE SERPL-SCNC: 95 MMOL/L (ref 95–110)
CO2 SERPL-SCNC: 30 MMOL/L (ref 23–29)
CREAT SERPL-MCNC: 1.7 MG/DL (ref 0.5–1.4)
CREAT UR-MCNC: 25 MG/DL (ref 23–375)
EST. GFR  (AFRICAN AMERICAN): 41.6 ML/MIN/1.73 M^2
EST. GFR  (NON AFRICAN AMERICAN): 36 ML/MIN/1.73 M^2
GLUCOSE SERPL-MCNC: 98 MG/DL (ref 70–110)
POTASSIUM SERPL-SCNC: 4.3 MMOL/L (ref 3.5–5.1)
SODIUM SERPL-SCNC: 133 MMOL/L (ref 136–145)
SODIUM UR-SCNC: 82 MMOL/L (ref 20–250)

## 2019-10-20 PROCEDURE — 99233 PR SUBSEQUENT HOSPITAL CARE,LEVL III: ICD-10-PCS | Mod: GC,,, | Performed by: PSYCHIATRY & NEUROLOGY

## 2019-10-20 PROCEDURE — 25000003 PHARM REV CODE 250: Performed by: STUDENT IN AN ORGANIZED HEALTH CARE EDUCATION/TRAINING PROGRAM

## 2019-10-20 PROCEDURE — 82570 ASSAY OF URINE CREATININE: CPT

## 2019-10-20 PROCEDURE — 63600175 PHARM REV CODE 636 W HCPCS: Performed by: STUDENT IN AN ORGANIZED HEALTH CARE EDUCATION/TRAINING PROGRAM

## 2019-10-20 PROCEDURE — 99233 SBSQ HOSP IP/OBS HIGH 50: CPT | Mod: GC,,, | Performed by: PSYCHIATRY & NEUROLOGY

## 2019-10-20 PROCEDURE — 25000003 PHARM REV CODE 250: Performed by: PSYCHIATRY & NEUROLOGY

## 2019-10-20 PROCEDURE — 36415 COLL VENOUS BLD VENIPUNCTURE: CPT

## 2019-10-20 PROCEDURE — 84300 ASSAY OF URINE SODIUM: CPT

## 2019-10-20 PROCEDURE — 25000003 PHARM REV CODE 250: Performed by: PHYSICIAN ASSISTANT

## 2019-10-20 PROCEDURE — 80048 BASIC METABOLIC PNL TOTAL CA: CPT

## 2019-10-20 PROCEDURE — 25000003 PHARM REV CODE 250: Performed by: FAMILY MEDICINE

## 2019-10-20 PROCEDURE — 20600001 HC STEP DOWN PRIVATE ROOM

## 2019-10-20 PROCEDURE — 25000003 PHARM REV CODE 250: Performed by: NURSE PRACTITIONER

## 2019-10-20 RX ORDER — HYDRALAZINE HYDROCHLORIDE 20 MG/ML
10 INJECTION INTRAMUSCULAR; INTRAVENOUS EVERY 8 HOURS PRN
Status: DISCONTINUED | OUTPATIENT
Start: 2019-10-20 | End: 2019-11-06 | Stop reason: HOSPADM

## 2019-10-20 RX ADMIN — HYDRALAZINE HYDROCHLORIDE 10 MG: 20 INJECTION INTRAMUSCULAR; INTRAVENOUS at 11:10

## 2019-10-20 RX ADMIN — ATORVASTATIN CALCIUM 40 MG: 20 TABLET, FILM COATED ORAL at 08:10

## 2019-10-20 RX ADMIN — MUPIROCIN: 20 OINTMENT TOPICAL at 03:10

## 2019-10-20 RX ADMIN — APIXABAN 2.5 MG: 2.5 TABLET, FILM COATED ORAL at 08:10

## 2019-10-20 RX ADMIN — MUPIROCIN: 20 OINTMENT TOPICAL at 08:10

## 2019-10-20 RX ADMIN — CARVEDILOL 3.12 MG: 3.12 TABLET, FILM COATED ORAL at 08:10

## 2019-10-20 RX ADMIN — LEVOTHYROXINE SODIUM 25 MCG: 25 TABLET ORAL at 05:10

## 2019-10-20 RX ADMIN — DONEPEZIL HYDROCHLORIDE 5 MG: 5 TABLET, FILM COATED ORAL at 08:10

## 2019-10-20 RX ADMIN — FUROSEMIDE 20 MG: 20 TABLET ORAL at 08:10

## 2019-10-20 RX ADMIN — SENNOSIDES 8.6 MG: 8.6 TABLET, FILM COATED ORAL at 08:10

## 2019-10-20 RX ADMIN — AMLODIPINE BESYLATE 10 MG: 10 TABLET ORAL at 08:10

## 2019-10-20 NOTE — ASSESSMENT & PLAN NOTE
Stroke risk factor  -- pro BNP from Feb 2019 >3000  -- On Lasix and Aldactone at home.  -- TTE shows 35%   -- Continue Coreg 3.125mg BID.   -- Held Lasix for worsening AMRIT.

## 2019-10-20 NOTE — SUBJECTIVE & OBJECTIVE
Neurologic Chief Complaint: RSW and aphasia    Subjective:     Interval History: Patient is seen for follow-up neurological assessment and treatment recommendations:     Complained of chest discomfort overnight. EKG showed Afib (known diagnosis) but no ST or T wave changes.   Pending placement.      HPI, Past Medical, Family, and Social History remains the same as documented in the initial encounter.     Review of Systems   Constitutional: Negative for chills and fever.   HENT: Negative for trouble swallowing.    Eyes: Negative for visual disturbance.   Respiratory: Negative for shortness of breath.    Cardiovascular: Negative for chest pain.   Gastrointestinal: Negative for nausea and vomiting.   Genitourinary: Negative for difficulty urinating.   Musculoskeletal: Positive for arthralgias.        L shoulder pain (chronic).   Neurological: Positive for weakness. Negative for facial asymmetry, speech difficulty and numbness.   Psychiatric/Behavioral: Negative for agitation and confusion.     Scheduled Meds:   amLODIPine  10 mg Oral Daily    apixaban  2.5 mg Oral BID    atorvastatin  40 mg Oral Daily    carvedilol  3.125 mg Oral BID    donepezil  5 mg Oral QHS    furosemide  20 mg Oral Daily    levothyroxine  25 mcg Oral Daily    mupirocin   Topical (Top) TID    senna  8.6 mg Oral Daily     Continuous Infusions:  PRN Meds:hydrALAZINE, influenza, sodium chloride 0.9%    Objective:     Vital Signs (Most Recent):  Temp: 97.2 °F (36.2 °C) (10/20/19 0828)  Pulse: 67 (10/20/19 0828)  Resp: 16 (10/20/19 0828)  BP: (!) 149/73 (10/20/19 0828)  SpO2: 96 % (10/20/19 0828)  BP Location: Right arm    Vital Signs Range (Last 24H):  Temp:  [97.2 °F (36.2 °C)-98.4 °F (36.9 °C)]   Pulse:  [47-72]   Resp:  [16-20]   BP: (130-184)/(59-88)   SpO2:  [95 %-98 %]   BP Location: Right arm    Physical Exam   Constitutional: No distress.   Eyes: EOM are normal.   Pulmonary/Chest: Effort normal.   Neurological: He is alert.   Nursing  note and vitals reviewed.      Neurological Exam:   LOC: alert  Language: No aphasia  Articulation: No dysarthria  Orientation: Not oriented to time  Facial Sensation (CN V): Normal  Motor: Arm left  Paresis: 3/5 (2/2 pain)  Leg left  Normal 5/5  Arm right  Normal 5/5  Leg right Normal 5/5  Sensation: Intact to light touch, temperature and vibration    Laboratory:  CMP:   Recent Labs   Lab 10/20/19  0550   CALCIUM 8.8   *   K 4.3   CO2 30*   CL 95   BUN 32*   CREATININE 1.7*     BMP:   Recent Labs   Lab 10/20/19  0550   *   K 4.3   CL 95   CO2 30*   BUN 32*   CREATININE 1.7*   CALCIUM 8.8     CBC:   Recent Labs   Lab 10/15/19  0430   WBC 7.75   RBC 4.79   HGB 12.6*   HCT 41.9      MCV 88   MCH 26.3*   MCHC 30.1*     Lipid Panel: No results for input(s): CHOL, LDLCALC, HDL, TRIG in the last 168 hours.  Coagulation: No results for input(s): PT, INR, APTT in the last 168 hours.  Platelet Aggregation Study: No results for input(s): PLTAGG, PLTAGINTERP, PLTAGREGLACO, ADPPLTAGGREG in the last 168 hours.  Hgb A1C: No results for input(s): HGBA1C in the last 168 hours.  TSH: No results for input(s): TSH in the last 168 hours.    Diagnostic Results     Brain/Vessel Imaging:     MRI Brain (10/11/2019) -               Areas of abnormal T2/FLAIR/diffusion signal abnormality consistent with areas of acute ischemia/infarct involving the cerebral hemispheres bilaterally.     There is signal abnormality associated with the left internal carotid artery likely corresponding to the appearance of occlusion on the CTA examination.     CTA Head and Neck (10/10/2019) -               Occluded left ICA with reconstitution of the distal/supraclinoid segment by retrograde flow through [id number].  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  Right vertebral artery supply the basilar  artery.  Basilar artery is small in caliber with wall irregularity and intermittent occlusions.     Partially calcified hyperattenuating lesion in the lateral aspect of the cavernous sinus abutting the distal right ICA, likely representing calcified meningioma.     Generalized cerebral volume loss and remote lacunar type infarct in the right caudate head.     Cardiac Imaging:     TTE (10/11/2019) -      · Moderately decreased left ventricular systolic function. The estimated ejection fraction is 35%  · Concentric left ventricular remodeling.  · Left ventricular diastolic dysfunction.  · Local segmental wall motion abnormalities.  · Severe left atrial enlargement.  · Mild right atrial enlargement.  · Mild aortic regurgitation.  · Moderate aortic valve stenosis but difficult to appreciate in the setting of AF, depressed EF, and low stroke volume.  · Aortic valve area is 1.39 cm2; peak velocity is 1.28 m/s; mean gradient is 4 mmHg.  · Elevated central venous pressure (15 mm Hg).

## 2019-10-20 NOTE — ASSESSMENT & PLAN NOTE
86 y/o male with PMH of chronic Afib (not on AC), CAD s/p stent, HTN, CHF, hypothyroidism who presented who received IV-tPA at OSH for RSW, aphasia and L gaze deviation. NIHSS 8. CTA MP showed a chronically occluded L ICA from it's origin to the supraclinoid segment with filling of the MCA via the AComm along with substantial burden on intracranial and extracranial atherosclerotic disease. He was also noted to have an occluded V4 with an intermittently occluded basilar. No EVT.  TTE shows an EF of 35%    Etiology - Cardioembolic     -- Pending placement.    Antithrombotics for secondary stroke prevention:  Eliquis 2.5 mg BID     Statins for secondary stroke prevention and hyperlipidemia, if present:   Statins: Atorvastatin- 40 mg daily    Aggressive risk factor modification: HTN     Rehab efforts: The patient has been evaluated by a stroke team provider and the therapy needs have been fully considered based off the presenting complaints and exam findings. The following therapy evaluations are needed: PT evaluate and treat, OT evaluate and treat, SLP evaluate and treat, PM&R evaluate for appropriate placement   -- Pending placement to [facility] on 10/21    Diagnostics ordered/pending: None    VTE prophylaxis: Eliquis 2.5 mg BID    BP parameters: SBP <160

## 2019-10-20 NOTE — ASSESSMENT & PLAN NOTE
Stroke risk factor  -- Per Care Everywhere; not on AC - Dr. Menjivar cardiologist reports patient not a good candidate due to risk of falls, non-compliance, and dementia   -- Noted on EKG at admit.  -- Rate controlled  -- Eliquis 2.5 BID

## 2019-10-20 NOTE — PROGRESS NOTES
[facility]  Vascular Neurology  Comprehensive Stroke Center  Progress Note    Assessment/Plan:     * Acute arterial ischemic stroke, multifocal, multiple vascular territories  86 y/o male with PMH of chronic Afib (not on AC), CAD s/p stent, HTN, CHF, hypothyroidism who presented who received IV-tPA at OSH for RSW, aphasia and L gaze deviation. NIHSS 8. CTA MP showed a chronically occluded L ICA from it's origin to the supraclinoid segment with filling of the MCA via the AComm along with substantial burden on intracranial and extracranial atherosclerotic disease. He was also noted to have an occluded V4 with an intermittently occluded basilar. No EVT.  TTE shows an EF of 35%    Etiology - Cardioembolic     -- Pending placement.    Antithrombotics for secondary stroke prevention:  Eliquis 2.5 mg BID     Statins for secondary stroke prevention and hyperlipidemia, if present:   Statins: Atorvastatin- 40 mg daily    Aggressive risk factor modification: HTN     Rehab efforts: The patient has been evaluated by a stroke team provider and the therapy needs have been fully considered based off the presenting complaints and exam findings. The following therapy evaluations are needed: PT evaluate and treat, OT evaluate and treat, SLP evaluate and treat, PM&R evaluate for appropriate placement   -- Pending placement to [facility] on 10/21    Diagnostics ordered/pending: None    VTE prophylaxis: Eliquis 2.5 mg BID    BP parameters: SBP <160        AMRIT (acute kidney injury)  -- BUN/Cr elevated on admission, improved and now worsening again.  -- BUN/Cr today 32/1.7  -- Euvolemic on exam  -- Urine Na and Cr pending.  -- Held Lasix   -- Avoid nephrotoxic meds. Eliquis renally dosed.    Person awaiting admission to adequate facility elsewhere  Pending rehab placement  Difficult placement due to patient living in [place] and visiting daughter when stroke occurred - therapy teams recommending inpatient  rehab    At this time patient is unable to fly back to [place]. He is not capable of flying due to inability to ambulate without trained medical assistance, impaired functional mobility, impaired cardiopulmonary response to activity, urine and bowel incontinence, and decreased mental capacity related to stroke and underlying diagnosis of dementia.     10/17 - Spoke to Dr. Krishna of [facility]. Requesting documentation of pt's current functional status stating why pt is unable to take commercial flight back to [place]. CM to send updated notes.     Erythema of hand  -- Erythema of right hand from IV infiltration  -- Bactroban cream TID X 10 days  -- Elevation of R hand  -- No pain today.    SIADH (syndrome of inappropriate ADH production)  -- Na 134 (improving)  -- 1L fluid restriction.      Dementia  -- Continue Donepezil 5 mg daily     Intracranial atherosclerosis  -- Noted on CTA head   -- On Eliquis and Atorvastatin 40 mg     Occlusion of left carotid artery  -- Seen on CTA  -- Suspect chronic occlusion     Nodule of right lung  -- 1 cm nodule of R lung seen on CTA  -- Will need follow up in 3- 6 months   -- Discussed with daughter     Cytotoxic brain edema  Area of cytotoxic cerebral edema identified when reviewing brain imaging in the territory of the R/L middle cerebral artery. There is not mass effect associated with it. We will continue to monitor the patients clinical exam for any worsening of symptoms which may indicate expansion of the stroke or the area of the edema resulting in the clinical change. The pattern is suggestive of cardioembolic etiology        CAD (coronary artery disease)  Stroke risk factor  -- Noted on Care Everywhere  -- S/p RCA stent 7/2018  -- Prescribed Plavix, statin and Ranexa but unclear if he's taking them  -- Eliquis 2.5 BID  -- Atorvastatin 40 mg daily       Paroxysmal atrial fibrillation  Stroke risk factor  -- Per Care Everywhere; not on AC - Dr. Menjivar  cardiologist reports patient not a good candidate due to risk of falls, non-compliance, and dementia   -- Noted on EKG at admit.  -- Rate controlled  -- Eliquis 2.5 BID    Chronic combined systolic and diastolic congestive heart failure  Stroke risk factor  -- pro BNP from Feb 2019 >3000  -- On Lasix and Aldactone at home.  -- TTE shows 35%   -- Continue Coreg 3.125mg BID.   -- Held Lasix for worsening AMRIT.     Acquired hypothyroidism  Stroke risk factor  -- Continue Synthroid 25mcg daily    Essential hypertension  Stroke risk factor  -- SBP<160  -- Coreg 3.125 mg BID  -- Norvasc 10 mg daily.         10/12 - Etiology likely cardioembolic. Will start Eliquis 2.5 mg BID. Creatinine trending down - may start patient on Eliquis 5 mg BID if continue to decrease. L ICA chronically occluded - no need to vasculare intervention.   10/13 - Spoke with PT - patient would benefit from inpatient rehab placement. Norvasc 5 mg ordered. Urine studies ordered for hyponatremia - suspect to be due to dehydration.   10/14 Patient with SIADH but sodium stable.  1 liter fluid restriction.    10/15  Irritation around right hand peripheral IV site.  Sodium 131 today.  Discussed with daughter that he is on fluid restriction and she will stop providing him with outside drinks.     10/16 - Sodium increasing 132. Bactroban ordered for right hand IV infiltration. Working on placement.   10/17 - Sodium continues to improve. Creatinine 1.5 likely near baseline, decreased eliquis to 2.5 mg. R hand erythema stable.   10/19/2019 Sodium 134. Awake and alert but remains confused. No pain. Pending placement  10/20/2019 Complained of chest discomfort overnight. EKG showed no ST wave changes, Trop normal.     STROKE DOCUMENTATION   Acute Stroke Times   Last Known Normal Date: 10/10/19  Last Known Normal Time: 1905  Symptom Onset Date: 10/10/19  Symptom Onset Time: 1905  Stroke Team Called Date: 10/10/19  Stroke Team Called Time: 2120  Stroke Team Arrival  Date: 10/10/19  Stroke Team Arrival Time: 2122    NIH Scale:  1a. Level of Consciousness: 0-->Alert, keenly responsive  1b. LOC Questions: 2-->Answers neither question correctly  1c. LOC Commands: 0-->Performs both tasks correctly  2. Best Gaze: 0-->Normal  3. Visual: 0-->No visual loss  4. Facial Palsy: 0-->Normal symmetrical movements  5a. Motor Arm, Left: 2-->Some effort against gravity, limb cannot get to or maintain (if cued) 90 (or 45) degrees, drifts down to bed, but has some effort against gravity  5b. Motor Arm, Right: 0-->No drift, limb holds 90 (or 45) degrees for full 10 secs  6a. Motor Leg, Left: 0-->No drift, leg holds 30 degree position for full 5 secs  6b. Motor Leg, Right: 0-->No drift, leg holds 30 degree position for full 5 secs  7. Limb Ataxia: 0-->Absent  8. Sensory: 0-->Normal, no sensory loss  9. Best Language: 0-->No aphasia, normal  10. Dysarthria: 0-->Normal  11. Extinction and Inattention (formerly Neglect): 0-->No abnormality  Total (NIH Stroke Scale): 4       Modified Elgin Score: 1  Holley Coma Scale:14   ABCD2 Score:    [id number] Score:   HAS -BLED Score:   ICH Score:   Hunt & Stroud Classification:      Hemorrhagic change of an Ischemic Stroke: Does this patient have an ischemic stroke with hemorrhagic changes? No     Neurologic Chief Complaint: RSW and aphasia    Subjective:     Interval History: Patient is seen for follow-up neurological assessment and treatment recommendations:     Complained of chest discomfort overnight. EKG showed Afib (known diagnosis) but no ST or T wave changes.   Pending placement.      HPI, Past Medical, Family, and Social History remains the same as documented in the initial encounter.     Review of Systems   Constitutional: Negative for chills and fever.   HENT: Negative for trouble swallowing.    Eyes: Negative for visual disturbance.   Respiratory: Negative for shortness of breath.    Cardiovascular: Negative for chest pain.   Gastrointestinal:  Negative for nausea and vomiting.   Genitourinary: Negative for difficulty urinating.   Musculoskeletal: Positive for arthralgias.        L shoulder pain (chronic).   Neurological: Positive for weakness. Negative for facial asymmetry, speech difficulty and numbness.   Psychiatric/Behavioral: Negative for agitation and confusion.     Scheduled Meds:   amLODIPine  10 mg Oral Daily    apixaban  2.5 mg Oral BID    atorvastatin  40 mg Oral Daily    carvedilol  3.125 mg Oral BID    donepezil  5 mg Oral QHS    furosemide  20 mg Oral Daily    levothyroxine  25 mcg Oral Daily    mupirocin   Topical (Top) TID    senna  8.6 mg Oral Daily     Continuous Infusions:  PRN Meds:hydrALAZINE, influenza, sodium chloride 0.9%    Objective:     Vital Signs (Most Recent):  Temp: 97.2 °F (36.2 °C) (10/20/19 0828)  Pulse: 67 (10/20/19 0828)  Resp: 16 (10/20/19 0828)  BP: (!) 149/73 (10/20/19 0828)  SpO2: 96 % (10/20/19 0828)  BP Location: Right arm    Vital Signs Range (Last 24H):  Temp:  [97.2 °F (36.2 °C)-98.4 °F (36.9 °C)]   Pulse:  [47-72]   Resp:  [16-20]   BP: (130-184)/(59-88)   SpO2:  [95 %-98 %]   BP Location: Right arm    Physical Exam   Constitutional: No distress.   Eyes: EOM are normal.   Pulmonary/Chest: Effort normal.   Neurological: He is alert.   Nursing note and vitals reviewed.      Neurological Exam:   LOC: alert  Language: No aphasia  Articulation: No dysarthria  Orientation: Not oriented to time  Facial Sensation (CN V): Normal  Motor: Arm left  Paresis: 3/5 (2/2 pain)  Leg left  Normal 5/5  Arm right  Normal 5/5  Leg right Normal 5/5  Sensation: Intact to light touch, temperature and vibration    Laboratory:  CMP:   Recent Labs   Lab 10/20/19  0550   CALCIUM 8.8   *   K 4.3   CO2 30*   CL 95   BUN 32*   CREATININE 1.7*     BMP:   Recent Labs   Lab 10/20/19  0550   *   K 4.3   CL 95   CO2 30*   BUN 32*   CREATININE 1.7*   CALCIUM 8.8     CBC:   Recent Labs   Lab 10/15/19  0430   WBC 7.75   RBC  4.79   HGB 12.6*   HCT 41.9      MCV 88   MCH 26.3*   MCHC 30.1*     Lipid Panel: No results for input(s): CHOL, LDLCALC, HDL, TRIG in the last 168 hours.  Coagulation: No results for input(s): PT, INR, APTT in the last 168 hours.  Platelet Aggregation Study: No results for input(s): PLTAGG, PLTAGINTERP, PLTAGREGLACO, ADPPLTAGGREG in the last 168 hours.  Hgb A1C: No results for input(s): HGBA1C in the last 168 hours.  TSH: No results for input(s): TSH in the last 168 hours.    Diagnostic Results     Brain/Vessel Imaging:     MRI Brain (10/11/2019) -               Areas of abnormal T2/FLAIR/diffusion signal abnormality consistent with areas of acute ischemia/infarct involving the cerebral hemispheres bilaterally.     There is signal abnormality associated with the left internal carotid artery likely corresponding to the appearance of occlusion on the CTA examination.     CTA Head and Neck (10/10/2019) -               Occluded left ICA with reconstitution of the distal/supraclinoid segment by retrograde flow through [id number].  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  Right vertebral artery supply the basilar artery.  Basilar artery is small in caliber with wall irregularity and intermittent occlusions.     Partially calcified hyperattenuating lesion in the lateral aspect of the cavernous sinus abutting the distal right ICA, likely representing calcified meningioma.     Generalized cerebral volume loss and remote lacunar type infarct in the right caudate head.     Cardiac Imaging:     TTE (10/11/2019) -      · Moderately decreased left ventricular systolic function. The estimated ejection fraction is 35%  · Concentric left ventricular remodeling.  · Left ventricular diastolic dysfunction.  · Local segmental wall motion abnormalities.  · Severe left atrial  enlargement.  · Mild right atrial enlargement.  · Mild aortic regurgitation.  · Moderate aortic valve stenosis but difficult to appreciate in the setting of AF, depressed EF, and low stroke volume.  · Aortic valve area is 1.39 cm2; peak velocity is 1.28 m/s; mean gradient is 4 mmHg.  · Elevated central venous pressure (15 mm Hg).      Santiago Donnelly MD  Comprehensive Stroke Center  Department of Vascular Neurology   [facility]

## 2019-10-20 NOTE — ASSESSMENT & PLAN NOTE
Stroke risk factor  -- Noted on Care Everywhere  -- S/p RCA stent 7/2018  -- Prescribed Plavix, statin and Ranexa but unclear if he's taking them  -- Eliquis 2.5 BID  -- Atorvastatin 40 mg daily

## 2019-10-20 NOTE — CARE UPDATE
Rapid Response Nurse Chart Check     Chart check completed, abnormal VS noted. Bedside RN Leticia contacted, no concerns verbalized at this time, instructed to call [phone] for further concerns or assistance.

## 2019-10-20 NOTE — ASSESSMENT & PLAN NOTE
-- BUN/Cr elevated on admission, improved and now worsening again.  -- BUN/Cr today 32/1.7  -- Euvolemic on exam  -- Urine Na and Cr pending.  -- Held Lasix   -- Avoid nephrotoxic meds. Eliquis renally dosed.

## 2019-10-20 NOTE — PLAN OF CARE
Pt ambulated in the hallway 3x today using a walker with 1x assistance. Pt and family educated that HTN is a factor that may be associated with stroke. Neuro checks q 4 hours. Pt oriented to self and place but not to time. Tele sitter in place and bed alarm activated. Decreased swelling in the right hand noted. Urine culture revealed sodium of 82 and Creatine of 25. Lasix held d/t BUN/Creatine of 1.7/32. Pending placement to [facility] on 10/21 Monday. VSS, no falls, no pains or complaints of SOB. Will continue to monitor.

## 2019-10-20 NOTE — ASSESSMENT & PLAN NOTE
Pending rehab placement  Difficult placement due to patient living in [place] and visiting daughter when stroke occurred - therapy teams recommending inpatient rehab    At this time patient is unable to fly back to [place]. He is not capable of flying due to inability to ambulate without trained medical assistance, impaired functional mobility, impaired cardiopulmonary response to activity, urine and bowel incontinence, and decreased mental capacity related to stroke and underlying diagnosis of dementia.     10/17 - Spoke to Dr. Krishna of [facility]. Requesting documentation of pt's current functional status stating why pt is unable to take commercial flight back to [place]. CM to send updated notes.

## 2019-10-20 NOTE — NURSING
"Stoke team notified of pt c/o of "funny feeling in chest." VSS no acute distress observed. EKG and troponin ordered. Will continue to monitor.   "

## 2019-10-20 NOTE — PLAN OF CARE
"Plan of care reviewed with patient. Pt remained free of falls/injuries/traumas during shift. Pt oriented to self only. Pt alert and VSS. Pt. Reported "funny feeling" in chest, MD notified. EKG and troponin done, troponin WDL, pt has denied any chest pain, sob, or other pain. Telesitter and son in room. All questions answered, will continue to monitor.    "

## 2019-10-21 LAB
ANION GAP SERPL CALC-SCNC: 8 MMOL/L (ref 8–16)
BUN SERPL-MCNC: 29 MG/DL (ref 8–23)
CALCIUM SERPL-MCNC: 8.8 MG/DL (ref 8.7–10.5)
CHLORIDE SERPL-SCNC: 95 MMOL/L (ref 95–110)
CO2 SERPL-SCNC: 28 MMOL/L (ref 23–29)
CREAT SERPL-MCNC: 1.4 MG/DL (ref 0.5–1.4)
EST. GFR  (AFRICAN AMERICAN): 52.6 ML/MIN/1.73 M^2
EST. GFR  (NON AFRICAN AMERICAN): 45.5 ML/MIN/1.73 M^2
GLUCOSE SERPL-MCNC: 76 MG/DL (ref 70–110)
POTASSIUM SERPL-SCNC: 4.1 MMOL/L (ref 3.5–5.1)
SODIUM SERPL-SCNC: 131 MMOL/L (ref 136–145)

## 2019-10-21 PROCEDURE — 92507 TX SP LANG VOICE COMM INDIV: CPT

## 2019-10-21 PROCEDURE — 97530 THERAPEUTIC ACTIVITIES: CPT

## 2019-10-21 PROCEDURE — 25000003 PHARM REV CODE 250: Performed by: PSYCHIATRY & NEUROLOGY

## 2019-10-21 PROCEDURE — 25000003 PHARM REV CODE 250: Performed by: STUDENT IN AN ORGANIZED HEALTH CARE EDUCATION/TRAINING PROGRAM

## 2019-10-21 PROCEDURE — 25000003 PHARM REV CODE 250: Performed by: NURSE PRACTITIONER

## 2019-10-21 PROCEDURE — 97535 SELF CARE MNGMENT TRAINING: CPT

## 2019-10-21 PROCEDURE — 99233 PR SUBSEQUENT HOSPITAL CARE,LEVL III: ICD-10-PCS | Mod: ,,, | Performed by: PSYCHIATRY & NEUROLOGY

## 2019-10-21 PROCEDURE — 92526 ORAL FUNCTION THERAPY: CPT

## 2019-10-21 PROCEDURE — 36415 COLL VENOUS BLD VENIPUNCTURE: CPT

## 2019-10-21 PROCEDURE — 80048 BASIC METABOLIC PNL TOTAL CA: CPT

## 2019-10-21 PROCEDURE — 99233 SBSQ HOSP IP/OBS HIGH 50: CPT | Mod: ,,, | Performed by: PSYCHIATRY & NEUROLOGY

## 2019-10-21 PROCEDURE — 20600001 HC STEP DOWN PRIVATE ROOM

## 2019-10-21 PROCEDURE — 25000003 PHARM REV CODE 250: Performed by: FAMILY MEDICINE

## 2019-10-21 PROCEDURE — 97116 GAIT TRAINING THERAPY: CPT

## 2019-10-21 RX ADMIN — ATORVASTATIN CALCIUM 40 MG: 20 TABLET, FILM COATED ORAL at 09:10

## 2019-10-21 RX ADMIN — LEVOTHYROXINE SODIUM 25 MCG: 25 TABLET ORAL at 05:10

## 2019-10-21 RX ADMIN — DONEPEZIL HYDROCHLORIDE 5 MG: 5 TABLET, FILM COATED ORAL at 09:10

## 2019-10-21 RX ADMIN — MUPIROCIN: 20 OINTMENT TOPICAL at 09:10

## 2019-10-21 RX ADMIN — AMLODIPINE BESYLATE 10 MG: 10 TABLET ORAL at 09:10

## 2019-10-21 RX ADMIN — APIXABAN 2.5 MG: 2.5 TABLET, FILM COATED ORAL at 09:10

## 2019-10-21 RX ADMIN — CARVEDILOL 3.12 MG: 3.12 TABLET, FILM COATED ORAL at 09:10

## 2019-10-21 RX ADMIN — SENNOSIDES 8.6 MG: 8.6 TABLET, FILM COATED ORAL at 09:10

## 2019-10-21 RX ADMIN — MUPIROCIN: 20 OINTMENT TOPICAL at 02:10

## 2019-10-21 NOTE — PLAN OF CARE
Goals reviewed and goals downgraded from modified independence to supervision 2* to patient's cognitive deficits and poor safety awareness.    Genevieve Garnica OTR/L  Occupational Therapy  Pager #: [phone]  10/21/2019    Problem: Occupational Therapy Goal  Goal: Occupational Therapy Goal  Description  Goals to be met by: 11/4/19    Patient will increase functional independence with ADLs by performing:    UE Dressing with Supervision.  LE Dressing with Supervision.  Grooming while standing with Supervision.  Toileting from toilet with Supervision for hygiene and clothing management.   Toilet transfer with supervision.     Outcome: Ongoing, Progressing

## 2019-10-21 NOTE — PLAN OF CARE
Discharge Recommendation: Rehab.    1 goal met today. PT goals appropriate.    Appropriate transfer level with nursing staff: Bed <> Chair:  Step Transfer with contact guard assistance with rolling walker.    Problem: Physical Therapy Goal  Goal: Physical Therapy Goal  Description  Goals to be met by: 10/25/19     Patient will increase functional independence with mobility by performin. Supine to sit with Minimal Assistance - met 10/16  1a. Supine to sit with Supervision with bed flat - not met  2. Sit to supine with Minimal Assistance - Not met  3. Sit to stand transfer with Stand-by Assistance - met 10/21/19  4. Bed to chair transfer with Supervision using Single-point Cane PRN - Discontinue  5. Gait  x 100 feet with Supervision using Single-point Cane PRN - Discontinue  6. Ascend/descend 3 stairs with right Handrail with Supervision - Not met  7. Lower extremity exercise program x 20 reps per handout, with assistance as needed - Not met  8. Added on 10/13: Bed to chair transfer with RW and supervision - Not met  9. Added on 10/13: Gait x 300 ft with RW and SBA (including turns and straight lines) - met 10/16  9a. Gait x 300 ft with bilateral SPC and SBA (including turns and straight lines) (revised 10/17)- not met     Outcome: Ongoing, Progressing    Bhargavi Pollard PT, DPT  10/21/2019  Pager: [phone]

## 2019-10-21 NOTE — PLAN OF CARE
Problem: SLP Goal  Goal: SLP Goal  Description  Speech Therapy Short Term Goals  Goal expected to be met by 10/25  1. Pt will participate in an ongoing assessment to determine the least restrictive and safest diet with possible updated goals to follow pending results.  2. The patient will answer orientation questions x4 with 90% acc no cues.   3. The patient will name 8 items in a concrete category given min cues.   4. The patient will answer complex y/n questions with 85% acc no cues.   5. The patient will follow 2+ step directions with 75% acc given 1 redirection.   6. The patient will participate in an ongoing assessment of speech, language, and cognition with updated goals to follow pending results and progress.    Outcome: Ongoing, Progressing     Patient seen for ongoing cognitive-linguistic therapy and monitoring of diet tolerance.     Royce Stearns CCC-SLP  Speech-Language Pathology  Pager: [phone]

## 2019-10-21 NOTE — SUBJECTIVE & OBJECTIVE
Neurologic Chief Complaint: RSW and aphasia    Subjective:     Interval History: Patient is seen for follow-up neurological assessment and treatment recommendations:     Renal function improving. Hyponatremic, continue 1L fluid restriction, will likely restart lasix tomorrow, no current signs of fluid overload. Awake and alert, oriented to person and place, disoriented to time.       HPI, Past Medical, Family, and Social History remains the same as documented in the initial encounter.     Review of Systems   Constitutional: Negative for chills and fever.   HENT: Negative for trouble swallowing.    Eyes: Negative for visual disturbance.   Respiratory: Negative for shortness of breath.    Cardiovascular: Negative for chest pain.   Gastrointestinal: Negative for nausea and vomiting.   Genitourinary: Negative for difficulty urinating.   Musculoskeletal: Positive for arthralgias.        L shoulder pain (chronic).   Neurological: Positive for weakness. Negative for facial asymmetry, speech difficulty and numbness.   Psychiatric/Behavioral: Negative for agitation and decreased concentration.     Scheduled Meds:   amLODIPine  10 mg Oral Daily    apixaban  2.5 mg Oral BID    atorvastatin  40 mg Oral Daily    carvedilol  3.125 mg Oral BID    donepezil  5 mg Oral QHS    levothyroxine  25 mcg Oral Daily    mupirocin   Topical (Top) TID    senna  8.6 mg Oral Daily     Continuous Infusions:  PRN Meds:hydrALAZINE, influenza, sodium chloride 0.9%    Objective:     Vital Signs (Most Recent):  Temp: 98.1 °F (36.7 °C) (10/21/19 1124)  Pulse: (!) 59 (10/21/19 1124)  Resp: 19 (10/21/19 1124)  BP: (!) 111/55 (10/21/19 1124)  SpO2: (!) 91 % (10/21/19 1124)  BP Location: Right arm    Vital Signs Range (Last 24H):  Temp:  [96.5 °F (35.8 °C)-98.2 °F (36.8 °C)]   Pulse:  [46-82]   Resp:  [16-19]   BP: (111-165)/(55-82)   SpO2:  [91 %-96 %]   BP Location: Right arm    Physical Exam   Constitutional: He appears well-developed. No  distress.   Eyes: EOM are normal.   Pulmonary/Chest: Effort normal.   Neurological: He is alert.   Nursing note and vitals reviewed.      Neurological Exam:   LOC: alert  Language: No aphasia  Articulation: No dysarthria  Orientation: Not oriented to time  Facial Sensation (CN V): Normal  Motor: Arm left  Paresis: 3/5 (2/2 pain)  Leg left  Normal 5/5  Arm right  Normal 5/5  Leg right Normal 5/5  Sensation: Intact to light touch, temperature and vibration    Laboratory:  CMP:   Recent Labs   Lab 10/21/19  0621   CALCIUM 8.8   *   K 4.1   CO2 28   CL 95   BUN 29*   CREATININE 1.4     BMP:   Recent Labs   Lab 10/21/19  0621   *   K 4.1   CL 95   CO2 28   BUN 29*   CREATININE 1.4   CALCIUM 8.8     CBC:   Recent Labs   Lab 10/15/19  0430   WBC 7.75   RBC 4.79   HGB 12.6*   HCT 41.9      MCV 88   MCH 26.3*   MCHC 30.1*     Lipid Panel: No results for input(s): CHOL, LDLCALC, HDL, TRIG in the last 168 hours.  Coagulation: No results for input(s): PT, INR, APTT in the last 168 hours.  Platelet Aggregation Study: No results for input(s): PLTAGG, PLTAGINTERP, PLTAGREGLACO, ADPPLTAGGREG in the last 168 hours.  Hgb A1C: No results for input(s): HGBA1C in the last 168 hours.  TSH: No results for input(s): TSH in the last 168 hours.    Diagnostic Results     Brain/Vessel Imaging:     MRI Brain (10/11/2019) -               Areas of abnormal T2/FLAIR/diffusion signal abnormality consistent with areas of acute ischemia/infarct involving the cerebral hemispheres bilaterally.     There is signal abnormality associated with the left internal carotid artery likely corresponding to the appearance of occlusion on the CTA examination.     CTA Head and Neck (10/10/2019) -               Occluded left ICA with reconstitution of the distal/supraclinoid segment by retrograde flow through [id number].  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  Right vertebral artery supply the basilar artery.  Basilar artery is small in caliber with wall irregularity and intermittent occlusions.     Partially calcified hyperattenuating lesion in the lateral aspect of the cavernous sinus abutting the distal right ICA, likely representing calcified meningioma.     Generalized cerebral volume loss and remote lacunar type infarct in the right caudate head.     Cardiac Imaging:     TTE (10/11/2019) -      · Moderately decreased left ventricular systolic function. The estimated ejection fraction is 35%  · Concentric left ventricular remodeling.  · Left ventricular diastolic dysfunction.  · Local segmental wall motion abnormalities.  · Severe left atrial enlargement.  · Mild right atrial enlargement.  · Mild aortic regurgitation.  · Moderate aortic valve stenosis but difficult to appreciate in the setting of AF, depressed EF, and low stroke volume.  · Aortic valve area is 1.39 cm2; peak velocity is 1.28 m/s; mean gradient is 4 mmHg.  · Elevated central venous pressure (15 mm Hg).

## 2019-10-21 NOTE — ASSESSMENT & PLAN NOTE
84 y/o male with PMH of chronic Afib (not on AC), CAD s/p stent, HTN, CHF, hypothyroidism who presented who received IV-tPA at OSH for RSW, aphasia and L gaze deviation. NIHSS 8. CTA MP showed a chronically occluded L ICA from it's origin to the supraclinoid segment with filling of the MCA via the AComm along with substantial burden on intracranial and extracranial atherosclerotic disease. He was also noted to have an occluded V4 with an intermittently occluded basilar. No EVT.  TTE shows an EF of 35%    Etiology - Cardioembolic     -- Pending placement.    Antithrombotics for secondary stroke prevention:  Eliquis 2.5 mg BID     Statins for secondary stroke prevention and hyperlipidemia, if present:   Statins: Atorvastatin- 40 mg daily    Aggressive risk factor modification: HTN     Rehab efforts: The patient has been evaluated by a stroke team provider and the therapy needs have been fully considered based off the presenting complaints and exam findings. The following therapy evaluations are needed: PT evaluate and treat, OT evaluate and treat, SLP evaluate and treat, PM&R evaluate for appropriate placement   -- Pending placement to [facility] (awaiting acceptance)    Diagnostics ordered/pending: None    VTE prophylaxis: Eliquis 2.5 mg BID, SCDs    BP parameters: SBP <160

## 2019-10-21 NOTE — ASSESSMENT & PLAN NOTE
Stroke risk factor  -- pro BNP from Feb 2019 >3000  -- On Lasix and Aldactone at home.  -- TTE shows 35%   -- Continue Coreg 3.125mg BID.   -- Held Lasix for worsening AMRIT. Possibly restart tomorrow.

## 2019-10-21 NOTE — PLAN OF CARE
Referral sent to [facility] awaiting bed availability.      10/21/19 2720   Discharge Reassessment   Assessment Type Discharge Planning Reassessment   Discharge Plan A Skilled Nursing Facility   Discharge Plan B Rehab   Anticipated Discharge Disposition SNF   Post-Acute Status   Post-Acute Authorization Placement   Post-Acute Placement Status Referrals Sent

## 2019-10-21 NOTE — PROGRESS NOTES
[facility]  Vascular Neurology  Comprehensive Stroke Center  Progress Note    Assessment/Plan:     * Acute arterial ischemic stroke, multifocal, multiple vascular territories  84 y/o male with PMH of chronic Afib (not on AC), CAD s/p stent, HTN, CHF, hypothyroidism who presented who received IV-tPA at OSH for RSW, aphasia and L gaze deviation. NIHSS 8. CTA MP showed a chronically occluded L ICA from it's origin to the supraclinoid segment with filling of the MCA via the AComm along with substantial burden on intracranial and extracranial atherosclerotic disease. He was also noted to have an occluded V4 with an intermittently occluded basilar. No EVT.  TTE shows an EF of 35%    Etiology - Cardioembolic     -- Pending placement.    Antithrombotics for secondary stroke prevention:  Eliquis 2.5 mg BID     Statins for secondary stroke prevention and hyperlipidemia, if present:   Statins: Atorvastatin- 40 mg daily    Aggressive risk factor modification: HTN     Rehab efforts: The patient has been evaluated by a stroke team provider and the therapy needs have been fully considered based off the presenting complaints and exam findings. The following therapy evaluations are needed: PT evaluate and treat, OT evaluate and treat, SLP evaluate and treat, PM&R evaluate for appropriate placement   -- Pending placement to [facility] (awaiting acceptance)    Diagnostics ordered/pending: None    VTE prophylaxis: Eliquis 2.5 mg BID, SCDs    BP parameters: SBP <160        AMRIT (acute kidney injury)  -- BUN/Cr elevated on admission, improved and now worsening again.  -- BUN/Cr improving once again, 29/1.4  -- Euvolemic on exam  -- Urine Na and Cr normal   -- Held Lasix, likely restart tomorrow.   -- Avoid nephrotoxic meds. Eliquis renally dosed.    Person awaiting admission to adequate facility elsewhere  Pending rehab placement  Difficult placement due to patient living in [place] and visiting daughter when  stroke occurred - therapy teams recommending inpatient rehab    At this time patient is unable to fly back to [place]. He is not capable of flying due to inability to ambulate without trained medical assistance, impaired functional mobility, impaired cardiopulmonary response to activity, urine and bowel incontinence, and decreased mental capacity related to stroke and underlying diagnosis of dementia.     10/17 - Spoke to Dr. Krishna of [facility]. Requesting documentation of pt's current functional status stating why pt is unable to take commercial flight back to [place]. CM to send updated notes.     Erythema of hand  -- Erythema of right hand from IV infiltration  -- Bactroban cream TID X 10 days  -- Elevation of R hand   -- No pain today.    SIADH (syndrome of inappropriate ADH production)  -- Na 131  -- 1L fluid restriction.  -- daily BMP, continue to monitor      Dementia  -- Continue Donepezil 5 mg daily     Intracranial atherosclerosis  -- Noted on CTA head   -- On Eliquis and Atorvastatin 40 mg     Occlusion of left carotid artery  -- Seen on CTA  -- Suspect chronic occlusion     Nodule of right lung  -- 1 cm nodule of R lung seen on CTA  -- Will need follow up in 3- 6 months   -- Discussed with daughter     Cytotoxic brain edema  Area of cytotoxic cerebral edema identified when reviewing brain imaging in the territory of the R/L middle cerebral artery. There is not mass effect associated with it. We will continue to monitor the patients clinical exam for any worsening of symptoms which may indicate expansion of the stroke or the area of the edema resulting in the clinical change. The pattern is suggestive of cardioembolic etiology        CAD (coronary artery disease)  Stroke risk factor  -- Noted on Care Everywhere  -- S/p RCA stent 7/2018  -- Prescribed Plavix, statin and Ranexa but unclear if he's taking them  -- Eliquis 2.5 BID  -- Atorvastatin 40 mg daily       Paroxysmal atrial  fibrillation  Stroke risk factor  -- Per Care Everywhere; not on AC - Dr. Menjivar cardiologist reports patient not a good candidate due to risk of falls, non-compliance, and dementia   -- Noted on EKG at admit.  -- Rate controlled  -- Eliquis 2.5 BID    Chronic combined systolic and diastolic congestive heart failure  Stroke risk factor  -- pro BNP from Feb 2019 >3000  -- On Lasix and Aldactone at home.  -- TTE shows 35%   -- Continue Coreg 3.125mg BID.   -- Held Lasix for worsening AMRIT. Possibly restart tomorrow.     Acquired hypothyroidism  Stroke risk factor  TSH 3.032  -- Continue Synthroid 25mcg daily    Essential hypertension  Stroke risk factor  -- SBP<160  -- Coreg 3.125 mg BID  -- Norvasc 10 mg daily.         10/12 - Etiology likely cardioembolic. Will start Eliquis 2.5 mg BID. Creatinine trending down - may start patient on Eliquis 5 mg BID if continue to decrease. L ICA chronically occluded - no need to vasculare intervention.   10/13 - Spoke with PT - patient would benefit from inpatient rehab placement. Norvasc 5 mg ordered. Urine studies ordered for hyponatremia - suspect to be due to dehydration.   10/14 Patient with SIADH but sodium stable.  1 liter fluid restriction.    10/15  Irritation around right hand peripheral IV site.  Sodium 131 today.  Discussed with daughter that he is on fluid restriction and she will stop providing him with outside drinks.     10/16 - Sodium increasing 132. Bactroban ordered for right hand IV infiltration. Working on placement.   10/17 - Sodium continues to improve. Creatinine 1.5 likely near baseline, decreased eliquis to 2.5 mg. R hand erythema stable.   10/19/2019 Sodium 134. Awake and alert but remains confused. No pain. Pending placement  10/20/2019 Complained of chest discomfort overnight. EKG showed no ST wave changes, Trop normal.   10/21 - Renal function improving. Hyponatremic, continue 1L fluid restriction, will likely restart lasix tomorrow, no current signs  of fluid overload. Awake and alert, oriented to person and place, disoriented to time.     STROKE DOCUMENTATION   Acute Stroke Times   Last Known Normal Date: 10/10/19  Last Known Normal Time: 1905  Symptom Onset Date: 10/10/19  Symptom Onset Time: 1905  Stroke Team Called Date: 10/10/19  Stroke Team Called Time: 2120  Stroke Team Arrival Date: 10/10/19  Stroke Team Arrival Time: 2122    NIH Scale:  1a. Level of Consciousness: 0-->Alert, keenly responsive  1b. LOC Questions: 1-->Answers one question correctly  1c. LOC Commands: 0-->Performs both tasks correctly  2. Best Gaze: 0-->Normal  3. Visual: 0-->No visual loss  4. Facial Palsy: 0-->Normal symmetrical movements  5a. Motor Arm, Left: 2-->Some effort against gravity, limb cannot get to or maintain (if cued) 90 (or 45) degrees, drifts down to bed, but has some effort against gravity  5b. Motor Arm, Right: 0-->No drift, limb holds 90 (or 45) degrees for full 10 secs  6a. Motor Leg, Left: 0-->No drift, leg holds 30 degree position for full 5 secs  6b. Motor Leg, Right: 0-->No drift, leg holds 30 degree position for full 5 secs  7. Limb Ataxia: 0-->Absent  8. Sensory: 0-->Normal, no sensory loss  9. Best Language: 0-->No aphasia, normal  10. Dysarthria: 0-->Normal  11. Extinction and Inattention (formerly Neglect): 0-->No abnormality  Total (NIH Stroke Scale): 3       Modified Hopkins Score: 1  Oakes Coma Scale:    ABCD2 Score:    [id number] Score:   HAS -BLED Score:   ICH Score:   Hunt & Stroud Classification:      Hemorrhagic change of an Ischemic Stroke: Does this patient have an ischemic stroke with hemorrhagic changes? No     Neurologic Chief Complaint: RSW and aphasia    Subjective:     Interval History: Patient is seen for follow-up neurological assessment and treatment recommendations:     Renal function improving. Hyponatremic, continue 1L fluid restriction, will likely restart lasix tomorrow, no current signs of fluid overload. Awake and alert, oriented  to person and place, disoriented to time.       HPI, Past Medical, Family, and Social History remains the same as documented in the initial encounter.     Review of Systems   Constitutional: Negative for chills and fever.   HENT: Negative for trouble swallowing.    Eyes: Negative for visual disturbance.   Respiratory: Negative for shortness of breath.    Cardiovascular: Negative for chest pain.   Gastrointestinal: Negative for nausea and vomiting.   Genitourinary: Negative for difficulty urinating.   Musculoskeletal: Positive for arthralgias.        L shoulder pain (chronic).   Neurological: Positive for weakness. Negative for facial asymmetry, speech difficulty and numbness.   Psychiatric/Behavioral: Negative for agitation and decreased concentration.     Scheduled Meds:   amLODIPine  10 mg Oral Daily    apixaban  2.5 mg Oral BID    atorvastatin  40 mg Oral Daily    carvedilol  3.125 mg Oral BID    donepezil  5 mg Oral QHS    levothyroxine  25 mcg Oral Daily    mupirocin   Topical (Top) TID    senna  8.6 mg Oral Daily     Continuous Infusions:  PRN Meds:hydrALAZINE, influenza, sodium chloride 0.9%    Objective:     Vital Signs (Most Recent):  Temp: 98.1 °F (36.7 °C) (10/21/19 1124)  Pulse: (!) 59 (10/21/19 1124)  Resp: 19 (10/21/19 1124)  BP: (!) 111/55 (10/21/19 1124)  SpO2: (!) 91 % (10/21/19 1124)  BP Location: Right arm    Vital Signs Range (Last 24H):  Temp:  [96.5 °F (35.8 °C)-98.2 °F (36.8 °C)]   Pulse:  [46-82]   Resp:  [16-19]   BP: (111-165)/(55-82)   SpO2:  [91 %-96 %]   BP Location: Right arm    Physical Exam   Constitutional: He appears well-developed. No distress.   Eyes: EOM are normal.   Pulmonary/Chest: Effort normal.   Neurological: He is alert.   Nursing note and vitals reviewed.      Neurological Exam:   LOC: alert  Language: No aphasia  Articulation: No dysarthria  Orientation: Not oriented to time  Facial Sensation (CN V): Normal  Motor: Arm left  Paresis: 3/5 (2/2 pain)  Leg left   Normal 5/5  Arm right  Normal 5/5  Leg right Normal 5/5  Sensation: Intact to light touch, temperature and vibration    Laboratory:  CMP:   Recent Labs   Lab 10/21/19  0621   CALCIUM 8.8   *   K 4.1   CO2 28   CL 95   BUN 29*   CREATININE 1.4     BMP:   Recent Labs   Lab 10/21/19  0621   *   K 4.1   CL 95   CO2 28   BUN 29*   CREATININE 1.4   CALCIUM 8.8     CBC:   Recent Labs   Lab 10/15/19  0430   WBC 7.75   RBC 4.79   HGB 12.6*   HCT 41.9      MCV 88   MCH 26.3*   MCHC 30.1*     Lipid Panel: No results for input(s): CHOL, LDLCALC, HDL, TRIG in the last 168 hours.  Coagulation: No results for input(s): PT, INR, APTT in the last 168 hours.  Platelet Aggregation Study: No results for input(s): PLTAGG, PLTAGINTERP, PLTAGREGLACO, ADPPLTAGGREG in the last 168 hours.  Hgb A1C: No results for input(s): HGBA1C in the last 168 hours.  TSH: No results for input(s): TSH in the last 168 hours.    Diagnostic Results     Brain/Vessel Imaging:     MRI Brain (10/11/2019) -               Areas of abnormal T2/FLAIR/diffusion signal abnormality consistent with areas of acute ischemia/infarct involving the cerebral hemispheres bilaterally.     There is signal abnormality associated with the left internal carotid artery likely corresponding to the appearance of occlusion on the CTA examination.     CTA Head and Neck (10/10/2019) -               Occluded left ICA with reconstitution of the distal/supraclinoid segment by retrograde flow through [id number].  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  Right vertebral artery supply the basilar artery.  Basilar artery is small in caliber with wall irregularity and intermittent occlusions.     Partially calcified hyperattenuating lesion in the lateral aspect of the cavernous sinus abutting the distal right ICA, likely representing  calcified meningioma.     Generalized cerebral volume loss and remote lacunar type infarct in the right caudate head.     Cardiac Imaging:     TTE (10/11/2019) -      · Moderately decreased left ventricular systolic function. The estimated ejection fraction is 35%  · Concentric left ventricular remodeling.  · Left ventricular diastolic dysfunction.  · Local segmental wall motion abnormalities.  · Severe left atrial enlargement.  · Mild right atrial enlargement.  · Mild aortic regurgitation.  · Moderate aortic valve stenosis but difficult to appreciate in the setting of AF, depressed EF, and low stroke volume.  · Aortic valve area is 1.39 cm2; peak velocity is 1.28 m/s; mean gradient is 4 mmHg.  · Elevated central venous pressure (15 mm Hg).      Ross Camp NP  Comprehensive Stroke Center  Department of Vascular Neurology   [facility]

## 2019-10-21 NOTE — ASSESSMENT & PLAN NOTE
Pending rehab placement  Difficult placement due to patient living in [place] and visiting daughter when stroke occurred - therapy teams recommending inpatient rehab    At this time patient is unable to fly back to [place]. He is not capable of flying due to inability to ambulate without trained medical assistance, impaired functional mobility, impaired cardiopulmonary response to activity, urine and bowel incontinence, and decreased mental capacity related to stroke and underlying diagnosis of dementia.     10/17 - Spoke to Dr. Krishna of [facility]. Requesting documentation of pt's current functional status stating why pt is unable to take commercial flight back to [place]. CM to send updated notes.

## 2019-10-21 NOTE — PLAN OF CARE
A A O to self.  Free of falls, traumas and injuries. Skin intact. Denies shortness of breath, chest pain, nausea, vomiting. Avasys in room. Patient instructed to call before getting out of bed. Patient waiting on placement to rehab facility. Plan of care reviewed with patient. Vital signs stable. Pain monitored. Will continue to monitor.

## 2019-10-21 NOTE — PT/OT/SLP PROGRESS
Physical Therapy Treatment    Patient Name:  Izaiah Douglas   MRN:  [id number]  Admitting Diagnosis:  Acute arterial ischemic stroke, multifocal, multiple vascular territories   Recent Surgery: * No surgery found *    Admit Date: 10/10/2019  Length of Stay: 11 days    Recommendations:     Discharge Recommendations:  rehabilitation facility   Discharge Equipment Recommendations: other (see comments)(tbd pending progress)   Barriers to discharge: None    Assessment:     Izaiah Douglas is a 85 y.o. male admitted with a medical diagnosis of Acute arterial ischemic stroke, multifocal, multiple vascular territories.  He presents with the following impairments/functional limitations:  weakness, impaired endurance, impaired balance, impaired functional mobilty, gait instability, decreased safety awareness, impaired cardiopulmonary response to activity. Pt tolerated session well today. Pt's functional strength continues to be WFL as well as ability to ambulate home distances at this time. Pt did ambulate less today than in previous sessions, but this was most likely due to pt progressing AD to bilat SPC today. Pt's coordination for ambulating with bilat SPC is poor and pt was unable to perform 4 point gait pattern even with max verbal cueing. Pt's underlying cognitive deficits are also contributing to poor ability to coordinate bilat SPC as well. Pt is frequently distracted and has inattention to tasks requiring a high frequency of cueing and redirection. Pt's cognitive deficits are also contributing to pt's inability to return home safely at this time. Pt is still a significant fall risk due to poor memory and command following as well as general impulsivity. Due to this pt is an excellent candidate for inpatient rehabilitation, as pt has a qualifying diagnosis, displays good activity tolerance, has experienced decline from PLOF, has good family support, and is motivated to participate in therapy.       Rehab Prognosis: Good;  "patient would benefit from acute skilled PT services to address these deficits and reach maximum level of function.    Recent Surgery: * No surgery found *      Plan:     During this hospitalization, patient to be seen 3 x/week to address the identified rehab impairments via gait training, therapeutic activities, therapeutic exercises, neuromuscular re-education and progress toward the following goals:    · Plan of Care Expires:  11/08/19    Subjective     RN notified prior to session. RN present upon PT entrance into room.    Chief Complaint: "I think I've got to take a leak" re: pt's response to how are you feeling  Patient/Family Comments/goals: return back home  Pain/Comfort:  · Pain Rating 1: 0/10  · Pain Rating Post-Intervention 1: 0/10      Objective:     Additional staff present: none    Patient found seated EOB with RN present with: bed alarm, telemetry   Mental Status: Patient is oriented to AxOx3 (self, location (city), and time (president)) and follows single step commands but requires frequent redirection and cueing. Patient is Alert, Impulsive, Cooperative and Confused during session.    General Precautions: Standard, Cardiac fall   Orthopedic Precautions:N/A   Braces: N/A   Body mass index is 24.94 kg/m².  Oxygen Device: Room Air    Outcome Measures:  AM-PAC 6 CLICK MOBILITY  Turning over in bed (including adjusting bedclothes, sheets and blankets)?: 4  Sitting down on and standing up from a chair with arms (e.g., wheelchair, bedside commode, etc.): 4  Moving from lying on back to sitting on the side of the bed?: 4  Moving to and from a bed to a chair (including a wheelchair)?: 3  Need to walk in hospital room?: 3  Climbing 3-5 steps with a railing?: 3  Basic Mobility Total Score: 21     Functional Mobility:    Bed Mobility with HOB elevated:   · Rolling/Turning to Right: stand by assistance  · Supine to Sit: stand by assistance; from Right side of bed  · Scooting anteriorly to EOB to have both feet " planted on floor: stand by assistance  · Sit to Supine: stand by assistance; to Rt side of bed    Sitting Balance at Edge of Bed:   Assistance Level Required: Contact Guard Assistance   Comments: CGA required for safety    Transfers:   · Sit <> Stand Transfer: stand by assistance with no assistive device   · Stand <> Sit Transfer: stand by assistance with no assistive device   · [id number] from EOB   · Sit <> Stand Transfer: minimum assistance with no assistive device   · Stand <> Sit Transfer: contact guard assistance with hand-held assist   · [id number] from toliet      Gait:  · Patient ambulated: 100 ft x 2 with standing rest break between trials  · Patient required: contact guard  · Patient used:  Bilateral single point cane  · Gait Pattern observed: 4-point gait attempted and cued frequently throughout ambulation trials.   · Gait Deviation(s): occasional unsteady gait, wide base of support, decreased arm swing, shuffle gait and flexed posture  · Impairments due to: impaired balance  · all lines remained intact throughout ambulation trial and gait belt utilized  · Comments: Pt with poor coordination for 4-point pattern even with cueing and breaking down task into steps. Most likely due to pt inattention. Eventually transitioned to 2-point gait with pt using Rt cane for stability and carrying Lt cane. Pt refused to let PT take Lt cane away.    Education:   All questions answered within PT scope of practice   PT role in POC   Pt educated on proper body mechanics, safety techniques, and energy conservation with PT facilitation and cueing throughout session   Whiteboard updated with pt's current mobility status listed below   Safe to perform step transfer to/from chair CGA and RW w/ nursing/PCT   Pt to ambulate 3x/day CGA and RW with nsg/family in order to maintain functional mobility   Importance of OOB tolerance 4-6 hrs/day to improve lung ventilation and expansion as well as strengthen postural  musculature   Call nursing/pct to transfer to chair/use bathroom. Pt stated understanding.    Patient left HOB elevated with all lines intact, call button in reach, bed alarm on and RN notified.    GOALS:   Multidisciplinary Problems     Physical Therapy Goals        Problem: Physical Therapy Goal    Goal Priority Disciplines Outcome Goal Variances Interventions   Physical Therapy Goal     PT, PT/OT Ongoing, Progressing     Description:  Goals to be met by: 10/25/19     Patient will increase functional independence with mobility by performin. Supine to sit with Minimal Assistance - met 10/16  1a. Supine to sit with Supervision with bed flat - not met  2. Sit to supine with Minimal Assistance - Not met  3. Sit to stand transfer with Stand-by Assistance - met 10/21/19  4. Bed to chair transfer with Supervision using Single-point Cane PRN - Discontinue  5. Gait  x 100 feet with Supervision using Single-point Cane PRN - Discontinue  6. Ascend/descend 3 stairs with right Handrail with Supervision - Not met  7. Lower extremity exercise program x 20 reps per handout, with assistance as needed - Not met  8. Added on 10/13: Bed to chair transfer with RW and supervision - Not met  9. Added on 10/13: Gait x 300 ft with RW and SBA (including turns and straight lines) - met 10/16  9a. Gait x 300 ft with bilateral SPC and SBA (including turns and straight lines) (revised 10/17)- not met                    Time Tracking:     PT Received On: 10/21/19  PT Start Time: 1003     PT Stop Time: 1027  PT Total Time (min): 24 min       Billable Minutes:   · Gait Training 15 and Therapeutic Activity 9    Treatment Type: Treatment  PT/PTA: PT       Bhargavi Pollard PT, DPT  10/21/2019   Pager: [phone]

## 2019-10-21 NOTE — PT/OT/SLP PROGRESS
Occupational Therapy   Treatment    Name: Izaiah Douglas  MRN: [id number]  Admitting Diagnosis:  Acute arterial ischemic stroke, multifocal, multiple vascular territories       Recommendations:     Discharge Recommendations: rehabilitation facility  Discharge Equipment Recommendations:  (TBD)  Barriers to discharge:  Decreased caregiver support    Assessment:     Izaiah Douglas is a 85 y.o. male with a medical diagnosis of Acute arterial ischemic stroke, multifocal, multiple vascular territories.  He presents oriented to self only. Performance deficits affecting function are weakness, impaired endurance, impaired self care skills, impaired functional mobilty, gait instability, impaired balance, impaired cognition, decreased safety awareness, impaired cardiopulmonary response to activity.  Patient participated well with therapy; however, performance limited by underlying cognitive impairments and poor safety requiring max cues for safety and attention/redirection to task. At this time, patient will continue to benefit from acute skilled therapy intervention to address deficits/underlying impairments and progress towards prior level of function. After discharge, patient will benefit from continuing therapy at rehab facility to increase independence in ADLs and functional mobility through skilled balance training and skilled therapeutic exercise to promote safety at home.    Rehab Prognosis:  Good; patient would benefit from acute skilled OT services to address these deficits and reach maximum level of function.       Plan:     Patient to be seen 3 x/week to address the above listed problems via self-care/home management, therapeutic activities, therapeutic exercises, neuromuscular re-education  · Plan of Care Expires: 11/10/19  · Plan of Care Reviewed with: patient    Subjective     Pain/Comfort:  · Pain Rating 1: 0/10    Objective:     Communicated with: RN prior to session.  Patient found HOB elevated with bed alarm,  telemetry, SCD upon OT entry to room.    General Precautions: Standard, fall   Orthopedic Precautions:N/A   Braces: N/A     Occupational Performance:     Bed Mobility:    · Patient completed Supine to Sit with stand by assistance     Functional Mobility/Transfers:  · Patient completed Sit <> Stand Transfer with stand by assistance  with  no assistive device   · Patient completed Bed <> Chair Transfer using Step Transfer technique with contact guard assistance with rolling walker  · Patient completed Toilet Transfer Step Transfer technique with minimum assistance for stand to sit and moderate assistance for sit to stand using rolling walker  · Functional Mobility: Patient ambulated in bedroom and bathroom using RW with CGA and max cues for safety awareness and positioning of RW.    Activities of Daily Living:  · Grooming: contact guard assistance washing face at sink  · Lower Body Dressing: stand by assistance doffing socks and moderate assistance donning socks  · Toileting: moderate assistance for hygiene in standing      [facility] 6 Click ADL: 16    Treatment & Education:  --Therapist provided facilitation and instruction of proper body mechanics, energy conservation, and fall prevention strategies during tasks listed above.  --Instructed patient to sit in bedside chair daily to increase OOB/activity tolerance.   --Notified RN that patient is a high fall risk secondary to cognitive deficits and recommended chair alarm.   --Educated patient on OT POC and answered all questions within OT scope of practice.  --Whiteboard updated    Patient left up in chair with all lines intact, call button in reach and RN presentEducation:      GOALS:   Multidisciplinary Problems     Occupational Therapy Goals        Problem: Occupational Therapy Goal    Goal Priority Disciplines Outcome Interventions   Occupational Therapy Goal     OT, PT/OT Ongoing, Progressing    Description:  Goals to be met by: 11/4    Patient will increase  functional independence with ADLs by performing:    UE Dressing with Supervision.  LE Dressing with Supervision.  Grooming while standing with Supervision.  Toileting from toilet with Supervision for hygiene and clothing management.   Toilet transfer with Supervision.                       Time Tracking:     OT Date of Treatment: 10/21/19  OT Start Time: 0856  OT Stop Time: 0926  OT Total Time (min): 30 min    Billable Minutes:Self Care/Home Management 15  Therapeutic Activity 15    Genevieve Garnica, JOSE  10/21/2019

## 2019-10-21 NOTE — PLAN OF CARE
Plan of care reviewed with patient. Pt remained free of falls/injuries/traumas during shift. VSS. Pt oriented to person and place, and reorients to time and situation easily. Tele-sitter in place. No c/o of pain, cp, or sob. Expected to d/c to SNF tomorrow. All questions answered, will continue to monitor.

## 2019-10-21 NOTE — ASSESSMENT & PLAN NOTE
-- BUN/Cr elevated on admission, improved and now worsening again.  -- BUN/Cr improving once again, 29/1.4  -- Euvolemic on exam  -- Urine Na and Cr normal   -- Held Lasix, likely restart tomorrow.   -- Avoid nephrotoxic meds. Eliquis renally dosed.

## 2019-10-21 NOTE — PT/OT/SLP PROGRESS
"Speech Language Pathology Treatment    Patient Name:  Izaiah Douglas   MRN:  [id number]  Admitting Diagnosis: Acute arterial ischemic stroke, multifocal, multiple vascular territories    Recommendations:                 General Recommendations:  Cognitive-linguistic therapy  Diet recommendations:  Regular, Liquid Diet Level: Thin   Aspiration Precautions: Small bites/sips and Standard aspiration precautions   General Precautions: Standard, fall  Communication strategies:  provide increased time to answer and go to room if call light pushed    Subjective     Patient awake and alert during session  "I just can't remember." Patient's response when asked where he lives  Patient exhibited mild frustration at deficits, but was pleasant and participatory throughout session  Communicated with nurse prior to session    Pain/Comfort:  Pain Rating 1: 0/10  Pain Rating Post-Intervention 1: 0/10    Objective:     Has the patient been evaluated by SLP for swallowing?   Yes  Keep patient NPO? No   Current Respiratory Status: room air      Patient seen for ongoing cognitive-linguistic therapy and monitoring of diet tolerance. He was sitting up in bed with avasys in place and no family present. During cog-ling therapy, patient continued to demonstrate significant confusion and disorientation. Patient was oriented to self and "hospital", but required max cueing for month, city and reason for hospitalization. Following a 5-minute interval, patient was unable to recall any of the orientation information from minute earlier. Patient answered 4/5 general information recall questions with min assist and 4/6 3-part mental manipulation questions with mod assist. Patient's responses continued to be delayed and he required consistent repetition and redirection. Patient's lunch arrived during session and SLP assessed him with thin liquids x6 (via straw) and solids x5 (roast and vegetables). Patient demonstrated no overt signs of aspiration during " all PO intake. SLP educated patient regarding POC from a ST perspective, but he would benefit from ongoing instruction. He was left in room with call light within reach.     Assessment:     Izaiah Douglas is a 85 y.o. male with an SLP diagnosis of Cognitive-Linguistic Impairment. .    Goals:   Multidisciplinary Problems     SLP Goals        Problem: SLP Goal    Goal Priority Disciplines Outcome   SLP Goal     SLP Ongoing, Progressing   Description:  Speech Therapy Short Term Goals  Goal expected to be met by 10/25  1. Pt will participate in an ongoing assessment to determine the least restrictive and safest diet with possible updated goals to follow pending results.  2. The patient will answer orientation questions x4 with 90% acc no cues.   3. The patient will name 8 items in a concrete category given min cues.   4. The patient will answer complex y/n questions with 85% acc no cues.   5. The patient will follow 2+ step directions with 75% acc given 1 redirection.   6. The patient will participate in an ongoing assessment of speech, language, and cognition with updated goals to follow pending results and progress.                     Plan:     · Patient to be seen:  3 x/week   · Plan of Care expires:  11/09/19  · Plan of Care reviewed with:  patient   · SLP Follow-Up:  Yes       Discharge recommendations:  rehabilitation facility   Barriers to Discharge:  Level of Skilled Assistance Needed     Time Tracking:     SLP Treatment Date:   10/21/19  Speech Start Time:  1128  Speech Stop Time:  1146     Speech Total Time (min):  18 min    Billable Minutes: Speech Therapy Individual 10 and Treatment Swallowing Dysfunction 8    Royce Stearns CCC-SLP  Speech-Language Pathology  Pager: [phone]   10/21/2019

## 2019-10-22 LAB
ANION GAP SERPL CALC-SCNC: 8 MMOL/L (ref 8–16)
BUN SERPL-MCNC: 31 MG/DL (ref 8–23)
CALCIUM SERPL-MCNC: 9 MG/DL (ref 8.7–10.5)
CHLORIDE SERPL-SCNC: 95 MMOL/L (ref 95–110)
CO2 SERPL-SCNC: 31 MMOL/L (ref 23–29)
CREAT SERPL-MCNC: 1.7 MG/DL (ref 0.5–1.4)
EST. GFR  (AFRICAN AMERICAN): 41.6 ML/MIN/1.73 M^2
EST. GFR  (NON AFRICAN AMERICAN): 36 ML/MIN/1.73 M^2
GLUCOSE SERPL-MCNC: 90 MG/DL (ref 70–110)
POTASSIUM SERPL-SCNC: 4.3 MMOL/L (ref 3.5–5.1)
SODIUM SERPL-SCNC: 134 MMOL/L (ref 136–145)

## 2019-10-22 PROCEDURE — 25000003 PHARM REV CODE 250: Performed by: PSYCHIATRY & NEUROLOGY

## 2019-10-22 PROCEDURE — 20600001 HC STEP DOWN PRIVATE ROOM

## 2019-10-22 PROCEDURE — 25000003 PHARM REV CODE 250: Performed by: FAMILY MEDICINE

## 2019-10-22 PROCEDURE — 97803 MED NUTRITION INDIV SUBSEQ: CPT

## 2019-10-22 PROCEDURE — 97116 GAIT TRAINING THERAPY: CPT

## 2019-10-22 PROCEDURE — 25000003 PHARM REV CODE 250: Performed by: NURSE PRACTITIONER

## 2019-10-22 PROCEDURE — 80048 BASIC METABOLIC PNL TOTAL CA: CPT

## 2019-10-22 PROCEDURE — 92507 TX SP LANG VOICE COMM INDIV: CPT

## 2019-10-22 PROCEDURE — 25000003 PHARM REV CODE 250: Performed by: STUDENT IN AN ORGANIZED HEALTH CARE EDUCATION/TRAINING PROGRAM

## 2019-10-22 PROCEDURE — 97530 THERAPEUTIC ACTIVITIES: CPT

## 2019-10-22 PROCEDURE — 36415 COLL VENOUS BLD VENIPUNCTURE: CPT

## 2019-10-22 PROCEDURE — 99233 PR SUBSEQUENT HOSPITAL CARE,LEVL III: ICD-10-PCS | Mod: ,,, | Performed by: PSYCHIATRY & NEUROLOGY

## 2019-10-22 PROCEDURE — 99233 SBSQ HOSP IP/OBS HIGH 50: CPT | Mod: ,,, | Performed by: PSYCHIATRY & NEUROLOGY

## 2019-10-22 RX ORDER — FUROSEMIDE 20 MG/1
20 TABLET ORAL DAILY
Status: DISCONTINUED | OUTPATIENT
Start: 2019-10-22 | End: 2019-11-06 | Stop reason: HOSPADM

## 2019-10-22 RX ADMIN — APIXABAN 2.5 MG: 2.5 TABLET, FILM COATED ORAL at 10:10

## 2019-10-22 RX ADMIN — MUPIROCIN: 20 OINTMENT TOPICAL at 03:10

## 2019-10-22 RX ADMIN — SENNOSIDES 8.6 MG: 8.6 TABLET, FILM COATED ORAL at 08:10

## 2019-10-22 RX ADMIN — FUROSEMIDE 20 MG: 20 TABLET ORAL at 11:10

## 2019-10-22 RX ADMIN — CARVEDILOL 3.12 MG: 3.12 TABLET, FILM COATED ORAL at 08:10

## 2019-10-22 RX ADMIN — AMLODIPINE BESYLATE 10 MG: 10 TABLET ORAL at 08:10

## 2019-10-22 RX ADMIN — MUPIROCIN: 20 OINTMENT TOPICAL at 09:10

## 2019-10-22 RX ADMIN — ATORVASTATIN CALCIUM 40 MG: 20 TABLET, FILM COATED ORAL at 08:10

## 2019-10-22 RX ADMIN — DONEPEZIL HYDROCHLORIDE 5 MG: 5 TABLET, FILM COATED ORAL at 10:10

## 2019-10-22 RX ADMIN — LEVOTHYROXINE SODIUM 25 MCG: 25 TABLET ORAL at 06:10

## 2019-10-22 RX ADMIN — CARVEDILOL 3.12 MG: 3.12 TABLET, FILM COATED ORAL at 10:10

## 2019-10-22 RX ADMIN — MUPIROCIN: 20 OINTMENT TOPICAL at 10:10

## 2019-10-22 RX ADMIN — APIXABAN 2.5 MG: 2.5 TABLET, FILM COATED ORAL at 08:10

## 2019-10-22 NOTE — SUBJECTIVE & OBJECTIVE
Neurologic Chief Complaint: RSW and aphasia    Subjective:     Interval History: Patient is seen for follow-up neurological assessment and treatment recommendations:     Reviewed outside imaging, renal function likely at baseline, restart home lasix at reduced dose of 20 mg daily, continue to monitor. Daily weights and strict I&Os.       HPI, Past Medical, Family, and Social History remains the same as documented in the initial encounter.     Review of Systems   Constitutional: Negative for chills and fever.   HENT: Negative for trouble swallowing.    Eyes: Negative for visual disturbance.   Respiratory: Negative for shortness of breath.    Cardiovascular: Negative for chest pain.   Gastrointestinal: Negative for nausea and vomiting.   Genitourinary: Negative for difficulty urinating.   Musculoskeletal: Positive for arthralgias.        L shoulder pain (chronic).   Neurological: Positive for weakness. Negative for facial asymmetry, speech difficulty and numbness.   Psychiatric/Behavioral: Negative for agitation and decreased concentration.     Scheduled Meds:   amLODIPine  10 mg Oral Daily    apixaban  2.5 mg Oral BID    atorvastatin  40 mg Oral Daily    carvedilol  3.125 mg Oral BID    donepezil  5 mg Oral QHS    furosemide  20 mg Oral Daily    levothyroxine  25 mcg Oral Daily    mupirocin   Topical (Top) TID    senna  8.6 mg Oral Daily     Continuous Infusions:  PRN Meds:hydrALAZINE, influenza, sodium chloride 0.9%    Objective:     Vital Signs (Most Recent):  Temp: 97.7 °F (36.5 °C) (10/22/19 0800)  Pulse: 63 (10/22/19 1113)  Resp: 18 (10/22/19 0800)  BP: (!) 149/68 (10/22/19 0800)  SpO2: 97 % (10/22/19 0800)  BP Location: Left arm    Vital Signs Range (Last 24H):  Temp:  [97.3 °F (36.3 °C)-98.5 °F (36.9 °C)]   Pulse:  [54-74]   Resp:  [16-19]   BP: (129-156)/(65-82)   SpO2:  [93 %-97 %]   BP Location: Left arm    Physical Exam   Constitutional: He appears well-developed. No distress.   Eyes: EOM are  normal.   Pulmonary/Chest: Effort normal.   Neurological: He is alert.   Nursing note and vitals reviewed.      Neurological Exam:   LOC: alert  Language: No aphasia  Articulation: No dysarthria  Orientation: Not oriented to time  Facial Sensation (CN V): Normal  Motor: Arm left  Paresis: 3/5 (2/2 pain)  Leg left  Normal 5/5  Arm right  Normal 5/5  Leg right Normal 5/5  Sensation: Intact to light touch, temperature and vibration    Laboratory:  CMP:   Recent Labs   Lab 10/22/19  0337   CALCIUM 9.0   *   K 4.3   CO2 31*   CL 95   BUN 31*   CREATININE 1.7*     BMP:   Recent Labs   Lab 10/22/19  0337   *   K 4.3   CL 95   CO2 31*   BUN 31*   CREATININE 1.7*   CALCIUM 9.0     CBC:   No results for input(s): WBC, RBC, HGB, HCT, PLT, MCV, MCH, MCHC in the last 168 hours.  Lipid Panel: No results for input(s): CHOL, LDLCALC, HDL, TRIG in the last 168 hours.  Coagulation: No results for input(s): PT, INR, APTT in the last 168 hours.  Platelet Aggregation Study: No results for input(s): PLTAGG, PLTAGINTERP, PLTAGREGLACO, ADPPLTAGGREG in the last 168 hours.  Hgb A1C: No results for input(s): HGBA1C in the last 168 hours.  TSH: No results for input(s): TSH in the last 168 hours.    Diagnostic Results     Brain/Vessel Imaging:     MRI Brain (10/11/2019) -               Areas of abnormal T2/FLAIR/diffusion signal abnormality consistent with areas of acute ischemia/infarct involving the cerebral hemispheres bilaterally.     There is signal abnormality associated with the left internal carotid artery likely corresponding to the appearance of occlusion on the CTA examination.     CTA Head and Neck (10/10/2019) -               Occluded left ICA with reconstitution of the distal/supraclinoid segment by retrograde flow through [id number].  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   Right vertebral artery supply the basilar artery.  Basilar artery is small in caliber with wall irregularity and intermittent occlusions.     Partially calcified hyperattenuating lesion in the lateral aspect of the cavernous sinus abutting the distal right ICA, likely representing calcified meningioma.     Generalized cerebral volume loss and remote lacunar type infarct in the right caudate head.     Cardiac Imaging:     TTE (10/11/2019) -      · Moderately decreased left ventricular systolic function. The estimated ejection fraction is 35%  · Concentric left ventricular remodeling.  · Left ventricular diastolic dysfunction.  · Local segmental wall motion abnormalities.  · Severe left atrial enlargement.  · Mild right atrial enlargement.  · Mild aortic regurgitation.  · Moderate aortic valve stenosis but difficult to appreciate in the setting of AF, depressed EF, and low stroke volume.  · Aortic valve area is 1.39 cm2; peak velocity is 1.28 m/s; mean gradient is 4 mmHg.  Elevated central venous pressure (15 mm Hg).

## 2019-10-22 NOTE — PROGRESS NOTES
[facility]  Adult Nutrition  Progress Note    SUMMARY       Recommendations    Recommendation:   1. Continue cardiac diet as tolerated, with fluid restriction per MD and texture per SLP recommendations.   2. RD to monitor & follow up.    Goals: Adequate PO intake to meet % of EEN and EPN while admitted  Nutrition Goal Status: new  Communication of RD Recs: other (comment)(POC)    Reason for Assessment    Reason For Assessment: RD follow-up  Diagnosis: cardiac disease(CVA)  Relevant Medical History: HTN, thyroid disease, CHF, stroke, dementia  Interdisciplinary Rounds: did not attend  General Information Comments: Pt continues with good appetite/intake of % of meals. Pt continues to appear nourished, NFPE not indicated at this time.  Nutrition Discharge Planning: d/c on cardiac diet, texture per SLP recommendations    Nutrition Risk Screen    Nutrition Risk Screen: no indicators present    Nutrition/Diet History    Spiritual, Cultural Beliefs, Orthodox Practices, Values that Affect Care: no    Anthropometrics    Temp: 98.1 °F (36.7 °C)  Height Method: Stated  Height: 6' (182.9 cm)  Height (inches): 72 in  Weight Method: Standard Scale  Weight: 86.5 kg (190 lb 11.2 oz)  Weight (lb): 190.7 lb  Ideal Body Weight (IBW), Male: 178 lb  % Ideal Body Weight, Male (lb): 100.82 lb  BMI (Calculated): 24.4    Lab/Procedures/Meds    Pertinent Labs Reviewed: reviewed  Pertinent Labs Comments: Na 134, BUN 31, Cr 1.7, GFR 36  Pertinent Medications Reviewed: reviewed  Pertinent Medications Comments: statin, lasix    Estimated/Assessed Needs    Weight Used For Calorie Calculations: 86.5 kg (190 lb 11.2 oz)  Energy Calorie Requirements (kcal): 1985 kcal/day  Energy Need Method: Lane-St Jeor(x 1.25 PAL)  Protein Requirements:  g/day(1-1.2 g/kg)  Weight Used For Protein Calculations: 86.5 kg (190 lb 11.2 oz)     Estimated Fluid Requirement Method: RDA Method(or per MD)  RDA Method (mL): 1985  CHO  Requirement: 50% of total kcals    Nutrition Prescription Ordered    Current Diet Order: Cardiac  Nutrition Order Comments: 1000 mL FR    Evaluation of Received Nutrient/Fluid Intake    I/O: -2.5L since admit  Energy Calories Required: meeting needs  Protein Required: meeting needs  Fluid Required: meeting needs  Comments: LBM 10/21  Tolerance: tolerating  % Intake of Estimated Energy Needs: 75 - 100 %  % Meal Intake: 75 - 100 %    Nutrition Risk    Level of Risk/Frequency of Follow-up: low     Assessment and Plan    Nutrition Problem  Inadequate oral intake     Related to (etiology):   Current diet     Signs and Symptoms (as evidenced by):   <75% of nutritional needs being met     Interventions(treatment strategy):  Collaboration of nutrition care with other providers  Referral of care  Modified diet- renal, DM  Initiate enteral nutrition-Suplena @ 50mL/hr if diet not advanced within 48hr.      Nutrition Diagnosis Status:   Resolved    Monitor and Evaluation    Food and Nutrient Intake: energy intake  Food and Nutrient Adminstration: diet order  Anthropometric Measurements: weight, weight change, body mass index  Biochemical Data, Medical Tests and Procedures: electrolyte and renal panel, gastrointestinal profile, glucose/endocrine profile  Nutrition-Focused Physical Findings: overall appearance     Malnutrition Assessment  Pt does not meet criteria for malnutrition at this time.    Nutrition Follow-Up    RD Follow-up?: Yes

## 2019-10-22 NOTE — PLAN OF CARE
A A O to self. Free of falls, traumas and injuries. Skin tears to R/L forearms. Both dressings changed today. Patient became combative with PCT upon requesting to get up and go get a hamburger when PCT tried to help him back in bed. Patient was able to eat lunch and has had no more combative episodes since. Denies shortness of breath, chest pain, nausea, vomiting. Waiting for placement to [facility]. Plan of care reviewed with patient. Vital signs stable. Pain monitored. Will continue to monitor.

## 2019-10-22 NOTE — PLAN OF CARE
Discharge Recommendation: Rehab.    0 goals met today. PT goals appropriate.    Appropriate transfer level with nursing staff: Bed <> Chair:  Step Transfer with contact guard assistance with hand-held assist.    Problem: Physical Therapy Goal  Goal: Physical Therapy Goal  Description  Goals to be met by: 10/25/19     Patient will increase functional independence with mobility by performin. Supine to sit with Minimal Assistance - met 10/16  1a. Supine to sit with Supervision with bed flat - not met  2. Sit to supine with Minimal Assistance - Not met  3. Sit to stand transfer with Stand-by Assistance - met 10/21/19  4. Ascend/descend 3 stairs with right Handrail with Supervision - Not met  5. Lower extremity exercise program x 20 reps per handout, with assistance as needed - Not met  6. Added on 10/13: Bed to chair transfer with RW and supervision - Not met  7. Added on 10/13: Gait x 300 ft with RW and SBA (including turns and straight lines) - met 10/16  7a. Gait x 300 ft with bilateral SPC and SBA (including turns and straight lines) (revised 10/17)- not met      Outcome: Ongoing, Progressing    Bhargavi Pollard, PT, DPT  10/22/2019  Pager: [phone]

## 2019-10-22 NOTE — PLAN OF CARE
Problem: Adult Inpatient Plan of Care  Goal: Plan of Care Review  LOC: Oriented to self; disoriented to time, place, situation.  SKIN: The skin is warm, dry. Skin tear on right hand; CDI.  RESPIRATORY: Respirations are WNL, even and unlabored. Normal effort and rate noted. No accessory muscle use noted. Patient on room air.  CARDIAC: Patient runs Afib on the monitor (60's-80's).  ABDOMEN: Soft and non tender to palpation. No distention noted.   URINARY: has urinal at bedside.   EXTREMITIES: Extremities are WNL; general weakness throughout.  Neuro: Pt. is calm and cooperative with care.      POC reviewed with patient. VSS. Patient free of injuries and falls. Patient has tele sitter in the room; told patient to call before getting out of bed. Patient has no c/o pain or discomfort. Patient awaiting SNF placement. All questions were addressed. Will continue to monitor.

## 2019-10-22 NOTE — PLAN OF CARE
Problem: SLP Goal  Goal: SLP Goal  Description  Speech Therapy Short Term Goals  Goal expected to be met by 10/25  1. Pt will participate in an ongoing assessment to determine the least restrictive and safest diet with possible updated goals to follow pending results.  2. The patient will answer orientation questions x4 with 90% acc no cues.   3. The patient will name 8 items in a concrete category given min cues.   4. The patient will answer complex y/n questions with 85% acc no cues.   5. The patient will follow 2+ step directions with 75% acc given 1 redirection.   6. The patient will participate in an ongoing assessment of speech, language, and cognition with updated goals to follow pending results and progress.    Outcome: Ongoing, Progressing     Patient seen for ongoing cognitive-linguistic therapy.     Royce Stearns CCC-SLP  Speech-Language Pathology  Pager: [phone]

## 2019-10-22 NOTE — ASSESSMENT & PLAN NOTE
-- BUN/Cr elevated on admission, improved and now worsening again. Likely at baseline per outside chart review of [facility]. Creatinine range 1.5 - 2.6  -- Euvolemic on exam  -- Urine Na and Cr normal   -- Lasix restarted at reduced dose.   -- Avoid nephrotoxic meds. Eliquis renally dosed.

## 2019-10-22 NOTE — ASSESSMENT & PLAN NOTE
Pending rehab placement  Difficult placement due to patient living in [place] and visiting daughter when stroke occurred - therapy teams recommending inpatient rehab    At this time patient is unable to fly back to [place]. He is not capable of flying due to inability to ambulate without trained medical assistance, impaired functional mobility, impaired cardiopulmonary response to activity, urine and bowel incontinence, and decreased mental capacity related to stroke and underlying diagnosis of dementia.     10/17 - Spoke to Dr. Krishna of [employer]. Requesting documentation of pt's current functional status stating why pt is unable to take commercial flight back to [place]. CM to send updated notes.

## 2019-10-22 NOTE — PROGRESS NOTES
[facility]  Vascular Neurology  Comprehensive Stroke Center  Progress Note    Assessment/Plan:     * Acute arterial ischemic stroke, multifocal, multiple vascular territories  86 y/o male with PMH of chronic Afib (not on AC), CAD s/p stent, HTN, CHF, hypothyroidism who presented who received IV-tPA at OSH for RSW, aphasia and L gaze deviation. NIHSS 8. CTA MP showed a chronically occluded L ICA from it's origin to the supraclinoid segment with filling of the MCA via the AComm along with substantial burden on intracranial and extracranial atherosclerotic disease. He was also noted to have an occluded V4 with an intermittently occluded basilar. No EVT.  TTE shows an EF of 35%    Etiology - Cardioembolic     -- Pending placement.    Antithrombotics for secondary stroke prevention:  Eliquis 2.5 mg BID     Statins for secondary stroke prevention and hyperlipidemia, if present:   Statins: Atorvastatin- 40 mg daily    Aggressive risk factor modification: HTN     Rehab efforts: The patient has been evaluated by a stroke team provider and the therapy needs have been fully considered based off the presenting complaints and exam findings. The following therapy evaluations are needed: PT evaluate and treat, OT evaluate and treat, SLP evaluate and treat, PM&R evaluate for appropriate placement   -- Pending placement to [facility] (awaiting bed availanbility)    Diagnostics ordered/pending: None    VTE prophylaxis: Eliquis 2.5 mg BID, SCDs    BP parameters: SBP <160        AMRIT (acute kidney injury)  -- BUN/Cr elevated on admission, improved and now worsening again. Likely at baseline per outside chart review of [facility]. Creatinine range 1.5 - 2.6  -- Euvolemic on exam  -- Urine Na and Cr normal   -- Lasix restarted at reduced dose.   -- Avoid nephrotoxic meds. Eliquis renally dosed.    Person awaiting admission to adequate facility elsewhere  Pending rehab placement  Difficult placement due to  patient living in [place] and visiting daughter when stroke occurred - therapy teams recommending inpatient rehab    At this time patient is unable to fly back to [place]. He is not capable of flying due to inability to ambulate without trained medical assistance, impaired functional mobility, impaired cardiopulmonary response to activity, urine and bowel incontinence, and decreased mental capacity related to stroke and underlying diagnosis of dementia.     10/17 - Spoke to Dr. Krishna of [employer]. Requesting documentation of pt's current functional status stating why pt is unable to take commercial flight back to [place]. CM to send updated notes.     Erythema of hand  -- Erythema of right hand from IV infiltration  -- Bactroban cream TID X 10 days  -- Elevation of R hand   -- No pain today.    SIADH (syndrome of inappropriate ADH production)  -- Na 134  -- 1L fluid restriction.  -- daily BMP, continue to monitor      Dementia  -- Continue Donepezil 5 mg daily     Intracranial atherosclerosis  -- Noted on CTA head   -- On Eliquis and Atorvastatin 40 mg     Occlusion of left carotid artery  -- Seen on CTA  -- Suspect chronic occlusion     Nodule of right lung  -- 1 cm nodule of R lung seen on CTA  -- Will need follow up in 3- 6 months   -- Discussed with daughter     Cytotoxic brain edema  Area of cytotoxic cerebral edema identified when reviewing brain imaging in the territory of the R/L middle cerebral artery. There is not mass effect associated with it. We will continue to monitor the patients clinical exam for any worsening of symptoms which may indicate expansion of the stroke or the area of the edema resulting in the clinical change. The pattern is suggestive of cardioembolic etiology        CAD (coronary artery disease)  Stroke risk factor  -- Noted on Care Everywhere  -- S/p RCA stent 7/2018  -- Prescribed Plavix, statin and Ranexa but unclear if he's taking them  -- Eliquis 2.5  BID  -- Atorvastatin 40 mg daily       Paroxysmal atrial fibrillation  Stroke risk factor  -- Per Care Everywhere; not on AC - Dr. Menjivar cardiologist reports patient not a good candidate due to risk of falls, non-compliance, and dementia   -- Noted on EKG at admit.  -- Rate controlled  -- Eliquis 2.5 BID    Chronic combined systolic and diastolic congestive heart failure  Stroke risk factor  -- pro BNP from Feb 2019 >3000  -- On Lasix and Aldactone at home.  -- TTE shows 35%   -- Continue Coreg 3.125mg BID.   -- Held Lasix for worsening AMRIT. Reviewed outside records from [facility], Creatinine range 1.5 -2.6. Patient likely at baseline. Restart lasix at reduced dose 20 mg daily.    -- daily weights, strict I&Os    Acquired hypothyroidism  Stroke risk factor  TSH 3.032  -- Continue Synthroid 25mcg daily    Essential hypertension  Stroke risk factor  -- SBP<160  -- Coreg 3.125 mg BID  -- Norvasc 10 mg daily.         10/12 - Etiology likely cardioembolic. Will start Eliquis 2.5 mg BID. Creatinine trending down - may start patient on Eliquis 5 mg BID if continue to decrease. L ICA chronically occluded - no need to vasculare intervention.   10/13 - Spoke with PT - patient would benefit from inpatient rehab placement. Norvasc 5 mg ordered. Urine studies ordered for hyponatremia - suspect to be due to dehydration.   10/14 Patient with SIADH but sodium stable.  1 liter fluid restriction.    10/15  Irritation around right hand peripheral IV site.  Sodium 131 today.  Discussed with daughter that he is on fluid restriction and she will stop providing him with outside drinks.     10/16 - Sodium increasing 132. Bactroban ordered for right hand IV infiltration. Working on placement.   10/17 - Sodium continues to improve. Creatinine 1.5 likely near baseline, decreased eliquis to 2.5 mg. R hand erythema stable.   10/19/2019 Sodium 134. Awake and alert but remains confused. No pain. Pending placement  10/20/2019  Complained of chest discomfort overnight. EKG showed no ST wave changes, Trop normal.   10/21 - Renal function improving. Hyponatremic, continue 1L fluid restriction, will likely restart lasix tomorrow, no current signs of fluid overload. Awake and alert, oriented to person and place, disoriented to time.   10/22 Reviewed outside imaging, renal function likely at baseline, restart home lasix at reduced dose of 20 mg daily, continue to monitor. Daily weights and strict I&Os.     STROKE DOCUMENTATION   Acute Stroke Times   Last Known Normal Date: 10/10/19  Last Known Normal Time: 1905  Symptom Onset Date: 10/10/19  Symptom Onset Time: 1905  Stroke Team Called Date: 10/10/19  Stroke Team Called Time: 2120  Stroke Team Arrival Date: 10/10/19  Stroke Team Arrival Time: 2122    NIH Scale:  1a. Level of Consciousness: 0-->Alert, keenly responsive  1b. LOC Questions: 1-->Answers one question correctly  1c. LOC Commands: 0-->Performs both tasks correctly  2. Best Gaze: 0-->Normal  3. Visual: 0-->No visual loss  4. Facial Palsy: 0-->Normal symmetrical movements  5a. Motor Arm, Left: 2-->Some effort against gravity, limb cannot get to or maintain (if cued) 90 (or 45) degrees, drifts down to bed, but has some effort against gravity  5b. Motor Arm, Right: 0-->No drift, limb holds 90 (or 45) degrees for full 10 secs  6a. Motor Leg, Left: 0-->No drift, leg holds 30 degree position for full 5 secs  6b. Motor Leg, Right: 0-->No drift, leg holds 30 degree position for full 5 secs  7. Limb Ataxia: 0-->Absent  8. Sensory: 0-->Normal, no sensory loss  9. Best Language: 0-->No aphasia, normal  10. Dysarthria: 0-->Normal  11. Extinction and Inattention (formerly Neglect): 0-->No abnormality  Total (NIH Stroke Scale): 3       Modified Isabel Score: 1  Holley Coma Scale:    ABCD2 Score:    [id number] Score:   HAS -BLED Score:   ICH Score:   Hunt & Stroud Classification:      Hemorrhagic change of an Ischemic Stroke: Does this patient have  an ischemic stroke with hemorrhagic changes? No     Neurologic Chief Complaint: RSW and aphasia    Subjective:     Interval History: Patient is seen for follow-up neurological assessment and treatment recommendations:     Reviewed outside imaging, renal function likely at baseline, restart home lasix at reduced dose of 20 mg daily, continue to monitor. Daily weights and strict I&Os.       HPI, Past Medical, Family, and Social History remains the same as documented in the initial encounter.     Review of Systems   Constitutional: Negative for chills and fever.   HENT: Negative for trouble swallowing.    Eyes: Negative for visual disturbance.   Respiratory: Negative for shortness of breath.    Cardiovascular: Negative for chest pain.   Gastrointestinal: Negative for nausea and vomiting.   Genitourinary: Negative for difficulty urinating.   Musculoskeletal: Positive for arthralgias.        L shoulder pain (chronic).   Neurological: Positive for weakness. Negative for facial asymmetry, speech difficulty and numbness.   Psychiatric/Behavioral: Negative for agitation and decreased concentration.     Scheduled Meds:   amLODIPine  10 mg Oral Daily    apixaban  2.5 mg Oral BID    atorvastatin  40 mg Oral Daily    carvedilol  3.125 mg Oral BID    donepezil  5 mg Oral QHS    furosemide  20 mg Oral Daily    levothyroxine  25 mcg Oral Daily    mupirocin   Topical (Top) TID    senna  8.6 mg Oral Daily     Continuous Infusions:  PRN Meds:hydrALAZINE, influenza, sodium chloride 0.9%    Objective:     Vital Signs (Most Recent):  Temp: 97.7 °F (36.5 °C) (10/22/19 0800)  Pulse: 63 (10/22/19 1113)  Resp: 18 (10/22/19 0800)  BP: (!) 149/68 (10/22/19 0800)  SpO2: 97 % (10/22/19 0800)  BP Location: Left arm    Vital Signs Range (Last 24H):  Temp:  [97.3 °F (36.3 °C)-98.5 °F (36.9 °C)]   Pulse:  [54-74]   Resp:  [16-19]   BP: (129-156)/(65-82)   SpO2:  [93 %-97 %]   BP Location: Left arm    Physical Exam   Constitutional: He  appears well-developed. No distress.   Eyes: EOM are normal.   Pulmonary/Chest: Effort normal.   Neurological: He is alert.   Nursing note and vitals reviewed.      Neurological Exam:   LOC: alert  Language: No aphasia  Articulation: No dysarthria  Orientation: Not oriented to time  Facial Sensation (CN V): Normal  Motor: Arm left  Paresis: 3/5 (2/2 pain)  Leg left  Normal 5/5  Arm right  Normal 5/5  Leg right Normal 5/5  Sensation: Intact to light touch, temperature and vibration    Laboratory:  CMP:   Recent Labs   Lab 10/22/19  0337   CALCIUM 9.0   *   K 4.3   CO2 31*   CL 95   BUN 31*   CREATININE 1.7*     BMP:   Recent Labs   Lab 10/22/19  0337   *   K 4.3   CL 95   CO2 31*   BUN 31*   CREATININE 1.7*   CALCIUM 9.0     CBC:   No results for input(s): WBC, RBC, HGB, HCT, PLT, MCV, MCH, MCHC in the last 168 hours.  Lipid Panel: No results for input(s): CHOL, LDLCALC, HDL, TRIG in the last 168 hours.  Coagulation: No results for input(s): PT, INR, APTT in the last 168 hours.  Platelet Aggregation Study: No results for input(s): PLTAGG, PLTAGINTERP, PLTAGREGLACO, ADPPLTAGGREG in the last 168 hours.  Hgb A1C: No results for input(s): HGBA1C in the last 168 hours.  TSH: No results for input(s): TSH in the last 168 hours.    Diagnostic Results     Brain/Vessel Imaging:     MRI Brain (10/11/2019) -               Areas of abnormal T2/FLAIR/diffusion signal abnormality consistent with areas of acute ischemia/infarct involving the cerebral hemispheres bilaterally.     There is signal abnormality associated with the left internal carotid artery likely corresponding to the appearance of occlusion on the CTA examination.     CTA Head and Neck (10/10/2019) -               Occluded left ICA with reconstitution of the distal/supraclinoid segment by retrograde flow through [id number].  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  Right vertebral artery supply the basilar artery.  Basilar artery is small in caliber with wall irregularity and intermittent occlusions.     Partially calcified hyperattenuating lesion in the lateral aspect of the cavernous sinus abutting the distal right ICA, likely representing calcified meningioma.     Generalized cerebral volume loss and remote lacunar type infarct in the right caudate head.     Cardiac Imaging:     TTE (10/11/2019) -      · Moderately decreased left ventricular systolic function. The estimated ejection fraction is 35%  · Concentric left ventricular remodeling.  · Left ventricular diastolic dysfunction.  · Local segmental wall motion abnormalities.  · Severe left atrial enlargement.  · Mild right atrial enlargement.  · Mild aortic regurgitation.  · Moderate aortic valve stenosis but difficult to appreciate in the setting of AF, depressed EF, and low stroke volume.  · Aortic valve area is 1.39 cm2; peak velocity is 1.28 m/s; mean gradient is 4 mmHg.  Elevated central venous pressure (15 mm Hg).      Ross Camp NP  [facility]  Department of Vascular Neurology   [facility]

## 2019-10-22 NOTE — ASSESSMENT & PLAN NOTE
84 y/o male with PMH of chronic Afib (not on AC), CAD s/p stent, HTN, CHF, hypothyroidism who presented who received IV-tPA at OSH for RSW, aphasia and L gaze deviation. NIHSS 8. CTA MP showed a chronically occluded L ICA from it's origin to the supraclinoid segment with filling of the MCA via the AComm along with substantial burden on intracranial and extracranial atherosclerotic disease. He was also noted to have an occluded V4 with an intermittently occluded basilar. No EVT.  TTE shows an EF of 35%    Etiology - Cardioembolic     -- Pending placement.    Antithrombotics for secondary stroke prevention:  Eliquis 2.5 mg BID     Statins for secondary stroke prevention and hyperlipidemia, if present:   Statins: Atorvastatin- 40 mg daily    Aggressive risk factor modification: HTN     Rehab efforts: The patient has been evaluated by a stroke team provider and the therapy needs have been fully considered based off the presenting complaints and exam findings. The following therapy evaluations are needed: PT evaluate and treat, OT evaluate and treat, SLP evaluate and treat, PM&R evaluate for appropriate placement   -- Pending placement to [facility] (awaiting bed availanbility)    Diagnostics ordered/pending: None    VTE prophylaxis: Eliquis 2.5 mg BID, SCDs    BP parameters: SBP <160

## 2019-10-22 NOTE — PT/OT/SLP PROGRESS
"Speech Language Pathology Treatment    Patient Name:  Izaiah Douglas   MRN:  [id number]  Admitting Diagnosis: Acute arterial ischemic stroke, multifocal, multiple vascular territories    Recommendations:                 General Recommendations:  Cognitive-linguistic therapy  Diet recommendations:  Regular, Liquid Diet Level: Thin   Aspiration Precautions: Small bites/sips and Standard aspiration precautions   General Precautions: Standard, fall  Communication strategies:  provide increased time to answer and go to room if call light pushed    Subjective     Patient awake and alert during session  Communicated with nurse prior to session    Pain/Comfort:  Pain Rating 1: 0/10  Pain Rating Post-Intervention 1: 0/10    Objective:     Has the patient been evaluated by SLP for swallowing?   Yes  Keep patient NPO? No   Current Respiratory Status: room air      Patient seen for ongoing cognitive-linguistic therapy. He was sitting up in chair with empty lunch tray at bedside. Patient continued to be disoriented to place ("[place]") and time ("January." "2001") despite max cueing from SLP. SLP oriented patient to signs in his room that remind of time, setting and situation. Later in the session, patient referenced one of these signs when asked orientation questions again. He answered 6/6 abstract reasoning questions with min assist and 3/5 4-part mental manipulation questions with mod assist. Patient's reasoning and critical thinking were improved this session, but he continued to exhibit significant memory deficits. SLP educated patient regarding POC from a ST perspective, but he would benefit from ongoing instruction. He was left in chair with call light within reach.     Assessment:     Izaiah Douglas is a 85 y.o. male with an SLP diagnosis of Cognitive-Linguistic Impairment. .    Goals:   Multidisciplinary Problems     SLP Goals        Problem: SLP Goal    Goal Priority Disciplines Outcome   SLP Goal     SLP Ongoing, " Progressing   Description:  Speech Therapy Short Term Goals  Goal expected to be met by 10/25  1. Pt will participate in an ongoing assessment to determine the least restrictive and safest diet with possible updated goals to follow pending results.  2. The patient will answer orientation questions x4 with 90% acc no cues.   3. The patient will name 8 items in a concrete category given min cues.   4. The patient will answer complex y/n questions with 85% acc no cues.   5. The patient will follow 2+ step directions with 75% acc given 1 redirection.   6. The patient will participate in an ongoing assessment of speech, language, and cognition with updated goals to follow pending results and progress.                     Plan:     · Patient to be seen:  3 x/week   · Plan of Care expires:  11/09/19  · Plan of Care reviewed with:  patient   · SLP Follow-Up:  Yes       Discharge recommendations:  rehabilitation facility   Barriers to Discharge:  Level of Skilled Assistance Needed     Time Tracking:     SLP Treatment Date:   10/22/19  Speech Start Time:  1208  Speech Stop Time:  1219     Speech Total Time (min):  11 min    Billable Minutes: Speech Therapy Individual 11    Royce Stearns CCC-SLP  Speech-Language Pathology  Pager: [phone]   10/22/2019

## 2019-10-22 NOTE — PLAN OF CARE
MALINA spoke with Nisha from [facility], states should have bed this week and submit for auth. Will continue to follow.

## 2019-10-22 NOTE — PLAN OF CARE
Recommendations     Recommendation:   1. Continue cardiac diet as tolerated, with fluid restriction per MD and texture per SLP recommendations.   2. RD to monitor & follow up.     Goals: Adequate PO intake to meet % of EEN and EPN while admitted  Nutrition Goal Status: new  Communication of RD Recs: other (comment)(POC)

## 2019-10-22 NOTE — PT/OT/SLP PROGRESS
Physical Therapy Treatment    Patient Name:  Izaiah Douglas   MRN:  [id number]  Admitting Diagnosis:  Acute arterial ischemic stroke, multifocal, multiple vascular territories   Recent Surgery: * No surgery found *    Admit Date: 10/10/2019  Length of Stay: 12 days    Recommendations:     Discharge Recommendations:  rehabilitation facility   Discharge Equipment Recommendations: other (see comments)(tbd)   Barriers to discharge: None    Assessment:     Izaiah Douglas is a 85 y.o. male admitted with a medical diagnosis of Acute arterial ischemic stroke, multifocal, multiple vascular territories.  He presents with the following impairments/functional limitations:  impaired endurance, impaired cognition, impaired balance, impaired cardiopulmonary response to activity, decreased safety awareness. Pt tolerated session well today. Pt req'd max coaxing and cueing for ambulation and mobility on this date. Pt did demonstrate improved coordination of ambulation with bilateral canes with an improved 4 point gait pattern throughout ambulation trial. Pt's underlying cognitive deficits continue to be the pt's biggest limitations as he does not recognize his functional limitations at this time and also has poor overall safety awareness. Pt req's max cueing for scanning surroundings, ensuring that the enviornment is safely set up, as well as for higher level cognitive tasks, such as using signs to navigate back to room. Due to this pt is unsafe to return home at this time and would benefit from inpatient rehab. Pt is an excellent candidate for inpatient rehabilitation, as pt has a qualifying diagnosis, displays good activity tolerance, has experienced decline from PLOF, has good family support, and is motivated to participate in therapy.     Rehab Prognosis: Good; patient would benefit from acute skilled PT services to address these deficits and reach maximum level of function.    Recent Surgery: * No surgery found *      Plan:      During this hospitalization, patient to be seen 3 x/week to address the identified rehab impairments via gait training, therapeutic activities, therapeutic exercises, neuromuscular re-education and progress toward the following goals:    · Plan of Care Expires:  11/08/19    Subjective     RN notified prior to session. Nurse, Robert, present upon PT entrance into room.    Chief Complaint: Pt had no complaints. Pt kept saying he was hungry throughout session.  Patient/Family Comments/goals: return  Back home  Pain/Comfort:  · Pain Rating 1: 0/10  · Pain Rating Post-Intervention 1: 0/10      Objective:     Additional staff present: none    Patient found HOB elevated with: telemetry, bed alarm   Mental Status: Patient is oriented to AxOx1 (self only) and follows multi-step commands approx 50% of the time and single step commands 100% of the time. Patient is Alert and Cooperative during session.    General Precautions: Standard, Cardiac fall   Orthopedic Precautions:N/A   Braces: N/A   Body mass index is 25.86 kg/m².  Oxygen Device: Room Air    Outcome Measures:  AM-PAC 6 CLICK MOBILITY  Turning over in bed (including adjusting bedclothes, sheets and blankets)?: 4  Sitting down on and standing up from a chair with arms (e.g., wheelchair, bedside commode, etc.): 4  Moving from lying on back to sitting on the side of the bed?: 4  Moving to and from a bed to a chair (including a wheelchair)?: 3  Need to walk in hospital room?: 3  Climbing 3-5 steps with a railing?: 3  Basic Mobility Total Score: 21     Functional Mobility:    Bed Mobility:   · Rolling/Turning to Left: stand by assistance  · Scooting to HOB via supine bridge: stand by assistance  · Supine to Sit: stand by assistance; from Lt side of bed  · Scooting anteriorly to EOB to have both feet planted on floor: supervision  · Sit to Supine: stand by assistance; to Lt side of bed    Sitting Balance at Edge of Bed:   Assistance Level Required: Supervision   Time:  2 minutes   Postural deviations noted: slouched posture and rounded shoulders   Comments: for donning house coat. Pt was able to respond to internal perturbations correctly during donning of house coat.    Transfers:   · Sit <> Stand Transfer: supervision with no assistive device   · Stand <> Sit Transfer: supervision with no assistive device   · [id number] from EOB     Gait:  · Patient ambulated: 350 ft   · Patient required: standy by assistance  · Patient used:  bilateral SPC   · Gait Pattern observed: 4-point gait  · Gait Deviation(s): occasional unsteady gait, wide base of support, shuffle gait and decreased benitez  · Impairments due to: impaired balance and decreased endurance  · all lines remained intact throughout ambulation trial  · Comments: Pt with decent coordination of 4 point gait and was able to maintain throughout. Decay in quality was seen as ambulation distance increased or with dual task.    Stairs:   Not performed due to pt status    Education:   All questions answered within PT scope of practice   PT role in POC   Pt educated on proper body mechanics, safety techniques, and energy conservation with PT facilitation and cueing throughout session   Whiteboard updated with pt's current mobility status listed below   Safe to perform step transfer to/from chair SBA and HHA w/ nursing/PCT   Pt to ambulate 3x/day SBA and bilat SPC with nsg/family in order to maintain functional mobility    Patient left HOB elevated with all lines intact, call button in reach, bed alarm on, RN notified and avaysis camera called to confirm patient visual.    GOALS:   Multidisciplinary Problems     Physical Therapy Goals        Problem: Physical Therapy Goal    Goal Priority Disciplines Outcome Goal Variances Interventions   Physical Therapy Goal     PT, PT/OT Ongoing, Progressing     Description:  Goals to be met by: 10/25/19     Patient will increase functional independence with mobility by performin.  Supine to sit with Minimal Assistance - met 10/16  1a. Supine to sit with Supervision with bed flat - not met  2. Sit to supine with Minimal Assistance - Not met  3. Sit to stand transfer with Stand-by Assistance - met 10/21/19  4. Ascend/descend 3 stairs with right Handrail with Supervision - Not met  5. Lower extremity exercise program x 20 reps per handout, with assistance as needed - Not met  6. Added on 10/13: Bed to chair transfer with RW and supervision - Not met  7. Added on 10/13: Gait x 300 ft with RW and SBA (including turns and straight lines) - met 10/16  7a. Gait x 300 ft with bilateral SPC and SBA (including turns and straight lines) (revised 10/17)- not met                     Time Tracking:     PT Received On: 10/22/19  PT Start Time: 0839     PT Stop Time: 0905  PT Total Time (min): 26 min       Billable Minutes:   · Gait Training 13 and Therapeutic Activity 13    Treatment Type: Treatment  PT/PTA: PT       Bhargavi Pollard PT, DPT  10/22/2019   Pager: [phone]

## 2019-10-22 NOTE — ASSESSMENT & PLAN NOTE
Stroke risk factor  -- pro BNP from Feb 2019 >3000  -- On Lasix and Aldactone at home.  -- TTE shows 35%   -- Continue Coreg 3.125mg BID.   -- Held Lasix for worsening AMRIT. Reviewed outside records from [facility], Creatinine range 1.5 -2.6. Patient likely at baseline. Restart lasix at reduced dose 20 mg daily.    -- daily weights, strict I&Os

## 2019-10-23 LAB
ANION GAP SERPL CALC-SCNC: 5 MMOL/L (ref 8–16)
BUN SERPL-MCNC: 32 MG/DL (ref 8–23)
CALCIUM SERPL-MCNC: 8.7 MG/DL (ref 8.7–10.5)
CHLORIDE SERPL-SCNC: 98 MMOL/L (ref 95–110)
CO2 SERPL-SCNC: 30 MMOL/L (ref 23–29)
CREAT SERPL-MCNC: 1.6 MG/DL (ref 0.5–1.4)
EST. GFR  (AFRICAN AMERICAN): 44.7 ML/MIN/1.73 M^2
EST. GFR  (NON AFRICAN AMERICAN): 38.7 ML/MIN/1.73 M^2
GLUCOSE SERPL-MCNC: 80 MG/DL (ref 70–110)
POTASSIUM SERPL-SCNC: 4.2 MMOL/L (ref 3.5–5.1)
SODIUM SERPL-SCNC: 133 MMOL/L (ref 136–145)

## 2019-10-23 PROCEDURE — 99233 PR SUBSEQUENT HOSPITAL CARE,LEVL III: ICD-10-PCS | Mod: ,,, | Performed by: PSYCHIATRY & NEUROLOGY

## 2019-10-23 PROCEDURE — 80048 BASIC METABOLIC PNL TOTAL CA: CPT

## 2019-10-23 PROCEDURE — 99233 SBSQ HOSP IP/OBS HIGH 50: CPT | Mod: ,,, | Performed by: PSYCHIATRY & NEUROLOGY

## 2019-10-23 PROCEDURE — 25000003 PHARM REV CODE 250: Performed by: STUDENT IN AN ORGANIZED HEALTH CARE EDUCATION/TRAINING PROGRAM

## 2019-10-23 PROCEDURE — 36415 COLL VENOUS BLD VENIPUNCTURE: CPT

## 2019-10-23 PROCEDURE — 97530 THERAPEUTIC ACTIVITIES: CPT

## 2019-10-23 PROCEDURE — 97535 SELF CARE MNGMENT TRAINING: CPT

## 2019-10-23 PROCEDURE — 92507 TX SP LANG VOICE COMM INDIV: CPT

## 2019-10-23 PROCEDURE — 25000003 PHARM REV CODE 250: Performed by: NURSE PRACTITIONER

## 2019-10-23 PROCEDURE — 25000003 PHARM REV CODE 250: Performed by: PSYCHIATRY & NEUROLOGY

## 2019-10-23 PROCEDURE — 25000003 PHARM REV CODE 250: Performed by: FAMILY MEDICINE

## 2019-10-23 PROCEDURE — 20600001 HC STEP DOWN PRIVATE ROOM

## 2019-10-23 RX ADMIN — MUPIROCIN: 20 OINTMENT TOPICAL at 09:10

## 2019-10-23 RX ADMIN — APIXABAN 2.5 MG: 2.5 TABLET, FILM COATED ORAL at 08:10

## 2019-10-23 RX ADMIN — MUPIROCIN: 20 OINTMENT TOPICAL at 08:10

## 2019-10-23 RX ADMIN — MUPIROCIN: 20 OINTMENT TOPICAL at 03:10

## 2019-10-23 RX ADMIN — CARVEDILOL 3.12 MG: 3.12 TABLET, FILM COATED ORAL at 09:10

## 2019-10-23 RX ADMIN — CARVEDILOL 3.12 MG: 3.12 TABLET, FILM COATED ORAL at 08:10

## 2019-10-23 RX ADMIN — AMLODIPINE BESYLATE 10 MG: 10 TABLET ORAL at 08:10

## 2019-10-23 RX ADMIN — LEVOTHYROXINE SODIUM 25 MCG: 25 TABLET ORAL at 07:10

## 2019-10-23 RX ADMIN — FUROSEMIDE 20 MG: 20 TABLET ORAL at 08:10

## 2019-10-23 RX ADMIN — APIXABAN 2.5 MG: 2.5 TABLET, FILM COATED ORAL at 09:10

## 2019-10-23 RX ADMIN — SENNOSIDES 8.6 MG: 8.6 TABLET, FILM COATED ORAL at 08:10

## 2019-10-23 RX ADMIN — DONEPEZIL HYDROCHLORIDE 5 MG: 5 TABLET, FILM COATED ORAL at 09:10

## 2019-10-23 RX ADMIN — ATORVASTATIN CALCIUM 40 MG: 20 TABLET, FILM COATED ORAL at 08:10

## 2019-10-23 NOTE — ASSESSMENT & PLAN NOTE
Stroke risk factor  -- Noted on Care Everywhere  -- S/p RCA stent 7/2018  -- Was prescribed Plavix, statin, and Ranexa but unclear if patient was taking them.  -- Eliquis 2.5 BID, Atorvastatin 40 mg daily

## 2019-10-23 NOTE — PROGRESS NOTES
[facility]  Vascular Neurology  Comprehensive Stroke Center  Progress Note    Assessment/Plan:     * Acute arterial ischemic stroke, multifocal, multiple vascular territories  86 y/o man with PMHx chronic Afib (not on AC), CAD s/p stent, HTN, CHF, hypothyroidism who received IV-tPA at OSH after presenting with R-sided weakness, aphasia, and L gaze deviation. 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 No EVT was pursued. TTE shows an EF of 35%.    MRI Brain demonstrated scattered areas of infarct in the bilateral hemispheres, predominantly in the R frontal lobe. Etiology suspected cardioembolic in the setting of AFib, cardiomyopathy.     Staff physician to contact patient's insurance company to discuss further details regarding -SNF placement (as below.)      Antithrombotics for secondary stroke prevention:  Eliquis 2.5 mg BID   Statins for secondary stroke prevention and hyperlipidemia, if present:   Statins: Atorvastatin- 40 mg daily  Aggressive risk factor modification: HTN, HLD, Diet, Exercise, A-Fib, CAD  Rehab efforts: The patient has been evaluated by a stroke team provider and the therapy needs have been fully considered based off the presenting complaints and exam findings. The following therapy evaluations are needed: PT evaluate and treat, OT evaluate and treat, SLP evaluate and treat, PM&R evaluate for appropriate placement -- Dispo [facility]   Diagnostics ordered/pending: None  VTE prophylaxis: Eliquis 2.5 mg BID, SCDs  BP parameters: SBP <160    Person awaiting admission to adequate facility elsewhere  Difficult placement due to patient living in [place] and visiting daughter when stroke occurred - therapy teams recommending inpatient  rehab  At this time, patient is unable to fly back to [place]. He is not capable of flying due to inability to ambulate without trained medical assistance, impaired functional mobility, impaired cardiopulmonary response to activity, urine and bowel incontinence, and decreased mental capacity related to stroke and underlying diagnosis of dementia.   10/17 - Spoke to Dr. Krishna of [facility] [employer] who requested documentation of pt's current functional status stating why pt is unable to take commercial flight back to [place].  sent updated notes.   10/23 - Dr. Krishna requesting phone call with staff physician to discuss further details regarding SS-SNF placement for patient.    SIADH (syndrome of inappropriate ADH production)  -- Na 133 > 131 > 134 > 133  -- Continue 1L fluid restriction.  -- Daily BMP, continue to monitor    Intracranial atherosclerosis  -- Noted on CTA head   -- On Eliquis and Atorvastatin 40 mg     Chronic combined systolic and diastolic congestive heart failure  Stroke risk factor  -- pro BNP from Feb 2019 >3000  -- On Lasix and Spironolactone at home.  -- TTE shows EF 35%, +WMAs  -- Coreg 3.125mg BID.   -- Held Lasix for AMRIT, however reviewed outside records from [facility], and Cr range 1.5 -2.6. Patient likely at baseline. Restarted Lasix at reduced-dose 20 mg daily.    -- daily weights, strict I&Os    AMRIT (acute kidney injury)  -- BUN/Cr elevated on admission, initially improved, then worsening again. Likely at baseline per outside chart review of [facility] (Cr range 1.5 - 2.6)  -- Pt appears euvolemic on exam  -- Urine Na and Cr normal   -- Lasix restarted at reduced dose.   -- Will avoid nephrotoxic meds. (Eliquis renally dosed.)    Erythema of hand  -- Patient previously with erythema of R hand from IV infiltration.  -- Bactroban cream TID X 10 days  -- Elevation of R hand   -- Pt denied complaints of pain on 10/22.    Paroxysmal atrial fibrillation  Stroke risk  factor  -- Per Care Everywhere; not previously on AC - Dr. Menjivar (Cardiologist) had report patient not a good candidate due to risk of falls, non-compliance, and dementia.  -- Noted on EKG at admit.  -- Remains rate-controlled at this time.  -- Eliquis 2.5 BID  Continue tele    Essential hypertension  Stroke risk factor  -- SBP < 160  -- At goal on current regimen -- Coreg 3.125 mg BID, Norvasc 10 mg Daily, and Lisinopril 20mg Daily.  Will continue to monitor.    Dementia  -- Continue Donepezil 5 mg daily     Occlusion of left carotid artery  -- Seen on CTA  -- Suspect chronic occlusion   Eliquis, statin    Cytotoxic cerebral edema  Area of cytotoxic cerebral edema identified when reviewing brain imaging in the territory of the R/L middle cerebral artery. There is not mass effect associated with it. We will continue to monitor the patients clinical exam for any worsening of symptoms which may indicate expansion of the stroke or the area of the edema resulting in the clinical change. The pattern is suggestive of cardioembolic etiology.    CAD (coronary artery disease)  Stroke risk factor  -- Noted on Care Everywhere  -- S/p RCA stent 7/2018  -- Was prescribed Plavix, statin, and Ranexa but unclear if patient was taking them.  -- Eliquis 2.5 BID, Atorvastatin 40 mg daily     Nodule of right lung  -- 1 cm nodule of R lung seen on CTA  -- Will need follow up in 3- 6 months   -- Discussed with daughter     Acquired hypothyroidism  Stroke risk factor  TSH 3.032  -- Continue Synthroid 25mcg daily       10/12 - Etiology likely cardioembolic. Will start Eliquis 2.5 mg BID. Creatinine trending down - may start patient on Eliquis 5 mg BID if continue to decrease. L ICA chronically occluded - no need to vasculare intervention.   10/13 - Spoke with PT - patient would benefit from inpatient rehab placement. Norvasc 5 mg ordered. Urine studies ordered for hyponatremia - suspect to be due to dehydration.   10/14 Patient with  SIADH but sodium stable.  1 liter fluid restriction.    10/15  Irritation around right hand peripheral IV site.  Sodium 131 today.  Discussed with daughter that he is on fluid restriction and she will stop providing him with outside drinks.     10/16 - Sodium increasing 132. Bactroban ordered for right hand IV infiltration. Working on placement.   10/17 - Sodium continues to improve. Creatinine 1.5 likely near baseline, decreased eliquis to 2.5 mg. R hand erythema stable.   10/19/2019 Sodium 134. Awake and alert but remains confused. No pain. Pending placement  10/20/2019 Complained of chest discomfort overnight. EKG showed no ST wave changes, Trop normal.   10/21 - Renal function improving. Hyponatremic, continue 1L fluid restriction, will likely restart lasix tomorrow, no current signs of fluid overload. Awake and alert, oriented to person and place, disoriented to time.   10/22 Reviewed outside imaging, renal function likely at baseline, restart home lasix at reduced dose of 20 mg daily, continue to monitor. Daily weights and strict I&Os.   10/23: Attempting to transfer patient to a bed on [facility] floor. Staff physician to contact patient's insurance company to discuss further details regarding SS-SNF placement.    STROKE DOCUMENTATION   Acute Stroke Times   Last Known Normal Date: 10/10/19  Last Known Normal Time: 1905  Symptom Onset Date: 10/10/19  Symptom Onset Time: 1905  Stroke Team Called Date: 10/10/19  Stroke Team Called Time: 2120  Stroke Team Arrival Date: 10/10/19  Stroke Team Arrival Time: 2122    NIH Scale:  1a. Level of Consciousness: 0-->Alert, keenly responsive  1b. LOC Questions: 2-->Answers neither question correctly  1c. LOC Commands: 0-->Performs both tasks correctly  2. Best Gaze: 0-->Normal  3. Visual: 0-->No visual loss  4. Facial Palsy: 1-->Minor paralysis (flattened nasolabial fold, asymmetry on smiling)  5a. Motor Arm, Left: 2-->Some effort against gravity, limb cannot get to or maintain (if  cued) 90 (or 45) degrees, drifts down to bed, but has some effort against gravity  5b. Motor Arm, Right: 0-->No drift, limb holds 90 (or 45) degrees for full 10 secs  6a. Motor Leg, Left: 0-->No drift, leg holds 30 degree position for full 5 secs  6b. Motor Leg, Right: 0-->No drift, leg holds 30 degree position for full 5 secs  7. Limb Ataxia: 0-->Absent  8. Sensory: 0-->Normal, no sensory loss  9. Best Language: 0-->No aphasia, normal  10. Dysarthria: 1-->Mild-to-moderate dysarthria, patient slurs at least some words and, at worst, can be understood with some difficulty  11. Extinction and Inattention (formerly Neglect): 0-->No abnormality  Total (NIH Stroke Scale): 6       Modified Berlin Score: 1  Fiatt Coma Scale:    ABCD2 Score:    [id number] Score:   HAS -BLED Score:   ICH Score:   Hunt & Stroud Classification:      Hemorrhagic change of an Ischemic Stroke: Does this patient have an ischemic stroke with hemorrhagic changes? No     Neurologic Chief Complaint: R-sided weakness and aphasia    Subjective:     Interval History: Patient is seen for follow-up neurological assessment and treatment recommendations: SIRIA. Attempting to transfer patient to a bed on [facility] floor. Staff physician to contact patient's insurance company to discuss further details regarding SS-SNF placement.    HPI, Past Medical, Family, and Social History remains the same as documented in the initial encounter.     Review of Systems   Constitutional: Negative for fatigue and fever.   Gastrointestinal: Negative for nausea and vomiting.   Neurological: Positive for facial asymmetry, speech difficulty and weakness.   Psychiatric/Behavioral: Negative for confusion and decreased concentration.     Scheduled Meds:   amLODIPine  10 mg Oral Daily    apixaban  2.5 mg Oral BID    atorvastatin  40 mg Oral Daily    carvedilol  3.125 mg Oral BID    donepezil  5 mg Oral QHS    furosemide  20 mg Oral Daily    levothyroxine  25 mcg Oral Daily     mupirocin   Topical (Top) TID    senna  8.6 mg Oral Daily     Continuous Infusions:  PRN Meds:hydrALAZINE, influenza, sodium chloride 0.9%    Objective:     Vital Signs (Most Recent):  Temp: 98.5 °F (36.9 °C) (10/23/19 1257)  Pulse: 69 (10/23/19 1257)  Resp: 18 (10/23/19 1257)  BP: 136/63 (10/23/19 1257)  SpO2: 96 % (10/23/19 1257)  BP Location: Left arm    Vital Signs Range (Last 24H):  Temp:  [97.6 °F (36.4 °C)-98.5 °F (36.9 °C)]   Pulse:  []   Resp:  [16-20]   BP: (131-171)/(56-87)   SpO2:  [94 %-99 %]   BP Location: Left arm    Physical Exam   Constitutional: He appears well-developed and well-nourished. No distress.   HENT:   Head: Normocephalic and atraumatic.   Eyes: Conjunctivae and EOM are normal.   Cardiovascular: Normal rate.   Pulmonary/Chest: Effort normal. No respiratory distress.   Musculoskeletal: He exhibits no edema or deformity.   Neurological: He is alert. No sensory deficit. He exhibits normal muscle tone.   Skin: Skin is warm and dry.   Psychiatric: He expresses impulsivity. He is attentive.       Neurological Exam:   LOC: alert  Attention Span: Good   Language: No aphasia, questionable baseline mental status  Articulation: Dysarthria  Orientation: Oriented to person; Not oriented to age, time  Visual Fields: Full  EOM (CN III, IV, VI): Full/intact  Facial Movement (CN VII): Lower facial weakness on the Right  Motor: Arm left  Paresis: 3/5  Leg left  Paresis: 4/5  Arm right  Normal 5/5  Leg right Normal 5/5  Sensation: Intact to light touch, temperature and vibration  Tone: Normal tone throughout    Laboratory:  CMP:   Recent Labs   Lab 10/23/19  0432   CALCIUM 8.7   *   K 4.2   CO2 30*   CL 98   BUN 32*   CREATININE 1.6*     BMP:   Recent Labs   Lab 10/23/19  0432   *   K 4.2   CL 98   CO2 30*   BUN 32*   CREATININE 1.6*   CALCIUM 8.7     CBC: No results for input(s): WBC, RBC, HGB, HCT, PLT, MCV, MCH, MCHC in the last 168 hours.  Lipid Panel: No results for input(s): CHOL,  LDLCALC, HDL, TRIG in the last 168 hours.  Coagulation: No results for input(s): PT, INR, APTT in the last 168 hours.  Platelet Aggregation Study: No results for input(s): PLTAGG, PLTAGINTERP, PLTAGREGLACO, ADPPLTAGGREG in the last 168 hours.  Hgb A1C: No results for input(s): HGBA1C in the last 168 hours.  TSH: No results for input(s): TSH in the last 168 hours.      Diagnostic Results     Brain/Vessel Imaging:     MRI Brain WO Contrast (10/11/2019) -         Areas of abnormal T2/FLAIR/diffusion signal abnormality consistent with areas of acute ischemia/infarct involving the cerebral hemispheres bilaterally.  There is signal abnormality associated with the left internal carotid artery likely corresponding to the appearance of occlusion on the CTA examination.     CTA Multiphase (10/10/2019) -      Occluded left ICA with reconstitution of the distal/supraclinoid segment by retrograde flow through [id number].  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  Right vertebral artery supply the basilar artery.  Basilar artery is small in caliber with wall irregularity and intermittent occlusions.  Partially calcified hyperattenuating lesion in the lateral aspect of the cavernous sinus abutting the distal right ICA, likely representing calcified meningioma.  Generalized cerebral volume loss and remote lacunar type infarct in the right caudate head.       Cardiac Imaging:     TTE (10/11/2019) -   · Moderately decreased left ventricular systolic function. The estimated ejection fraction is 35%.  · Concentric left ventricular remodeling.  · Left ventricular diastolic dysfunction.  · Local segmental wall motion abnormalities.  · Severe left atrial enlargement.  · Mild right atrial enlargement.  · Mild aortic regurgitation.  · Moderate aortic valve stenosis but difficult to appreciate in the  setting of AF, depressed EF, and low stroke volume.  · Aortic valve area is 1.39 cm2; peak velocity is 1.28 m/s; mean gradient is 4 mmHg.    Elevated central venous pressure (15 mm Hg).      Rosalia Cheatham PA-C  [facility]  Department of Vascular Neurology   [facility]

## 2019-10-23 NOTE — ASSESSMENT & PLAN NOTE
Stroke risk factor  -- Per Care Everywhere; not previously on AC - Dr. Menjivar (Cardiologist) had report patient not a good candidate due to risk of falls, non-compliance, and dementia.  -- Noted on EKG at admit.  -- Remains rate-controlled at this time.  -- Eliquis 2.5 BID  Continue tele

## 2019-10-23 NOTE — PT/OT/SLP PROGRESS
Speech Language Pathology Treatment    Patient Name:  Izaiah Douglas   MRN:  [id number]  Admitting Diagnosis: Acute arterial ischemic stroke, multifocal, multiple vascular territories    Recommendations:                 General Recommendations:  Cognitive-linguistic therapy  Diet recommendations:  Regular, Liquid Diet Level: Thin   Aspiration Precautions: Small bites/sips and Standard aspiration precautions   General Precautions: Standard, fall  Communication strategies:  provide increased time to answer and go to room if call light pushed    Subjective     Patient awake and alert during session  Communicated with nurse prior to session    Pain/Comfort:  Pain Rating 1: 0/10  Pain Rating Post-Intervention 1: 0/10    Objective:     Has the patient been evaluated by SLP for swallowing?   Yes  Keep patient NPO? No   Current Respiratory Status: room air      Patient seen for ongoing cognitive-linguistic therapy. He was sitting up in bed during session with telesitter in place. Patient continued to be oriented to self only despite max cueing. Given binary choice for orientation questions, patient was 25% accurate. He correctly answered 3/6 general recall questions with mod assist. He answered 6/10 reasoning questions using analogies with mod assist. Following a 5-minute interval, patient was unable to recall orientation information despite max cueing. Patient's response time continued to be delayed, but he required fewer redirections during this session than in prior. SLP educated patient regarding POC from a ST perspective, but he would benefit from ongoing instruction. He was left in bed with call light within reach.     Assessment:     Izaiah Douglas is a 85 y.o. male with an SLP diagnosis of Cognitive-Linguistic Impairment. .    Goals:   Multidisciplinary Problems     SLP Goals        Problem: SLP Goal    Goal Priority Disciplines Outcome   SLP Goal     SLP Ongoing, Progressing   Description:  Speech Therapy Short Term  Goals  Goal expected to be met by 10/25  1. Pt will participate in an ongoing assessment to determine the least restrictive and safest diet with possible updated goals to follow pending results.  2. The patient will answer orientation questions x4 with 90% acc no cues.   3. The patient will name 8 items in a concrete category given min cues.   4. The patient will answer complex y/n questions with 85% acc no cues.   5. The patient will follow 2+ step directions with 75% acc given 1 redirection.   6. The patient will participate in an ongoing assessment of speech, language, and cognition with updated goals to follow pending results and progress.                     Plan:     · Patient to be seen:  3 x/week   · Plan of Care expires:  11/09/19  · Plan of Care reviewed with:  patient   · SLP Follow-Up:  Yes       Discharge recommendations:  rehabilitation facility   Barriers to Discharge:  Level of Skilled Assistance Needed     Time Tracking:     SLP Treatment Date:   10/23/19  Speech Start Time:  1008  Speech Stop Time:  1025     Speech Total Time (min):  17 min    Billable Minutes: Speech Therapy Individual 17    Royce Stearns CCC-SLP  Speech-Language Pathology  Pager: [phone]   10/23/2019

## 2019-10-23 NOTE — ASSESSMENT & PLAN NOTE
Stroke risk factor  -- SBP < 160  -- At goal on current regimen -- Coreg 3.125 mg BID, Norvasc 10 mg Daily, and Lisinopril 20mg Daily.  Will continue to monitor.

## 2019-10-23 NOTE — ASSESSMENT & PLAN NOTE
-- BUN/Cr elevated on admission, initially improved, then worsening again. Likely at baseline per outside chart review of [facility] (Cr range 1.5 - 2.6)  -- Pt appears euvolemic on exam  -- Urine Na and Cr normal   -- Lasix restarted at reduced dose.   -- Will avoid nephrotoxic meds. (Eliquis renally dosed.)

## 2019-10-23 NOTE — ASSESSMENT & PLAN NOTE
84 y/o man with PMHx chronic Afib (not on AC), CAD s/p stent, HTN, CHF, hypothyroidism who received IV-tPA at OSH after presenting with R-sided weakness, aphasia, and L gaze deviation. 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 No EVT was pursued. TTE shows an EF of 35%.    MRI Brain demonstrated scattered areas of infarct in the bilateral hemispheres, predominantly in the R frontal lobe. Etiology suspected cardioembolic in the setting of AFib, cardiomyopathy.     Staff physician to contact patient's insurance company to discuss further details regarding -SNF placement (as below.)      Antithrombotics for secondary stroke prevention:  Eliquis 2.5 mg BID   Statins for secondary stroke prevention and hyperlipidemia, if present:   Statins: Atorvastatin- 40 mg daily  Aggressive risk factor modification: HTN, HLD, Diet, Exercise, A-Fib, CAD  Rehab efforts: The patient has been evaluated by a stroke team provider and the therapy needs have been fully considered based off the presenting complaints and exam findings. The following therapy evaluations are needed: PT evaluate and treat, OT evaluate and treat, SLP evaluate and treat, PM&R evaluate for appropriate placement -- Dispo [facility] short-stay SNF   Diagnostics ordered/pending: None  VTE prophylaxis: Eliquis 2.5 mg BID, SCDs  BP parameters: SBP <160

## 2019-10-23 NOTE — ASSESSMENT & PLAN NOTE
Difficult placement due to patient living in [place] and visiting daughter when stroke occurred - therapy teams recommending inpatient rehab  At this time, patient is unable to fly back to [place]. He is not capable of flying due to inability to ambulate without trained medical assistance, impaired functional mobility, impaired cardiopulmonary response to activity, urine and bowel incontinence, and decreased mental capacity related to stroke and underlying diagnosis of dementia.   10/17 - Spoke to Dr. Krishna of [facility] who requested documentation of pt's current functional status stating why pt is unable to take commercial flight back to [place].  sent updated notes.   10/23 - Dr. Krishna requesting phone call with staff physician to discuss further details regarding SS-SNF placement for patient.

## 2019-10-23 NOTE — ASSESSMENT & PLAN NOTE
Area of cytotoxic cerebral edema identified when reviewing brain imaging in the territory of the R/L middle cerebral artery. There is not mass effect associated with it. We will continue to monitor the patients clinical exam for any worsening of symptoms which may indicate expansion of the stroke or the area of the edema resulting in the clinical change. The pattern is suggestive of cardioembolic etiology.

## 2019-10-23 NOTE — PLAN OF CARE
Problem: SLP Goal  Goal: SLP Goal  Description  Speech Therapy Short Term Goals  Goal expected to be met by 10/25  1. Pt will participate in an ongoing assessment to determine the least restrictive and safest diet with possible updated goals to follow pending results.  2. The patient will answer orientation questions x4 with 90% acc no cues.   3. The patient will name 8 items in a concrete category given min cues.   4. The patient will answer complex y/n questions with 85% acc no cues.   5. The patient will follow 2+ step directions with 75% acc given 1 redirection.   6. The patient will participate in an ongoing assessment of speech, language, and cognition with updated goals to follow pending results and progress.    Outcome: Ongoing, Progressing     Patient seen for ongoing cognitive therapy.     Royce Stearns CCC-SLP  Speech-Language Pathology  Pager: [phone]

## 2019-10-23 NOTE — ASSESSMENT & PLAN NOTE
Stroke risk factor  -- pro BNP from Feb 2019 >3000  -- On Lasix and Spironolactone at home.  -- TTE shows EF 35%, +WMAs  -- Coreg 3.125mg BID.   -- Held Lasix for AMRIT, however reviewed outside records from [facility], and Cr range 1.5 -2.6. Patient likely at baseline. Restarted Lasix at reduced-dose 20 mg daily.    -- daily weights, strict I&Os

## 2019-10-23 NOTE — PLAN OF CARE
LOC: Oriented to self, place; disoriented to time, situation.  SKIN: The skin is warm, dry. Skin tears to R/L forearms; dressings CDI.  RESPIRATORY: Respirations are WNL, even and unlabored. Normal effort and rate noted. No accessory muscle use noted. Patient on room air.  CARDIAC: Patient runs Afib on the monitor (60's-80's).  ABDOMEN: Soft and non tender to palpation. No distention noted.   URINARY: has urinal at bedside.   EXTREMITIES: Extremities are WNL; general weakness throughout.  Neuro: Pt. is calm and cooperative with care.       POC reviewed with patient and daughter. VSS. Patient free of injuries and falls. Patient has tele sitter in the room; told patient to call before getting out of bed. Patient has no c/o pain or discomfort. Applied mupirocin ointment to right hand. Patient awaiting SNF placement. All questions were addressed. Will continue to monitor.

## 2019-10-23 NOTE — ASSESSMENT & PLAN NOTE
-- Patient previously with erythema of R hand from IV infiltration.  -- Bactroban cream TID X 10 days  -- Elevation of R hand   -- Pt denied complaints of pain on 10/22.

## 2019-10-23 NOTE — PLAN OF CARE
Goals reviewed and remain appropriate.     Genevieve Garnica OTR/L  Occupational Therapy  Pager #: [phone]  10/23/2019    Problem: Occupational Therapy Goal  Goal: Occupational Therapy Goal  Description  Goals to be met by: 11/4    Patient will increase functional independence with ADLs by performing:    UE Dressing with Supervision.  LE Dressing with Supervision.  Grooming while standing with Supervision.  Toileting from toilet with Supervision for hygiene and clothing management.   Toilet transfer with Supervision.      Outcome: Ongoing, Progressing

## 2019-10-23 NOTE — PLAN OF CARE
A A O x 4,. Free of falls, traumas and injuries. Skin intact. Denies shortness of breath, chest pain, nausea, vomiting. Avasys in use. Patient went on multiple walks around unit with staff throughout shift. Patient got agitated and started throwing things in room because he stated he needed attention. Waiting on SNF placement. Plan of care reviewed with patient. Vital signs stable. Pain monitored. Will continue to monitor.

## 2019-10-23 NOTE — SUBJECTIVE & OBJECTIVE
Neurologic Chief Complaint: R-sided weakness and aphasia    Subjective:     Interval History: Patient is seen for follow-up neurological assessment and treatment recommendations: SIRIA. Attempting to transfer patient to a bed on NSU floor. Staff physician to contact patient's insurance company to discuss further details regarding SS-SNF placement.    HPI, Past Medical, Family, and Social History remains the same as documented in the initial encounter.     Review of Systems   Constitutional: Negative for fatigue and fever.   Gastrointestinal: Negative for nausea and vomiting.   Neurological: Positive for facial asymmetry, speech difficulty and weakness.   Psychiatric/Behavioral: Negative for confusion and decreased concentration.     Scheduled Meds:   amLODIPine  10 mg Oral Daily    apixaban  2.5 mg Oral BID    atorvastatin  40 mg Oral Daily    carvedilol  3.125 mg Oral BID    donepezil  5 mg Oral QHS    furosemide  20 mg Oral Daily    levothyroxine  25 mcg Oral Daily    mupirocin   Topical (Top) TID    senna  8.6 mg Oral Daily     Continuous Infusions:  PRN Meds:hydrALAZINE, influenza, sodium chloride 0.9%    Objective:     Vital Signs (Most Recent):  Temp: 98.5 °F (36.9 °C) (10/23/19 1257)  Pulse: 69 (10/23/19 1257)  Resp: 18 (10/23/19 1257)  BP: 136/63 (10/23/19 1257)  SpO2: 96 % (10/23/19 1257)  BP Location: Left arm    Vital Signs Range (Last 24H):  Temp:  [97.6 °F (36.4 °C)-98.5 °F (36.9 °C)]   Pulse:  []   Resp:  [16-20]   BP: (131-171)/(56-87)   SpO2:  [94 %-99 %]   BP Location: Left arm    Physical Exam   Constitutional: He appears well-developed and well-nourished. No distress.   HENT:   Head: Normocephalic and atraumatic.   Eyes: Conjunctivae and EOM are normal.   Cardiovascular: Normal rate.   Pulmonary/Chest: Effort normal. No respiratory distress.   Musculoskeletal: He exhibits no edema or deformity.   Neurological: He is alert. No sensory deficit. He exhibits normal muscle tone.   Skin:  Skin is warm and dry.   Psychiatric: He expresses impulsivity. He is attentive.       Neurological Exam:   LOC: alert  Attention Span: Good   Language: No aphasia, questionable baseline mental status  Articulation: Dysarthria  Orientation: Oriented to person; Not oriented to age, time  Visual Fields: Full  EOM (CN III, IV, VI): Full/intact  Facial Movement (CN VII): Lower facial weakness on the Right  Motor: Arm left  Paresis: 3/5  Leg left  Paresis: 4/5  Arm right  Normal 5/5  Leg right Normal 5/5  Sensation: Intact to light touch, temperature and vibration  Tone: Normal tone throughout    Laboratory:  CMP:   Recent Labs   Lab 10/23/19  0432   CALCIUM 8.7   *   K 4.2   CO2 30*   CL 98   BUN 32*   CREATININE 1.6*     BMP:   Recent Labs   Lab 10/23/19  0432   *   K 4.2   CL 98   CO2 30*   BUN 32*   CREATININE 1.6*   CALCIUM 8.7     CBC: No results for input(s): WBC, RBC, HGB, HCT, PLT, MCV, MCH, MCHC in the last 168 hours.  Lipid Panel: No results for input(s): CHOL, LDLCALC, HDL, TRIG in the last 168 hours.  Coagulation: No results for input(s): PT, INR, APTT in the last 168 hours.  Platelet Aggregation Study: No results for input(s): PLTAGG, PLTAGINTERP, PLTAGREGLACO, ADPPLTAGGREG in the last 168 hours.  Hgb A1C: No results for input(s): HGBA1C in the last 168 hours.  TSH: No results for input(s): TSH in the last 168 hours.      Diagnostic Results     Brain/Vessel Imaging:     MRI Brain WO Contrast (10/11/2019) -         Areas of abnormal T2/FLAIR/diffusion signal abnormality consistent with areas of acute ischemia/infarct involving the cerebral hemispheres bilaterally.  There is signal abnormality associated with the left internal carotid artery likely corresponding to the appearance of occlusion on the CTA examination.     CTA Multiphase (10/10/2019) -      Occluded left ICA with reconstitution of the distal/supraclinoid segment by retrograde flow through [id number].  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  Right vertebral artery supply the basilar artery.  Basilar artery is small in caliber with wall irregularity and intermittent occlusions.  Partially calcified hyperattenuating lesion in the lateral aspect of the cavernous sinus abutting the distal right ICA, likely representing calcified meningioma.  Generalized cerebral volume loss and remote lacunar type infarct in the right caudate head.       Cardiac Imaging:     TTE (10/11/2019) -   · Moderately decreased left ventricular systolic function. The estimated ejection fraction is 35%.  · Concentric left ventricular remodeling.  · Left ventricular diastolic dysfunction.  · Local segmental wall motion abnormalities.  · Severe left atrial enlargement.  · Mild right atrial enlargement.  · Mild aortic regurgitation.  · Moderate aortic valve stenosis but difficult to appreciate in the setting of AF, depressed EF, and low stroke volume.  · Aortic valve area is 1.39 cm2; peak velocity is 1.28 m/s; mean gradient is 4 mmHg.    Elevated central venous pressure (15 mm Hg).

## 2019-10-24 LAB
ANION GAP SERPL CALC-SCNC: 7 MMOL/L (ref 8–16)
BASOPHILS # BLD AUTO: 0.04 K/UL (ref 0–0.2)
BASOPHILS NFR BLD: 0.6 % (ref 0–1.9)
BUN SERPL-MCNC: 34 MG/DL (ref 8–23)
CALCIUM SERPL-MCNC: 8.6 MG/DL (ref 8.7–10.5)
CHLORIDE SERPL-SCNC: 98 MMOL/L (ref 95–110)
CO2 SERPL-SCNC: 27 MMOL/L (ref 23–29)
CREAT SERPL-MCNC: 1.6 MG/DL (ref 0.5–1.4)
DIFFERENTIAL METHOD: ABNORMAL
EOSINOPHIL # BLD AUTO: 0.3 K/UL (ref 0–0.5)
EOSINOPHIL NFR BLD: 3.8 % (ref 0–8)
ERYTHROCYTE [DISTWIDTH] IN BLOOD BY AUTOMATED COUNT: 13.7 % (ref 11.5–14.5)
EST. GFR  (AFRICAN AMERICAN): 44.7 ML/MIN/1.73 M^2
EST. GFR  (NON AFRICAN AMERICAN): 38.7 ML/MIN/1.73 M^2
GLUCOSE SERPL-MCNC: 101 MG/DL (ref 70–110)
HCT VFR BLD AUTO: 40.2 % (ref 40–54)
HGB BLD-MCNC: 12 G/DL (ref 14–18)
IMM GRANULOCYTES # BLD AUTO: 0.03 K/UL (ref 0–0.04)
IMM GRANULOCYTES NFR BLD AUTO: 0.5 % (ref 0–0.5)
LYMPHOCYTES # BLD AUTO: 1.4 K/UL (ref 1–4.8)
LYMPHOCYTES NFR BLD: 20.9 % (ref 18–48)
MAGNESIUM SERPL-MCNC: 1.9 MG/DL (ref 1.6–2.6)
MCH RBC QN AUTO: 26 PG (ref 27–31)
MCHC RBC AUTO-ENTMCNC: 29.9 G/DL (ref 32–36)
MCV RBC AUTO: 87 FL (ref 82–98)
MONOCYTES # BLD AUTO: 0.9 K/UL (ref 0.3–1)
MONOCYTES NFR BLD: 13.1 % (ref 4–15)
NEUTROPHILS # BLD AUTO: 4 K/UL (ref 1.8–7.7)
NEUTROPHILS NFR BLD: 61.1 % (ref 38–73)
NRBC BLD-RTO: 0 /100 WBC
PHOSPHATE SERPL-MCNC: 3.2 MG/DL (ref 2.7–4.5)
PLATELET # BLD AUTO: 162 K/UL (ref 150–350)
PMV BLD AUTO: 10.5 FL (ref 9.2–12.9)
POTASSIUM SERPL-SCNC: 4.2 MMOL/L (ref 3.5–5.1)
RBC # BLD AUTO: 4.61 M/UL (ref 4.6–6.2)
SODIUM SERPL-SCNC: 132 MMOL/L (ref 136–145)
WBC # BLD AUTO: 6.51 K/UL (ref 3.9–12.7)

## 2019-10-24 PROCEDURE — 99233 SBSQ HOSP IP/OBS HIGH 50: CPT | Mod: ,,, | Performed by: PSYCHIATRY & NEUROLOGY

## 2019-10-24 PROCEDURE — 85025 COMPLETE CBC W/AUTO DIFF WBC: CPT

## 2019-10-24 PROCEDURE — 20600001 HC STEP DOWN PRIVATE ROOM

## 2019-10-24 PROCEDURE — 25000003 PHARM REV CODE 250: Performed by: FAMILY MEDICINE

## 2019-10-24 PROCEDURE — 36415 COLL VENOUS BLD VENIPUNCTURE: CPT

## 2019-10-24 PROCEDURE — 99233 PR SUBSEQUENT HOSPITAL CARE,LEVL III: ICD-10-PCS | Mod: ,,, | Performed by: PSYCHIATRY & NEUROLOGY

## 2019-10-24 PROCEDURE — 25000003 PHARM REV CODE 250: Performed by: STUDENT IN AN ORGANIZED HEALTH CARE EDUCATION/TRAINING PROGRAM

## 2019-10-24 PROCEDURE — 25000003 PHARM REV CODE 250: Performed by: PSYCHIATRY & NEUROLOGY

## 2019-10-24 PROCEDURE — 83735 ASSAY OF MAGNESIUM: CPT

## 2019-10-24 PROCEDURE — 84100 ASSAY OF PHOSPHORUS: CPT

## 2019-10-24 PROCEDURE — 97116 GAIT TRAINING THERAPY: CPT

## 2019-10-24 PROCEDURE — 80048 BASIC METABOLIC PNL TOTAL CA: CPT

## 2019-10-24 PROCEDURE — 25000003 PHARM REV CODE 250: Performed by: NURSE PRACTITIONER

## 2019-10-24 PROCEDURE — 97110 THERAPEUTIC EXERCISES: CPT

## 2019-10-24 RX ADMIN — CARVEDILOL 3.12 MG: 3.12 TABLET, FILM COATED ORAL at 08:10

## 2019-10-24 RX ADMIN — ATORVASTATIN CALCIUM 40 MG: 20 TABLET, FILM COATED ORAL at 09:10

## 2019-10-24 RX ADMIN — MUPIROCIN: 20 OINTMENT TOPICAL at 10:10

## 2019-10-24 RX ADMIN — CARVEDILOL 3.12 MG: 3.12 TABLET, FILM COATED ORAL at 09:10

## 2019-10-24 RX ADMIN — APIXABAN 2.5 MG: 2.5 TABLET, FILM COATED ORAL at 08:10

## 2019-10-24 RX ADMIN — SENNOSIDES 8.6 MG: 8.6 TABLET, FILM COATED ORAL at 09:10

## 2019-10-24 RX ADMIN — APIXABAN 2.5 MG: 2.5 TABLET, FILM COATED ORAL at 09:10

## 2019-10-24 RX ADMIN — FUROSEMIDE 20 MG: 20 TABLET ORAL at 09:10

## 2019-10-24 RX ADMIN — LEVOTHYROXINE SODIUM 25 MCG: 25 TABLET ORAL at 06:10

## 2019-10-24 RX ADMIN — DONEPEZIL HYDROCHLORIDE 5 MG: 5 TABLET, FILM COATED ORAL at 08:10

## 2019-10-24 RX ADMIN — MUPIROCIN: 20 OINTMENT TOPICAL at 03:10

## 2019-10-24 RX ADMIN — AMLODIPINE BESYLATE 10 MG: 10 TABLET ORAL at 09:10

## 2019-10-24 NOTE — PLAN OF CARE
Patient is free of fall/trauma/injury. Denies CP, SOB, or pain/discomfort. VSS. Pt remains on tele. Bed alarm on, telesitter in room. Pt oriented to self. Discharge to SNF pending insurance approval.  All questions addressed. Will continue to monitor

## 2019-10-24 NOTE — PROGRESS NOTES
[facility]  Vascular Neurology  Comprehensive Stroke Center  Progress Note    Assessment/Plan:     * Acute arterial ischemic stroke, multifocal, multiple vascular territories  84 y/o man with PMHx chronic Afib (not on AC), CAD s/p stent, HTN, CHF, hypothyroidism who received IV-tPA at OSH after presenting with R-sided weakness, aphasia, and L gaze deviation. 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 No EVT was pursued. TTE shows an EF of 35%.    MRI Brain demonstrated scattered areas of infarct in the bilateral hemispheres, predominantly in the R frontal lobe. Etiology suspected likely hypoperfusion/watershed in the setting of vascular abnormalities, though differential also includes cardioembolic in the setting of AFib, cardiomyopathy.     Staff physician to re-attempt contacting patient's insurance company to discuss further details regarding -SNF placement (as below.) Pt remains medically and neurologically stable.      Antithrombotics for secondary stroke prevention:  Eliquis 2.5 mg BID   Statins for secondary stroke prevention and hyperlipidemia, if present:   Statins: Atorvastatin- 40 mg daily  Aggressive risk factor modification: HTN, HLD, Diet, Exercise, A-Fib, CAD  Rehab efforts: The patient has been evaluated by a stroke team provider and the therapy needs have been fully considered based off the presenting complaints and exam findings. The following therapy evaluations are needed: PT evaluate and treat, OT evaluate and treat, SLP evaluate and treat, PM&R evaluate for appropriate placement -- [facility]po [facility]   Diagnostics ordered/pending: None  VTE prophylaxis: Eliquis 2.5 mg BID, SCDs  BP parameters: SBP <160    Person awaiting admission  to adequate facility elsewhere  Difficult placement due to patient living in [place] and visiting daughter when stroke occurred - therapy teams recommending inpatient rehab  At this time, patient is unable to fly back to [place]. He is not capable of flying due to inability to ambulate without trained medical assistance, impaired functional mobility, impaired cardiopulmonary response to activity, urine and bowel incontinence, and decreased mental capacity related to stroke and underlying diagnosis of dementia.   10/17 - Spoke to Dr. Krishna of [employer] insurance who requested documentation of pt's current functional status stating why pt is unable to take commercial flight back to [place]. CM sent updated notes.   10/23-24: Dr. Krishna requesting phone call with staff physician to discuss further details regarding SS-SNF placement for patient. Staff attempted call yesterday, however no answer, left voicemail. Will re-attempt phone call today. Pt remains medically and neurologically stable at this time.    SIADH (syndrome of inappropriate ADH production)  -- Na 133 > 131 > 134 > 133 > 132  -- Continue 1L fluid restriction. May consider increasing restriction if Na downtrend continues.   -- Daily BMP, continue to monitor    Intracranial atherosclerosis  -- Noted on CTA head   -- On Eliquis and Atorvastatin 40 mg     Chronic combined systolic and diastolic congestive heart failure  Stroke risk factor  -- pro BNP from Feb 2019 >3000  -- On Lasix and Spironolactone at home.  -- TTE shows EF 35%, +WMAs  -- Coreg 3.125mg BID.   -- Held Lasix for AMRIT, however reviewed outside records from [facility], and Cr range 1.5 -2.6. Patient likely at baseline. Restarted Lasix at reduced-dose 20 mg daily.    -- daily weights, strict I&Os    AMRIT (acute kidney injury)  -- BUN/Cr elevated on admission, initially improved, then worsening again. Likely at baseline per outside chart review of [facility] (Cr range  1.5 - 2.6)  -- Pt appears euvolemic on exam  -- Urine Na and Cr normal   -- Lasix restarted at reduced dose.   -- Will avoid nephrotoxic meds. (Eliquis renally dosed.)    Erythema of hand  -- Patient previously with erythema of R hand from IV infiltration.  -- Bactroban cream TID X 10 days (end date 10/26)  -- Elevation of R hand   -- Pt denied complaints of pain on 10/22.    Paroxysmal atrial fibrillation  Stroke risk factor  -- Per Care Everywhere; not previously on AC - Dr. Menjivar (Cardiologist) had report patient not a good candidate due to risk of falls, non-compliance, and dementia.  -- Noted on EKG at admit.  -- Remains rate-controlled at this time.  -- Eliquis 2.5 BID  Continue tele    Essential hypertension  Stroke risk factor  -- SBP < 160  -- At goal on current regimen -- Coreg 3.125 mg BID, Norvasc 10 mg Daily, and Lisinopril 20mg Daily.  Will continue to monitor.    Dementia  -- Continue Donepezil 5 mg daily     Occlusion of left carotid artery  -- Seen on CTA  -- Suspect chronic occlusion   Eliquis, statin    Cytotoxic cerebral edema  Area of cytotoxic cerebral edema identified when reviewing brain imaging in the territory of the R/L middle cerebral artery. There is not mass effect associated with it. We will continue to monitor the patients clinical exam for any worsening of symptoms which may indicate expansion of the stroke or the area of the edema resulting in the clinical change. The pattern is suggestive of cardioembolic etiology.    CAD (coronary artery disease)  Stroke risk factor  -- Noted on Care Everywhere  -- S/p RCA stent 7/2018  -- Was prescribed Plavix, statin, and Ranexa but unclear if patient was taking them.  -- Eliquis 2.5 BID, Atorvastatin 40 mg daily     Nodule of right lung  -- 1 cm nodule of R lung seen on CTA  -- Will need follow up in 3- 6 months   -- Previously discussed with daughter     Acquired hypothyroidism  Stroke risk factor  TSH 3.032  -- Continue Synthroid 25mcg  daily       10/12 - Etiology likely cardioembolic. Will start Eliquis 2.5 mg BID. Creatinine trending down - may start patient on Eliquis 5 mg BID if continue to decrease. L ICA chronically occluded - no need to vasculare intervention.   10/13 - Spoke with PT - patient would benefit from inpatient rehab placement. Norvasc 5 mg ordered. Urine studies ordered for hyponatremia - suspect to be due to dehydration.   10/14 Patient with SIADH but sodium stable.  1 liter fluid restriction.    10/15  Irritation around right hand peripheral IV site.  Sodium 131 today.  Discussed with daughter that he is on fluid restriction and she will stop providing him with outside drinks.     10/16 - Sodium increasing 132. Bactroban ordered for right hand IV infiltration. Working on placement.   10/17 - Sodium continues to improve. Creatinine 1.5 likely near baseline, decreased eliquis to 2.5 mg. R hand erythema stable.   10/19/2019 Sodium 134. Awake and alert but remains confused. No pain. Pending placement  10/20/2019 Complained of chest discomfort overnight. EKG showed no ST wave changes, Trop normal.   10/21 - Renal function improving. Hyponatremic, continue 1L fluid restriction, will likely restart lasix tomorrow, no current signs of fluid overload. Awake and alert, oriented to person and place, disoriented to time.   10/22 Reviewed outside imaging, renal function likely at baseline, restart home lasix at reduced dose of 20 mg daily, continue to monitor. Daily weights and strict I&Os.   10/23: Attempting to transfer patient to a bed on [facility] floor. Staff physician to contact patient's insurance company to discuss further details regarding SS-SNF placement.  10/24: Staff physician contacted patient's insurance company yesterday, however no answer, left voicemail. Will re-attempt phone call today. Pt remains medically and neurologically stable.    STROKE DOCUMENTATION   Acute Stroke Times   Last Known Normal Date: 10/10/19  Last Known  Normal Time: 1905  Symptom Onset Date: 10/10/19  Symptom Onset Time: 1905  Stroke Team Called Date: 10/10/19  Stroke Team Called Time: 2120  Stroke Team Arrival Date: 10/10/19  Stroke Team Arrival Time: 2122    NIH Scale:  1a. Level of Consciousness: 0-->Alert, keenly responsive  1b. LOC Questions: 2-->Answers neither question correctly  1c. LOC Commands: 0-->Performs both tasks correctly  2. Best Gaze: 0-->Normal  3. Visual: 0-->No visual loss  4. Facial Palsy: 1-->Minor paralysis (flattened nasolabial fold, asymmetry on smiling)  5a. Motor Arm, Left: 2-->Some effort against gravity, limb cannot get to or maintain (if cued) 90 (or 45) degrees, drifts down to bed, but has some effort against gravity  5b. Motor Arm, Right: 0-->No drift, limb holds 90 (or 45) degrees for full 10 secs  6a. Motor Leg, Left: 0-->No drift, leg holds 30 degree position for full 5 secs  6b. Motor Leg, Right: 0-->No drift, leg holds 30 degree position for full 5 secs  7. Limb Ataxia: 0-->Absent  8. Sensory: 0-->Normal, no sensory loss  9. Best Language: 0-->No aphasia, normal  10. Dysarthria: 1-->Mild-to-moderate dysarthria, patient slurs at least some words and, at worst, can be understood with some difficulty  11. Extinction and Inattention (formerly Neglect): 0-->No abnormality  Total (NIH Stroke Scale): 6       Modified [place] Score: 1  [place] Coma Scale:    ABCD2 Score:    [id number] Score:   HAS -BLED Score:   ICH Score:   Hunt & Stroud Classification:      Hemorrhagic change of an Ischemic Stroke: Does this patient have an ischemic stroke with hemorrhagic changes? No     Neurologic Chief Complaint: R-sided weakness and aphasia    Subjective:     Interval History: Patient is seen for follow-up neurological assessment and treatment recommendations: SIRIA. Staff physician contacted patient's insurance company yesterday, however no answer, left voicemail. Will re-attempt phone call today. Pt remains medically and neurologically  stable.    HPI, Past Medical, Family, and Social History remains the same as documented in the initial encounter.     Review of Systems   Constitutional: Negative for fatigue and fever.   Gastrointestinal: Negative for nausea and vomiting.   Neurological: Positive for facial asymmetry, speech difficulty and weakness.   Psychiatric/Behavioral: Negative for confusion and decreased concentration.     Scheduled Meds:   amLODIPine  10 mg Oral Daily    apixaban  2.5 mg Oral BID    atorvastatin  40 mg Oral Daily    carvedilol  3.125 mg Oral BID    donepezil  5 mg Oral QHS    furosemide  20 mg Oral Daily    levothyroxine  25 mcg Oral Daily    mupirocin   Topical (Top) TID    senna  8.6 mg Oral Daily     Continuous Infusions:  PRN Meds:hydrALAZINE, influenza, sodium chloride 0.9%    Objective:     Vital Signs (Most Recent):  Temp: 97.8 °F (36.6 °C) (10/24/19 0925)  Pulse: 69 (10/24/19 0925)  Resp: 18 (10/24/19 0925)  BP: (!) 153/72 (10/24/19 0925)  SpO2: 96 % (10/24/19 0925)  BP Location: Left arm    Vital Signs Range (Last 24H):  Temp:  [97.5 °F (36.4 °C)-98.5 °F (36.9 °C)]   Pulse:  [56-86]   Resp:  [18]   BP: (129-153)/(63-75)   SpO2:  [94 %-98 %]   BP Location: Left arm    Physical Exam   Constitutional: He appears well-developed and well-nourished. No distress.   HENT:   Head: Normocephalic and atraumatic.   Eyes: Conjunctivae and EOM are normal.   Cardiovascular: Normal rate.   Pulmonary/Chest: Effort normal. No respiratory distress.   Musculoskeletal: He exhibits no edema or deformity.   Neurological: He is alert. No sensory deficit. He exhibits normal muscle tone.   Skin: Skin is warm and dry.   Psychiatric: He expresses impulsivity. He is attentive.       Neurological Exam:   LOC: alert  Attention Span: Good   Language: No aphasia, questionable baseline mental status  Articulation: Dysarthria  Orientation: Oriented to person; Not oriented to age, time  Visual Fields: Full  EOM (CN III, IV, VI):  Full/intact  Facial Movement (CN VII): Lower facial weakness on the Right  Motor: Arm left  Paresis: 3/5  Leg left  Paresis: 4/5  Arm right  Normal 5/5  Leg right Normal 5/5  Sensation: Intact to light touch, temperature and vibration  Tone: Normal tone throughout    Laboratory:  CMP:   Recent Labs   Lab 10/24/19  0441   CALCIUM 8.6*   *   K 4.2   CO2 27   CL 98   BUN 34*   CREATININE 1.6*     BMP:   Recent Labs   Lab 10/24/19  0441   *   K 4.2   CL 98   CO2 27   BUN 34*   CREATININE 1.6*   CALCIUM 8.6*     CBC:   Recent Labs   Lab 10/24/19  0441   WBC 6.51   RBC 4.61   HGB 12.0*   HCT 40.2      MCV 87   MCH 26.0*   MCHC 29.9*     Lipid Panel: No results for input(s): CHOL, LDLCALC, HDL, TRIG in the last 168 hours.  Coagulation: No results for input(s): PT, INR, APTT in the last 168 hours.  Platelet Aggregation Study: No results for input(s): PLTAGG, PLTAGINTERP, PLTAGREGLACO, ADPPLTAGGREG in the last 168 hours.  Hgb A1C: No results for input(s): HGBA1C in the last 168 hours.  TSH: No results for input(s): TSH in the last 168 hours.      Diagnostic Results     Brain/Vessel Imaging:     MRI Brain WO Contrast (10/11/2019) -         Areas of abnormal T2/FLAIR/diffusion signal abnormality consistent with areas of acute ischemia/infarct involving the cerebral hemispheres bilaterally.  There is signal abnormality associated with the left internal carotid artery likely corresponding to the appearance of occlusion on the CTA examination.     CTA Multiphase (10/10/2019) -      Occluded left ICA with reconstitution of the distal/supraclinoid segment by retrograde flow through [id number].  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  Right vertebral artery supply the basilar artery.  Basilar artery is small in caliber with wall irregularity and intermittent  occlusions.  Partially calcified hyperattenuating lesion in the lateral aspect of the cavernous sinus abutting the distal right ICA, likely representing calcified meningioma.  Generalized cerebral volume loss and remote lacunar type infarct in the right caudate head.       Cardiac Imaging:     TTE (10/11/2019) -   · Moderately decreased left ventricular systolic function. The estimated ejection fraction is 35%.  · Concentric left ventricular remodeling.  · Left ventricular diastolic dysfunction.  · Local segmental wall motion abnormalities.  · Severe left atrial enlargement.  · Mild right atrial enlargement.  · Mild aortic regurgitation.  · Moderate aortic valve stenosis but difficult to appreciate in the setting of AF, depressed EF, and low stroke volume.  · Aortic valve area is 1.39 cm2; peak velocity is 1.28 m/s; mean gradient is 4 mmHg.    Elevated central venous pressure (15 mm Hg).      Rosalia Cheatham PA-C  Comprehensive Stroke Center  Department of Vascular Neurology   [facility]

## 2019-10-24 NOTE — PLAN OF CARE
10/24/19 1112   Discharge Reassessment   Assessment Type Discharge Planning Reassessment   Discharge Plan A Skilled Nursing Facility  ([facility] pending insurance authorization  )   Discharge Plan B Home with family;Home Health   Anticipated Discharge Disposition SNF

## 2019-10-24 NOTE — ASSESSMENT & PLAN NOTE
-- Patient previously with erythema of R hand from IV infiltration.  -- Bactroban cream TID X 10 days (end date 10/26)  -- Elevation of R hand   -- Pt denied complaints of pain on 10/22.

## 2019-10-24 NOTE — SUBJECTIVE & OBJECTIVE
Neurologic Chief Complaint: R-sided weakness and aphasia    Subjective:     Interval History: Patient is seen for follow-up neurological assessment and treatment recommendations: SIRIA. Staff physician contacted patient's insurance company yesterday, however no answer, left voicemail. Will re-attempt phone call today. Pt remains medically and neurologically stable.    HPI, Past Medical, Family, and Social History remains the same as documented in the initial encounter.     Review of Systems   Constitutional: Negative for fatigue and fever.   Gastrointestinal: Negative for nausea and vomiting.   Neurological: Positive for facial asymmetry, speech difficulty and weakness.   Psychiatric/Behavioral: Negative for confusion and decreased concentration.     Scheduled Meds:   amLODIPine  10 mg Oral Daily    apixaban  2.5 mg Oral BID    atorvastatin  40 mg Oral Daily    carvedilol  3.125 mg Oral BID    donepezil  5 mg Oral QHS    furosemide  20 mg Oral Daily    levothyroxine  25 mcg Oral Daily    mupirocin   Topical (Top) TID    senna  8.6 mg Oral Daily     Continuous Infusions:  PRN Meds:hydrALAZINE, influenza, sodium chloride 0.9%    Objective:     Vital Signs (Most Recent):  Temp: 97.8 °F (36.6 °C) (10/24/19 0925)  Pulse: 69 (10/24/19 0925)  Resp: 18 (10/24/19 0925)  BP: (!) 153/72 (10/24/19 0925)  SpO2: 96 % (10/24/19 0925)  BP Location: Left arm    Vital Signs Range (Last 24H):  Temp:  [97.5 °F (36.4 °C)-98.5 °F (36.9 °C)]   Pulse:  [56-86]   Resp:  [18]   BP: (129-153)/(63-75)   SpO2:  [94 %-98 %]   BP Location: Left arm    Physical Exam   Constitutional: He appears well-developed and well-nourished. No distress.   HENT:   Head: Normocephalic and atraumatic.   Eyes: Conjunctivae and EOM are normal.   Cardiovascular: Normal rate.   Pulmonary/Chest: Effort normal. No respiratory distress.   Musculoskeletal: He exhibits no edema or deformity.   Neurological: He is alert. No sensory deficit. He exhibits normal  muscle tone.   Skin: Skin is warm and dry.   Psychiatric: He expresses impulsivity. He is attentive.       Neurological Exam:   LOC: alert  Attention Span: Good   Language: No aphasia, questionable baseline mental status  Articulation: Dysarthria  Orientation: Oriented to person; Not oriented to age, time  Visual Fields: Full  EOM (CN III, IV, VI): Full/intact  Facial Movement (CN VII): Lower facial weakness on the Right  Motor: Arm left  Paresis: 3/5  Leg left  Paresis: 4/5  Arm right  Normal 5/5  Leg right Normal 5/5  Sensation: Intact to light touch, temperature and vibration  Tone: Normal tone throughout    Laboratory:  CMP:   Recent Labs   Lab 10/24/19  0441   CALCIUM 8.6*   *   K 4.2   CO2 27   CL 98   BUN 34*   CREATININE 1.6*     BMP:   Recent Labs   Lab 10/24/19  0441   *   K 4.2   CL 98   CO2 27   BUN 34*   CREATININE 1.6*   CALCIUM 8.6*     CBC:   Recent Labs   Lab 10/24/19  0441   WBC 6.51   RBC 4.61   HGB 12.0*   HCT 40.2      MCV 87   MCH 26.0*   MCHC 29.9*     Lipid Panel: No results for input(s): CHOL, LDLCALC, HDL, TRIG in the last 168 hours.  Coagulation: No results for input(s): PT, INR, APTT in the last 168 hours.  Platelet Aggregation Study: No results for input(s): PLTAGG, PLTAGINTERP, PLTAGREGLACO, ADPPLTAGGREG in the last 168 hours.  Hgb A1C: No results for input(s): HGBA1C in the last 168 hours.  TSH: No results for input(s): TSH in the last 168 hours.      Diagnostic Results     Brain/Vessel Imaging:     MRI Brain WO Contrast (10/11/2019) -         Areas of abnormal T2/FLAIR/diffusion signal abnormality consistent with areas of acute ischemia/infarct involving the cerebral hemispheres bilaterally.  There is signal abnormality associated with the left internal carotid artery likely corresponding to the appearance of occlusion on the CTA examination.     CTA Multiphase (10/10/2019) -      Occluded left ICA with reconstitution of the distal/supraclinoid segment by retrograde  flow through [id number].  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  Right vertebral artery supply the basilar artery.  Basilar artery is small in caliber with wall irregularity and intermittent occlusions.  Partially calcified hyperattenuating lesion in the lateral aspect of the cavernous sinus abutting the distal right ICA, likely representing calcified meningioma.  Generalized cerebral volume loss and remote lacunar type infarct in the right caudate head.       Cardiac Imaging:     TTE (10/11/2019) -   · Moderately decreased left ventricular systolic function. The estimated ejection fraction is 35%.  · Concentric left ventricular remodeling.  · Left ventricular diastolic dysfunction.  · Local segmental wall motion abnormalities.  · Severe left atrial enlargement.  · Mild right atrial enlargement.  · Mild aortic regurgitation.  · Moderate aortic valve stenosis but difficult to appreciate in the setting of AF, depressed EF, and low stroke volume.  · Aortic valve area is 1.39 cm2; peak velocity is 1.28 m/s; mean gradient is 4 mmHg.    Elevated central venous pressure (15 mm Hg).

## 2019-10-24 NOTE — ASSESSMENT & PLAN NOTE
Difficult placement due to patient living in [place] and visiting daughter when stroke occurred - therapy teams recommending inpatient rehab  At this time, patient is unable to fly back to [place]. He is not capable of flying due to inability to ambulate without trained medical assistance, impaired functional mobility, impaired cardiopulmonary response to activity, urine and bowel incontinence, and decreased mental capacity related to stroke and underlying diagnosis of dementia.   10/17 - Spoke to Dr. Krishna of [facility] [employer] who requested documentation of pt's current functional status stating why pt is unable to take commercial flight back to [place].  sent updated notes.   10/23-24: Dr. Krishna requesting phone call with staff physician to discuss further details regarding SS-SNF placement for patient. Staff attempted call yesterday, however no answer, left voicemail. Will re-attempt phone call today. Pt remains medically and neurologically stable at this time.

## 2019-10-24 NOTE — ASSESSMENT & PLAN NOTE
-- 1 cm nodule of R lung seen on CTA  -- Will need follow up in 3- 6 months   -- Previously discussed with daughter

## 2019-10-24 NOTE — ASSESSMENT & PLAN NOTE
-- Na 133 > 131 > 134 > 133 > 132  -- Continue 1L fluid restriction. May consider increasing restriction if Na downtrend continues.   -- Daily BMP, continue to monitor

## 2019-10-24 NOTE — ASSESSMENT & PLAN NOTE
84 y/o man with PMHx chronic Afib (not on AC), CAD s/p stent, HTN, CHF, hypothyroidism who received IV-tPA at OSH after presenting with R-sided weakness, aphasia, and L gaze deviation. 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 No EVT was pursued. TTE shows an EF of 35%.    MRI Brain demonstrated scattered areas of infarct in the bilateral hemispheres, predominantly in the R frontal lobe. Etiology suspected likely hypoperfusion/watershed in the setting of vascular abnormalities, though differential also includes cardioembolic in the setting of AFib, cardiomyopathy.     Staff physician to re-attempt contacting patient's insurance company to discuss further details regarding SS-SNF placement (as below.) Pt remains medically and neurologically stable.      Antithrombotics for secondary stroke prevention:  Eliquis 2.5 mg BID   Statins for secondary stroke prevention and hyperlipidemia, if present:   Statins: Atorvastatin- 40 mg daily  Aggressive risk factor modification: HTN, HLD, Diet, Exercise, A-Fib, CAD  Rehab efforts: The patient has been evaluated by a stroke team provider and the therapy needs have been fully considered based off the presenting complaints and exam findings. The following therapy evaluations are needed: PT evaluate and treat, OT evaluate and treat, SLP evaluate and treat, PM&R evaluate for appropriate placement -- Dispo [facility] short-stay SNF   Diagnostics ordered/pending: None  VTE prophylaxis: Eliquis 2.5 mg BID, SCDs  BP parameters: SBP <160

## 2019-10-24 NOTE — PT/OT/SLP PROGRESS
Physical Therapy Treatment    Patient Name:  Izaiah Douglas   MRN:  [id number]  Admitting Diagnosis:  Acute arterial ischemic stroke, multifocal, multiple vascular territories   Recent Surgery: * No surgery found *    Admit Date: 10/10/2019  Length of Stay: 14 days    Recommendations:     Discharge Recommendations:  Nursing Facility, Skilled   Discharge Equipment Recommendations: none   Barriers to discharge: Inaccessible home and Decreased caregiver support    Assessment:     Izaiah Douglas is a 85 y.o. male admitted with a medical diagnosis of Acute arterial ischemic stroke, multifocal, multiple vascular territories.  He presents with the following impairments/functional limitations:  gait instability, impaired cardiopulmonary response to activity, impaired endurance, impaired balance, decreased safety awareness, impaired cognition, impaired functional mobilty, decreased coordination.  Mr. Douglas tolerated physical therapy treatment well today.  Mr. Douglas was independent with all mobility assessments to obtain EOB sitting.  Once standing B SPC attempted however due to increased anterior sways of COM outside [place] RW used instead.  During gait Mr. Douglas demonstrated a reciprocal gait pattern however was unable to complete multistep tasks during ambulation.  He was limited to due to decreased cardiopulmonary response to exercise, decreased safety awareness, and decreased balance.  Mr. Douglas will continue to benefit from acute inpatient physical therapy services to address the above functional limitations and return to highest level of function.    Rehab Prognosis: Good; patient would benefit from acute skilled PT services to address these deficits and reach maximum level of function.    Recent Surgery: * No surgery found *      Plan:     During this hospitalization, patient to be seen 3 x/week to address the identified rehab impairments via gait training, therapeutic activities, therapeutic exercises, neuromuscular  re-education and progress toward the following goals:    · Plan of Care Expires:  11/08/19    Subjective   Communicated with RN prior to session.  Patient found supine upon PT entry to room, agreeable to evaluation. Izaiah Douglas's alone present during session.    Chief Complaint: Mr. Douglas had no complaints upon entering room today.  Patient/Family Comments/goals: Mr. Douglas repeatedly asked for lunch throughout session today.  Pain/Comfort:  · Pain Rating 1: 0/10      Objective:   Patient found with: telemetry, peripheral IV   Mental Status: Patient is oriented to AxOx1 and follows verbal commands with cueing. Patient is Alert during session.    General Precautions: Standard, Cardiac fall   Orthopedic Precautions:N/A   Braces: N/A   Oxygen Device: Room Air  Vitals:   Heart Rate (bpm) 69   SaO2: 97%     Outcome Measures:  AM-PAC 6 CLICK MOBILITY  Turning over in bed (including adjusting bedclothes, sheets and blankets)?: 4  Sitting down on and standing up from a chair with arms (e.g., wheelchair, bedside commode, etc.): 4  Moving from lying on back to sitting on the side of the bed?: 4  Moving to and from a bed to a chair (including a wheelchair)?: 4  Need to walk in hospital room?: 3  Climbing 3-5 steps with a railing?: 3  Basic Mobility Total Score: 22     Functional Mobility:  Additional staff present: Student PT  Bed Mobility:   · Rolling/Turning to Left: supervision  · Scooting to HOB via supine bridge: supervision  · Supine to Sit: supervision; from left side of bed  · Scooting anteriorly to EOB to have both feet planted on floor: supervision  · Sit to Supine: supervision; to right side of bed    Sitting Balance at Edge of Bed:   Assistance Level Required: Supervision   Time: 5 minutes    Postural deviations noted: slouched posture, rounded shoulders and forward head   Comments: Mr. Douglas was independent will sitting balance at EOB utilizing BUE for support.    Transfers:   · Sit <> Stand Transfer:  contact guard assistance with rolling walker   · Stand <> Sit Transfer: contact guard assistance with rolling walker   · [id number] from EOB    Gait:  · Patient ambulated: approximately 50 feet in hospital hallway today with 1 rest break at 25 feet.  · Patient required: contact guard  · Patient used:  rolling walker   · Gait Pattern observed: reciprocal gait  · Gait Deviation(s): unsteady gait, decreased step length, narrow base of support, flexed posture and decreased benitez  · Impairments due to: impaired balance, decreased endurance and impaired coordination  · Comments: Mr. Douglas initially attempted B SPC however due to increased anterior sway of body mass outside DEL[place] support RW utilized instead.  Mr. Douglas required constant cueing to stay within boundaries of RW to ensure his safety throughout session.  Mr. Douglas demonstrated increased fatigue and SOB with gait training.  Mr. Douglas unable to complete multistep tasks such as walking and talking without stopping indicating a balance limitation.    Therapeutic Activities & Exercises:   1. Seated Marching: Pt performed 1 minute with facilitation for correct performance and sequencing. Exercises performed to develop and maintain pt's  endurance and balance.     Education:  Mr. Douglas was educated on POC and goals for physical therapy session for today.  Mr. Douglas was educated on why he needed to stay within the boundaries of the RW during gait inorder to maintain his safety.    Patient left with bed in chair position, with head in midline, neutral pelvis & heels floated for skin protection with all lines intact, call button in reach and RN notified    GOALS:   Multidisciplinary Problems     Physical Therapy Goals        Problem: Physical Therapy Goal    Goal Priority Disciplines Outcome Goal Variances Interventions   Physical Therapy Goal     PT, PT/OT Ongoing, Progressing     Description:  Goals to be met by: 10/25/19     Patient will increase functional  independence with mobility by performin. Supine to sit with Minimal Assistance - met 10/16  1a. Supine to sit with Supervision with bed flat - not met  2. Sit to supine with Minimal Assistance - Not met  3. Sit to stand transfer with Stand-by Assistance - met 10/21/19  4. Ascend/descend 3 stairs with right Handrail with Supervision - Not met  5. Lower extremity exercise program x 20 reps per handout, with assistance as needed - Not met  6. Added on 10/13: Bed to chair transfer with RW and supervision - Not met  7. Added on 10/13: Gait x 300 ft with RW and SBA (including turns and straight lines) - met 10/16  7a. Gait x 300 ft with bilateral SPC and SBA (including turns and straight lines) (revised 10/17)- not met                     Time Tracking:     PT Received On: 10/24/19  PT Start Time: 1050     PT Stop Time: 1118  PT Total Time (min): 28 min     Billable Minutes:   · Gait Training 18 minutes (1 unit) and Therapeutic Exercise 10 minutes (1 unit)    Treatment Type: Treatment  PT/PTA: PT       Teddy Fuentes, SPT  10/24/2019

## 2019-10-24 NOTE — PLAN OF CARE
Pt remained free of falls, trauma, injury. VSS. Pt remains confused and oriented to self and place. Geetha sys at bedside, bed alarm on. Pt to discharged to [facility] pending insurance approval. Reviewed plan of care with pt; answered questions. Pt tolerating plan of care will continue to monitor.

## 2019-10-24 NOTE — PLAN OF CARE
Plan of Care:  Discharge Recommendation: Long-term SNF.  Please continue Progressive Mobility Protocol as appropriate.  Appropriate transfer level with nursing staff: Bed <> Chair:  Step Transfer with contact guard assistance with Rolling Walker.    JAMEEL Boles  10/24/2019

## 2019-10-25 LAB
ANION GAP SERPL CALC-SCNC: 6 MMOL/L (ref 8–16)
BUN SERPL-MCNC: 33 MG/DL (ref 8–23)
CALCIUM SERPL-MCNC: 9.1 MG/DL (ref 8.7–10.5)
CHLORIDE SERPL-SCNC: 99 MMOL/L (ref 95–110)
CO2 SERPL-SCNC: 29 MMOL/L (ref 23–29)
CREAT SERPL-MCNC: 1.4 MG/DL (ref 0.5–1.4)
EST. GFR  (AFRICAN AMERICAN): 52.6 ML/MIN/1.73 M^2
EST. GFR  (NON AFRICAN AMERICAN): 45.5 ML/MIN/1.73 M^2
GLUCOSE SERPL-MCNC: 85 MG/DL (ref 70–110)
POTASSIUM SERPL-SCNC: 4.1 MMOL/L (ref 3.5–5.1)
SODIUM SERPL-SCNC: 134 MMOL/L (ref 136–145)

## 2019-10-25 PROCEDURE — 92507 TX SP LANG VOICE COMM INDIV: CPT

## 2019-10-25 PROCEDURE — 25000003 PHARM REV CODE 250: Performed by: NURSE PRACTITIONER

## 2019-10-25 PROCEDURE — 25000003 PHARM REV CODE 250: Performed by: STUDENT IN AN ORGANIZED HEALTH CARE EDUCATION/TRAINING PROGRAM

## 2019-10-25 PROCEDURE — 99233 PR SUBSEQUENT HOSPITAL CARE,LEVL III: ICD-10-PCS | Mod: GC,,, | Performed by: PSYCHIATRY & NEUROLOGY

## 2019-10-25 PROCEDURE — 63600175 PHARM REV CODE 636 W HCPCS: Performed by: STUDENT IN AN ORGANIZED HEALTH CARE EDUCATION/TRAINING PROGRAM

## 2019-10-25 PROCEDURE — 25000003 PHARM REV CODE 250: Performed by: PSYCHIATRY & NEUROLOGY

## 2019-10-25 PROCEDURE — 97116 GAIT TRAINING THERAPY: CPT

## 2019-10-25 PROCEDURE — 99233 SBSQ HOSP IP/OBS HIGH 50: CPT | Mod: GC,,, | Performed by: PSYCHIATRY & NEUROLOGY

## 2019-10-25 PROCEDURE — 80048 BASIC METABOLIC PNL TOTAL CA: CPT

## 2019-10-25 PROCEDURE — 36415 COLL VENOUS BLD VENIPUNCTURE: CPT

## 2019-10-25 PROCEDURE — 20600001 HC STEP DOWN PRIVATE ROOM

## 2019-10-25 PROCEDURE — 25000003 PHARM REV CODE 250: Performed by: FAMILY MEDICINE

## 2019-10-25 PROCEDURE — 97535 SELF CARE MNGMENT TRAINING: CPT

## 2019-10-25 RX ADMIN — CARVEDILOL 3.12 MG: 3.12 TABLET, FILM COATED ORAL at 08:10

## 2019-10-25 RX ADMIN — MUPIROCIN: 20 OINTMENT TOPICAL at 08:10

## 2019-10-25 RX ADMIN — SENNOSIDES 8.6 MG: 8.6 TABLET, FILM COATED ORAL at 08:10

## 2019-10-25 RX ADMIN — APIXABAN 2.5 MG: 2.5 TABLET, FILM COATED ORAL at 08:10

## 2019-10-25 RX ADMIN — HYDRALAZINE HYDROCHLORIDE 10 MG: 20 INJECTION INTRAMUSCULAR; INTRAVENOUS at 12:10

## 2019-10-25 RX ADMIN — AMLODIPINE BESYLATE 10 MG: 10 TABLET ORAL at 08:10

## 2019-10-25 RX ADMIN — DONEPEZIL HYDROCHLORIDE 5 MG: 5 TABLET, FILM COATED ORAL at 08:10

## 2019-10-25 RX ADMIN — LEVOTHYROXINE SODIUM 25 MCG: 25 TABLET ORAL at 05:10

## 2019-10-25 RX ADMIN — FUROSEMIDE 20 MG: 20 TABLET ORAL at 08:10

## 2019-10-25 RX ADMIN — ATORVASTATIN CALCIUM 40 MG: 20 TABLET, FILM COATED ORAL at 08:10

## 2019-10-25 RX ADMIN — HYDRALAZINE HYDROCHLORIDE 10 MG: 20 INJECTION INTRAMUSCULAR; INTRAVENOUS at 08:10

## 2019-10-25 RX ADMIN — MUPIROCIN: 20 OINTMENT TOPICAL at 01:10

## 2019-10-25 NOTE — ASSESSMENT & PLAN NOTE
-- BUN/Cr elevated on admission, initially improved, then worsening again. Likely at baseline per outside chart review of [facility] (Cr range 1.5 - 2.6)  -- Pt appears euvolemic on exam  -- Urine Na and Cr normal   -- Lasix restarted at reduced dose.   -- Will avoid nephrotoxic meds. (Eliquis renally dosed.)

## 2019-10-25 NOTE — PT/OT/SLP PROGRESS
"Speech Language Pathology Treatment    Patient Name:  Izaiah Douglas   MRN:  [id number]  Admitting Diagnosis: Acute arterial ischemic stroke, multifocal, multiple vascular territories    Recommendations:                 General Recommendations:  Cognitive-linguistic therapy  Diet recommendations:  Regular, Liquid Diet Level: Thin   Aspiration Precautions: Small bites/sips and Standard aspiration precautions   General Precautions: Standard, fall  Communication strategies:  provide increased time to answer and go to room if call light pushed    Subjective     Patient awake and alert during session  Communicated with nurse prior to session    Pain/Comfort:  Pain Rating 1: 0/10  Pain Rating Post-Intervention 1: 0/10    Objective:     Has the patient been evaluated by SLP for swallowing?   Yes  Keep patient NPO? No   Current Respiratory Status: room air      Patient seen for ongoing cognitive-linguistic therapy. He was sitting up in chair with avasys in place during session. Patient continued to be oriented to self and "hospital" only, despite max cueing for city and time. SLP with extensive education on orientation and different types of therapy patient is receiving. Patient acknowledged teaching, but demonstrated significantly impaired immediate and delayed recall. Patient answered 3/4 simple problem solving questions with min assist. However, given word retention tasks, patient demonstrated significant difficulty as he was 0/3 despite max assist. SLP educated patient regarding POC from a ST perspective, but he would benefit from ongoing instruction. He was left in chair with call light within reach.     Assessment:     Izaiah Douglas is a 85 y.o. male with an SLP diagnosis of Cognitive-Linguistic Impairment. .    Goals:   Multidisciplinary Problems     SLP Goals        Problem: SLP Goal    Goal Priority Disciplines Outcome   SLP Goal     SLP Ongoing, Progressing   Description:  Speech Therapy Short Term Goals  Goal " expected to be met by 10/25  1. Pt will participate in an ongoing assessment to determine the least restrictive and safest diet with possible updated goals to follow pending results.  2. The patient will answer orientation questions x4 with 90% acc no cues.   3. The patient will name 8 items in a concrete category given min cues.   4. The patient will answer complex y/n questions with 85% acc no cues.   5. The patient will follow 2+ step directions with 75% acc given 1 redirection.   6. The patient will participate in an ongoing assessment of speech, language, and cognition with updated goals to follow pending results and progress.                     Plan:     · Patient to be seen:  3 x/week   · Plan of Care expires:  11/09/19  · Plan of Care reviewed with:  patient   · SLP Follow-Up:  Yes       Discharge recommendations:  nursing facility, skilled   Barriers to Discharge:  Level of Skilled Assistance Needed     Time Tracking:     SLP Treatment Date:   10/25/19  Speech Start Time:  1007  Speech Stop Time:  1021     Speech Total Time (min):  14 min    Billable Minutes: Speech Therapy Individual 14    Royce Stearns CCC-SLP  Speech-Language Pathology  Pager: [phone]   10/25/2019

## 2019-10-25 NOTE — PLAN OF CARE
Problem: Physical Therapy Goal  Goal: Physical Therapy Goal  Description  Goals to be met by: 19     Patient will increase functional independence with mobility by performin. Supine to sit with Minimal Assistance - met 10/16  1a. Supine to sit with Supervision with bed flat - not met  2. Sit to supine with Minimal Assistance - Not met  3. Sit to stand transfer with Stand-by Assistance - met 10/21/19  4. Ascend/descend 3 stairs with right Handrail with Supervision - Not met  5. Lower extremity exercise program x 20 reps per handout, with assistance as needed - Not met  6. Added on 10/13: Bed to chair transfer with RW and supervision - Not met  7. Added on 10/13: Gait x 300 ft with RW and SBA (including turns and straight lines) - met 10/16  7a. Gait x 300 ft with bilateral SPC and SBA (including turns and straight lines) (revised 10/17)- not met       Outcome: Ongoing, Progressing   Continue with plan of care.   Lorena Odonnell, PT  10/25/2019

## 2019-10-25 NOTE — ASSESSMENT & PLAN NOTE
-- Patient previously with erythema of R hand from IV infiltration.  -- Bactroban cream TID X 10 days (end date 10/26)  -- Elevation of R hand   -- Pt denied complaints of pain on exam today.

## 2019-10-25 NOTE — ASSESSMENT & PLAN NOTE
Difficult placement due to patient living in [place] and visiting daughter when stroke occurred - therapy teams recommending inpatient rehab  At this time, patient is unable to fly back to [place]. He is not capable of flying due to inability to ambulate without trained medical assistance, impaired functional mobility, impaired cardiopulmonary response to activity, urine and bowel incontinence, and decreased mental capacity related to stroke and underlying diagnosis of dementia.   10/17 - Spoke to Dr. Krishna of [facility] [employer] who requested documentation of pt's current functional status stating why pt is unable to take commercial flight back to [place]. MALINA sent updated notes.   10/23-24: Dr. Krishna requesting phone call with staff physician to discuss further details regarding -SNF placement for patient. Staff attempted call yesterday, however no answer, left voicemail. Will re-attempt phone call today. Pt remains medically and neurologically stable at this time.    10/25: Called Dr. Krishna, no answer. Will re-attempt tomorrow. Patient's daughter contacted by SAW/MALINA. She is unable to provided 24 hour care. Patient accepted to [facility], need insurance auth.

## 2019-10-25 NOTE — ASSESSMENT & PLAN NOTE
Stroke risk factor  -- pro BNP from Feb 2019 >3000  -- On Lasix and Spironolactone at home.  -- TTE shows EF 35%, +WMAs  -- Coreg 3.125mg BID.   -- Held Lasix for AMRIT, however reviewed outside records from [facility], and Cr range 1.5 -2.6. Patient likely at baseline. Restarted Lasix at reduced-dose 20 mg daily.    -- daily weights, strict I&Os

## 2019-10-25 NOTE — SUBJECTIVE & OBJECTIVE
Neurologic Chief Complaint: R-sided weakness and aphasia    Subjective:     Interval History: Patient is seen for follow-up neurological assessment and treatment recommendations:     Called Dr. Krishna, no answer. Will re-attempt tomorrow. Patient's daughter contacted by SW/MALINA. She is unable to provided 24 hour care. Patient accepted to [facility], need insurance auth.     HPI, Past Medical, Family, and Social History remains the same as documented in the initial encounter.     Review of Systems   Constitutional: Negative for fatigue and fever.   Gastrointestinal: Negative for nausea and vomiting.   Neurological: Positive for facial asymmetry, speech difficulty and weakness (improving).   Psychiatric/Behavioral: Negative for confusion and decreased concentration.     Scheduled Meds:   amLODIPine  10 mg Oral Daily    apixaban  2.5 mg Oral BID    atorvastatin  40 mg Oral Daily    carvedilol  3.125 mg Oral BID    donepezil  5 mg Oral QHS    furosemide  20 mg Oral Daily    levothyroxine  25 mcg Oral Daily    mupirocin   Topical (Top) TID    senna  8.6 mg Oral Daily     Continuous Infusions:  PRN Meds:hydrALAZINE, influenza, sodium chloride 0.9%    Objective:     Vital Signs (Most Recent):  Temp: 97.2 °F (36.2 °C) (10/25/19 1540)  Pulse: 68 (10/25/19 1540)  Resp: 18 (10/25/19 1540)  BP: (!) 155/71 (10/25/19 1540)  SpO2: 95 % (10/25/19 1540)  BP Location: Left arm    Vital Signs Range (Last 24H):  Temp:  [96.7 °F (35.9 °C)-97.8 °F (36.6 °C)]   Pulse:  [67-88]   Resp:  [18-19]   BP: (123-172)/(58-84)   SpO2:  [94 %-97 %]   BP Location: Left arm    Physical Exam   Constitutional: He appears well-developed and well-nourished. No distress.   HENT:   Head: Normocephalic and atraumatic.   Eyes: Conjunctivae and EOM are normal.   Cardiovascular: Normal rate.   Pulmonary/Chest: Effort normal. No respiratory distress.   Musculoskeletal: He exhibits no edema or deformity.   Neurological: He is alert. No sensory deficit. He  exhibits normal muscle tone.   Skin: Skin is warm and dry.   Psychiatric: He expresses impulsivity. He is attentive.       Neurological Exam:   LOC: alert  Attention Span: Good   Language: No aphasia, questionable baseline mental status  Articulation: Dysarthria  Orientation: Oriented to person; Not oriented to age, time  Visual Fields: Full  EOM (CN III, IV, VI): Full/intact  Facial Movement (CN VII): Lower facial weakness on the Right  Motor: Arm left  Paresis: 3/5  Leg left  Paresis: 4/5  Arm right  Normal 5/5  Leg right Normal 5/5  Sensation: Intact to light touch, temperature and vibration  Tone: Normal tone throughout    Laboratory:  CMP:   Recent Labs   Lab 10/25/19  0459   CALCIUM 9.1   *   K 4.1   CO2 29   CL 99   BUN 33*   CREATININE 1.4     BMP:   Recent Labs   Lab 10/25/19  0459   *   K 4.1   CL 99   CO2 29   BUN 33*   CREATININE 1.4   CALCIUM 9.1     CBC:   Recent Labs   Lab 10/24/19  0441   WBC 6.51   RBC 4.61   HGB 12.0*   HCT 40.2      MCV 87   MCH 26.0*   MCHC 29.9*     Lipid Panel: No results for input(s): CHOL, LDLCALC, HDL, TRIG in the last 168 hours.  Coagulation: No results for input(s): PT, INR, APTT in the last 168 hours.  Platelet Aggregation Study: No results for input(s): PLTAGG, PLTAGINTERP, PLTAGREGLACO, ADPPLTAGGREG in the last 168 hours.  Hgb A1C: No results for input(s): HGBA1C in the last 168 hours.  TSH: No results for input(s): TSH in the last 168 hours.      Diagnostic Results     Brain/Vessel Imaging:     MRI Brain WO Contrast (10/11/2019) -         Areas of abnormal T2/FLAIR/diffusion signal abnormality consistent with areas of acute ischemia/infarct involving the cerebral hemispheres bilaterally.  There is signal abnormality associated with the left internal carotid artery likely corresponding to the appearance of occlusion on the CTA examination.     CTA Multiphase (10/10/2019) -      Occluded left ICA with reconstitution of the distal/supraclinoid segment  by retrograde flow through [id number].  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  Right vertebral artery supply the basilar artery.  Basilar artery is small in caliber with wall irregularity and intermittent occlusions.  Partially calcified hyperattenuating lesion in the lateral aspect of the cavernous sinus abutting the distal right ICA, likely representing calcified meningioma.  Generalized cerebral volume loss and remote lacunar type infarct in the right caudate head.       Cardiac Imaging:     TTE (10/11/2019) -   · Moderately decreased left ventricular systolic function. The estimated ejection fraction is 35%.  · Concentric left ventricular remodeling.  · Left ventricular diastolic dysfunction.  · Local segmental wall motion abnormalities.  · Severe left atrial enlargement.  · Mild right atrial enlargement.  · Mild aortic regurgitation.  · Moderate aortic valve stenosis but difficult to appreciate in the setting of AF, depressed EF, and low stroke volume.  · Aortic valve area is 1.39 cm2; peak velocity is 1.28 m/s; mean gradient is 4 mmHg.    Elevated central venous pressure (15 mm Hg).

## 2019-10-25 NOTE — PROGRESS NOTES
[facility]  Vascular Neurology  Comprehensive Stroke Center  Progress Note    Assessment/Plan:     * Acute arterial ischemic stroke, multifocal, multiple vascular territories  84 y/o man with PMHx chronic Afib (not on AC), CAD s/p stent, HTN, CHF, hypothyroidism who received IV-tPA at OSH after presenting with R-sided weakness, aphasia, and L gaze deviation. 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 No EVT was pursued. TTE shows an EF of 35%.    MRI Brain demonstrated scattered areas of infarct in the bilateral hemispheres, predominantly in the R frontal lobe. Etiology suspected likely hypoperfusion/watershed in the setting of vascular abnormalities, though differential also includes cardioembolic in the setting of AFib, cardiomyopathy.         Antithrombotics for secondary stroke prevention:  Eliquis 2.5 mg BID   Statins for secondary stroke prevention and hyperlipidemia, if present:   Statins: Atorvastatin- 40 mg daily  Aggressive risk factor modification: HTN, HLD, Diet, Exercise, A-Fib, CAD  Rehab efforts: The patient has been evaluated by a stroke team provider and the therapy needs have been fully considered based off the presenting complaints and exam findings. The following therapy evaluations are needed: PT evaluate and treat, OT evaluate and treat, SLP evaluate and treat, PM&R evaluate for appropriate placement -- [facility]   Diagnostics ordered/pending: None  VTE prophylaxis: Eliquis 2.5 mg BID, SCDs  BP parameters: SBP <160    AMRIT (acute kidney injury)  -- BUN/Cr elevated on admission, initially improved, then worsening again. Likely at baseline per outside chart review of [facility] (Cr range 1.5 - 2.6)  -- Pt appears  euvolemic on exam  -- Urine Na and Cr normal   -- Lasix restarted at reduced dose.   -- Will avoid nephrotoxic meds. (Eliquis renally dosed.)    Person awaiting admission to adequate facility elsewhere  Difficult placement due to patient living in [place] and visiting daughter when stroke occurred - therapy teams recommending inpatient rehab  At this time, patient is unable to fly back to [place]. He is not capable of flying due to inability to ambulate without trained medical assistance, impaired functional mobility, impaired cardiopulmonary response to activity, urine and bowel incontinence, and decreased mental capacity related to stroke and underlying diagnosis of dementia.   10/17 - Spoke to Dr. Krishna of [employer] insurance who requested documentation of pt's current functional status stating why pt is unable to take commercial flight back to [place]. CM sent updated notes.   10/23-24: Dr. Krishna requesting phone call with staff physician to discuss further details regarding -SNF placement for patient. Staff attempted call yesterday, however no answer, left voicemail. Will re-attempt phone call today. Pt remains medically and neurologically stable at this time.    10/25: Called Dr. Krishna, no answer. Will re-attempt tomorrow. Patient's daughter contacted by SAW/MALINA. She is unable to provided 24 hour care. Patient accepted to [facility], need insurance auth.     Erythema of hand  -- Patient previously with erythema of R hand from IV infiltration.  -- Bactroban cream TID X 10 days (end date 10/26)  -- Elevation of R hand   -- Pt denied complaints of pain on exam today.    SIADH (syndrome of inappropriate ADH production)  -- Na 133 > 131 > 134 > 133 > 132  -- Continue 1L fluid restriction. May consider increasing restriction if Na downtrend continues.   -- Daily BMP, continue to monitor    Dementia  -- Continue Donepezil 5 mg daily     Intracranial atherosclerosis  -- Noted on CTA head   -- On Eliquis and  Atorvastatin 40 mg     Occlusion of left carotid artery  -- Seen on CTA  -- Suspect chronic occlusion   Eliquis, statin    Nodule of right lung  -- 1 cm nodule of R lung seen on CTA  -- Will need follow up in 3- 6 months   -- Previously discussed with daughter     Cytotoxic cerebral edema  Area of cytotoxic cerebral edema identified when reviewing brain imaging in the territory of the R/L middle cerebral artery. There is not mass effect associated with it. We will continue to monitor the patients clinical exam for any worsening of symptoms which may indicate expansion of the stroke or the area of the edema resulting in the clinical change. The pattern is suggestive of cardioembolic etiology.    CAD (coronary artery disease)  Stroke risk factor  -- Noted on Care Everywhere  -- S/p RCA stent 7/2018  -- Was prescribed Plavix, statin, and Ranexa but unclear if patient was taking them.  -- Eliquis 2.5 BID, Atorvastatin 40 mg daily     Paroxysmal atrial fibrillation  Stroke risk factor  -- Per Care Everywhere; not previously on AC - Dr. Menjivar (Cardiologist) had report patient not a good candidate due to risk of falls, non-compliance, and dementia.  -- Noted on EKG at admit.  -- Remains rate-controlled at this time.  -- Eliquis 2.5 BID  Continue tele    Chronic combined systolic and diastolic congestive heart failure  Stroke risk factor  -- pro BNP from Feb 2019 >3000  -- On Lasix and Spironolactone at home.  -- TTE shows EF 35%, +WMAs  -- Coreg 3.125mg BID.   -- Held Lasix for AMRIT, however reviewed outside records from [facility], and Cr range 1.5 -2.6. Patient likely at baseline. Restarted Lasix at reduced-dose 20 mg daily.    -- daily weights, strict I&Os    Acquired hypothyroidism  Stroke risk factor  TSH 3.032  -- Continue Synthroid 25mcg daily    Essential hypertension  Stroke risk factor  -- SBP < 160  -- At goal on current regimen -- Coreg 3.125 mg BID, Norvasc 10 mg Daily, and Lisinopril 20mg  Daily.  Will continue to monitor.         10/12 - Etiology likely cardioembolic. Will start Eliquis 2.5 mg BID. Creatinine trending down - may start patient on Eliquis 5 mg BID if continue to decrease. L ICA chronically occluded - no need to vasculare intervention.   10/13 - Spoke with PT - patient would benefit from inpatient rehab placement. Norvasc 5 mg ordered. Urine studies ordered for hyponatremia - suspect to be due to dehydration.   10/14 Patient with SIADH but sodium stable.  1 liter fluid restriction.    10/15  Irritation around right hand peripheral IV site.  Sodium 131 today.  Discussed with daughter that he is on fluid restriction and she will stop providing him with outside drinks.     10/16 - Sodium increasing 132. Bactroban ordered for right hand IV infiltration. Working on placement.   10/17 - Sodium continues to improve. Creatinine 1.5 likely near baseline, decreased eliquis to 2.5 mg. R hand erythema stable.   10/19/2019 Sodium 134. Awake and alert but remains confused. No pain. Pending placement  10/20/2019 Complained of chest discomfort overnight. EKG showed no ST wave changes, Trop normal.   10/21 - Renal function improving. Hyponatremic, continue 1L fluid restriction, will likely restart lasix tomorrow, no current signs of fluid overload. Awake and alert, oriented to person and place, disoriented to time.   10/22 Reviewed outside imaging, renal function likely at baseline, restart home lasix at reduced dose of 20 mg daily, continue to monitor. Daily weights and strict I&Os.   10/23: Attempting to transfer patient to a bed on [facility] floor. Staff physician to contact patient's insurance company to discuss further details regarding SS-SNF placement.  10/24: Staff physician contacted patient's insurance company yesterday, however no answer, left voicemail. Will re-attempt phone call today. Pt remains medically and neurologically stable.    10/25: Called Dr. Krishna, no answer. Will re-attempt tomorrow.  Patient's daughter contacted by SAW/MALINA. She is unable to provided 24 hour care. Patient accepted to [facility], need insurance auth.      STROKE DOCUMENTATION   Acute Stroke Times   Last Known Normal Date: 10/10/19  Last Known Normal Time: 1905  Symptom Onset Date: 10/10/19  Symptom Onset Time: 1905  Stroke Team Called Date: 10/10/19  Stroke Team Called Time: 2120  Stroke Team Arrival Date: 10/10/19  Stroke Team Arrival Time: 2122    NIH Scale:  1a. Level of Consciousness: 0-->Alert, keenly responsive  1b. LOC Questions: 2-->Answers neither question correctly  1c. LOC Commands: 0-->Performs both tasks correctly  2. Best Gaze: 0-->Normal  3. Visual: 0-->No visual loss  4. Facial Palsy: 1-->Minor paralysis (flattened nasolabial fold, asymmetry on smiling)  5a. Motor Arm, Left: 2-->Some effort against gravity, limb cannot get to or maintain (if cued) 90 (or 45) degrees, drifts down to bed, but has some effort against gravity  5b. Motor Arm, Right: 0-->No drift, limb holds 90 (or 45) degrees for full 10 secs  6a. Motor Leg, Left: 1-->Drift, leg falls by the end of the 5-sec period but does not hit bed  6b. Motor Leg, Right: 0-->No drift, leg holds 30 degree position for full 5 secs  7. Limb Ataxia: 0-->Absent  8. Sensory: 0-->Normal, no sensory loss  9. Best Language: 0-->No aphasia, normal  10. Dysarthria: 1-->Mild-to-moderate dysarthria, patient slurs at least some words and, at worst, can be understood with some difficulty  11. Extinction and Inattention (formerly Neglect): 0-->No abnormality  Total (NIH Stroke Scale): 7       Modified Houston Score: 1  Holley Coma Scale:    ABCD2 Score:    [id number] Score:   HAS -BLED Score:   ICH Score:   Hunt & Stroud Classification:      Hemorrhagic change of an Ischemic Stroke: Does this patient have an ischemic stroke with hemorrhagic changes? No     Neurologic Chief Complaint: R-sided weakness and aphasia    Subjective:     Interval History: Patient is seen for follow-up  neurological assessment and treatment recommendations:     Called Dr. Krishna, no answer. Will re-attempt tomorrow. Patient's daughter contacted by SW/MALINA. She is unable to provided 24 hour care. Patient accepted to [facility], need insurance auth.     HPI, Past Medical, Family, and Social History remains the same as documented in the initial encounter.     Review of Systems   Constitutional: Negative for fatigue and fever.   Gastrointestinal: Negative for nausea and vomiting.   Neurological: Positive for facial asymmetry, speech difficulty and weakness (improving).   Psychiatric/Behavioral: Negative for confusion and decreased concentration.     Scheduled Meds:   amLODIPine  10 mg Oral Daily    apixaban  2.5 mg Oral BID    atorvastatin  40 mg Oral Daily    carvedilol  3.125 mg Oral BID    donepezil  5 mg Oral QHS    furosemide  20 mg Oral Daily    levothyroxine  25 mcg Oral Daily    mupirocin   Topical (Top) TID    senna  8.6 mg Oral Daily     Continuous Infusions:  PRN Meds:hydrALAZINE, influenza, sodium chloride 0.9%    Objective:     Vital Signs (Most Recent):  Temp: 97.2 °F (36.2 °C) (10/25/19 1540)  Pulse: 68 (10/25/19 1540)  Resp: 18 (10/25/19 1540)  BP: (!) 155/71 (10/25/19 1540)  SpO2: 95 % (10/25/19 1540)  BP Location: Left arm    Vital Signs Range (Last 24H):  Temp:  [96.7 °F (35.9 °C)-97.8 °F (36.6 °C)]   Pulse:  [67-88]   Resp:  [18-19]   BP: (123-172)/(58-84)   SpO2:  [94 %-97 %]   BP Location: Left arm    Physical Exam   Constitutional: He appears well-developed and well-nourished. No distress.   HENT:   Head: Normocephalic and atraumatic.   Eyes: Conjunctivae and EOM are normal.   Cardiovascular: Normal rate.   Pulmonary/Chest: Effort normal. No respiratory distress.   Musculoskeletal: He exhibits no edema or deformity.   Neurological: He is alert. No sensory deficit. He exhibits normal muscle tone.   Skin: Skin is warm and dry.   Psychiatric: He expresses impulsivity. He is attentive.        Neurological Exam:   LOC: alert  Attention Span: Good   Language: No aphasia, questionable baseline mental status  Articulation: Dysarthria  Orientation: Oriented to person; Not oriented to age, time  Visual Fields: Full  EOM (CN III, IV, VI): Full/intact  Facial Movement (CN VII): Lower facial weakness on the Right  Motor: Arm left  Paresis: 3/5  Leg left  Paresis: 4/5  Arm right  Normal 5/5  Leg right Normal 5/5  Sensation: Intact to light touch, temperature and vibration  Tone: Normal tone throughout    Laboratory:  CMP:   Recent Labs   Lab 10/25/19  0459   CALCIUM 9.1   *   K 4.1   CO2 29   CL 99   BUN 33*   CREATININE 1.4     BMP:   Recent Labs   Lab 10/25/19  0459   *   K 4.1   CL 99   CO2 29   BUN 33*   CREATININE 1.4   CALCIUM 9.1     CBC:   Recent Labs   Lab 10/24/19  0441   WBC 6.51   RBC 4.61   HGB 12.0*   HCT 40.2      MCV 87   MCH 26.0*   MCHC 29.9*     Lipid Panel: No results for input(s): CHOL, LDLCALC, HDL, TRIG in the last 168 hours.  Coagulation: No results for input(s): PT, INR, APTT in the last 168 hours.  Platelet Aggregation Study: No results for input(s): PLTAGG, PLTAGINTERP, PLTAGREGLACO, ADPPLTAGGREG in the last 168 hours.  Hgb A1C: No results for input(s): HGBA1C in the last 168 hours.  TSH: No results for input(s): TSH in the last 168 hours.      Diagnostic Results     Brain/Vessel Imaging:     MRI Brain WO Contrast (10/11/2019) -         Areas of abnormal T2/FLAIR/diffusion signal abnormality consistent with areas of acute ischemia/infarct involving the cerebral hemispheres bilaterally.  There is signal abnormality associated with the left internal carotid artery likely corresponding to the appearance of occlusion on the CTA examination.     CTA Multiphase (10/10/2019) -      Occluded left ICA with reconstitution of the distal/supraclinoid segment by retrograde flow through [id number].  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  Right vertebral artery supply the basilar artery.  Basilar artery is small in caliber with wall irregularity and intermittent occlusions.  Partially calcified hyperattenuating lesion in the lateral aspect of the cavernous sinus abutting the distal right ICA, likely representing calcified meningioma.  Generalized cerebral volume loss and remote lacunar type infarct in the right caudate head.       Cardiac Imaging:     TTE (10/11/2019) -   · Moderately decreased left ventricular systolic function. The estimated ejection fraction is 35%.  · Concentric left ventricular remodeling.  · Left ventricular diastolic dysfunction.  · Local segmental wall motion abnormalities.  · Severe left atrial enlargement.  · Mild right atrial enlargement.  · Mild aortic regurgitation.  · Moderate aortic valve stenosis but difficult to appreciate in the setting of AF, depressed EF, and low stroke volume.  · Aortic valve area is 1.39 cm2; peak velocity is 1.28 m/s; mean gradient is 4 mmHg.    Elevated central venous pressure (15 mm Hg).      Martínez Lawton NP  Comprehensive Stroke Center  Department of Vascular Neurology   [facility]

## 2019-10-25 NOTE — PLAN OF CARE
Problem: SLP Goal  Goal: SLP Goal  Description  Speech Therapy Short Term Goals  Goal expected to be met by 10/25  1. Pt will participate in an ongoing assessment to determine the least restrictive and safest diet with possible updated goals to follow pending results.  2. The patient will answer orientation questions x4 with 90% acc no cues.   3. The patient will name 8 items in a concrete category given min cues.   4. The patient will answer complex y/n questions with 85% acc no cues.   5. The patient will follow 2+ step directions with 75% acc given 1 redirection.   6. The patient will participate in an ongoing assessment of speech, language, and cognition with updated goals to follow pending results and progress.    Outcome: Ongoing, Progressing     Patient seen for ongoing cognitive-linguistic therapy.     Royce Stearns CCC-SLP  Speech-Language Pathology  Pager: [phone]

## 2019-10-25 NOTE — PT/OT/SLP PROGRESS
"Physical Therapy Treatment    Patient Name:  Izaiah Douglas   MRN:  [id number]    Recommendations:     Discharge Recommendations:  nursing facility, skilled   Discharge Equipment Recommendations: none   Barriers to discharge: Decreased caregiver support and patient confused and at increased risk of falls    Assessment:     Izaiah Douglas is a 85 y.o. male admitted with a medical diagnosis of Acute arterial ischemic stroke, multifocal, multiple vascular territories.  He presents with the following impairments/functional limitations:  impaired endurance, weakness, impaired balance, impaired functional mobilty, gait instability, impaired cognition, decreased safety awareness, decreased upper extremity function. The patient demonstrates unsteadiness with gait, continues to rely on RW for balance. During turn, patient experienced loss of balance, patient performed stepping strategy and required minimum assistance to recover balance. The patient is not safe to return home due to impaired safety awareness, decreased insight into his deficits, impaired balance all of which increase his risk of falls.     Rehab Prognosis: Good; patient would benefit from acute skilled PT services to address these deficits and reach maximum level of function.    Recent Surgery: * No surgery found *      Plan:     During this hospitalization, patient to be seen 3 x/week to address the identified rehab impairments via gait training, therapeutic activities, therapeutic exercises, neuromuscular re-education and progress toward the following goals:    · Plan of Care Expires:  11/08/19    Subjective     Chief Complaint: none; "I thought you were my daughter" to RN in [facility]  Patient/Family Comments/goals: motivated to ambulate with therapy  Pain/Comfort:  · Pain Rating 1: 0/10      Objective:     Communicated with RN prior to session.  Patient found up in chair with ([employer]) upon PT entry to room.     General Precautions: Standard, fall   Orthopedic " Precautions:N/A   Braces: N/A     Functional Mobility:    Bed Mobility  Sit to supine:  stand by assistance    Transfers Sit to Stand:  stand by assistance    Gait  Gait Distance: 120 +85 ft with RW  Assistance Level: contact guard assist, required minimum assistance to recover loss of balance during turning with RW  Description: Wide DELGADO, kypohtic posture, decreased gait speed, shuffling steps with decreased toe clearance, decreased step length. Cues for posture, cues to ambulate closer to RW, cues to maintain RW on ground.         AM-PAC 6 CLICK MOBILITY  Turning over in bed (including adjusting bedclothes, sheets and blankets)?: 4  Sitting down on and standing up from a chair with arms (e.g., wheelchair, bedside commode, etc.): 4  Moving from lying on back to sitting on the side of the bed?: 4  Moving to and from a bed to a chair (including a wheelchair)?: 4  Need to walk in hospital room?: 3  Climbing 3-5 steps with a railing?: 3  Basic Mobility Total Score: 22       Therapeutic Activities and Exercises:   Patient educated on role of therapy, goals of session, benefits of out of bed mobility. Patient agreeable to mobilize with therapy.  Discussed PT plan of care during hospitalization. Patient educated that they need to call for assistance to mobilize out of bed. Whiteboard updated as appropriate. Patient educated on how their diagnosis impacts their mobility within PT scope of practice.     Patient left HOB elevated with all lines intact, call button in reach, bed alarm on, PCT present and [employer] notified..    GOALS:   Multidisciplinary Problems     Physical Therapy Goals        Problem: Physical Therapy Goal    Goal Priority Disciplines Outcome Goal Variances Interventions   Physical Therapy Goal     PT, PT/OT Ongoing, Progressing     Description:  Goals to be met by: 19     Patient will increase functional independence with mobility by performin. Supine to sit with Minimal Assistance - met  10/16  1a. Supine to sit with Supervision with bed flat - not met  2. Sit to supine with Minimal Assistance - Not met  3. Sit to stand transfer with Stand-by Assistance - met 10/21/19  4. Ascend/descend 3 stairs with right Handrail with Supervision - Not met  5. Lower extremity exercise program x 20 reps per handout, with assistance as needed - Not met  6. Added on 10/13: Bed to chair transfer with RW and supervision - Not met  7. Added on 10/13: Gait x 300 ft with RW and SBA (including turns and straight lines) - met 10/16  7a. Gait x 300 ft with bilateral SPC and SBA (including turns and straight lines) (revised 10/17)- not met                        Time Tracking:     PT Received On: 10/25/19  PT Start Time: 1527     PT Stop Time: 1540  PT Total Time (min): 13 min     Billable Minutes: Gait Training 13    Treatment Type: Treatment  PT/PTA: PT     PTA Visit Number: 0     Lorena Odonnell, PT  10/25/2019

## 2019-10-25 NOTE — PLAN OF CARE
[facility] accepted. Awaiting insurance approval. Dr. Krishna with out of state insurance would like to speak with team. Number given [phone]. Daughter states going out of town and cannot provide 24 hour care at this time.      10/25/19 1510   Discharge Reassessment   Assessment Type Discharge Planning Reassessment   Discharge Plan A Skilled Nursing Facility   Discharge Plan B Home with family   Anticipated Discharge Disposition SNF   Post-Acute Status   Post-Acute Authorization Placement   Post-Acute Placement Status Pending Payor Review

## 2019-10-25 NOTE — ASSESSMENT & PLAN NOTE
86 y/o man with PMHx chronic Afib (not on AC), CAD s/p stent, HTN, CHF, hypothyroidism who received IV-tPA at OSH after presenting with R-sided weakness, aphasia, and L gaze deviation. 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 No EVT was pursued. TTE shows an EF of 35%.    MRI Brain demonstrated scattered areas of infarct in the bilateral hemispheres, predominantly in the R frontal lobe. Etiology suspected likely hypoperfusion/watershed in the setting of vascular abnormalities, though differential also includes cardioembolic in the setting of AFib, cardiomyopathy.         Antithrombotics for secondary stroke prevention:  Eliquis 2.5 mg BID   Statins for secondary stroke prevention and hyperlipidemia, if present:   Statins: Atorvastatin- 40 mg daily  Aggressive risk factor modification: HTN, HLD, Diet, Exercise, A-Fib, CAD  Rehab efforts: The patient has been evaluated by a stroke team provider and the therapy needs have been fully considered based off the presenting complaints and exam findings. The following therapy evaluations are needed: PT evaluate and treat, OT evaluate and treat, SLP evaluate and treat, PM&R evaluate for appropriate placement -- [facility]   Diagnostics ordered/pending: None  VTE prophylaxis: Eliquis 2.5 mg BID, SCDs  BP parameters: SBP <160

## 2019-10-25 NOTE — PLAN OF CARE
Pt resting in bed, bed low to floor, call light in reach, alarm activated, room clutter free, hourly rounding for pt safety, all precautions set in place to prevent calls

## 2019-10-25 NOTE — PT/OT/SLP PROGRESS
Occupational Therapy   Treatment    Name: Izaiah Douglas  MRN: [id number]  Admitting Diagnosis:  Acute arterial ischemic stroke, multifocal, multiple vascular territories       Recommendations:     Discharge Recommendations: nursing facility, skilled  Discharge Equipment Recommendations:  none  Barriers to discharge:  Decreased caregiver support    Assessment:     Izaiah Douglas is a 85 y.o. male with a medical diagnosis of Acute arterial ischemic stroke, multifocal, multiple vascular territories.  He presents with deficits in higher level ADL task performance including standing activities.  Pt. Required CGA to ambulate to bathroom on this date; SBA to wash hands at sink and SBA for toileting in stand. Pt. Continues to be somewhat confused at times. Pt. Would benefit from continued OT services. . Performance deficits affecting function are impaired endurance, impaired self care skills, impaired functional mobilty, impaired cognition, decreased safety awareness, decreased upper extremity function.     Rehab Prognosis:  Good; patient would benefit from acute skilled OT services to address these deficits and reach maximum level of function.       Plan:     Patient to be seen 3 x/week to address the above listed problems via self-care/home management, therapeutic activities, therapeutic exercises  · Plan of Care Expires: 11/10/19  · Plan of Care Reviewed with: patient    Subjective     Pain/Comfort:  · Pain Rating 1: 0/10  · Pain Rating Post-Intervention 1: 0/10    Objective:     Communicated with: nurse prior to session.  Patient found up in chair with (seated in bedside chair) upon OT entry to room.    General Precautions: Standard, fall   Orthopedic Precautions:N/A   Braces: N/A     Occupational Performance:     Bed Mobility:    · Not tested as pt. Up in chair      Functional Mobility/Transfers:  · Patient completed Sit <> Stand Transfer with contact guard assistance  with  rolling walker   · Patient completed Bed <>  Chair Transfer using Stand Pivot technique with stand by assistance with rolling walker  · Patient completed Toilet Transfer Stand Pivot technique with contact guard assistance with  grab bars  · Functional Mobility: Pt. Ambulated in room with CGA/SBA with RW; pt. Also ambulated for second trial in hallway x ~ 48 feet with CGA and vc's  for walker management ; Pt. Took standing rest break and ambulated back to room with RW and CGA    Activities of Daily Living:  · Grooming: stand by assistance in stand at sink to wash hands; declined brushing teeth  · Toileting: contact guard assistance to stand with stabilization of grab bar and urinate       [facility] 6 Click ADL: 19    Treatment & Education:  Pt. Educated on role of OT and POC  Pt. Educated on need for staff assist for functional mobility  White board updated.   Pt. Performed AROM there ex for RUE for all majorplanes of motion x 1 set 10 reps and LUE elbow flex/ext only 2/2 previous arm injury    Patient left up in chair with all lines intact, call button in reach and [employer] camera system notifiedEducation:      GOALS:   Multidisciplinary Problems     Occupational Therapy Goals        Problem: Occupational Therapy Goal    Goal Priority Disciplines Outcome Interventions   Occupational Therapy Goal     OT, PT/OT Ongoing, Progressing    Description:  Goals to be met by: 11/4    Patient will increase functional independence with ADLs by performing:    UE Dressing with Supervision.  LE Dressing with Supervision.  Grooming while standing with Supervision.  Toileting from toilet with Supervision for hygiene and clothing management.   Toilet transfer with Supervision.                       Time Tracking:     OT Date of Treatment: 10/25/19  OT Start Time: 1024  OT Stop Time: 1045  OT Total Time (min): 21 min    Billable Minutes:Self Care/Home Management 21    BRIGHT Reddy  10/25/2019

## 2019-10-26 LAB
ANION GAP SERPL CALC-SCNC: 8 MMOL/L (ref 8–16)
BASOPHILS # BLD AUTO: 0.04 K/UL (ref 0–0.2)
BASOPHILS NFR BLD: 0.5 % (ref 0–1.9)
BUN SERPL-MCNC: 29 MG/DL (ref 8–23)
CALCIUM SERPL-MCNC: 9.3 MG/DL (ref 8.7–10.5)
CHLORIDE SERPL-SCNC: 97 MMOL/L (ref 95–110)
CO2 SERPL-SCNC: 29 MMOL/L (ref 23–29)
CREAT SERPL-MCNC: 1.4 MG/DL (ref 0.5–1.4)
DIFFERENTIAL METHOD: ABNORMAL
EOSINOPHIL # BLD AUTO: 0.3 K/UL (ref 0–0.5)
EOSINOPHIL NFR BLD: 3.2 % (ref 0–8)
ERYTHROCYTE [DISTWIDTH] IN BLOOD BY AUTOMATED COUNT: 13.8 % (ref 11.5–14.5)
EST. GFR  (AFRICAN AMERICAN): 52.6 ML/MIN/1.73 M^2
EST. GFR  (NON AFRICAN AMERICAN): 45.5 ML/MIN/1.73 M^2
GLUCOSE SERPL-MCNC: 71 MG/DL (ref 70–110)
HCT VFR BLD AUTO: 44.3 % (ref 40–54)
HGB BLD-MCNC: 13.3 G/DL (ref 14–18)
IMM GRANULOCYTES # BLD AUTO: 0.02 K/UL (ref 0–0.04)
IMM GRANULOCYTES NFR BLD AUTO: 0.3 % (ref 0–0.5)
LYMPHOCYTES # BLD AUTO: 1.7 K/UL (ref 1–4.8)
LYMPHOCYTES NFR BLD: 21.3 % (ref 18–48)
MAGNESIUM SERPL-MCNC: 1.9 MG/DL (ref 1.6–2.6)
MCH RBC QN AUTO: 25.8 PG (ref 27–31)
MCHC RBC AUTO-ENTMCNC: 30 G/DL (ref 32–36)
MCV RBC AUTO: 86 FL (ref 82–98)
MONOCYTES # BLD AUTO: 0.8 K/UL (ref 0.3–1)
MONOCYTES NFR BLD: 10.7 % (ref 4–15)
NEUTROPHILS # BLD AUTO: 5 K/UL (ref 1.8–7.7)
NEUTROPHILS NFR BLD: 64 % (ref 38–73)
NRBC BLD-RTO: 0 /100 WBC
PHOSPHATE SERPL-MCNC: 3.3 MG/DL (ref 2.7–4.5)
PLATELET # BLD AUTO: 185 K/UL (ref 150–350)
PMV BLD AUTO: 10.3 FL (ref 9.2–12.9)
POTASSIUM SERPL-SCNC: 3.8 MMOL/L (ref 3.5–5.1)
RBC # BLD AUTO: 5.15 M/UL (ref 4.6–6.2)
SODIUM SERPL-SCNC: 134 MMOL/L (ref 136–145)
WBC # BLD AUTO: 7.76 K/UL (ref 3.9–12.7)

## 2019-10-26 PROCEDURE — 83735 ASSAY OF MAGNESIUM: CPT

## 2019-10-26 PROCEDURE — 85025 COMPLETE CBC W/AUTO DIFF WBC: CPT

## 2019-10-26 PROCEDURE — 25000003 PHARM REV CODE 250: Performed by: NURSE PRACTITIONER

## 2019-10-26 PROCEDURE — 99233 SBSQ HOSP IP/OBS HIGH 50: CPT | Mod: GC,,, | Performed by: PSYCHIATRY & NEUROLOGY

## 2019-10-26 PROCEDURE — 20600001 HC STEP DOWN PRIVATE ROOM

## 2019-10-26 PROCEDURE — 25000003 PHARM REV CODE 250: Performed by: STUDENT IN AN ORGANIZED HEALTH CARE EDUCATION/TRAINING PROGRAM

## 2019-10-26 PROCEDURE — 84100 ASSAY OF PHOSPHORUS: CPT

## 2019-10-26 PROCEDURE — 25000003 PHARM REV CODE 250: Performed by: FAMILY MEDICINE

## 2019-10-26 PROCEDURE — 80048 BASIC METABOLIC PNL TOTAL CA: CPT

## 2019-10-26 PROCEDURE — 25000003 PHARM REV CODE 250: Performed by: PSYCHIATRY & NEUROLOGY

## 2019-10-26 PROCEDURE — 36415 COLL VENOUS BLD VENIPUNCTURE: CPT

## 2019-10-26 PROCEDURE — 99233 PR SUBSEQUENT HOSPITAL CARE,LEVL III: ICD-10-PCS | Mod: GC,,, | Performed by: PSYCHIATRY & NEUROLOGY

## 2019-10-26 RX ADMIN — AMLODIPINE BESYLATE 10 MG: 10 TABLET ORAL at 08:10

## 2019-10-26 RX ADMIN — CARVEDILOL 3.12 MG: 3.12 TABLET, FILM COATED ORAL at 08:10

## 2019-10-26 RX ADMIN — APIXABAN 2.5 MG: 2.5 TABLET, FILM COATED ORAL at 09:10

## 2019-10-26 RX ADMIN — CARVEDILOL 3.12 MG: 3.12 TABLET, FILM COATED ORAL at 09:10

## 2019-10-26 RX ADMIN — APIXABAN 2.5 MG: 2.5 TABLET, FILM COATED ORAL at 08:10

## 2019-10-26 RX ADMIN — DONEPEZIL HYDROCHLORIDE 5 MG: 5 TABLET, FILM COATED ORAL at 09:10

## 2019-10-26 RX ADMIN — SENNOSIDES 8.6 MG: 8.6 TABLET, FILM COATED ORAL at 08:10

## 2019-10-26 RX ADMIN — MUPIROCIN: 20 OINTMENT TOPICAL at 08:10

## 2019-10-26 RX ADMIN — LEVOTHYROXINE SODIUM 25 MCG: 25 TABLET ORAL at 06:10

## 2019-10-26 RX ADMIN — ATORVASTATIN CALCIUM 40 MG: 20 TABLET, FILM COATED ORAL at 08:10

## 2019-10-26 RX ADMIN — FUROSEMIDE 20 MG: 20 TABLET ORAL at 08:10

## 2019-10-26 NOTE — ASSESSMENT & PLAN NOTE
Difficult placement due to patient living in [place] and visiting daughter when stroke occurred - therapy teams recommending inpatient rehab  At this time, patient is unable to fly back to [place]. He is not capable of flying due to inability to ambulate without trained medical assistance, impaired functional mobility, impaired cardiopulmonary response to activity, urine and bowel incontinence, and decreased mental capacity related to stroke and underlying diagnosis of dementia.   10/17 - Spoke to Dr. Krishna of [facility] who requested documentation of pt's current functional status stating why pt is unable to take commercial flight back to [place]. MALINA sent updated notes.   10/23-24: Dr. Krishna requesting phone call with staff physician to discuss further details regarding -SNF placement for patient. Staff attempted call yesterday, however no answer, left voicemail. Will re-attempt phone call today. Pt remains medically and neurologically stable at this time.    10/25: Called Dr. Krishna, no answer. Will re-attempt tomorrow. Patient's daughter contacted by SAW/MALINA. She is unable to provided 24 hour care. Patient accepted to [facility], need insurance auth.     Will attempt to call on Monday

## 2019-10-26 NOTE — ASSESSMENT & PLAN NOTE
86 y/o man with PMHx chronic Afib (not on AC), CAD s/p stent, HTN, CHF, hypothyroidism who received IV-tPA at OSH after presenting with R-sided weakness, aphasia, and L gaze deviation. 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 No EVT was pursued. TTE shows an EF of 35%.    MRI Brain demonstrated scattered areas of infarct in the bilateral hemispheres, predominantly in the R frontal lobe. Etiology suspected likely hypoperfusion/watershed in the setting of vascular abnormalities, though differential also includes cardioembolic in the setting of AFib, cardiomyopathy.         Antithrombotics for secondary stroke prevention:  Eliquis 2.5 mg BID   Statins for secondary stroke prevention and hyperlipidemia, if present:   Statins: Atorvastatin- 40 mg daily  Aggressive risk factor modification: HTN, HLD, Diet, Exercise, A-Fib, CAD  Rehab efforts: The patient has been evaluated by a stroke team provider and the therapy needs have been fully considered based off the presenting complaints and exam findings. The following therapy evaluations are needed: PT evaluate and treat, OT evaluate and treat, SLP evaluate and treat, PM&R evaluate for appropriate placement -- [facility]   Diagnostics ordered/pending: None  VTE prophylaxis: Eliquis 2.5 mg BID, SCDs  BP parameters: SBP <160

## 2019-10-26 NOTE — ASSESSMENT & PLAN NOTE
Stroke risk factor  -- pro BNP from Feb 2019 >3000  -- On Lasix and Spironolactone at home.  -- TTE shows EF 35%, +WMAs  -- Coreg 3.125mg BID.   -- Held Lasix for AMRIT, however reviewed outside records from [facility], and Cr range 1.5 -2.6. Patient likely at baseline. Restarted Lasix at reduced-dose 20 mg daily.    -- daily weights, strict I&Os

## 2019-10-26 NOTE — ASSESSMENT & PLAN NOTE
-- BUN/Cr elevated on admission, initially improved, then worsening again. Likely at baseline per outside chart review of [facility] (Cr range 1.5 - 2.6)  -- Pt appears euvolemic on exam  -- Urine Na and Cr normal   -- Lasix restarted at reduced dose.   -- Will avoid nephrotoxic meds. (Eliquis renally dosed.)

## 2019-10-26 NOTE — SUBJECTIVE & OBJECTIVE
Neurologic Chief Complaint: R-sided weakness and aphasia    Subjective:     Interval History: Patient is seen for follow-up neurological assessment and treatment recommendations:     No acute events overnight. Neuro exam stable. Awaiting placement     HPI, Past Medical, Family, and Social History remains the same as documented in the initial encounter.     Review of Systems   Constitutional: Negative for fatigue and fever.   Gastrointestinal: Negative for nausea and vomiting.   Neurological: Positive for speech difficulty and weakness (improving).   Psychiatric/Behavioral: Negative for confusion and decreased concentration.     Scheduled Meds:   amLODIPine  10 mg Oral Daily    apixaban  2.5 mg Oral BID    atorvastatin  40 mg Oral Daily    carvedilol  3.125 mg Oral BID    donepezil  5 mg Oral QHS    furosemide  20 mg Oral Daily    levothyroxine  25 mcg Oral Daily    senna  8.6 mg Oral Daily     Continuous Infusions:  PRN Meds:hydrALAZINE, influenza, sodium chloride 0.9%    Objective:     Vital Signs (Most Recent):  Temp: 97.2 °F (36.2 °C) (10/26/19 1238)  Pulse: 62 (10/26/19 1238)  Resp: 18 (10/26/19 1238)  BP: (!) 119/57 (10/26/19 1238)  SpO2: 96 % (10/26/19 1238)  BP Location: Right arm    Vital Signs Range (Last 24H):  Temp:  [97 °F (36.1 °C)-98.3 °F (36.8 °C)]   Pulse:  [62-76]   Resp:  [16-18]   BP: (119-170)/(57-80)   SpO2:  [94 %-98 %]   BP Location: Right arm    Physical Exam   Constitutional: He appears well-developed and well-nourished. No distress.   HENT:   Head: Normocephalic and atraumatic.   Eyes: Conjunctivae and EOM are normal.   Cardiovascular: Normal rate.   Pulmonary/Chest: Effort normal. No respiratory distress.   Musculoskeletal: He exhibits no edema or deformity.   Neurological: He is alert. No sensory deficit. He exhibits normal muscle tone.   Skin: Skin is warm and dry.   Psychiatric: He expresses impulsivity. He is attentive.       Neurological Exam:   LOC: alert  Attention Span:  Good   Language: No aphasia, questionable baseline mental status  Articulation: Dysarthria  Orientation: Oriented to person; Not oriented to age, time  Visual Fields: Full  EOM (CN III, IV, VI): Full/intact  Facial Movement (CN VII): Symmetric  Motor: Arm left  Paresis: 4/5  Leg left  Paresis: 3/5  Arm right  Normal 5/5  Leg right Normal 5/5  Sensation: Intact to light touch, temperature and vibration  Tone: Normal tone throughout    Laboratory:  CMP:   Recent Labs   Lab 10/26/19  0421   CALCIUM 9.3   *   K 3.8   CO2 29   CL 97   BUN 29*   CREATININE 1.4     BMP:   Recent Labs   Lab 10/26/19  0421   *   K 3.8   CL 97   CO2 29   BUN 29*   CREATININE 1.4   CALCIUM 9.3     CBC:   Recent Labs   Lab 10/26/19  0421   WBC 7.76   RBC 5.15   HGB 13.3*   HCT 44.3      MCV 86   MCH 25.8*   MCHC 30.0*     Lipid Panel: No results for input(s): CHOL, LDLCALC, HDL, TRIG in the last 168 hours.  Coagulation: No results for input(s): PT, INR, APTT in the last 168 hours.  Platelet Aggregation Study: No results for input(s): PLTAGG, PLTAGINTERP, PLTAGREGLACO, ADPPLTAGGREG in the last 168 hours.  Hgb A1C: No results for input(s): HGBA1C in the last 168 hours.  TSH: No results for input(s): TSH in the last 168 hours.      Diagnostic Results     Brain/Vessel Imaging:     MRI Brain WO Contrast (10/11/2019) -         Areas of abnormal T2/FLAIR/diffusion signal abnormality consistent with areas of acute ischemia/infarct involving the cerebral hemispheres bilaterally.  There is signal abnormality associated with the left internal carotid artery likely corresponding to the appearance of occlusion on the CTA examination.     CTA Multiphase (10/10/2019) -      Occluded left ICA with reconstitution of the distal/supraclinoid segment by retrograde flow through [id number].  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  Right vertebral artery supply the basilar artery.  Basilar artery is small in caliber with wall irregularity and intermittent occlusions.  Partially calcified hyperattenuating lesion in the lateral aspect of the cavernous sinus abutting the distal right ICA, likely representing calcified meningioma.  Generalized cerebral volume loss and remote lacunar type infarct in the right caudate head.       Cardiac Imaging:     TTE (10/11/2019) -   · Moderately decreased left ventricular systolic function. The estimated ejection fraction is 35%.  · Concentric left ventricular remodeling.  · Left ventricular diastolic dysfunction.  · Local segmental wall motion abnormalities.  · Severe left atrial enlargement.  · Mild right atrial enlargement.  · Mild aortic regurgitation.  · Moderate aortic valve stenosis but difficult to appreciate in the setting of AF, depressed EF, and low stroke volume.  · Aortic valve area is 1.39 cm2; peak velocity is 1.28 m/s; mean gradient is 4 mmHg.    Elevated central venous pressure (15 mm Hg).

## 2019-10-26 NOTE — PROGRESS NOTES
[facility]  Vascular Neurology  Comprehensive Stroke Center  Progress Note    Assessment/Plan:     * Acute arterial ischemic stroke, multifocal, multiple vascular territories  86 y/o man with PMHx chronic Afib (not on AC), CAD s/p stent, HTN, CHF, hypothyroidism who received IV-tPA at OSH after presenting with R-sided weakness, aphasia, and L gaze deviation. 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 No EVT was pursued. TTE shows an EF of 35%.    MRI Brain demonstrated scattered areas of infarct in the bilateral hemispheres, predominantly in the R frontal lobe. Etiology suspected likely hypoperfusion/watershed in the setting of vascular abnormalities, though differential also includes cardioembolic in the setting of AFib, cardiomyopathy.         Antithrombotics for secondary stroke prevention:  Eliquis 2.5 mg BID   Statins for secondary stroke prevention and hyperlipidemia, if present:   Statins: Atorvastatin- 40 mg daily  Aggressive risk factor modification: HTN, HLD, Diet, Exercise, A-Fib, CAD  Rehab efforts: The patient has been evaluated by a stroke team provider and the therapy needs have been fully considered based off the presenting complaints and exam findings. The following therapy evaluations are needed: PT evaluate and treat, OT evaluate and treat, SLP evaluate and treat, PM&R evaluate for appropriate placement -- Dispo [facility]   Diagnostics ordered/pending: None  VTE prophylaxis: Eliquis 2.5 mg BID, SCDs  BP parameters: SBP <160    AMRIT (acute kidney injury)  -- BUN/Cr elevated on admission, initially improved, then worsening again. Likely at baseline per outside chart review of [facility] (Cr range 1.5 - 2.6)  -- Pt appears  euvolemic on exam  -- Urine Na and Cr normal   -- Lasix restarted at reduced dose.   -- Will avoid nephrotoxic meds. (Eliquis renally dosed.)    Person awaiting admission to adequate facility elsewhere  Difficult placement due to patient living in [place] and visiting daughter when stroke occurred - therapy teams recommending inpatient rehab  At this time, patient is unable to fly back to [place]. He is not capable of flying due to inability to ambulate without trained medical assistance, impaired functional mobility, impaired cardiopulmonary response to activity, urine and bowel incontinence, and decreased mental capacity related to stroke and underlying diagnosis of dementia.   10/17 - Spoke to Dr. Krishna of [employer] insurance who requested documentation of pt's current functional status stating why pt is unable to take commercial flight back to [place]. CM sent updated notes.   10/23-24: Dr. Krishna requesting phone call with staff physician to discuss further details regarding -SNF placement for patient. Staff attempted call yesterday, however no answer, left voicemail. Will re-attempt phone call today. Pt remains medically and neurologically stable at this time.    10/25: Called Dr. Krishna, no answer. Will re-attempt tomorrow. Patient's daughter contacted by SAW/MALINA. She is unable to provided 24 hour care. Patient accepted to [facility], need insurance auth.     Will attempt to call on Monday    Erythema of hand  -- Patient previously with erythema of R hand from IV infiltration.  -- Bactroban cream TID X 10 days (end date 10/26)  -- Elevation of R hand   -- Pt denied complaints of pain on exam today.    SIADH (syndrome of inappropriate ADH production)  -- Na 133 > 131 > 134 > 133 > 132  -- Continue 1L fluid restriction. May consider increasing restriction if Na downtrend continues.   -- Daily BMP, continue to monitor    Dementia  -- Continue Donepezil 5 mg daily     Intracranial atherosclerosis  -- Noted on  CTA head   -- On Eliquis and Atorvastatin 40 mg     Occlusion of left carotid artery  -- Seen on CTA  -- Suspect chronic occlusion   Eliquis, statin    Nodule of right lung  -- 1 cm nodule of R lung seen on CTA  -- Will need follow up in 3- 6 months   -- Previously discussed with daughter     Cytotoxic cerebral edema  Area of cytotoxic cerebral edema identified when reviewing brain imaging in the territory of the R/L middle cerebral artery. There is not mass effect associated with it. We will continue to monitor the patients clinical exam for any worsening of symptoms which may indicate expansion of the stroke or the area of the edema resulting in the clinical change. The pattern is suggestive of cardioembolic etiology.    CAD (coronary artery disease)  Stroke risk factor  -- Noted on Care Everywhere  -- S/p RCA stent 7/2018  -- Was prescribed Plavix, statin, and Ranexa but unclear if patient was taking them.  -- Eliquis 2.5 BID, Atorvastatin 40 mg daily     Paroxysmal atrial fibrillation  Stroke risk factor  -- Per Care Everywhere; not previously on AC - Dr. Menjivar (Cardiologist) had report patient not a good candidate due to risk of falls, non-compliance, and dementia.  -- Noted on EKG at admit.  -- Remains rate-controlled at this time.  -- Eliquis 2.5 BID  Continue tele    Chronic combined systolic and diastolic congestive heart failure  Stroke risk factor  -- pro BNP from Feb 2019 >3000  -- On Lasix and Spironolactone at home.  -- TTE shows EF 35%, +WMAs  -- Coreg 3.125mg BID.   -- Held Lasix for AMRIT, however reviewed outside records from [facility], and Cr range 1.5 -2.6. Patient likely at baseline. Restarted Lasix at reduced-dose 20 mg daily.    -- daily weights, strict I&Os    Acquired hypothyroidism  Stroke risk factor  TSH 3.032  -- Continue Synthroid 25mcg daily    Essential hypertension  Stroke risk factor  -- SBP < 160  -- At goal on current regimen -- Coreg 3.125 mg BID, Norvasc 10 mg  Daily, and Lisinopril 20mg Daily.  Will continue to monitor.         10/12 - Etiology likely cardioembolic. Will start Eliquis 2.5 mg BID. Creatinine trending down - may start patient on Eliquis 5 mg BID if continue to decrease. L ICA chronically occluded - no need to vasculare intervention.   10/13 - Spoke with PT - patient would benefit from inpatient rehab placement. Norvasc 5 mg ordered. Urine studies ordered for hyponatremia - suspect to be due to dehydration.   10/14 Patient with SIADH but sodium stable.  1 liter fluid restriction.    10/15  Irritation around right hand peripheral IV site.  Sodium 131 today.  Discussed with daughter that he is on fluid restriction and she will stop providing him with outside drinks.     10/16 - Sodium increasing 132. Bactroban ordered for right hand IV infiltration. Working on placement.   10/17 - Sodium continues to improve. Creatinine 1.5 likely near baseline, decreased eliquis to 2.5 mg. R hand erythema stable.   10/19/2019 Sodium 134. Awake and alert but remains confused. No pain. Pending placement  10/20/2019 Complained of chest discomfort overnight. EKG showed no ST wave changes, Trop normal.   10/21 - Renal function improving. Hyponatremic, continue 1L fluid restriction, will likely restart lasix tomorrow, no current signs of fluid overload. Awake and alert, oriented to person and place, disoriented to time.   10/22 Reviewed outside imaging, renal function likely at baseline, restart home lasix at reduced dose of 20 mg daily, continue to monitor. Daily weights and strict I&Os.   10/23: Attempting to transfer patient to a bed on [facility] floor. Staff physician to contact patient's insurance company to discuss further details regarding SS-SNF placement.  10/24: Staff physician contacted patient's insurance company yesterday, however no answer, left voicemail. Will re-attempt phone call today. Pt remains medically and neurologically stable.    10/25: Called Dr. Krishna, no answer.  Will re-attempt tomorrow. Patient's daughter contacted by SAW/MALINA. She is unable to provided 24 hour care. Patient accepted to [facility], need insurance auth.      10/26: No acute events overnight. Neuro exam stable. Awaiting placement     STROKE DOCUMENTATION   Acute Stroke Times   Last Known Normal Date: 10/10/19  Last Known Normal Time: 1905  Symptom Onset Date: 10/10/19  Symptom Onset Time: 1905  Stroke Team Called Date: 10/10/19  Stroke Team Called Time: 2120  Stroke Team Arrival Date: 10/10/19  Stroke Team Arrival Time: 2122    NIH Scale:  1a. Level of Consciousness: 0-->Alert, keenly responsive  1b. LOC Questions: 2-->Answers neither question correctly  1c. LOC Commands: 0-->Performs both tasks correctly  2. Best Gaze: 0-->Normal  3. Visual: 0-->No visual loss  4. Facial Palsy: 1-->Minor paralysis (flattened nasolabial fold, asymmetry on smiling)  5a. Motor Arm, Left: 2-->Some effort against gravity, limb cannot get to or maintain (if cued) 90 (or 45) degrees, drifts down to bed, but has some effort against gravity  5b. Motor Arm, Right: 0-->No drift, limb holds 90 (or 45) degrees for full 10 secs  6a. Motor Leg, Left: 2-->Some effort against gravity, leg falls to bed by 5 secs, but has some effort against gravity  6b. Motor Leg, Right: 0-->No drift, leg holds 30 degree position for full 5 secs  7. Limb Ataxia: 0-->Absent  8. Sensory: 0-->Normal, no sensory loss  9. Best Language: 0-->No aphasia, normal  10. Dysarthria: 1-->Mild-to-moderate dysarthria, patient slurs at least some words and, at worst, can be understood with some difficulty  11. Extinction and Inattention (formerly Neglect): 0-->No abnormality  Total (NIH Stroke Scale): 8       Modified Isabel Score: 1  [place] Coma Scale:    ABCD2 Score:    [id number] Score:   HAS -BLED Score:   ICH Score:   Hunt & Stroud Classification:      Hemorrhagic change of an Ischemic Stroke: Does this patient have an ischemic stroke with hemorrhagic changes? No      Neurologic Chief Complaint: R-sided weakness and aphasia    Subjective:     Interval History: Patient is seen for follow-up neurological assessment and treatment recommendations:     No acute events overnight. Neuro exam stable. Awaiting placement     HPI, Past Medical, Family, and Social History remains the same as documented in the initial encounter.     Review of Systems   Constitutional: Negative for fatigue and fever.   Gastrointestinal: Negative for nausea and vomiting.   Neurological: Positive for speech difficulty and weakness (improving).   Psychiatric/Behavioral: Negative for confusion and decreased concentration.     Scheduled Meds:   amLODIPine  10 mg Oral Daily    apixaban  2.5 mg Oral BID    atorvastatin  40 mg Oral Daily    carvedilol  3.125 mg Oral BID    donepezil  5 mg Oral QHS    furosemide  20 mg Oral Daily    levothyroxine  25 mcg Oral Daily    senna  8.6 mg Oral Daily     Continuous Infusions:  PRN Meds:hydrALAZINE, influenza, sodium chloride 0.9%    Objective:     Vital Signs (Most Recent):  Temp: 97.2 °F (36.2 °C) (10/26/19 1238)  Pulse: 62 (10/26/19 1238)  Resp: 18 (10/26/19 1238)  BP: (!) 119/57 (10/26/19 1238)  SpO2: 96 % (10/26/19 1238)  BP Location: Right arm    Vital Signs Range (Last 24H):  Temp:  [97 °F (36.1 °C)-98.3 °F (36.8 °C)]   Pulse:  [62-76]   Resp:  [16-18]   BP: (119-170)/(57-80)   SpO2:  [94 %-98 %]   BP Location: Right arm    Physical Exam   Constitutional: He appears well-developed and well-nourished. No distress.   HENT:   Head: Normocephalic and atraumatic.   Eyes: Conjunctivae and EOM are normal.   Cardiovascular: Normal rate.   Pulmonary/Chest: Effort normal. No respiratory distress.   Musculoskeletal: He exhibits no edema or deformity.   Neurological: He is alert. No sensory deficit. He exhibits normal muscle tone.   Skin: Skin is warm and dry.   Psychiatric: He expresses impulsivity. He is attentive.       Neurological Exam:   LOC: alert  Attention Span:  Good   Language: No aphasia, questionable baseline mental status  Articulation: Dysarthria  Orientation: Oriented to person; Not oriented to age, time  Visual Fields: Full  EOM (CN III, IV, VI): Full/intact  Facial Movement (CN VII): Symmetric  Motor: Arm left  Paresis: 4/5  Leg left  Paresis: 3/5  Arm right  Normal 5/5  Leg right Normal 5/5  Sensation: Intact to light touch, temperature and vibration  Tone: Normal tone throughout    Laboratory:  CMP:   Recent Labs   Lab 10/26/19  0421   CALCIUM 9.3   *   K 3.8   CO2 29   CL 97   BUN 29*   CREATININE 1.4     BMP:   Recent Labs   Lab 10/26/19  0421   *   K 3.8   CL 97   CO2 29   BUN 29*   CREATININE 1.4   CALCIUM 9.3     CBC:   Recent Labs   Lab 10/26/19  0421   WBC 7.76   RBC 5.15   HGB 13.3*   HCT 44.3      MCV 86   MCH 25.8*   MCHC 30.0*     Lipid Panel: No results for input(s): CHOL, LDLCALC, HDL, TRIG in the last 168 hours.  Coagulation: No results for input(s): PT, INR, APTT in the last 168 hours.  Platelet Aggregation Study: No results for input(s): PLTAGG, PLTAGINTERP, PLTAGREGLACO, ADPPLTAGGREG in the last 168 hours.  Hgb A1C: No results for input(s): HGBA1C in the last 168 hours.  TSH: No results for input(s): TSH in the last 168 hours.      Diagnostic Results     Brain/Vessel Imaging:     MRI Brain WO Contrast (10/11/2019) -         Areas of abnormal T2/FLAIR/diffusion signal abnormality consistent with areas of acute ischemia/infarct involving the cerebral hemispheres bilaterally.  There is signal abnormality associated with the left internal carotid artery likely corresponding to the appearance of occlusion on the CTA examination.     CTA Multiphase (10/10/2019) -      Occluded left ICA with reconstitution of the distal/supraclinoid segment by retrograde flow through [id number].  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  Right vertebral artery supply the basilar artery.  Basilar artery is small in caliber with wall irregularity and intermittent occlusions.  Partially calcified hyperattenuating lesion in the lateral aspect of the cavernous sinus abutting the distal right ICA, likely representing calcified meningioma.  Generalized cerebral volume loss and remote lacunar type infarct in the right caudate head.       Cardiac Imaging:     TTE (10/11/2019) -   · Moderately decreased left ventricular systolic function. The estimated ejection fraction is 35%.  · Concentric left ventricular remodeling.  · Left ventricular diastolic dysfunction.  · Local segmental wall motion abnormalities.  · Severe left atrial enlargement.  · Mild right atrial enlargement.  · Mild aortic regurgitation.  · Moderate aortic valve stenosis but difficult to appreciate in the setting of AF, depressed EF, and low stroke volume.  · Aortic valve area is 1.39 cm2; peak velocity is 1.28 m/s; mean gradient is 4 mmHg.    Elevated central venous pressure (15 mm Hg).      Martínez Lawton NP  Comprehensive Stroke Center  Department of Vascular Neurology   [facility]

## 2019-10-27 LAB
ANION GAP SERPL CALC-SCNC: 9 MMOL/L (ref 8–16)
BUN SERPL-MCNC: 32 MG/DL (ref 8–23)
CALCIUM SERPL-MCNC: 9 MG/DL (ref 8.7–10.5)
CHLORIDE SERPL-SCNC: 100 MMOL/L (ref 95–110)
CO2 SERPL-SCNC: 27 MMOL/L (ref 23–29)
CREAT SERPL-MCNC: 1.5 MG/DL (ref 0.5–1.4)
EST. GFR  (AFRICAN AMERICAN): 48.4 ML/MIN/1.73 M^2
EST. GFR  (NON AFRICAN AMERICAN): 41.8 ML/MIN/1.73 M^2
GLUCOSE SERPL-MCNC: 70 MG/DL (ref 70–110)
POTASSIUM SERPL-SCNC: 4.2 MMOL/L (ref 3.5–5.1)
SODIUM SERPL-SCNC: 136 MMOL/L (ref 136–145)

## 2019-10-27 PROCEDURE — 25000003 PHARM REV CODE 250: Performed by: STUDENT IN AN ORGANIZED HEALTH CARE EDUCATION/TRAINING PROGRAM

## 2019-10-27 PROCEDURE — 25000003 PHARM REV CODE 250: Performed by: FAMILY MEDICINE

## 2019-10-27 PROCEDURE — 25000003 PHARM REV CODE 250: Performed by: NURSE PRACTITIONER

## 2019-10-27 PROCEDURE — 36415 COLL VENOUS BLD VENIPUNCTURE: CPT

## 2019-10-27 PROCEDURE — 80048 BASIC METABOLIC PNL TOTAL CA: CPT

## 2019-10-27 PROCEDURE — 99232 SBSQ HOSP IP/OBS MODERATE 35: CPT | Mod: GC,,, | Performed by: PSYCHIATRY & NEUROLOGY

## 2019-10-27 PROCEDURE — 20600001 HC STEP DOWN PRIVATE ROOM

## 2019-10-27 PROCEDURE — 25000003 PHARM REV CODE 250: Performed by: PSYCHIATRY & NEUROLOGY

## 2019-10-27 PROCEDURE — 99232 PR SUBSEQUENT HOSPITAL CARE,LEVL II: ICD-10-PCS | Mod: GC,,, | Performed by: PSYCHIATRY & NEUROLOGY

## 2019-10-27 RX ADMIN — CARVEDILOL 3.12 MG: 3.12 TABLET, FILM COATED ORAL at 08:10

## 2019-10-27 RX ADMIN — FUROSEMIDE 20 MG: 20 TABLET ORAL at 08:10

## 2019-10-27 RX ADMIN — APIXABAN 2.5 MG: 2.5 TABLET, FILM COATED ORAL at 09:10

## 2019-10-27 RX ADMIN — CARVEDILOL 3.12 MG: 3.12 TABLET, FILM COATED ORAL at 09:10

## 2019-10-27 RX ADMIN — ATORVASTATIN CALCIUM 40 MG: 20 TABLET, FILM COATED ORAL at 08:10

## 2019-10-27 RX ADMIN — APIXABAN 2.5 MG: 2.5 TABLET, FILM COATED ORAL at 08:10

## 2019-10-27 RX ADMIN — LEVOTHYROXINE SODIUM 25 MCG: 25 TABLET ORAL at 06:10

## 2019-10-27 RX ADMIN — DONEPEZIL HYDROCHLORIDE 5 MG: 5 TABLET, FILM COATED ORAL at 09:10

## 2019-10-27 RX ADMIN — AMLODIPINE BESYLATE 10 MG: 10 TABLET ORAL at 08:10

## 2019-10-27 RX ADMIN — SENNOSIDES 8.6 MG: 8.6 TABLET, FILM COATED ORAL at 08:10

## 2019-10-27 NOTE — ASSESSMENT & PLAN NOTE
-- BUN/Cr elevated on admission, initially improved, then worsening again. Likely at baseline per outside chart review of [facility] (Cr range 1.5 - 2.6)  -- Pt appears euvolemic on exam  -- Urine Na and Cr normal   -- Lasix restarted at reduced dose.   -- Will avoid nephrotoxic meds. (Eliquis renally dosed.)

## 2019-10-27 NOTE — ASSESSMENT & PLAN NOTE
84 y/o man with PMHx chronic Afib (not on AC), CAD s/p stent, HTN, CHF, hypothyroidism who received IV-tPA at OSH after presenting with R-sided weakness, aphasia, and L gaze deviation. 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 No EVT was pursued. TTE shows an EF of 35%.    MRI Brain demonstrated scattered areas of infarct in the bilateral hemispheres, predominantly in the R frontal lobe. Etiology suspected likely hypoperfusion/watershed in the setting of vascular abnormalities, though differential also includes cardioembolic in the setting of AFib, cardiomyopathy.         Antithrombotics for secondary stroke prevention:  Eliquis 2.5 mg BID   Statins for secondary stroke prevention and hyperlipidemia, if present:   Statins: Atorvastatin- 40 mg daily  Aggressive risk factor modification: HTN, HLD, Diet, Exercise, A-Fib, CAD  Rehab efforts: The patient has been evaluated by a stroke team provider and the therapy needs have been fully considered based off the presenting complaints and exam findings. The following therapy evaluations are needed: PT evaluate and treat, OT evaluate and treat, SLP evaluate and treat, PM&R evaluate for appropriate placement -- [facility]   Diagnostics ordered/pending: None  VTE prophylaxis: Eliquis 2.5 mg BID, SCDs  BP parameters: SBP <160

## 2019-10-27 NOTE — SUBJECTIVE & OBJECTIVE
Neurologic Chief Complaint: R-sided weakness and aphasia    Subjective:     Interval History: Patient is seen for follow-up neurological assessment and treatment recommendations:     SIRIA. Patient awaiting placement.     HPI, Past Medical, Family, and Social History remains the same as documented in the initial encounter.     Review of Systems   Constitutional: Negative for fatigue and fever.   Gastrointestinal: Negative for nausea and vomiting.   Neurological: Positive for speech difficulty and weakness.   Psychiatric/Behavioral: Negative for confusion and decreased concentration.     Scheduled Meds:   amLODIPine  10 mg Oral Daily    apixaban  2.5 mg Oral BID    atorvastatin  40 mg Oral Daily    carvedilol  3.125 mg Oral BID    donepezil  5 mg Oral QHS    furosemide  20 mg Oral Daily    levothyroxine  25 mcg Oral Daily    senna  8.6 mg Oral Daily     Continuous Infusions:  PRN Meds:hydrALAZINE, influenza, sodium chloride 0.9%    Objective:     Vital Signs (Most Recent):  Temp: 98 °F (36.7 °C) (10/27/19 1200)  Pulse: 67 (10/27/19 1549)  Resp: 16 (10/27/19 1200)  BP: 130/68 (10/27/19 1200)  SpO2: 97 % (10/27/19 1200)  BP Location: Left arm    Vital Signs Range (Last 24H):  Temp:  [97.3 °F (36.3 °C)-98.6 °F (37 °C)]   Pulse:  [60-83]   Resp:  [16-22]   BP: (130-156)/(65-84)   SpO2:  [92 %-97 %]   BP Location: Left arm    Physical Exam   Constitutional: He appears well-developed and well-nourished. No distress.   HENT:   Head: Normocephalic and atraumatic.   Eyes: Conjunctivae and EOM are normal.   Cardiovascular: Normal rate.   Pulmonary/Chest: Effort normal. No respiratory distress.   Musculoskeletal: He exhibits no edema or deformity.   Neurological: He is alert. No sensory deficit. He exhibits normal muscle tone.   Skin: Skin is warm and dry.   Psychiatric: He expresses impulsivity. He is attentive.       Neurological Exam:   LOC: alert  Attention Span: Good   Language: No aphasia, questionable baseline  mental status  Articulation: Dysarthria  Orientation: Oriented to person; Not oriented to age, time  Visual Fields: Full  EOM (CN III, IV, VI): Full/intact  Facial Movement (CN VII): Symmetric  Motor: Arm left  Paresis: 4/5  Leg left  Paresis: 3/5  Arm right  Normal 5/5  Leg right Normal 5/5  Sensation: Intact to light touch, temperature and vibration  Tone: Normal tone throughout    Laboratory:  CMP:   Recent Labs   Lab 10/27/19  0316   CALCIUM 9.0      K 4.2   CO2 27      BUN 32*   CREATININE 1.5*     BMP:   Recent Labs   Lab 10/27/19  0316      K 4.2      CO2 27   BUN 32*   CREATININE 1.5*   CALCIUM 9.0     CBC:   Recent Labs   Lab 10/26/19  0421   WBC 7.76   RBC 5.15   HGB 13.3*   HCT 44.3      MCV 86   MCH 25.8*   MCHC 30.0*     Lipid Panel: No results for input(s): CHOL, LDLCALC, HDL, TRIG in the last 168 hours.  Coagulation: No results for input(s): PT, INR, APTT in the last 168 hours.  Platelet Aggregation Study: No results for input(s): PLTAGG, PLTAGINTERP, PLTAGREGLACO, ADPPLTAGGREG in the last 168 hours.  Hgb A1C: No results for input(s): HGBA1C in the last 168 hours.  TSH: No results for input(s): TSH in the last 168 hours.      Diagnostic Results     Brain/Vessel Imaging:     MRI Brain WO Contrast (10/11/2019) -         Areas of abnormal T2/FLAIR/diffusion signal abnormality consistent with areas of acute ischemia/infarct involving the cerebral hemispheres bilaterally.  There is signal abnormality associated with the left internal carotid artery likely corresponding to the appearance of occlusion on the CTA examination.     CTA Multiphase (10/10/2019) -      Occluded left ICA with reconstitution of the distal/supraclinoid segment by retrograde flow through [id number].  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  Right vertebral artery  supply the basilar artery.  Basilar artery is small in caliber with wall irregularity and intermittent occlusions.  Partially calcified hyperattenuating lesion in the lateral aspect of the cavernous sinus abutting the distal right ICA, likely representing calcified meningioma.  Generalized cerebral volume loss and remote lacunar type infarct in the right caudate head.       Cardiac Imaging:     TTE (10/11/2019) -   · Moderately decreased left ventricular systolic function. The estimated ejection fraction is 35%.  · Concentric left ventricular remodeling.  · Left ventricular diastolic dysfunction.  · Local segmental wall motion abnormalities.  · Severe left atrial enlargement.  · Mild right atrial enlargement.  · Mild aortic regurgitation.  · Moderate aortic valve stenosis but difficult to appreciate in the setting of AF, depressed EF, and low stroke volume.  · Aortic valve area is 1.39 cm2; peak velocity is 1.28 m/s; mean gradient is 4 mmHg.    Elevated central venous pressure (15 mm Hg).

## 2019-10-27 NOTE — ASSESSMENT & PLAN NOTE
Difficult placement due to patient living in [place] and visiting daughter when stroke occurred - therapy teams recommending inpatient rehab  At this time, patient is unable to fly back to [place]. He is not capable of flying due to inability to ambulate without trained medical assistance, impaired functional mobility, impaired cardiopulmonary response to activity, urine and bowel incontinence, and decreased mental capacity related to stroke and underlying diagnosis of dementia.   10/17 - Spoke to Dr. Krishna of [facility] [employer] who requested documentation of pt's current functional status stating why pt is unable to take commercial flight back to [place]. MALINA sent updated notes.   10/23-24: Dr. Krishna requesting phone call with staff physician to discuss further details regarding -SNF placement for patient. Staff attempted call yesterday, however no answer, left voicemail. Will re-attempt phone call today. Pt remains medically and neurologically stable at this time.    10/25: Called Dr. Krishna, no answer. Will re-attempt tomorrow. Patient's daughter contacted by SAW/MALINA. She is unable to provided 24 hour care. Patient accepted to [facility], need insurance auth.     Will attempt to call on Monday

## 2019-10-27 NOTE — PROGRESS NOTES
[facility]  Vascular Neurology  Comprehensive Stroke Center  Progress Note    Assessment/Plan:     * Acute arterial ischemic stroke, multifocal, multiple vascular territories  86 y/o man with PMHx chronic Afib (not on AC), CAD s/p stent, HTN, CHF, hypothyroidism who received IV-tPA at OSH after presenting with R-sided weakness, aphasia, and L gaze deviation. 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 No EVT was pursued. TTE shows an EF of 35%.    MRI Brain demonstrated scattered areas of infarct in the bilateral hemispheres, predominantly in the R frontal lobe. Etiology suspected likely hypoperfusion/watershed in the setting of vascular abnormalities, though differential also includes cardioembolic in the setting of AFib, cardiomyopathy.         Antithrombotics for secondary stroke prevention:  Eliquis 2.5 mg BID   Statins for secondary stroke prevention and hyperlipidemia, if present:   Statins: Atorvastatin- 40 mg daily  Aggressive risk factor modification: HTN, HLD, Diet, Exercise, A-Fib, CAD  Rehab efforts: The patient has been evaluated by a stroke team provider and the therapy needs have been fully considered based off the presenting complaints and exam findings. The following therapy evaluations are needed: PT evaluate and treat, OT evaluate and treat, SLP evaluate and treat, PM&R evaluate for appropriate placement -- Dispo [facility]   Diagnostics ordered/pending: None  VTE prophylaxis: Eliquis 2.5 mg BID, SCDs  BP parameters: SBP <160    AMRIT (acute kidney injury)  -- BUN/Cr elevated on admission, initially improved, then worsening again. Likely at baseline per outside chart review of [facility] (Cr range 1.5 - 2.6)  -- Pt appears  euvolemic on exam  -- Urine Na and Cr normal   -- Lasix restarted at reduced dose.   -- Will avoid nephrotoxic meds. (Eliquis renally dosed.)    Person awaiting admission to adequate facility elsewhere  Difficult placement due to patient living in [place] and visiting daughter when stroke occurred - therapy teams recommending inpatient rehab  At this time, patient is unable to fly back to [place]. He is not capable of flying due to inability to ambulate without trained medical assistance, impaired functional mobility, impaired cardiopulmonary response to activity, urine and bowel incontinence, and decreased mental capacity related to stroke and underlying diagnosis of dementia.   10/17 - Spoke to Dr. Krishna of [employer] insurance who requested documentation of pt's current functional status stating why pt is unable to take commercial flight back to [place]. CM sent updated notes.   10/23-24: Dr. Krishna requesting phone call with staff physician to discuss further details regarding -SNF placement for patient. Staff attempted call yesterday, however no answer, left voicemail. Will re-attempt phone call today. Pt remains medically and neurologically stable at this time.    10/25: Called Dr. Krishna, no answer. Will re-attempt tomorrow. Patient's daughter contacted by [facility]MALINA. She is unable to provided 24 hour care. Patient accepted to [facility], need insurance auth.     Will attempt to call on Monday    Erythema of hand  -- Patient previously with erythema of R hand from IV infiltration.  -- Bactroban cream TID X 10 days (end date 10/26)  -- Elevation of R hand   -- Pt denied complaints of pain on exam today.    SIADH (syndrome of inappropriate ADH production)  -- Na stable   -- Continue 1L fluid restriction. May consider increasing restriction if Na downtrend continues.   -- Daily BMP, continue to monitor    Dementia  -- Continue Donepezil 5 mg daily     Intracranial atherosclerosis  -- Noted on CTA head   -- On  Eliquis and Atorvastatin 40 mg     Occlusion of left carotid artery  -- Seen on CTA  -- Suspect chronic occlusion   Eliquis, statin    Nodule of right lung  -- 1 cm nodule of R lung seen on CTA  -- Will need follow up in 3- 6 months   -- Previously discussed with daughter     Cytotoxic cerebral edema  Area of cytotoxic cerebral edema identified when reviewing brain imaging in the territory of the R/L middle cerebral artery. There is not mass effect associated with it. We will continue to monitor the patients clinical exam for any worsening of symptoms which may indicate expansion of the stroke or the area of the edema resulting in the clinical change. The pattern is suggestive of cardioembolic etiology.    CAD (coronary artery disease)  Stroke risk factor  -- Noted on Care Everywhere  -- S/p RCA stent 7/2018  -- Was prescribed Plavix, statin, and Ranexa but unclear if patient was taking them.  -- Eliquis 2.5 BID, Atorvastatin 40 mg daily     Paroxysmal atrial fibrillation  Stroke risk factor  -- Per Care Everywhere; not previously on AC - Dr. Menjivar (Cardiologist) had report patient not a good candidate due to risk of falls, non-compliance, and dementia.  -- Noted on EKG at admit.  -- Remains rate-controlled at this time.  -- Eliquis 2.5 BID  Continue tele    Chronic combined systolic and diastolic congestive heart failure  Stroke risk factor  -- pro BNP from Feb 2019 >3000  -- On Lasix and Spironolactone at home.  -- TTE shows EF 35%, +WMAs  -- Coreg 3.125mg BID.   -- Held Lasix for AMRIT, however reviewed outside records from [facility], and Cr range 1.5 -2.6. Patient likely at baseline. Restarted Lasix at reduced-dose 20 mg daily.    -- daily weights, strict I&Os    Acquired hypothyroidism  Stroke risk factor  TSH 3.032  -- Continue Synthroid 25mcg daily    Essential hypertension  Stroke risk factor  -- SBP < 160  -- At goal on current regimen -- Coreg 3.125 mg BID, Norvasc 10 mg Daily, and Lisinopril  20mg Daily.  Will continue to monitor.         10/12 - Etiology likely cardioembolic. Will start Eliquis 2.5 mg BID. Creatinine trending down - may start patient on Eliquis 5 mg BID if continue to decrease. L ICA chronically occluded - no need to vasculare intervention.   10/13 - Spoke with PT - patient would benefit from inpatient rehab placement. Norvasc 5 mg ordered. Urine studies ordered for hyponatremia - suspect to be due to dehydration.   10/14 Patient with SIADH but sodium stable.  1 liter fluid restriction.    10/15  Irritation around right hand peripheral IV site.  Sodium 131 today.  Discussed with daughter that he is on fluid restriction and she will stop providing him with outside drinks.     10/16 - Sodium increasing 132. Bactroban ordered for right hand IV infiltration. Working on placement.   10/17 - Sodium continues to improve. Creatinine 1.5 likely near baseline, decreased eliquis to 2.5 mg. R hand erythema stable.   10/19/2019 Sodium 134. Awake and alert but remains confused. No pain. Pending placement  10/20/2019 Complained of chest discomfort overnight. EKG showed no ST wave changes, Trop normal.   10/21 - Renal function improving. Hyponatremic, continue 1L fluid restriction, will likely restart lasix tomorrow, no current signs of fluid overload. Awake and alert, oriented to person and place, disoriented to time.   10/22 Reviewed outside imaging, renal function likely at baseline, restart home lasix at reduced dose of 20 mg daily, continue to monitor. Daily weights and strict I&Os.   10/23: Attempting to transfer patient to a bed on [facility] floor. Staff physician to contact patient's insurance company to discuss further details regarding SS-SNF placement.  10/24: Staff physician contacted patient's insurance company yesterday, however no answer, left voicemail. Will re-attempt phone call today. Pt remains medically and neurologically stable.    10/25: Called Dr. Krishna, no answer. Will re-attempt  tomorrow. Patient's daughter contacted by [facility]ALINA. She is unable to provided 24 hour care. Patient accepted to [facility], need insurance auth.      10/26: No acute events overnight. Neuro exam stable. Awaiting placement   10/27/2019: SIRIA. Patient awaiting placement.     STROKE DOCUMENTATION   Acute Stroke Times   Last Known Normal Date: 10/10/19  Last Known Normal Time: 1905  Symptom Onset Date: 10/10/19  Symptom Onset Time: 1905  Stroke Team Called Date: 10/10/19  Stroke Team Called Time: 2120  Stroke Team Arrival Date: 10/10/19  Stroke Team Arrival Time: 2122    NIH Scale:  1a. Level of Consciousness: 0-->Alert, keenly responsive  1b. LOC Questions: 2-->Answers neither question correctly  1c. LOC Commands: 0-->Performs both tasks correctly  2. Best Gaze: 0-->Normal  3. Visual: 0-->No visual loss  4. Facial Palsy: 1-->Minor paralysis (flattened nasolabial fold, asymmetry on smiling)  5a. Motor Arm, Left: 2-->Some effort against gravity, limb cannot get to or maintain (if cued) 90 (or 45) degrees, drifts down to bed, but has some effort against gravity  5b. Motor Arm, Right: 0-->No drift, limb holds 90 (or 45) degrees for full 10 secs  6a. Motor Leg, Left: 2-->Some effort against gravity, leg falls to bed by 5 secs, but has some effort against gravity  6b. Motor Leg, Right: 0-->No drift, leg holds 30 degree position for full 5 secs  7. Limb Ataxia: 0-->Absent  8. Sensory: 0-->Normal, no sensory loss  9. Best Language: 0-->No aphasia, normal  10. Dysarthria: 1-->Mild-to-moderate dysarthria, patient slurs at least some words and, at worst, can be understood with some difficulty  11. Extinction and Inattention (formerly Neglect): 0-->No abnormality  Total (NIH Stroke Scale): 8       Modified Wise Score: 1  Holley Coma Scale:    ABCD2 Score:    [id number] Score:   HAS -BLED Score:   ICH Score:   Hunt & Stroud Classification:      Hemorrhagic change of an Ischemic Stroke: Does this patient have an ischemic stroke  with hemorrhagic changes? No     Neurologic Chief Complaint: R-sided weakness and aphasia    Subjective:     Interval History: Patient is seen for follow-up neurological assessment and treatment recommendations:     SIRIA. Patient awaiting placement.     HPI, Past Medical, Family, and Social History remains the same as documented in the initial encounter.     Review of Systems   Constitutional: Negative for fatigue and fever.   Gastrointestinal: Negative for nausea and vomiting.   Neurological: Positive for speech difficulty and weakness.   Psychiatric/Behavioral: Negative for confusion and decreased concentration.     Scheduled Meds:   amLODIPine  10 mg Oral Daily    apixaban  2.5 mg Oral BID    atorvastatin  40 mg Oral Daily    carvedilol  3.125 mg Oral BID    donepezil  5 mg Oral QHS    furosemide  20 mg Oral Daily    levothyroxine  25 mcg Oral Daily    senna  8.6 mg Oral Daily     Continuous Infusions:  PRN Meds:hydrALAZINE, influenza, sodium chloride 0.9%    Objective:     Vital Signs (Most Recent):  Temp: 98 °F (36.7 °C) (10/27/19 1200)  Pulse: 67 (10/27/19 1549)  Resp: 16 (10/27/19 1200)  BP: 130/68 (10/27/19 1200)  SpO2: 97 % (10/27/19 1200)  BP Location: Left arm    Vital Signs Range (Last 24H):  Temp:  [97.3 °F (36.3 °C)-98.6 °F (37 °C)]   Pulse:  [60-83]   Resp:  [16-22]   BP: (130-156)/(65-84)   SpO2:  [92 %-97 %]   BP Location: Left arm    Physical Exam   Constitutional: He appears well-developed and well-nourished. No distress.   HENT:   Head: Normocephalic and atraumatic.   Eyes: Conjunctivae and EOM are normal.   Cardiovascular: Normal rate.   Pulmonary/Chest: Effort normal. No respiratory distress.   Musculoskeletal: He exhibits no edema or deformity.   Neurological: He is alert. No sensory deficit. He exhibits normal muscle tone.   Skin: Skin is warm and dry.   Psychiatric: He expresses impulsivity. He is attentive.       Neurological Exam:   LOC: alert  Attention Span: Good   Language: No  aphasia, questionable baseline mental status  Articulation: Dysarthria  Orientation: Oriented to person; Not oriented to age, time  Visual Fields: Full  EOM (CN III, IV, VI): Full/intact  Facial Movement (CN VII): Symmetric  Motor: Arm left  Paresis: 4/5  Leg left  Paresis: 3/5  Arm right  Normal 5/5  Leg right Normal 5/5  Sensation: Intact to light touch, temperature and vibration  Tone: Normal tone throughout    Laboratory:  CMP:   Recent Labs   Lab 10/27/19  0316   CALCIUM 9.0      K 4.2   CO2 27      BUN 32*   CREATININE 1.5*     BMP:   Recent Labs   Lab 10/27/19  0316      K 4.2      CO2 27   BUN 32*   CREATININE 1.5*   CALCIUM 9.0     CBC:   Recent Labs   Lab 10/26/19  0421   WBC 7.76   RBC 5.15   HGB 13.3*   HCT 44.3      MCV 86   MCH 25.8*   MCHC 30.0*     Lipid Panel: No results for input(s): CHOL, LDLCALC, HDL, TRIG in the last 168 hours.  Coagulation: No results for input(s): PT, INR, APTT in the last 168 hours.  Platelet Aggregation Study: No results for input(s): PLTAGG, PLTAGINTERP, PLTAGREGLACO, ADPPLTAGGREG in the last 168 hours.  Hgb A1C: No results for input(s): HGBA1C in the last 168 hours.  TSH: No results for input(s): TSH in the last 168 hours.      Diagnostic Results     Brain/Vessel Imaging:     MRI Brain WO Contrast (10/11/2019) -         Areas of abnormal T2/FLAIR/diffusion signal abnormality consistent with areas of acute ischemia/infarct involving the cerebral hemispheres bilaterally.  There is signal abnormality associated with the left internal carotid artery likely corresponding to the appearance of occlusion on the CTA examination.     CTA Multiphase (10/10/2019) -      Occluded left ICA with reconstitution of the distal/supraclinoid segment by retrograde flow through [id number].  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  Right vertebral artery supply the basilar artery.  Basilar artery is small in caliber with wall irregularity and intermittent occlusions.  Partially calcified hyperattenuating lesion in the lateral aspect of the cavernous sinus abutting the distal right ICA, likely representing calcified meningioma.  Generalized cerebral volume loss and remote lacunar type infarct in the right caudate head.       Cardiac Imaging:     TTE (10/11/2019) -   · Moderately decreased left ventricular systolic function. The estimated ejection fraction is 35%.  · Concentric left ventricular remodeling.  · Left ventricular diastolic dysfunction.  · Local segmental wall motion abnormalities.  · Severe left atrial enlargement.  · Mild right atrial enlargement.  · Mild aortic regurgitation.  · Moderate aortic valve stenosis but difficult to appreciate in the setting of AF, depressed EF, and low stroke volume.  · Aortic valve area is 1.39 cm2; peak velocity is 1.28 m/s; mean gradient is 4 mmHg.    Elevated central venous pressure (15 mm Hg).      Martínez Lawton NP  Comprehensive Stroke Center  Department of Vascular Neurology   [facility]

## 2019-10-27 NOTE — ASSESSMENT & PLAN NOTE
-- Na stable   -- Continue 1L fluid restriction. May consider increasing restriction if Na downtrend continues.   -- Daily BMP, continue to monitor

## 2019-10-27 NOTE — PLAN OF CARE
Problem: Fall Injury Risk  Goal: Absence of Fall and Fall-Related Injury  Outcome: Ongoing, Progressing     Problem: Adult Inpatient Plan of Care  Goal: Plan of Care Review  Outcome: Ongoing, Progressing  Goal: Optimal Comfort and Wellbeing  Outcome: Ongoing, Progressing     Problem: Cerebral Tissue Perfusion Risk (Stroke, Ischemic/Transient Ischemic Attack)  Goal: Optimal Cerebral Tissue Perfusion  Outcome: Ongoing, Progressing     Problem: Communication Impairment (Stroke, Ischemic/Transient Ischemic Attack)  Goal: Improved Communication Skills  Outcome: Ongoing, Progressing     Problem: Skin Injury Risk Increased  Goal: Skin Health and Integrity  Outcome: Ongoing, Progressing

## 2019-10-27 NOTE — ASSESSMENT & PLAN NOTE
Stroke risk factor  -- pro BNP from Feb 2019 >3000  -- On Lasix and Spironolactone at home.  -- TTE shows EF 35%, +WMAs  -- Coreg 3.125mg BID.   -- Held Lasix for AMRIT, however reviewed outside records from [facility], and Cr range 1.5 -2.6. Patient likely at baseline. Restarted Lasix at reduced-dose 20 mg daily.    -- daily weights, strict I&Os

## 2019-10-28 LAB
ANION GAP SERPL CALC-SCNC: 8 MMOL/L (ref 8–16)
BASOPHILS # BLD AUTO: 0.03 K/UL (ref 0–0.2)
BASOPHILS NFR BLD: 0.4 % (ref 0–1.9)
BUN SERPL-MCNC: 33 MG/DL (ref 8–23)
CALCIUM SERPL-MCNC: 8.6 MG/DL (ref 8.7–10.5)
CHLORIDE SERPL-SCNC: 102 MMOL/L (ref 95–110)
CO2 SERPL-SCNC: 24 MMOL/L (ref 23–29)
CREAT SERPL-MCNC: 1.6 MG/DL (ref 0.5–1.4)
DIFFERENTIAL METHOD: ABNORMAL
EOSINOPHIL # BLD AUTO: 0.2 K/UL (ref 0–0.5)
EOSINOPHIL NFR BLD: 2.7 % (ref 0–8)
ERYTHROCYTE [DISTWIDTH] IN BLOOD BY AUTOMATED COUNT: 13.9 % (ref 11.5–14.5)
EST. GFR  (AFRICAN AMERICAN): 44.7 ML/MIN/1.73 M^2
EST. GFR  (NON AFRICAN AMERICAN): 38.7 ML/MIN/1.73 M^2
GLUCOSE SERPL-MCNC: 117 MG/DL (ref 70–110)
HCT VFR BLD AUTO: 41.1 % (ref 40–54)
HGB BLD-MCNC: 12.5 G/DL (ref 14–18)
IMM GRANULOCYTES # BLD AUTO: 0.02 K/UL (ref 0–0.04)
IMM GRANULOCYTES NFR BLD AUTO: 0.3 % (ref 0–0.5)
LYMPHOCYTES # BLD AUTO: 1.4 K/UL (ref 1–4.8)
LYMPHOCYTES NFR BLD: 17.6 % (ref 18–48)
MAGNESIUM SERPL-MCNC: 2 MG/DL (ref 1.6–2.6)
MCH RBC QN AUTO: 26.3 PG (ref 27–31)
MCHC RBC AUTO-ENTMCNC: 30.4 G/DL (ref 32–36)
MCV RBC AUTO: 86 FL (ref 82–98)
MONOCYTES # BLD AUTO: 0.9 K/UL (ref 0.3–1)
MONOCYTES NFR BLD: 11.3 % (ref 4–15)
NEUTROPHILS # BLD AUTO: 5.2 K/UL (ref 1.8–7.7)
NEUTROPHILS NFR BLD: 67.7 % (ref 38–73)
NRBC BLD-RTO: 0 /100 WBC
PHOSPHATE SERPL-MCNC: 3.6 MG/DL (ref 2.7–4.5)
PLATELET # BLD AUTO: 170 K/UL (ref 150–350)
PMV BLD AUTO: 10.5 FL (ref 9.2–12.9)
POTASSIUM SERPL-SCNC: 4.1 MMOL/L (ref 3.5–5.1)
RBC # BLD AUTO: 4.76 M/UL (ref 4.6–6.2)
SODIUM SERPL-SCNC: 134 MMOL/L (ref 136–145)
WBC # BLD AUTO: 7.67 K/UL (ref 3.9–12.7)

## 2019-10-28 PROCEDURE — 97535 SELF CARE MNGMENT TRAINING: CPT

## 2019-10-28 PROCEDURE — 36415 COLL VENOUS BLD VENIPUNCTURE: CPT

## 2019-10-28 PROCEDURE — 20600001 HC STEP DOWN PRIVATE ROOM

## 2019-10-28 PROCEDURE — 25000003 PHARM REV CODE 250: Performed by: FAMILY MEDICINE

## 2019-10-28 PROCEDURE — 99232 PR SUBSEQUENT HOSPITAL CARE,LEVL II: ICD-10-PCS | Mod: GC,,, | Performed by: PSYCHIATRY & NEUROLOGY

## 2019-10-28 PROCEDURE — 25000003 PHARM REV CODE 250: Performed by: PSYCHIATRY & NEUROLOGY

## 2019-10-28 PROCEDURE — 25000003 PHARM REV CODE 250: Performed by: STUDENT IN AN ORGANIZED HEALTH CARE EDUCATION/TRAINING PROGRAM

## 2019-10-28 PROCEDURE — 85025 COMPLETE CBC W/AUTO DIFF WBC: CPT

## 2019-10-28 PROCEDURE — 92507 TX SP LANG VOICE COMM INDIV: CPT

## 2019-10-28 PROCEDURE — 99232 SBSQ HOSP IP/OBS MODERATE 35: CPT | Mod: GC,,, | Performed by: PSYCHIATRY & NEUROLOGY

## 2019-10-28 PROCEDURE — 84100 ASSAY OF PHOSPHORUS: CPT

## 2019-10-28 PROCEDURE — 83735 ASSAY OF MAGNESIUM: CPT

## 2019-10-28 PROCEDURE — 97530 THERAPEUTIC ACTIVITIES: CPT

## 2019-10-28 PROCEDURE — 93005 ELECTROCARDIOGRAM TRACING: CPT

## 2019-10-28 PROCEDURE — 25000003 PHARM REV CODE 250: Performed by: NURSE PRACTITIONER

## 2019-10-28 PROCEDURE — 80048 BASIC METABOLIC PNL TOTAL CA: CPT

## 2019-10-28 PROCEDURE — 93010 EKG 12-LEAD: ICD-10-PCS | Mod: ,,, | Performed by: INTERNAL MEDICINE

## 2019-10-28 PROCEDURE — 93010 ELECTROCARDIOGRAM REPORT: CPT | Mod: ,,, | Performed by: INTERNAL MEDICINE

## 2019-10-28 RX ADMIN — FUROSEMIDE 20 MG: 20 TABLET ORAL at 08:10

## 2019-10-28 RX ADMIN — AMLODIPINE BESYLATE 10 MG: 10 TABLET ORAL at 08:10

## 2019-10-28 RX ADMIN — APIXABAN 2.5 MG: 2.5 TABLET, FILM COATED ORAL at 08:10

## 2019-10-28 RX ADMIN — SENNOSIDES 8.6 MG: 8.6 TABLET, FILM COATED ORAL at 08:10

## 2019-10-28 RX ADMIN — LEVOTHYROXINE SODIUM 25 MCG: 25 TABLET ORAL at 05:10

## 2019-10-28 RX ADMIN — ATORVASTATIN CALCIUM 40 MG: 20 TABLET, FILM COATED ORAL at 08:10

## 2019-10-28 RX ADMIN — DONEPEZIL HYDROCHLORIDE 5 MG: 5 TABLET, FILM COATED ORAL at 08:10

## 2019-10-28 RX ADMIN — CARVEDILOL 3.12 MG: 3.12 TABLET, FILM COATED ORAL at 08:10

## 2019-10-28 NOTE — ASSESSMENT & PLAN NOTE
-- BUN/Cr elevated on admission, initially improved, then worsening again. Likely at baseline per outside chart review of [facility] (Cr range 1.5 - 2.6)  -- Pt appears euvolemic on exam  -- Urine Na and Cr normal   -- Lasix restarted at reduced dose.   -- Will avoid nephrotoxic meds. (Eliquis renally dosed.)

## 2019-10-28 NOTE — SUBJECTIVE & OBJECTIVE
Neurologic Chief Complaint: R-sided weakness and aphasia    Subjective:     Interval History: Patient is seen for follow-up neurological assessment and treatment recommendations: FERNANDOEON     HPI, Past Medical, Family, and Social History remains the same as documented in the initial encounter.     Review of Systems   Constitutional: Negative for fatigue and fever.   Gastrointestinal: Negative for nausea and vomiting.   Neurological: Positive for speech difficulty and weakness.   Psychiatric/Behavioral: Negative for confusion and decreased concentration.   All other systems reviewed and are negative.    Scheduled Meds:   amLODIPine  10 mg Oral Daily    apixaban  2.5 mg Oral BID    atorvastatin  40 mg Oral Daily    carvedilol  3.125 mg Oral BID    donepezil  5 mg Oral QHS    furosemide  20 mg Oral Daily    levothyroxine  25 mcg Oral Daily    senna  8.6 mg Oral Daily     Continuous Infusions:  PRN Meds:hydrALAZINE, influenza, sodium chloride 0.9%    Objective:     Vital Signs (Most Recent):  Temp: 97.6 °F (36.4 °C) (10/28/19 1643)  Pulse: 80 (10/28/19 1643)  Resp: 18 (10/28/19 1643)  BP: (!) 156/75 (10/28/19 1643)  SpO2: 95 % (10/28/19 1643)  BP Location: Right arm    Vital Signs Range (Last 24H):  Temp:  [96 °F (35.6 °C)-98.3 °F (36.8 °C)]   Pulse:  [52-82]   Resp:  [16-20]   BP: (132-167)/(72-82)   SpO2:  [94 %-97 %]   BP Location: Right arm    Physical Exam   Constitutional: He appears well-developed and well-nourished. No distress.   HENT:   Head: Normocephalic and atraumatic.   Eyes: Conjunctivae and EOM are normal.   Cardiovascular: Normal rate.   Pulmonary/Chest: Effort normal. No respiratory distress.   Musculoskeletal: He exhibits no edema or deformity.   Neurological: He is alert. No sensory deficit. He exhibits normal muscle tone.   Skin: Skin is warm and dry.   Psychiatric: He expresses impulsivity and inappropriate judgment. He is attentive.       Neurological Exam:   LOC: alert  Attention Span: Good    Language: No aphasia, questionable baseline mental status  Articulation: Dysarthria  Orientation: Oriented to person; Not oriented to age, time  Visual Fields: Full  EOM (CN III, IV, VI): Full/intact  Facial Movement (CN VII): Symmetric  Motor: Arm left  Paresis: 4/5  Leg left  Paresis: 3/5  Arm right  Normal 5/5  Leg right Normal 5/5  Sensation: Intact to light touch, temperature and vibration  Tone: Normal tone throughout    Laboratory:  CMP:   Recent Labs   Lab 10/28/19  0306   CALCIUM 8.6*   *   K 4.1   CO2 24      BUN 33*   CREATININE 1.6*     BMP:   Recent Labs   Lab 10/28/19  0306   *   K 4.1      CO2 24   BUN 33*   CREATININE 1.6*   CALCIUM 8.6*     CBC:   Recent Labs   Lab 10/28/19  0306   WBC 7.67   RBC 4.76   HGB 12.5*   HCT 41.1      MCV 86   MCH 26.3*   MCHC 30.4*     Lipid Panel: No results for input(s): CHOL, LDLCALC, HDL, TRIG in the last 168 hours.  Coagulation: No results for input(s): PT, INR, APTT in the last 168 hours.  Platelet Aggregation Study: No results for input(s): PLTAGG, PLTAGINTERP, PLTAGREGLACO, ADPPLTAGGREG in the last 168 hours.  Hgb A1C: No results for input(s): HGBA1C in the last 168 hours.  TSH: No results for input(s): TSH in the last 168 hours.      Diagnostic Results     Brain/Vessel Imaging:     MRI Brain WO Contrast (10/11/2019) -         Areas of abnormal T2/FLAIR/diffusion signal abnormality consistent with areas of acute ischemia/infarct involving the cerebral hemispheres bilaterally.  There is signal abnormality associated with the left internal carotid artery likely corresponding to the appearance of occlusion on the CTA examination.     CTA Multiphase (10/10/2019) -      Occluded left ICA with reconstitution of the distal/supraclinoid segment by retrograde flow through [id number].  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  Right vertebral artery supply the basilar artery.  Basilar artery is small in caliber with wall irregularity and intermittent occlusions.  Partially calcified hyperattenuating lesion in the lateral aspect of the cavernous sinus abutting the distal right ICA, likely representing calcified meningioma.  Generalized cerebral volume loss and remote lacunar type infarct in the right caudate head.       Cardiac Imaging:     TTE (10/11/2019) -   · Moderately decreased left ventricular systolic function. The estimated ejection fraction is 35%.  · Concentric left ventricular remodeling.  · Left ventricular diastolic dysfunction.  · Local segmental wall motion abnormalities.  · Severe left atrial enlargement.  · Mild right atrial enlargement.  · Mild aortic regurgitation.  · Moderate aortic valve stenosis but difficult to appreciate in the setting of AF, depressed EF, and low stroke volume.  · Aortic valve area is 1.39 cm2; peak velocity is 1.28 m/s; mean gradient is 4 mmHg.    Elevated central venous pressure (15 mm Hg).

## 2019-10-28 NOTE — PLAN OF CARE
Problem: Occupational Therapy Goal  Goal: Occupational Therapy Goal  Description  Goals to be met by: 11/4    Patient will increase functional independence with ADLs by performing:    UE Dressing with Supervision.  LE Dressing with Supervision.  Grooming while standing with Supervision.  Toileting from toilet with Supervision for hygiene and clothing management.   Toilet transfer with Supervision.      Outcome: Ongoing, Progressing

## 2019-10-28 NOTE — PT/OT/SLP PROGRESS
Speech Language Pathology Treatment    Patient Name:  Izaiah Douglas   MRN:  [id number]  Admitting Diagnosis: Acute arterial ischemic stroke, multifocal, multiple vascular territories    Recommendations:                 General Recommendations:  Cognitive-linguistic therapy  Diet recommendations:  Regular, Liquid Diet Level: Thin   Aspiration Precautions: Small bites/sips and Standard aspiration precautions   General Precautions: Standard, fall  Communication strategies:  provide increased time to answer and go to room if call light pushed    Subjective     Patient awake but lethargic during session  Communicated with nurse prior to session    Pain/Comfort:  Pain Rating 1: 0/10  Pain Rating Post-Intervention 1: 0/10    Objective:     Has the patient been evaluated by SLP for swallowing?   Yes  Keep patient NPO? No   Current Respiratory Status: room air      Patient seen for ongoing cognitive-linguistic therapy. He was sitting up in bed with his eyes closed for majority of session. Patient more easily oriented to time during this session, but was unable to orient to city despite max assist. Patient exhibited increased difficulty with recalling general information during this session as he was unable to recall where he was born and where he lived currently despite max cueing. Patient answered 4/4 simple yes/no questions with min assist, but only answered 1/3 complex yes/no questions with max assist. He exhibited significant delay with all responses and required max cueing for response. As session progressed, the patient required max tactile and verbal stim to stay awake and session was halted. SLP educated patient regarding POC from a ST perspective, but he would benefit from ongoing instruction. He was left in bed with call light within reach.     Assessment:     Izaiah Douglas is a 85 y.o. male with an SLP diagnosis of Cognitive-Linguistic Impairment. .    Goals:   Multidisciplinary Problems     SLP Goals         Problem: SLP Goal    Goal Priority Disciplines Outcome   SLP Goal     SLP Ongoing, Progressing   Description:  Speech Therapy Short Term Goals  Goal expected to be met by 11/1  1. Pt will tolerate a regular diet/thin liquids with no s/s of aspiration.  2. The patient will answer orientation questions x4 with 90% acc no cues.   3. The patient will name 8 items in a concrete category given min cues.   4. The patient will answer complex y/n questions with 85% acc no cues.   5. The patient will follow 2+ step directions with 75% acc given 1 redirection.   6. The patient will participate in an ongoing assessment of speech, language, and cognition with updated goals to follow pending results and progress.                      Plan:     · Patient to be seen:  3 x/week   · Plan of Care expires:  11/09/19  · Plan of Care reviewed with:  patient   · SLP Follow-Up:  Yes       Discharge recommendations:  nursing facility, skilled   Barriers to Discharge:  Level of Skilled Assistance Needed     Time Tracking:     SLP Treatment Date:   10/28/19  Speech Start Time:  1131  Speech Stop Time:  1140     Speech Total Time (min):  9 min    Billable Minutes: Speech Therapy Individual 9    Royce Stearns CCC-SLP  Speech-Language Pathology  Pager: [phone]   10/28/2019

## 2019-10-28 NOTE — ASSESSMENT & PLAN NOTE
Stroke risk factor  -- pro BNP from Feb 2019 >3000  -- On Lasix and Spironolactone at home.  -- TTE shows EF 35%, +WMAs  -- Coreg 3.125mg BID.   -- Held Lasix for AMRIT, however reviewed outside records from [facility], and Cr range 1.5 -2.6. Patient likely at baseline. Restarted Lasix at reduced-dose 20 mg daily.    -- daily weights, strict I&Os

## 2019-10-28 NOTE — PROGRESS NOTES
[facility]  Vascular Neurology  Comprehensive Stroke Center  Progress Note    Assessment/Plan:     * Acute arterial ischemic stroke, multifocal, multiple vascular territories  84 y/o man with PMHx chronic Afib (not on AC), CAD s/p stent, HTN, CHF, hypothyroidism who received IV-tPA at OSH after presenting with R-sided weakness, aphasia, and L gaze deviation. 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 No EVT was pursued. TTE shows an EF of 35%.    MRI Brain demonstrated scattered areas of infarct in the bilateral hemispheres, predominantly in the R frontal lobe. Etiology suspected likely hypoperfusion/watershed in the setting of vascular abnormalities, though differential also includes cardioembolic in the setting of AFib, cardiomyopathy.         Antithrombotics for secondary stroke prevention:  Eliquis 2.5 mg BID   Statins for secondary stroke prevention and hyperlipidemia, if present:   Statins: Atorvastatin- 40 mg daily  Aggressive risk factor modification: HTN, HLD, Diet, Exercise, A-Fib, CAD  Rehab efforts: The patient has been evaluated by a stroke team provider and the therapy needs have been fully considered based off the presenting complaints and exam findings. The following therapy evaluations are needed: PT evaluate and treat, OT evaluate and treat, SLP evaluate and treat, PM&R evaluate for appropriate placement -- Dispo [facility]   Diagnostics ordered/pending: None  VTE prophylaxis: Eliquis 2.5 mg BID, SCDs  BP parameters: SBP <160    AMRIT (acute kidney injury)  -- BUN/Cr elevated on admission, initially improved, then worsening again. Likely at baseline per outside chart review of [facility] (Cr range 1.5 - 2.6)  -- Pt appears  euvolemic on exam  -- Urine Na and Cr normal   -- Lasix restarted at reduced dose.   -- Will avoid nephrotoxic meds. (Eliquis renally dosed.)    Person awaiting admission to adequate facility elsewhere  Difficult placement due to patient living in [place] and visiting daughter when stroke occurred - therapy teams recommending inpatient rehab  At this time, patient is unable to fly back to [place]. He is not capable of flying due to inability to ambulate without trained medical assistance, impaired functional mobility, impaired cardiopulmonary response to activity, urine and bowel incontinence, and decreased mental capacity related to stroke and underlying diagnosis of dementia.   10/17 - Spoke to Dr. Krishna of [employer] insurance who requested documentation of pt's current functional status stating why pt is unable to take commercial flight back to [place]. CM sent updated notes.   10/23-24: Dr. Krishna requesting phone call with staff physician to discuss further details regarding -SNF placement for patient. Staff attempted call yesterday, however no answer, left voicemail. Will re-attempt phone call today. Pt remains medically and neurologically stable at this time.    10/25: Called Dr. Krishna, no answer. Will re-attempt tomorrow. Patient's daughter contacted by [facility]ALINA. She is unable to provided 24 hour care. Patient accepted to [facility], need insurance auth.     Will attempt to call on Monday    Erythema of hand  -- Patient previously with erythema of R hand from IV infiltration.  -- Bactroban cream TID X 10 days (end date 10/26)  -- Elevation of R hand   -- Pt denied complaints of pain on exam today.    SIADH (syndrome of inappropriate ADH production)  -- Na stable   -- Continue 1L fluid restriction. May consider increasing restriction if Na downtrend continues.   -- Daily BMP, continue to monitor    Dementia  -- Continue Donepezil 5 mg daily     Intracranial atherosclerosis  -- Noted on CTA head   -- On  Eliquis and Atorvastatin 40 mg     Occlusion of left carotid artery  -- Seen on CTA  -- Suspect chronic occlusion   Eliquis, statin    Nodule of right lung  -- 1 cm nodule of R lung seen on CTA  -- Will need follow up in 3- 6 months   -- Previously discussed with daughter     Cytotoxic cerebral edema  Area of cytotoxic cerebral edema identified when reviewing brain imaging in the territory of the R/L middle cerebral artery. There is not mass effect associated with it. We will continue to monitor the patients clinical exam for any worsening of symptoms which may indicate expansion of the stroke or the area of the edema resulting in the clinical change. The pattern is suggestive of cardioembolic etiology.    CAD (coronary artery disease)  Stroke risk factor  -- Noted on Care Everywhere  -- S/p RCA stent 7/2018  -- Was prescribed Plavix, statin, and Ranexa but unclear if patient was taking them.  -- Eliquis 2.5 BID, Atorvastatin 40 mg daily     Paroxysmal atrial fibrillation  Stroke risk factor  -- Per Care Everywhere; not previously on AC - Dr. Menjivar (Cardiologist) had report patient not a good candidate due to risk of falls, non-compliance, and dementia.  -- Noted on EKG at admit.  -- Remains rate-controlled at this time.  -- Eliquis 2.5 BID  Continue tele    Chronic combined systolic and diastolic congestive heart failure  Stroke risk factor  -- pro BNP from Feb 2019 >3000  -- On Lasix and Spironolactone at home.  -- TTE shows EF 35%, +WMAs  -- Coreg 3.125mg BID.   -- Held Lasix for AMRIT, however reviewed outside records from [facility], and Cr range 1.5 -2.6. Patient likely at baseline. Restarted Lasix at reduced-dose 20 mg daily.    -- daily weights, strict I&Os    Acquired hypothyroidism  Stroke risk factor  TSH 3.032  -- Continue Synthroid 25mcg daily    Essential hypertension  Stroke risk factor  -- SBP < 160  -- At goal on current regimen -- Coreg 3.125 mg BID, Norvasc 10 mg Daily, and Lisinopril  20mg Daily.  Will continue to monitor.         10/12 - Etiology likely cardioembolic. Will start Eliquis 2.5 mg BID. Creatinine trending down - may start patient on Eliquis 5 mg BID if continue to decrease. L ICA chronically occluded - no need to vasculare intervention.   10/13 - Spoke with PT - patient would benefit from inpatient rehab placement. Norvasc 5 mg ordered. Urine studies ordered for hyponatremia - suspect to be due to dehydration.   10/14 Patient with SIADH but sodium stable.  1 liter fluid restriction.    10/15  Irritation around right hand peripheral IV site.  Sodium 131 today.  Discussed with daughter that he is on fluid restriction and she will stop providing him with outside drinks.     10/16 - Sodium increasing 132. Bactroban ordered for right hand IV infiltration. Working on placement.   10/17 - Sodium continues to improve. Creatinine 1.5 likely near baseline, decreased eliquis to 2.5 mg. R hand erythema stable.   10/19/2019 Sodium 134. Awake and alert but remains confused. No pain. Pending placement  10/20/2019 Complained of chest discomfort overnight. EKG showed no ST wave changes, Trop normal.   10/21 - Renal function improving. Hyponatremic, continue 1L fluid restriction, will likely restart lasix tomorrow, no current signs of fluid overload. Awake and alert, oriented to person and place, disoriented to time.   10/22 Reviewed outside imaging, renal function likely at baseline, restart home lasix at reduced dose of 20 mg daily, continue to monitor. Daily weights and strict I&Os.   10/23: Attempting to transfer patient to a bed on [facility] floor. Staff physician to contact patient's insurance company to discuss further details regarding SS-SNF placement.  10/24: Staff physician contacted patient's insurance company yesterday, however no answer, left voicemail. Will re-attempt phone call today. Pt remains medically and neurologically stable.    10/25: Called Dr. Krishna, no answer. Will re-attempt  tomorrow. Patient's daughter contacted by [facility]INA. She is unable to provided 24 hour care. Patient accepted to [facility], need insurance auth.      10/26: No acute events overnight. Neuro exam stable. Awaiting placement   10/27/2019: SIRIA. Patient awaiting placement.   10/28/19 FERNANDODES.  Waiting for placement     STROKE DOCUMENTATION   Acute Stroke Times   Last Known Normal Date: 10/10/19  Last Known Normal Time: 1905  Symptom Onset Date: 10/10/19  Symptom Onset Time: 1905  Stroke Team Called Date: 10/10/19  Stroke Team Called Time: 2120  Stroke Team Arrival Date: 10/10/19  Stroke Team Arrival Time: 2122    NIH Scale:  1a. Level of Consciousness: 0-->Alert, keenly responsive  1b. LOC Questions: 2-->Answers neither question correctly  1c. LOC Commands: 0-->Performs both tasks correctly  2. Best Gaze: 0-->Normal  3. Visual: 0-->No visual loss  4. Facial Palsy: 1-->Minor paralysis (flattened nasolabial fold, asymmetry on smiling)  5a. Motor Arm, Left: 2-->Some effort against gravity, limb cannot get to or maintain (if cued) 90 (or 45) degrees, drifts down to bed, but has some effort against gravity  5b. Motor Arm, Right: 0-->No drift, limb holds 90 (or 45) degrees for full 10 secs  6a. Motor Leg, Left: 2-->Some effort against gravity, leg falls to bed by 5 secs, but has some effort against gravity  6b. Motor Leg, Right: 0-->No drift, leg holds 30 degree position for full 5 secs  7. Limb Ataxia: 0-->Absent  8. Sensory: 0-->Normal, no sensory loss  9. Best Language: 0-->No aphasia, normal  10. Dysarthria: 1-->Mild-to-moderate dysarthria, patient slurs at least some words and, at worst, can be understood with some difficulty  11. Extinction and Inattention (formerly Neglect): 0-->No abnormality  Total (NIH Stroke Scale): 8       Modified Joffre Score: 1  Frost Coma Scale:    ABCD2 Score:    [id number] Score:   HAS -BLED Score:   ICH Score:   Hunt & Stroud Classification:      Hemorrhagic change of an Ischemic Stroke:  Does this patient have an ischemic stroke with hemorrhagic changes? No     Neurologic Chief Complaint: R-sided weakness and aphasia    Subjective:     Interval History: Patient is seen for follow-up neurological assessment and treatment recommendations: SIRIA CASANOVA, Past Medical, Family, and Social History remains the same as documented in the initial encounter.     Review of Systems   Constitutional: Negative for fatigue and fever.   Gastrointestinal: Negative for nausea and vomiting.   Neurological: Positive for speech difficulty and weakness.   Psychiatric/Behavioral: Negative for confusion and decreased concentration.   All other systems reviewed and are negative.    Scheduled Meds:   amLODIPine  10 mg Oral Daily    apixaban  2.5 mg Oral BID    atorvastatin  40 mg Oral Daily    carvedilol  3.125 mg Oral BID    donepezil  5 mg Oral QHS    furosemide  20 mg Oral Daily    levothyroxine  25 mcg Oral Daily    senna  8.6 mg Oral Daily     Continuous Infusions:  PRN Meds:hydrALAZINE, influenza, sodium chloride 0.9%    Objective:     Vital Signs (Most Recent):  Temp: 97.6 °F (36.4 °C) (10/28/19 1643)  Pulse: 80 (10/28/19 1643)  Resp: 18 (10/28/19 1643)  BP: (!) 156/75 (10/28/19 1643)  SpO2: 95 % (10/28/19 1643)  BP Location: Right arm    Vital Signs Range (Last 24H):  Temp:  [96 °F (35.6 °C)-98.3 °F (36.8 °C)]   Pulse:  [52-82]   Resp:  [16-20]   BP: (132-167)/(72-82)   SpO2:  [94 %-97 %]   BP Location: Right arm    Physical Exam   Constitutional: He appears well-developed and well-nourished. No distress.   HENT:   Head: Normocephalic and atraumatic.   Eyes: Conjunctivae and EOM are normal.   Cardiovascular: Normal rate.   Pulmonary/Chest: Effort normal. No respiratory distress.   Musculoskeletal: He exhibits no edema or deformity.   Neurological: He is alert. No sensory deficit. He exhibits normal muscle tone.   Skin: Skin is warm and dry.   Psychiatric: He expresses impulsivity and inappropriate judgment. He  is attentive.       Neurological Exam:   LOC: alert  Attention Span: Good   Language: No aphasia, questionable baseline mental status  Articulation: Dysarthria  Orientation: Oriented to person; Not oriented to age, time  Visual Fields: Full  EOM (CN III, IV, VI): Full/intact  Facial Movement (CN VII): Symmetric  Motor: Arm left  Paresis: 4/5  Leg left  Paresis: 3/5  Arm right  Normal 5/5  Leg right Normal 5/5  Sensation: Intact to light touch, temperature and vibration  Tone: Normal tone throughout    Laboratory:  CMP:   Recent Labs   Lab 10/28/19  0306   CALCIUM 8.6*   *   K 4.1   CO2 24      BUN 33*   CREATININE 1.6*     BMP:   Recent Labs   Lab 10/28/19  0306   *   K 4.1      CO2 24   BUN 33*   CREATININE 1.6*   CALCIUM 8.6*     CBC:   Recent Labs   Lab 10/28/19  0306   WBC 7.67   RBC 4.76   HGB 12.5*   HCT 41.1      MCV 86   MCH 26.3*   MCHC 30.4*     Lipid Panel: No results for input(s): CHOL, LDLCALC, HDL, TRIG in the last 168 hours.  Coagulation: No results for input(s): PT, INR, APTT in the last 168 hours.  Platelet Aggregation Study: No results for input(s): PLTAGG, PLTAGINTERP, PLTAGREGLACO, ADPPLTAGGREG in the last 168 hours.  Hgb A1C: No results for input(s): HGBA1C in the last 168 hours.  TSH: No results for input(s): TSH in the last 168 hours.      Diagnostic Results     Brain/Vessel Imaging:     MRI Brain WO Contrast (10/11/2019) -         Areas of abnormal T2/FLAIR/diffusion signal abnormality consistent with areas of acute ischemia/infarct involving the cerebral hemispheres bilaterally.  There is signal abnormality associated with the left internal carotid artery likely corresponding to the appearance of occlusion on the CTA examination.     CTA Multiphase (10/10/2019) -      Occluded left ICA with reconstitution of the distal/supraclinoid segment by retrograde flow through [id number].  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  Right vertebral artery supply the basilar artery.  Basilar artery is small in caliber with wall irregularity and intermittent occlusions.  Partially calcified hyperattenuating lesion in the lateral aspect of the cavernous sinus abutting the distal right ICA, likely representing calcified meningioma.  Generalized cerebral volume loss and remote lacunar type infarct in the right caudate head.       Cardiac Imaging:     TTE (10/11/2019) -   · Moderately decreased left ventricular systolic function. The estimated ejection fraction is 35%.  · Concentric left ventricular remodeling.  · Left ventricular diastolic dysfunction.  · Local segmental wall motion abnormalities.  · Severe left atrial enlargement.  · Mild right atrial enlargement.  · Mild aortic regurgitation.  · Moderate aortic valve stenosis but difficult to appreciate in the setting of AF, depressed EF, and low stroke volume.  · Aortic valve area is 1.39 cm2; peak velocity is 1.28 m/s; mean gradient is 4 mmHg.    Elevated central venous pressure (15 mm Hg).      Gertrudis Hernandez PA-C  Comprehensive Stroke Center  Department of Vascular Neurology   [facility]

## 2019-10-28 NOTE — ASSESSMENT & PLAN NOTE
84 y/o man with PMHx chronic Afib (not on AC), CAD s/p stent, HTN, CHF, hypothyroidism who received IV-tPA at OSH after presenting with R-sided weakness, aphasia, and L gaze deviation. 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 No EVT was pursued. TTE shows an EF of 35%.    MRI Brain demonstrated scattered areas of infarct in the bilateral hemispheres, predominantly in the R frontal lobe. Etiology suspected likely hypoperfusion/watershed in the setting of vascular abnormalities, though differential also includes cardioembolic in the setting of AFib, cardiomyopathy.         Antithrombotics for secondary stroke prevention:  Eliquis 2.5 mg BID   Statins for secondary stroke prevention and hyperlipidemia, if present:   Statins: Atorvastatin- 40 mg daily  Aggressive risk factor modification: HTN, HLD, Diet, Exercise, A-Fib, CAD  Rehab efforts: The patient has been evaluated by a stroke team provider and the therapy needs have been fully considered based off the presenting complaints and exam findings. The following therapy evaluations are needed: PT evaluate and treat, OT evaluate and treat, SLP evaluate and treat, PM&R evaluate for appropriate placement -- [facility]   Diagnostics ordered/pending: None  VTE prophylaxis: Eliquis 2.5 mg BID, SCDs  BP parameters: SBP <160

## 2019-10-28 NOTE — PT/OT/SLP PROGRESS
Occupational Therapy   Treatment    Name: Izaiah Douglas  MRN: [id number]  Admitting Diagnosis:  Acute arterial ischemic stroke, multifocal, multiple vascular territories       Recommendations:     Discharge Recommendations: nursing facility, skilled  Discharge Equipment Recommendations:  (TBD)  Barriers to discharge:  Decreased caregiver support    Assessment:     Izaiah Douglas is a 85 y.o. male with a medical diagnosis of Acute arterial ischemic stroke, multifocal, multiple vascular territories.  He presents with performance deficits affecting function includingweakness, impaired endurance, impaired self care skills, impaired functional mobilty, gait instability, impaired cognition, decreased safety awareness, decreased upper extremity function, decreased lower extremity function, decreased ROM.     Rehab Prognosis:  Good; patient would benefit from acute skilled OT services to address these deficits and reach maximum level of function.       Plan:     Patient to be seen 3 x/week to address the above listed problems via self-care/home management, therapeutic activities, therapeutic exercises  · Plan of Care Expires: 11/10/19  · Plan of Care Reviewed with: patient    Subjective     Pain/Comfort:  · Pain Rating 1: 0/10  · Pain Rating Post-Intervention 1: 0/10    Objective:     Communicated with: Nurse prior to session.  Patient found HOB elevated with (Avasys) upon OT entry to room.    General Precautions: Standard, fall   Orthopedic Precautions:N/A   Braces: N/A     Occupational Performance:     Bed Mobility:    · Patient completed Rolling/Turning to Right with stand by assistance  · Patient completed Scooting/Bridging with stand by assistance  · Patient completed Supine to Sit with minimum assistance  · Patient completed Sit to Supine with minimum assistance     Functional Mobility/Transfers:  · Patient completed Sit <> Stand Transfer with minimum assistance  with  no assistive device       Activities of Daily  Living:  · Toileting: stand by assistance with Pt sitting EOB using urinal with no physical assist provided but V/Cs needed for proper placement of urinal.   · Pt refused to work on UBD or LBD.    Pt performed dowel exercises with 2 pound dowel 1 set 10 reps performing shld Flex/ Extn, and chest presses.   Min (A) needed to complete exercises secondary to (L) UE shld weakness with rest breaks provided between sets.    [facility] 6 Click ADL: 19    Treatment & Education:  Pt edu on Plan of care,  safety when performing functional transfers, self care tasks and need to participate consistently in order to make progress.  - White board updated  - Self care tasks completed-- as noted above   - He performed dynamic sitting activity from EOB  when  working on toileting.    Patient left HOB elevated with bed alarm on and nurse notifiedEducation:      GOALS:   Multidisciplinary Problems     Occupational Therapy Goals        Problem: Occupational Therapy Goal    Goal Priority Disciplines Outcome Interventions   Occupational Therapy Goal     OT, PT/OT Ongoing, Progressing    Description:  Goals to be met by: 11/4    Patient will increase functional independence with ADLs by performing:    UE Dressing with Supervision.  LE Dressing with Supervision.  Grooming while standing with Supervision.  Toileting from toilet with Supervision for hygiene and clothing management.   Toilet transfer with Supervision.                       Time Tracking:     OT Date of Treatment: 10/28/19  OT Start Time: 1410  OT Stop Time: 1435  OT Total Time (min): 25 min    Billable Minutes:Self Care/Home Management 15  Therapeutic Exercise 10    SEAN Chiu/YA  10/28/2019

## 2019-10-28 NOTE — ASSESSMENT & PLAN NOTE
Difficult placement due to patient living in [place] and visiting daughter when stroke occurred - therapy teams recommending inpatient rehab  At this time, patient is unable to fly back to [place]. He is not capable of flying due to inability to ambulate without trained medical assistance, impaired functional mobility, impaired cardiopulmonary response to activity, urine and bowel incontinence, and decreased mental capacity related to stroke and underlying diagnosis of dementia.   10/17 - Spoke to Dr. Krishna of [facility] [employer] who requested documentation of pt's current functional status stating why pt is unable to take commercial flight back to [place]. MALINA sent updated notes.   10/23-24: Dr. Krishna requesting phone call with staff physician to discuss further details regarding -SNF placement for patient. Staff attempted call yesterday, however no answer, left voicemail. Will re-attempt phone call today. Pt remains medically and neurologically stable at this time.    10/25: Called Dr. Krishna, no answer. Will re-attempt tomorrow. Patient's daughter contacted by SAW/MALINA. She is unable to provided 24 hour care. Patient accepted to [facility], need insurance auth.     Will attempt to call on Monday

## 2019-10-28 NOTE — PLAN OF CARE
Problem: Fall Injury Risk  Goal: Absence of Fall and Fall-Related Injury  Outcome: Ongoing, Progressing     Problem: Adult Inpatient Plan of Care  Goal: Plan of Care Review  Outcome: Ongoing, Progressing  Goal: Optimal Comfort and Wellbeing  Outcome: Ongoing, Progressing     Problem: Functional Ability Impaired (Stroke, Ischemic/Transient Ischemic Attack)  Goal: Optimal Functional Ability  Outcome: Ongoing, Progressing     POC reviewed with patient. All questions and concerns reviewed. Vital signs stable throughout shift. For full assessment please refer to flowsheet. Fall/ safety precautions implemented & maintained. Bed locked in lowest position, bed alarm activated & audible, & call light in reach. Will continue to monitor.

## 2019-10-28 NOTE — PLAN OF CARE
Attempted to call wife to discuss discharge plan, no answer.   Called daughter Selam to discuss discharge plan, no answer.

## 2019-10-29 LAB
ANION GAP SERPL CALC-SCNC: 6 MMOL/L (ref 8–16)
BUN SERPL-MCNC: 33 MG/DL (ref 8–23)
CALCIUM SERPL-MCNC: 9 MG/DL (ref 8.7–10.5)
CHLORIDE SERPL-SCNC: 101 MMOL/L (ref 95–110)
CO2 SERPL-SCNC: 27 MMOL/L (ref 23–29)
CREAT SERPL-MCNC: 1.6 MG/DL (ref 0.5–1.4)
EST. GFR  (AFRICAN AMERICAN): 44.7 ML/MIN/1.73 M^2
EST. GFR  (NON AFRICAN AMERICAN): 38.7 ML/MIN/1.73 M^2
GLUCOSE SERPL-MCNC: 77 MG/DL (ref 70–110)
POTASSIUM SERPL-SCNC: 4.5 MMOL/L (ref 3.5–5.1)
SODIUM SERPL-SCNC: 134 MMOL/L (ref 136–145)

## 2019-10-29 PROCEDURE — 80048 BASIC METABOLIC PNL TOTAL CA: CPT

## 2019-10-29 PROCEDURE — 97803 MED NUTRITION INDIV SUBSEQ: CPT

## 2019-10-29 PROCEDURE — 25000003 PHARM REV CODE 250: Performed by: FAMILY MEDICINE

## 2019-10-29 PROCEDURE — 25000003 PHARM REV CODE 250: Performed by: PSYCHIATRY & NEUROLOGY

## 2019-10-29 PROCEDURE — 99232 SBSQ HOSP IP/OBS MODERATE 35: CPT | Mod: GC,,, | Performed by: PSYCHIATRY & NEUROLOGY

## 2019-10-29 PROCEDURE — 99232 PR SUBSEQUENT HOSPITAL CARE,LEVL II: ICD-10-PCS | Mod: GC,,, | Performed by: PSYCHIATRY & NEUROLOGY

## 2019-10-29 PROCEDURE — 36415 COLL VENOUS BLD VENIPUNCTURE: CPT

## 2019-10-29 PROCEDURE — 20600001 HC STEP DOWN PRIVATE ROOM

## 2019-10-29 PROCEDURE — 25000003 PHARM REV CODE 250: Performed by: STUDENT IN AN ORGANIZED HEALTH CARE EDUCATION/TRAINING PROGRAM

## 2019-10-29 PROCEDURE — 25000003 PHARM REV CODE 250: Performed by: NURSE PRACTITIONER

## 2019-10-29 RX ADMIN — APIXABAN 2.5 MG: 2.5 TABLET, FILM COATED ORAL at 08:10

## 2019-10-29 RX ADMIN — CARVEDILOL 3.12 MG: 3.12 TABLET, FILM COATED ORAL at 08:10

## 2019-10-29 RX ADMIN — LEVOTHYROXINE SODIUM 25 MCG: 25 TABLET ORAL at 05:10

## 2019-10-29 RX ADMIN — SENNOSIDES 8.6 MG: 8.6 TABLET, FILM COATED ORAL at 08:10

## 2019-10-29 RX ADMIN — FUROSEMIDE 20 MG: 20 TABLET ORAL at 08:10

## 2019-10-29 RX ADMIN — AMLODIPINE BESYLATE 10 MG: 10 TABLET ORAL at 08:10

## 2019-10-29 RX ADMIN — ATORVASTATIN CALCIUM 40 MG: 20 TABLET, FILM COATED ORAL at 08:10

## 2019-10-29 RX ADMIN — DONEPEZIL HYDROCHLORIDE 5 MG: 5 TABLET, FILM COATED ORAL at 08:10

## 2019-10-29 NOTE — ASSESSMENT & PLAN NOTE
-- BUN/Cr elevated on admission, initially improved, then worsening again. Likely at baseline per outside chart review of [facility] (Cr range 1.5 - 2.6)  -- Pt appears euvolemic on exam  -- Urine Na and Cr normal   -- Lasix restarted at reduced dose.   -- Will avoid nephrotoxic meds. (Eliquis renally dosed.)

## 2019-10-29 NOTE — ASSESSMENT & PLAN NOTE
Difficult placement due to patient living in [place] and visiting daughter when stroke occurred - therapy teams recommending inpatient rehab  At this time, patient is unable to fly back to [place]. He is not capable of flying due to inability to ambulate without trained medical assistance, impaired functional mobility, impaired cardiopulmonary response to activity, urine and bowel incontinence, and decreased mental capacity related to stroke and underlying diagnosis of dementia.   10/17 - Spoke to Dr. Krishna of [facility] [employer] who requested documentation of pt's current functional status stating why pt is unable to take commercial flight back to [place]. MALINA sent updated notes.   10/23-24: Dr. Krishna requesting phone call with staff physician to discuss further details regarding -SNF placement for patient. Staff attempted call yesterday, however no answer, left voicemail. Will re-attempt phone call today. Pt remains medically and neurologically stable at this time.    10/25: Called Dr. Krishna, no answer. Will re-attempt tomorrow. Patient's daughter contacted by SAW/MALINA. She is unable to provided 24 hour care. Patient accepted to [facility], need insurance auth.     Will attempt to call on Monday

## 2019-10-29 NOTE — NURSING
Pt has had a non-eventful day but has gotten up without calling for assistance several time thru out the day. Currently sitting up in bed awake alert but un-oriented. No s/s of distress. Call light within reach. Bed low locked position. Side rails up for safety. Bed alarm set, Avys at bedside.

## 2019-10-29 NOTE — PLAN OF CARE
Recommendations    1. Continue current diet as tolerated. Pt continues with good po intake.  2. If po intake poor, recommend adding Boost Plus with meals.   3. RD following.    Goals: Adequate PO intake to meet % of EEN and EPN while admitted  Nutrition Goal Status: goal met

## 2019-10-29 NOTE — PROGRESS NOTES
[facility]  Vascular Neurology  Comprehensive Stroke Center  Progress Note    Assessment/Plan:     * Acute arterial ischemic stroke, multifocal, multiple vascular territories  84 y/o man with PMHx chronic Afib (not on AC), CAD s/p stent, HTN, CHF, hypothyroidism who received IV-tPA at OSH after presenting with R-sided weakness, aphasia, and L gaze deviation. 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 No EVT was pursued. TTE shows an EF of 35%.    MRI Brain demonstrated scattered areas of infarct in the bilateral hemispheres, predominantly in the R frontal lobe. Etiology suspected likely hypoperfusion/watershed in the setting of vascular abnormalities, though differential also includes cardioembolic in the setting of AFib, cardiomyopathy.         Antithrombotics for secondary stroke prevention:  Eliquis 2.5 mg BID   Statins for secondary stroke prevention and hyperlipidemia, if present:   Statins: Atorvastatin- 40 mg daily  Aggressive risk factor modification: HTN, HLD, Diet, Exercise, A-Fib, CAD  Rehab efforts: The patient has been evaluated by a stroke team provider and the therapy needs have been fully considered based off the presenting complaints and exam findings. The following therapy evaluations are needed: PT evaluate and treat, OT evaluate and treat, SLP evaluate and treat, PM&R evaluate for appropriate placement -- Dispo [facility] short-stay [facility]   Diagnostics ordered/pending: None  VTE prophylaxis: Eliquis 2.5 mg BID, SCDs  BP parameters: SBP <160    AMRIT (acute kidney injury)  -- BUN/Cr elevated on admission, initially improved, then worsening again. Likely at baseline per outside chart review of [facility] (Cr range 1.5 - 2.6)  -- Pt appears  euvolemic on exam  -- Urine Na and Cr normal   -- Lasix restarted at reduced dose.   -- Will avoid nephrotoxic meds. (Eliquis renally dosed.)    Person awaiting admission to adequate facility elsewhere  Difficult placement due to patient living in [place] and visiting daughter when stroke occurred - therapy teams recommending inpatient rehab  At this time, patient is unable to fly back to [place]. He is not capable of flying due to inability to ambulate without trained medical assistance, impaired functional mobility, impaired cardiopulmonary response to activity, urine and bowel incontinence, and decreased mental capacity related to stroke and underlying diagnosis of dementia.   10/17 - Spoke to Dr. Krishna of [employer] insurance who requested documentation of pt's current functional status stating why pt is unable to take commercial flight back to [place]. CM sent updated notes.   10/23-24: Dr. Krishna requesting phone call with staff physician to discuss further details regarding -SNF placement for patient. Staff attempted call yesterday, however no answer, left voicemail. Will re-attempt phone call today. Pt remains medically and neurologically stable at this time.    10/25: Called Dr. Krishna, no answer. Will re-attempt tomorrow. Patient's daughter contacted by SAW/MALINA. She is unable to provided 24 hour care. Patient accepted to [facility], need insurance auth.     Will attempt to call on Monday    Erythema of hand  -- Patient previously with erythema of R hand from IV infiltration.  -- Bactroban cream TID X 10 days (end date 10/26)  -- Elevation of R hand   -- Pt denied complaints of pain on exam today.    SIADH (syndrome of inappropriate ADH production)  -- Na stable   -- Continue 1L fluid restriction. May consider increasing restriction if Na downtrend continues.   -- Daily BMP, continue to monitor    Dementia  -- Continue Donepezil 5 mg daily     Intracranial atherosclerosis  -- Noted on CTA head   -- On  Eliquis and Atorvastatin 40 mg     Occlusion of left carotid artery  -- Seen on CTA  -- Suspect chronic occlusion   Eliquis, statin    Nodule of right lung  -- 1 cm nodule of R lung seen on CTA  -- Will need follow up in 3- 6 months   -- Previously discussed with daughter     Cytotoxic cerebral edema  Area of cytotoxic cerebral edema identified when reviewing brain imaging in the territory of the R/L middle cerebral artery. There is not mass effect associated with it. We will continue to monitor the patients clinical exam for any worsening of symptoms which may indicate expansion of the stroke or the area of the edema resulting in the clinical change. The pattern is suggestive of cardioembolic etiology.    CAD (coronary artery disease)  Stroke risk factor  -- Noted on Care Everywhere  -- S/p RCA stent 7/2018  -- Was prescribed Plavix, statin, and Ranexa but unclear if patient was taking them.  -- Eliquis 2.5 BID, Atorvastatin 40 mg daily     Paroxysmal atrial fibrillation  Stroke risk factor  -- Per Care Everywhere; not previously on AC - Dr. Menjivar (Cardiologist) had report patient not a good candidate due to risk of falls, non-compliance, and dementia.  -- Noted on EKG at admit.  -- Remains rate-controlled at this time.  -- Eliquis 2.5 BID  Continue tele    Chronic combined systolic and diastolic congestive heart failure  Stroke risk factor  -- pro BNP from Feb 2019 >3000  -- On Lasix and Spironolactone at home.  -- TTE shows EF 35%, +WMAs  -- Coreg 3.125mg BID.   -- Held Lasix for AMRIT, however reviewed outside records from [facility], and Cr range 1.5 -2.6. Patient likely at baseline. Restarted Lasix at reduced-dose 20 mg daily.    -- daily weights, strict I&Os    Acquired hypothyroidism  Stroke risk factor  TSH 3.032  -- Continue Synthroid 25mcg daily    Essential hypertension  Stroke risk factor  -- SBP < 160  -- At goal on current regimen -- Coreg 3.125 mg BID, Norvasc 10 mg Daily, and Lisinopril  20mg Daily.  Will continue to monitor.         10/12 - Etiology likely cardioembolic. Will start Eliquis 2.5 mg BID. Creatinine trending down - may start patient on Eliquis 5 mg BID if continue to decrease. L ICA chronically occluded - no need to vasculare intervention.   10/13 - Spoke with PT - patient would benefit from inpatient rehab placement. Norvasc 5 mg ordered. Urine studies ordered for hyponatremia - suspect to be due to dehydration.   10/14 Patient with SIADH but sodium stable.  1 liter fluid restriction.    10/15  Irritation around right hand peripheral IV site.  Sodium 131 today.  Discussed with daughter that he is on fluid restriction and she will stop providing him with outside drinks.     10/16 - Sodium increasing 132. Bactroban ordered for right hand IV infiltration. Working on placement.   10/17 - Sodium continues to improve. Creatinine 1.5 likely near baseline, decreased eliquis to 2.5 mg. R hand erythema stable.   10/19/2019 Sodium 134. Awake and alert but remains confused. No pain. Pending placement  10/20/2019 Complained of chest discomfort overnight. EKG showed no ST wave changes, Trop normal.   10/21 - Renal function improving. Hyponatremic, continue 1L fluid restriction, will likely restart lasix tomorrow, no current signs of fluid overload. Awake and alert, oriented to person and place, disoriented to time.   10/22 Reviewed outside imaging, renal function likely at baseline, restart home lasix at reduced dose of 20 mg daily, continue to monitor. Daily weights and strict I&Os.   10/23: Attempting to transfer patient to a bed on [facility] floor. Staff physician to contact patient's insurance company to discuss further details regarding SS-SNF placement.  10/24: Staff physician contacted patient's insurance company yesterday, however no answer, left voicemail. Will re-attempt phone call today. Pt remains medically and neurologically stable.    10/25: Called Dr. Krishna, no answer. Will re-attempt  tomorrow. Patient's daughter contacted by SAW/MALINA. She is unable to provided 24 hour care. Patient accepted to [facility], need insurance auth.      10/26: No acute events overnight. Neuro exam stable. Awaiting placement   10/27/2019: SIRIA. Patient awaiting placement.   10/28/19 SIRIA.  Waiting for placement   10/29 neurologically unchanged     STROKE DOCUMENTATION   Acute Stroke Times   Last Known Normal Date: 10/10/19  Last Known Normal Time: 1905  Symptom Onset Date: 10/10/19  Symptom Onset Time: 1905  Stroke Team Called Date: 10/10/19  Stroke Team Called Time: 2120  Stroke Team Arrival Date: 10/10/19  Stroke Team Arrival Time: 2122    NIH Scale:  1a. Level of Consciousness: 1-->Not alert, but arousable by minor stimulation to obey, answer, or respond  1b. LOC Questions: 2-->Answers neither question correctly  1c. LOC Commands: 0-->Performs both tasks correctly  2. Best Gaze: 0-->Normal  3. Visual: 0-->No visual loss  4. Facial Palsy: 1-->Minor paralysis (flattened nasolabial fold, asymmetry on smiling)  5a. Motor Arm, Left: 2-->Some effort against gravity, limb cannot get to or maintain (if cued) 90 (or 45) degrees, drifts down to bed, but has some effort against gravity  5b. Motor Arm, Right: 0-->No drift, limb holds 90 (or 45) degrees for full 10 secs  6a. Motor Leg, Left: 2-->Some effort against gravity, leg falls to bed by 5 secs, but has some effort against gravity  6b. Motor Leg, Right: 0-->No drift, leg holds 30 degree position for full 5 secs  7. Limb Ataxia: 0-->Absent  8. Sensory: 0-->Normal, no sensory loss  9. Best Language: 0-->No aphasia, normal  10. Dysarthria: 1-->Mild-to-moderate dysarthria, patient slurs at least some words and, at worst, can be understood with some difficulty  11. Extinction and Inattention (formerly Neglect): 0-->No abnormality  Total (NIH Stroke Scale): 9       Modified Isabel Score: 1  Holley Coma Scale:    ABCD2 Score:    [id number] Score:   HAS -BLED Score:   ICH  Score:   Whalen & Stroud Classification:      Hemorrhagic change of an Ischemic Stroke: Does this patient have an ischemic stroke with hemorrhagic changes? No     Neurologic Chief Complaint: R-sided weakness and aphasia    Subjective:     Interval History: Patient is seen for follow-up neurological assessment and treatment recommendations: SIRIA CASANOVA, Past Medical, Family, and Social History remains the same as documented in the initial encounter.     Review of Systems   Constitutional: Negative for fatigue and fever.   Gastrointestinal: Negative for nausea and vomiting.   Neurological: Positive for speech difficulty and weakness.   Psychiatric/Behavioral: Positive for confusion and decreased concentration.     Scheduled Meds:   amLODIPine  10 mg Oral Daily    apixaban  2.5 mg Oral BID    atorvastatin  40 mg Oral Daily    carvedilol  3.125 mg Oral BID    donepezil  5 mg Oral QHS    furosemide  20 mg Oral Daily    levothyroxine  25 mcg Oral Daily    senna  8.6 mg Oral Daily     Continuous Infusions:  PRN Meds:hydrALAZINE, influenza, sodium chloride 0.9%    Objective:     Vital Signs (Most Recent):  Temp: 97.9 °F (36.6 °C) (10/29/19 1129)  Pulse: 74 (10/29/19 1309)  Resp: 18 (10/29/19 1129)  BP: 132/62 (10/29/19 1129)  SpO2: 96 % (10/29/19 1129)  BP Location: Left arm    Vital Signs Range (Last 24H):  Temp:  [96.2 °F (35.7 °C)-97.9 °F (36.6 °C)]   Pulse:  [62-80]   Resp:  [18-20]   BP: (116-156)/(62-75)   SpO2:  [95 %-96 %]   BP Location: Left arm    Physical Exam   Constitutional: He appears well-developed and well-nourished. No distress.   HENT:   Head: Normocephalic and atraumatic.   Eyes: Conjunctivae and EOM are normal.   Cardiovascular: Normal rate.   Pulmonary/Chest: Effort normal. No respiratory distress.   Musculoskeletal: He exhibits no edema or deformity.   Neurological: He is alert. No sensory deficit. He exhibits normal muscle tone.   Skin: Skin is warm and dry.   Psychiatric: He expresses  impulsivity and inappropriate judgment. He is attentive.       Neurological Exam:   LOC: alert  Attention Span: Good   Language: No aphasia, questionable baseline mental status  Articulation: Dysarthria  Orientation: Oriented to person; Not oriented to age, time  Visual Fields: Full  EOM (CN III, IV, VI): Full/intact  Facial Movement (CN VII): Symmetric  Motor: Arm left  Paresis: 4/5  Leg left  Paresis: 3/5  Arm right  Normal 5/5  Leg right Normal 5/5  Sensation: Intact to light touch, temperature and vibration  Tone: Normal tone throughout    Laboratory:  CMP:   Recent Labs   Lab 10/29/19  0546   CALCIUM 9.0   *   K 4.5   CO2 27      BUN 33*   CREATININE 1.6*     BMP:   Recent Labs   Lab 10/29/19  0546   *   K 4.5      CO2 27   BUN 33*   CREATININE 1.6*   CALCIUM 9.0     CBC:   Recent Labs   Lab 10/28/19  0306   WBC 7.67   RBC 4.76   HGB 12.5*   HCT 41.1      MCV 86   MCH 26.3*   MCHC 30.4*     Lipid Panel: No results for input(s): CHOL, LDLCALC, HDL, TRIG in the last 168 hours.  Coagulation: No results for input(s): PT, INR, APTT in the last 168 hours.  Platelet Aggregation Study: No results for input(s): PLTAGG, PLTAGINTERP, PLTAGREGLACO, ADPPLTAGGREG in the last 168 hours.  Hgb A1C: No results for input(s): HGBA1C in the last 168 hours.  TSH: No results for input(s): TSH in the last 168 hours.      Diagnostic Results     Brain/Vessel Imaging:     MRI Brain WO Contrast (10/11/2019) -         Areas of abnormal T2/FLAIR/diffusion signal abnormality consistent with areas of acute ischemia/infarct involving the cerebral hemispheres bilaterally.  There is signal abnormality associated with the left internal carotid artery likely corresponding to the appearance of occlusion on the CTA examination.     CTA Multiphase (10/10/2019) -      Occluded left ICA with reconstitution of the distal/supraclinoid segment by retrograde flow through [id number].  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  Right vertebral artery supply the basilar artery.  Basilar artery is small in caliber with wall irregularity and intermittent occlusions.  Partially calcified hyperattenuating lesion in the lateral aspect of the cavernous sinus abutting the distal right ICA, likely representing calcified meningioma.  Generalized cerebral volume loss and remote lacunar type infarct in the right caudate head.       Cardiac Imaging:     TTE (10/11/2019) -   · Moderately decreased left ventricular systolic function. The estimated ejection fraction is 35%.  · Concentric left ventricular remodeling.  · Left ventricular diastolic dysfunction.  · Local segmental wall motion abnormalities.  · Severe left atrial enlargement.  · Mild right atrial enlargement.  · Mild aortic regurgitation.  · Moderate aortic valve stenosis but difficult to appreciate in the setting of AF, depressed EF, and low stroke volume.  · Aortic valve area is 1.39 cm2; peak velocity is 1.28 m/s; mean gradient is 4 mmHg.    Elevated central venous pressure (15 mm Hg).      Gertrudis Hernandez PA-C  Comprehensive Stroke Center  Department of Vascular Neurology   [facility]

## 2019-10-29 NOTE — PROGRESS NOTES
[facility]  Adult Nutrition  Progress Note    SUMMARY       Recommendations    1. Continue current diet as tolerated. Pt continues with good po intake.  2. If po intake poor, recommend adding Boost Plus with meals.   3. RD following.    Goals: Adequate PO intake to meet % of EEN and EPN while admitted  Nutrition Goal Status: goal met  Communication of RD Recs: (POC)    Reason for Assessment    Reason For Assessment: RD follow-up  Diagnosis: cardiac disease(CVA)  Relevant Medical History: HTN, thyroid disease, CHF, stroke, dementia  Interdisciplinary Rounds: did not attend  General Information Comments: Pt sleeping during RD visit. Noted pt hungry every few hours. Per chart review, pt eating % of meals. NFPE not warranted. Pt does not meet malnutrition criteria at this time.  Nutrition Discharge Planning: d/c on cardiac diet, texture per SLP recommendations    Nutrition Risk Screen    Nutrition Risk Screen: no indicators present    Nutrition/Diet History    Spiritual, Cultural Beliefs, Congregation Practices, Values that Affect Care: no  Factors Affecting Nutritional Intake: None identified at this time    Anthropometrics    Temp: 97.9 °F (36.6 °C)  Height Method: Stated  Height: 6' (182.9 cm)  Height (inches): 72 in  Weight Method: Standard Scale  Weight: 85.2 kg (187 lb 13.3 oz)  Weight (lb): 187.83 lb  Ideal Body Weight (IBW), Male: 178 lb  % Ideal Body Weight, Male (lb): 100.82 lb  BMI (Calculated): 24.4  BMI Grade: 18.5-24.9 - normal     Lab/Procedures/Meds    Pertinent Labs Reviewed: reviewed  Pertinent Labs Comments: Na 134, BUN 33, Cr 1.6, GFR 38.7  Pertinent Medications Reviewed: reviewed  Pertinent Medications Comments: amlodipine, statin, carvedilol, lasix, levothyroxine, senna    Estimated/Assessed Needs    Weight Used For Calorie Calculations: 86.5 kg (190 lb 11.2 oz)  Energy Calorie Requirements (kcal): 1985 kcal/day  Energy Need Method: Luis-St Lazar(x 1.25 PAL)  Protein  Requirements:  g/day(1-1.2 g/kg)  Weight Used For Protein Calculations: 86.5 kg (190 lb 11.2 oz)     Estimated Fluid Requirement Method: RDA Method(or per MD)  RDA Method (mL): 1985    Nutrition Prescription Ordered    Current Diet Order: Regular/Cardiac    Evaluation of Received Nutrient/Fluid Intake    I/O: -2.67L since 10/15  Comments: LBM 10/28  Tolerance: tolerating  % Intake of Estimated Energy Needs: 75 - 100 %  % Meal Intake: 75 - 100 %    Nutrition Risk    Level of Risk/Frequency of Follow-up: low     Assessment and Plan    Nutrition Problem  Inadequate oral intake     Related to (etiology):   Current diet     Signs and Symptoms (as evidenced by):   <75% of nutritional needs being met     Interventions(treatment strategy):  Collaboration of nutrition care with other providers     Nutrition Diagnosis Status:   Resolved     Monitor and Evaluation    Food and Nutrient Intake: energy intake, food and beverage intake  Food and Nutrient Adminstration: diet order  Physical Activity and Function: nutrition-related ADLs and IADLs  Anthropometric Measurements: weight, weight change, body mass index  Biochemical Data, Medical Tests and Procedures: electrolyte and renal panel, gastrointestinal profile, glucose/endocrine profile  Nutrition-Focused Physical Findings: overall appearance     Nutrition Follow-Up    RD Follow-up?: Yes

## 2019-10-29 NOTE — PLAN OF CARE
Spoke with patient's wife Marilee Nuñez regarding discharge plan. Wife states she is aware of  obtaining insurance in [place] and may move there until recovers. States she is still ok with [facility], pts daughter making decisions at this time. Awaiting call back from [facility] to inquire about [employer] and CM will need policy information to submit for auth once insurance with [facility] begins. Spoke with Tete at [employer] who states pt can get insurance with [employer] at the first of the month when ready to move back to [place]. Spoke with Paty at [facility] who states we can submit for auth for this hospitialization once insurance kicks in with [facility]. Spoke to Nisha at [facility] about plan.

## 2019-10-29 NOTE — PLAN OF CARE
10/29/19 1605   Discharge Reassessment   Assessment Type Discharge Planning Reassessment   Discharge Plan A Skilled Nursing Facility   Discharge Plan B Rehab   Anticipated Discharge Disposition SNF   Post-Acute Status   Discharge Delays (!) Other  (insurance)

## 2019-10-29 NOTE — ASSESSMENT & PLAN NOTE
Stroke risk factor  -- pro BNP from Feb 2019 >3000  -- On Lasix and Spironolactone at home.  -- TTE shows EF 35%, +WMAs  -- Coreg 3.125mg BID.   -- Held Lasix for AMRIT, however reviewed outside records from [facility], and Cr range 1.5 -2.6. Patient likely at baseline. Restarted Lasix at reduced-dose 20 mg daily.    -- daily weights, strict I&Os

## 2019-10-29 NOTE — PLAN OF CARE
Attempted to follow up with Marilee for discharge plan. No answer.  Call placed to daughter Selam, left message.

## 2019-10-29 NOTE — SUBJECTIVE & OBJECTIVE
Neurologic Chief Complaint: R-sided weakness and aphasia    Subjective:     Interval History: Patient is seen for follow-up neurological assessment and treatment recommendations: IVYON     HPI, Past Medical, Family, and Social History remains the same as documented in the initial encounter.     Review of Systems   Constitutional: Negative for fatigue and fever.   Gastrointestinal: Negative for nausea and vomiting.   Neurological: Positive for speech difficulty and weakness.   Psychiatric/Behavioral: Positive for confusion and decreased concentration.     Scheduled Meds:   amLODIPine  10 mg Oral Daily    apixaban  2.5 mg Oral BID    atorvastatin  40 mg Oral Daily    carvedilol  3.125 mg Oral BID    donepezil  5 mg Oral QHS    furosemide  20 mg Oral Daily    levothyroxine  25 mcg Oral Daily    senna  8.6 mg Oral Daily     Continuous Infusions:  PRN Meds:hydrALAZINE, influenza, sodium chloride 0.9%    Objective:     Vital Signs (Most Recent):  Temp: 97.9 °F (36.6 °C) (10/29/19 1129)  Pulse: 74 (10/29/19 1309)  Resp: 18 (10/29/19 1129)  BP: 132/62 (10/29/19 1129)  SpO2: 96 % (10/29/19 1129)  BP Location: Left arm    Vital Signs Range (Last 24H):  Temp:  [96.2 °F (35.7 °C)-97.9 °F (36.6 °C)]   Pulse:  [62-80]   Resp:  [18-20]   BP: (116-156)/(62-75)   SpO2:  [95 %-96 %]   BP Location: Left arm    Physical Exam   Constitutional: He appears well-developed and well-nourished. No distress.   HENT:   Head: Normocephalic and atraumatic.   Eyes: Conjunctivae and EOM are normal.   Cardiovascular: Normal rate.   Pulmonary/Chest: Effort normal. No respiratory distress.   Musculoskeletal: He exhibits no edema or deformity.   Neurological: He is alert. No sensory deficit. He exhibits normal muscle tone.   Skin: Skin is warm and dry.   Psychiatric: He expresses impulsivity and inappropriate judgment. He is attentive.       Neurological Exam:   LOC: alert  Attention Span: Good   Language: No aphasia, questionable baseline  mental status  Articulation: Dysarthria  Orientation: Oriented to person; Not oriented to age, time  Visual Fields: Full  EOM (CN III, IV, VI): Full/intact  Facial Movement (CN VII): Symmetric  Motor: Arm left  Paresis: 4/5  Leg left  Paresis: 3/5  Arm right  Normal 5/5  Leg right Normal 5/5  Sensation: Intact to light touch, temperature and vibration  Tone: Normal tone throughout    Laboratory:  CMP:   Recent Labs   Lab 10/29/19  0546   CALCIUM 9.0   *   K 4.5   CO2 27      BUN 33*   CREATININE 1.6*     BMP:   Recent Labs   Lab 10/29/19  0546   *   K 4.5      CO2 27   BUN 33*   CREATININE 1.6*   CALCIUM 9.0     CBC:   Recent Labs   Lab 10/28/19  0306   WBC 7.67   RBC 4.76   HGB 12.5*   HCT 41.1      MCV 86   MCH 26.3*   MCHC 30.4*     Lipid Panel: No results for input(s): CHOL, LDLCALC, HDL, TRIG in the last 168 hours.  Coagulation: No results for input(s): PT, INR, APTT in the last 168 hours.  Platelet Aggregation Study: No results for input(s): PLTAGG, PLTAGINTERP, PLTAGREGLACO, ADPPLTAGGREG in the last 168 hours.  Hgb A1C: No results for input(s): HGBA1C in the last 168 hours.  TSH: No results for input(s): TSH in the last 168 hours.      Diagnostic Results     Brain/Vessel Imaging:     MRI Brain WO Contrast (10/11/2019) -         Areas of abnormal T2/FLAIR/diffusion signal abnormality consistent with areas of acute ischemia/infarct involving the cerebral hemispheres bilaterally.  There is signal abnormality associated with the left internal carotid artery likely corresponding to the appearance of occlusion on the CTA examination.     CTA Multiphase (10/10/2019) -      Occluded left ICA with reconstitution of the distal/supraclinoid segment by retrograde flow through [id number].  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  Right vertebral artery  supply the basilar artery.  Basilar artery is small in caliber with wall irregularity and intermittent occlusions.  Partially calcified hyperattenuating lesion in the lateral aspect of the cavernous sinus abutting the distal right ICA, likely representing calcified meningioma.  Generalized cerebral volume loss and remote lacunar type infarct in the right caudate head.       Cardiac Imaging:     TTE (10/11/2019) -   · Moderately decreased left ventricular systolic function. The estimated ejection fraction is 35%.  · Concentric left ventricular remodeling.  · Left ventricular diastolic dysfunction.  · Local segmental wall motion abnormalities.  · Severe left atrial enlargement.  · Mild right atrial enlargement.  · Mild aortic regurgitation.  · Moderate aortic valve stenosis but difficult to appreciate in the setting of AF, depressed EF, and low stroke volume.  · Aortic valve area is 1.39 cm2; peak velocity is 1.28 m/s; mean gradient is 4 mmHg.    Elevated central venous pressure (15 mm Hg).

## 2019-10-29 NOTE — PLAN OF CARE
NO ACUTE CHANGES NOTED.  PATIENT CONSTANT NEED OF REORIENTING.  CONSISTENTLY ATTEMPTS TO GET OUT OF BED PER SELF.   BED ALARM ON & AVASYS SYSTEM IN PLACE.  PATIENT FREQUENTLY VOIDS.  HE CONTINUES TO DUMP URINE FROM URINAL.  REMINDED FREQUENTLY & REORIENTED.  NO C/O PAIN.  CONTINUES TO C/O HUNGER EVERY FEW HOURS.  TWO SANDWICHES GIVEN & SNACKS THROUGH OUT THE SHIFT.  NO FURTHER ISSUES NOTED.

## 2019-10-29 NOTE — ASSESSMENT & PLAN NOTE
86 y/o man with PMHx chronic Afib (not on AC), CAD s/p stent, HTN, CHF, hypothyroidism who received IV-tPA at OSH after presenting with R-sided weakness, aphasia, and L gaze deviation. 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 No EVT was pursued. TTE shows an EF of 35%.    MRI Brain demonstrated scattered areas of infarct in the bilateral hemispheres, predominantly in the R frontal lobe. Etiology suspected likely hypoperfusion/watershed in the setting of vascular abnormalities, though differential also includes cardioembolic in the setting of AFib, cardiomyopathy.         Antithrombotics for secondary stroke prevention:  Eliquis 2.5 mg BID   Statins for secondary stroke prevention and hyperlipidemia, if present:   Statins: Atorvastatin- 40 mg daily  Aggressive risk factor modification: HTN, HLD, Diet, Exercise, A-Fib, CAD  Rehab efforts: The patient has been evaluated by a stroke team provider and the therapy needs have been fully considered based off the presenting complaints and exam findings. The following therapy evaluations are needed: PT evaluate and treat, OT evaluate and treat, SLP evaluate and treat, PM&R evaluate for appropriate placement -- [facility]   Diagnostics ordered/pending: None  VTE prophylaxis: Eliquis 2.5 mg BID, SCDs  BP parameters: SBP <160

## 2019-10-30 PROCEDURE — 92507 TX SP LANG VOICE COMM INDIV: CPT

## 2019-10-30 PROCEDURE — 99232 SBSQ HOSP IP/OBS MODERATE 35: CPT | Mod: GC,,, | Performed by: PSYCHIATRY & NEUROLOGY

## 2019-10-30 PROCEDURE — 97530 THERAPEUTIC ACTIVITIES: CPT

## 2019-10-30 PROCEDURE — 25000003 PHARM REV CODE 250: Performed by: FAMILY MEDICINE

## 2019-10-30 PROCEDURE — 25000003 PHARM REV CODE 250: Performed by: STUDENT IN AN ORGANIZED HEALTH CARE EDUCATION/TRAINING PROGRAM

## 2019-10-30 PROCEDURE — 25000003 PHARM REV CODE 250: Performed by: PSYCHIATRY & NEUROLOGY

## 2019-10-30 PROCEDURE — 97535 SELF CARE MNGMENT TRAINING: CPT

## 2019-10-30 PROCEDURE — 25000003 PHARM REV CODE 250: Performed by: NURSE PRACTITIONER

## 2019-10-30 PROCEDURE — 97116 GAIT TRAINING THERAPY: CPT

## 2019-10-30 PROCEDURE — 94761 N-INVAS EAR/PLS OXIMETRY MLT: CPT

## 2019-10-30 PROCEDURE — 99232 PR SUBSEQUENT HOSPITAL CARE,LEVL II: ICD-10-PCS | Mod: GC,,, | Performed by: PSYCHIATRY & NEUROLOGY

## 2019-10-30 PROCEDURE — 20600001 HC STEP DOWN PRIVATE ROOM

## 2019-10-30 RX ORDER — ACETAMINOPHEN 325 MG/1
650 TABLET ORAL EVERY 6 HOURS PRN
Status: DISCONTINUED | OUTPATIENT
Start: 2019-10-30 | End: 2019-11-06 | Stop reason: HOSPADM

## 2019-10-30 RX ORDER — ONDANSETRON 8 MG/1
8 TABLET, ORALLY DISINTEGRATING ORAL EVERY 8 HOURS PRN
Status: DISCONTINUED | OUTPATIENT
Start: 2019-10-30 | End: 2019-11-06 | Stop reason: HOSPADM

## 2019-10-30 RX ADMIN — LEVOTHYROXINE SODIUM 25 MCG: 25 TABLET ORAL at 06:10

## 2019-10-30 RX ADMIN — AMLODIPINE BESYLATE 10 MG: 10 TABLET ORAL at 08:10

## 2019-10-30 RX ADMIN — ATORVASTATIN CALCIUM 40 MG: 20 TABLET, FILM COATED ORAL at 08:10

## 2019-10-30 RX ADMIN — CARVEDILOL 3.12 MG: 3.12 TABLET, FILM COATED ORAL at 08:10

## 2019-10-30 RX ADMIN — SENNOSIDES 8.6 MG: 8.6 TABLET, FILM COATED ORAL at 08:10

## 2019-10-30 RX ADMIN — APIXABAN 2.5 MG: 2.5 TABLET, FILM COATED ORAL at 09:10

## 2019-10-30 RX ADMIN — FUROSEMIDE 20 MG: 20 TABLET ORAL at 08:10

## 2019-10-30 RX ADMIN — CARVEDILOL 3.12 MG: 3.12 TABLET, FILM COATED ORAL at 09:10

## 2019-10-30 RX ADMIN — APIXABAN 2.5 MG: 2.5 TABLET, FILM COATED ORAL at 08:10

## 2019-10-30 RX ADMIN — DONEPEZIL HYDROCHLORIDE 5 MG: 5 TABLET, FILM COATED ORAL at 09:10

## 2019-10-30 NOTE — PLAN OF CARE
Pt has had a non-eventful day. Pt was less active and more cooperative today than the day before. Worked well with physical therapy, ambulated very well down the escobar without SOB or any distress. Family member currently at bedside. Pt is resting quietly. Bed low locked position. Side rails up for safety. Call light within reach.

## 2019-10-30 NOTE — PT/OT/SLP PROGRESS
"Speech Language Pathology Treatment    Patient Name:  Izaiah Douglas   MRN:  [id number]   [id number]/7086 A    Admitting Diagnosis: Acute arterial ischemic stroke, multifocal, multiple vascular territories    Recommendations:                 General Recommendations:  Speech/language therapy  Diet recommendations:  Regular, Liquid Diet Level: Thin   Aspiration Precautions: Standard aspiration precautions   General Precautions: Standard, fall  Communication strategies:  go to room if call light pushed    Subjective     "I don't know." Pt reported when prompted to orient to year.    Pain/Comfort:  · Pain Rating 1: 0/10  · Pain Rating Post-Intervention 1: 0/10    Objective:     Has the patient been evaluated by SLP for swallowing?   Yes  Keep patient NPO? No   Current Respiratory Status: room air      Pt awake and alert upon entry. Although oriented to "hospital" ind'ly, unable to orient to [facility]. Oriented to month given max cues. However unable to orient to year. Pt followed 4/4 2-step commands ind'ly, 3/4 complex commands ind'ly, 4/4 given repetition, answered 5/6 complex y/n q's ind'ly, 6/6 given cues, and he answered 2/4 basic problem solving q's ind'ly. Difficulty answering unsuccessful trials x2 appeared 2/2 impaired long term memory, which appeared seemingly consistent with pt's underlying medical dx of dementia, per review of his medical chart. Unable to sequence series of 4 words according to given attribute. Generated comparison between 2 similar, common objects ind'ly. However unable to generate contrast between the two. Education provided re: ongoing implementation of standard aspiration precautions and updated SLP POC. Pt verbalized understanding of all education provided and agreement with SLP POC. White board updated. No further questions.     Assessment:     Izaiah Douglas is a 85 y.o. male with an SLP diagnosis of need for ongoing therapeutic trials of problem solving and reasoning tasks to determine whether pt at " cognitive-linguistic baseline.     Goals:   Multidisciplinary Problems     SLP Goals        Problem: SLP Goal    Goal Priority Disciplines Outcome   SLP Goal     SLP Ongoing, Progressing   Description:  Speech Therapy Short Term Goals  Goal expected to be met by 11/6  1. Pt will tolerate regular consistency solid diet and thin liquids without overt clinical signs aspiration.  2. Pt will orient x4 ind'ly.   3. Pt will participate in therapeutic trials of problem solving and reasoning skills to determine whether at cognitive-linguistic baseline.                     Plan:     · Patient to be seen:  2 x/week   · Plan of Care expires:  11/09/19  · Plan of Care reviewed with:  patient   · SLP Follow-Up:  Yes       Discharge recommendations:  nursing facility, skilled     Time Tracking:     SLP Treatment Date:   10/30/19  Speech Start Time:  1003  Speech Stop Time:  1022     Speech Total Time (min):  19 min    Billable Minutes: Speech Therapy Individual 10 and Seld Care/Home Management Training 9    THIAGO Loya, CCC-SLP  [phone]  10/30/2019

## 2019-10-30 NOTE — PT/OT/SLP PROGRESS
"Physical Therapy Treatment    Patient Name:  Izaiah Douglas   MRN:  [id number]    Recommendations:     Discharge Recommendations:  nursing facility, skilled   Discharge Equipment Recommendations: other (see comments)(TBD by next level of care)   Barriers to discharge: Inaccessible home and Decreased caregiver support    Assessment:     Izaiah Douglas is a 85 y.o. male admitted with a medical diagnosis of Acute arterial ischemic stroke, multifocal, multiple vascular territories.  He presents with the following impairments/functional limitations:  weakness, impaired endurance, impaired self care skills, impaired functional mobilty, gait instability, impaired cognition, decreased safety awareness, impaired balance. Mr. Douglas continues to demonstrate gait instability with ambulation and requires frequent VCs as well as minimal asssitance to maintain safety with the RW. He is not safe to return home due to impaired safety awareness and decreased insight into his deficits.     Rehab Prognosis: Good; patient would benefit from acute skilled PT services to address these deficits and reach maximum level of function.    Recent Surgery: * No surgery found *      Plan:     During this hospitalization, patient to be seen 3 x/week to address the identified rehab impairments via gait training, therapeutic activities, therapeutic exercises, neuromuscular re-education and progress toward the following goals:    · Plan of Care Expires:  11/08/19    Subjective     Chief Complaint: "What city are we in?"  Patient/Family Comments/goals: Would like to return home.  Pain/Comfort:  · Pain Rating 1: 0/10  · Pain Rating Post-Intervention 1: 0/10      Objective:     Communicated with nursing prior to session.  Patient found HOB elevated with peripheral IV, telemetry upon PT entry to room.     General Precautions: Standard, fall   Orthopedic Precautions:N/A   Braces: N/A     AAOx person and place (hopsital)    Functional Mobility:  · Bed " Mobility:     · Rolling Left:  stand by assistance  · Supine to Sit: contact guard assistance  · Sit to Supine: contact guard assistance  · Transfers:     · Sit to Stand (x2):  contact guard assistance with rolling walker. VCs for hand placement on the bed to stand.  · Gait: ~180 ft with RW and CGA/Murphy; pt required frequent VCing to maintain appropriate distance from the walker. During ambulation in room, pt was running L and R sides into objects and required Murphy for turning walker. PT instructed patient not to lift walker up off the ground.   · Balance:   · Static sitting balance: SBA.  · Dynamic standine balance: CGA with occasional Murphy for assistance with balance when patient was standing and using the urinal.      AM-PAC 6 CLICK MOBILITY  Turning over in bed (including adjusting bedclothes, sheets and blankets)?: 4  Sitting down on and standing up from a chair with arms (e.g., wheelchair, bedside commode, etc.): 3  Moving from lying on back to sitting on the side of the bed?: 3  Moving to and from a bed to a chair (including a wheelchair)?: 3  Need to walk in hospital room?: 3  Climbing 3-5 steps with a railing?: 3  Basic Mobility Total Score: 19       Therapeutic Activities and Exercises:   Patient educated on role of therapy, goals of session, and benefits of mobilizing. Discussed PT plan of care during hospitalization. Patient educated that they need to call for assistance to mobilize out of bed. Patient educated on how their diagnosis impacts their mobility within PT scope of practice.     Patient left HOB elevated with all lines intact, call button in reach, bed alarm on and nursing notified.  Avasys confirmed visualization of patient.    GOALS:   Multidisciplinary Problems     Physical Therapy Goals        Problem: Physical Therapy Goal    Goal Priority Disciplines Outcome Goal Variances Interventions   Physical Therapy Goal     PT, PT/OT Ongoing, Progressing     Description:  Goals to be met by:  19     Patient will increase functional independence with mobility by performin. Supine to sit with Minimal Assistance - met 10/16  1a. Supine to sit with Supervision with bed flat - not met  2. Sit to supine with Minimal Assistance - Not met  3. Sit to stand transfer with Stand-by Assistance - met 10/21/19  4. Ascend/descend 3 stairs with right Handrail with Supervision - Not met  5. Lower extremity exercise program x 20 reps per handout, with assistance as needed - Not met  6. Added on 10/13: Bed to chair transfer with RW and supervision - Not met  7. Added on 10/13: Gait x 300 ft with RW and SBA (including turns and straight lines) - met 10/16  7a. Gait x 300 ft with bilateral SPC and SBA (including turns and straight lines) (revised 10/17)- not met                        Time Tracking:     PT Received On: 10/30/19  PT Start Time: 1115     PT Stop Time: 1138  PT Total Time (min): 23 min     Billable Minutes: Gait Training 8 and Therapeutic Activity 15    Treatment Type: Treatment  PT/PTA: PT     PTA Visit Number: 0     Maricel Pruitt, PT  10/30/2019

## 2019-10-30 NOTE — PT/OT/SLP PROGRESS
Occupational Therapy      Patient Name:  Izaiah Douglas   MRN:  [id number]    Patient remains appropriate for continued OT.  Will be treated next on 10/31/19.    BRIGHT Mancilla  10/30/2019

## 2019-10-30 NOTE — ASSESSMENT & PLAN NOTE
-Noted onset of hyponatremia on 10/12, decreasing to 131 at the lowest.   -SIADH suspected due to euvolemic appearance and no contributing hyperglycemia, reason to suspect pseudohyponatremia, use of thiazide diuretic, or signs volume overload on exam.   -No TSH abnormality or signs of glucocorticoid deficiency.   -Suspect onset secondary to stroke.   -Sodium improved status post fluid restriction and Na tablets.     Plan:   -Trending renal function daily.   -1L fluid restriction daily.

## 2019-10-30 NOTE — SUBJECTIVE & OBJECTIVE
Neurologic Chief Complaint: Right sided weakness.     Subjective:     Interval History: Patient is seen for follow-up neurological assessment and treatment recommendations. No acute events overnight. Patient had no complaints. He is tolerating PO and having normal BM. Remains disoriented to time, but pleasant. Placement pending.     HPI, Past Medical, Family, and Social History remains the same as documented in the initial encounter.     Review of Systems   Constitutional: Negative for appetite change.   HENT: Negative for trouble swallowing.    Eyes: Negative for photophobia and visual disturbance.   Respiratory: Negative for cough and shortness of breath.    Gastrointestinal: Negative for abdominal pain, diarrhea, nausea and vomiting.   Genitourinary: Negative for difficulty urinating.   Neurological: Negative for light-headedness, numbness and headaches.     Scheduled Meds:   amLODIPine  10 mg Oral Daily    apixaban  2.5 mg Oral BID    atorvastatin  40 mg Oral Daily    carvedilol  3.125 mg Oral BID    donepezil  5 mg Oral QHS    furosemide  20 mg Oral Daily    levothyroxine  25 mcg Oral Daily    senna  8.6 mg Oral Daily     Continuous Infusions:  PRN Meds:hydrALAZINE, influenza, sodium chloride 0.9%    Objective:     Vital Signs (Most Recent):  Temp: 97.6 °F (36.4 °C) (10/30/19 1302)  Pulse: 81 (10/30/19 1302)  Resp: 18 (10/30/19 1302)  BP: (!) 127/58 (10/30/19 1302)  SpO2: 95 % (10/30/19 1302)  BP Location: Left arm    Vital Signs Range (Last 24H):  Temp:  [97.3 °F (36.3 °C)-98.1 °F (36.7 °C)]   Pulse:  [59-81]   Resp:  [18-19]   BP: (117-148)/(58-73)   SpO2:  [93 %-97 %]   BP Location: Left arm    Physical Exam   Constitutional: He appears well-developed and well-nourished. No distress.   HENT:   Mouth/Throat: Oropharynx is clear and moist and mucous membranes are normal.   Eyes: Pupils are equal, round, and reactive to light. Conjunctivae are normal. No scleral icterus.   Cardiovascular: Normal rate,  regular rhythm, normal heart sounds and normal pulses.   No murmur heard.  Pulmonary/Chest: Effort normal and breath sounds normal. No respiratory distress.   Abdominal: Soft. Normal appearance and bowel sounds are normal. He exhibits no distension. There is no tenderness.   Musculoskeletal: He exhibits no edema.   Neurological: He is alert.   Skin: Skin is warm and dry. No bruising noted.     Neurological Exam:   LOC: alert  Attention Span: Good   Language: No aphasia  Articulation: No dysarthria  Orientation: Not oriented to person, place, and time  Pupils (CN II, III): PERRL  Facial Movement (CN VII): Symmetric facial expression    Motor: Arm left  Normal 5/5  Leg left  Normal 5/5  Arm right  Normal 5/5  Leg right Normal 5/5  Tone: Normal tone throughout    Laboratory:  CMP: No results for input(s): GLUCOSE, CALCIUM, ALBUMIN, PROT, NA, K, CO2, CL, BUN, CREATININE, ALKPHOS, ALT, AST, BILITOT in the last 24 hours.     CBC:   Recent Labs   Lab 10/28/19  0306   WBC 7.67   RBC 4.76   HGB 12.5*   HCT 41.1      MCV 86   MCH 26.3*   MCHC 30.4*     Lipid Panel: No results for input(s): CHOL, LDLCALC, HDL, TRIG in the last 168 hours.     Hgb A1C: No results for input(s): HGBA1C in the last 168 hours.     TSH: No results for input(s): TSH in the last 168 hours.    Diagnostic Results     Brain Imaging   CTH 10/10: No acute intracranial abnormalities identified. Chronic involutional and chronic ischemic changes. There is partially calcified mass demonstrated lateral to the cavernous sinus medial right temporal lobe adjacent to ICA.  This would favor calcified meningioma.  Differential would include aneurysm. CTA may be obtained for further evaluation.    MRI brain 10/11: The examination is limited due to motion artifact however there are areas of abnormal T2/FLAIR/diffusion signal abnormality consistent with areas of acute ischemia/infarct involving the cerebral hemispheres bilaterally, as discussed above. The  suspected intracranial meningioma adjacent to the supraclinoid right internal carotid artery seen on the CT examination is not well evaluated on this examination in part due to the motion artifact.    Vessel Imaging   CTA multiphase 10/10: Limited evaluation due to suboptimal contrast timing. Occluded left ICA with reconstitution of the distal/supraclinoid segment by retrograde flow through [id number]. Occluded intracranial segment of the left vertebral terminating at the level of left PICA origin.  Right vertebral artery supply the basilar artery.  Basilar artery is small in caliber with wall irregularity and intermittent occlusions. 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 The estimated ejection fraction is 35% Concentric left ventricular remodeling. Left ventricular diastolic dysfunction. Local segmental wall motion abnormalities. Severe left atrial enlargement.

## 2019-10-30 NOTE — ASSESSMENT & PLAN NOTE
-Seen on CTA on 10/11.   -Suspect chronic occlusion.     Plan:   -Continue current secondary stroke prevention.

## 2019-10-30 NOTE — PROGRESS NOTES
[facility]  Vascular Neurology  Comprehensive Stroke Center  Progress Note    Assessment/Plan:     * Acute arterial ischemic stroke, multifocal, multiple vascular territories  This is an 85 year old male with PMH chronic Afib (not on AC), CAD (S/P stent), HTN, combined CHF, and hypothyroidism who received IV-tPA at OSH on 10/10 after presenting with right-sided weakness, aphasia, and left gaze deviation. CTA MP showed a chronically occluded left ICA from it's origin to the supraclinoid segment with filling of the MCA via the anterior communicating artery, along with substantial burden of intracranial and extracranial atherosclerotic disease, including occluded V4 with an intermittently occluded basilar. No EVT was pursued. TTE on 10/11 showed an EF of 35% and severe left atrial enlargement,however no thrombus. MRI brain on 10/11 demonstrated scattered areas of infarct in the bilateral hemispheres, predominantly in the right frontal lobe. Etiology suspected likely hypoperfusion/watershed in the setting of vascular abnormalities, though differential also includes cardioembolic in the setting of AF and ischemic cardiomyopathy. NIHSS of 8 on admission. PT and SLP consulted. Stable for discharge and pending placement.     Antithrombotics for secondary stroke prevention:  Eliquis 2.5 mg BID     Statins for secondary stroke prevention and hyperlipidemia, if present:   Statins: Atorvastatin- 40 mg daily     Aggressive risk factor modification: HTN, HLD, Diet, Exercise, A-Fib, CAD     Rehab efforts: The patient has been evaluated by a stroke team provider and the therapy needs have been fully considered based off the presenting complaints and exam findings. The following therapy evaluations are needed: PT evaluate and treat, OT evaluate and treat, SLP evaluate and treat, PM&R evaluate for appropriate placement     Diagnostics ordered/pending: None    VTE prophylaxis: Eliquis 2.5 mg BID, SCDs    BP  parameters: SBP <160.     Disposition: SNF; placement pending.     AMRIT (acute kidney injury)  -BUN/Cr elevated on admission, initially improved, then worsening again. Likely at baseline per outside chart review of [facility] (Cr range 1.5 - 2.6)  -Appears euvolemic on exam  -Urine Na and Cr normal.     Plan:   -Lasix restarted at reduced dose.   -Continue to monitor volume status.   -Renally dose all medications.     Person awaiting admission to adequate facility elsewhere  -Difficult placement due to patient living in [place] and visiting daughter when stroke occurred - therapy teams recommending inpatient rehab  -At this time, patient is unable to fly back to [place]. He is not capable of flying due to inability to ambulate without trained medical assistance, impaired functional mobility, impaired cardiopulmonary response to activity, urine and bowel incontinence, and decreased mental capacity related to stroke and underlying diagnosis of dementia.   -10/17: Spoke to Dr. Krishna of [facility] who requested documentation of pt's current functional status stating why pt is unable to take commercial flight back to [place]. CM sent updated notes.   -10/23-24: Dr. Krishna requesting phone call with staff physician to discuss further details regarding -SNF placement for patient. Staff attempted call yesterday, however no answer, left voicemail. Will re-attempt phone call today. Pt remains medically and neurologically stable at this time.  -10/25: Called Dr. Krishna, no answer. Will re-attempt tomorrow. Patient's daughter contacted by SAW/MALINA. She is unable to provided 24 hour care.     Plan:   -Patient accepted to [facility]; need insurance auth.     SIADH (syndrome of inappropriate ADH production)  -Noted onset of hyponatremia on 10/12, decreasing to 131 at the lowest.   -SIADH suspected due to euvolemic appearance and no contributing hyperglycemia, reason to suspect pseudohyponatremia, use of  thiazide diuretic, or signs volume overload on exam.   -No TSH abnormality or signs of glucocorticoid deficiency.   -Suspect onset secondary to stroke.   -Sodium improved status post fluid restriction and Na tablets.     Plan:   -Trending renal function daily.   -1L fluid restriction daily.     Dementia  Plan:   -Continue Donepezil 5 mg daily.    Intracranial atherosclerosis  See assessment for occlusion of left ICA.     Occlusion of left carotid artery  -Seen on CTA on 10/11.   -Suspect chronic occlusion.     Plan:   -Continue current secondary stroke prevention.     Nodule of right lung  -1 cm nodule of right lung seen on CTA; no prior history of tobacco use.     Plan:   -For a part solid nodule 6 mm or greater, Fleischner Society 2017 guidelines recommend follow up with non-contrast chest CT at 3-6 months to confirm persistence. If this nodule is unchanged and the solid component remains <6 mm, annual follow up CT should be performed for 5 years  -Previously discussed with daughter.     Cytotoxic cerebral edema  -Area of cytotoxic cerebral edema identified when reviewing brain imaging in the territory of the R/L middle cerebral artery. There is not mass effect associated with it.   -The pattern is suggestive of cardioembolic etiology.    Plan:   -We will continue to monitor the patients clinical exam for any worsening of symptoms which may indicate expansion of the stroke or the area of the edema resulting in the clinical change.     Coronary artery disease involving native coronary artery of native heart without angina pectoris  -Stroke risk factor  -Noted on Care Everywhere; S/P RCA stent in 7/2018; was prescribed Plavix, Statin, and Ranexa,  but unclear if patient was taking them.    Plan:   -Continue high-intensity Statin daily and holding ASA with daily use of Eliquis for AF.     Paroxysmal atrial fibrillation  -Stroke risk factor  -CHADSVASC: 7.   -HASBLED: 2  -Per Care Everywhere, not previously on AC;  Dr. Menjivar (Cardiologist) had report patient not a good candidate due to risk of falls, non-compliance, and dementia.  -Remains rate-controlled at this time.    Plan:   -Continue beta-blocker and Eliquis 2.5 mg BID.   -Telemetry ordered.     Chronic combined systolic and diastolic congestive heart failure  -Stroke risk factor  -Most recent TTE on 10/11 significant for EF of 35%, diastolic dysfunction, wall motion abnormalities, severe left atrial enlargement, and moderate AS.   -Likely ischemic etiology with history of CAD.   -Home regimen: Lasix 20 mg and Spironolactone.   -Pro-BNP from Feb 2019 >3000.   -Appears euvolemic on exam.     Plan:   -Initiated Coreg 3.125mg BID.   -Held Spironolactone    -Initially held Lasix for AMRIT, however reviewed outside records from [facility], and Cr range 1.5 -2.6. Patient likely at baseline. Restarted Lasix on 10/15 at reduced-dose 20 mg daily.    -Daily weights and strict I/O's.     Acquired hypothyroidism  -Stroke risk factor  -TSH 3.032    Plan:   -Continue Synthroid 25 mcg daily.     Essential hypertension  -Stroke risk factor.  -Home regimen: Spironolactone.     Plan:   -Goal SBP < 160  -At goal on current regimen: Coreg 3.125 mg BID, and Norvasc 10 mg daily.        Hospital Course:   10/12: Etiology likely cardioembolic. Will start Eliquis 2.5 mg BID. Creatinine trending down - may start patient on Eliquis 5 mg BID if continue to decrease. Left ICA chronically occluded; no need to vasculare intervention.   10/13: Spoke with PT; patient would benefit from inpatient rehab placement. Norvasc 5 mg ordered. Urine studies ordered for hyponatremia - suspect to be due to dehydration.   10/14: Patient with SIADH but sodium stable; 1 L fluid restriction ordered  10/15: Irritation around right hand peripheral IV site. Sodium 131 today. Discussed with daughter that he is on fluid restriction and she will stop providing him with outside drinks.     10/16: Sodium increasing  to 132. Bactroban ordered for right hand IV infiltration. Working on placement.   10/17: Sodium continues to improve. Creatinine 1.5 likely near baseline. Decreased Eliquis to 2.5 mg. Right hand erythema stable.   10/19: Sodium 134. Awake and alert but remains confused. No pain. Pending placement  10/20: Complained of chest discomfort overnight. EKG showed no ST wave changes; troponin normal.   10/21: Renal function improving. Hyponatremic, continue 1L fluid restriction. Will likely restart lasix tomorrow; no current signs of fluid overload. Neurological exam unchanged.   10/22: Reviewed outside imaging, renal function likely at baseline; restarted home Lasix at reduced dose of 20 mg daily and continue to monitor daily weights and strict I/O's.   10/23: Attempting to transfer patient to a bed on [facility] floor. Staff physician to contact patient's insurance company to discuss further details regarding -SNF placement.  10/24: Staff physician contacted patient's insurance company yesterday, however no answer; left voicemail. Will re-attempt phone call today. Remains medically and neurologically stable for discharge.   10/25: Called Dr. Krishna; no answer. Will re-attempt tomorrow. Patient's daughter contacted by SAW/MALINA. She is unable to provided 24 hour care. Patient accepted to [facility], need insurance authorization  10/26-10/30: No acute events overnight. Neurologically unchanged. Awaiting placement     STROKE DOCUMENTATION   Acute Stroke Times   Last Known Normal Date: 10/10/19  Last Known Normal Time: 1905  Symptom Onset Date: 10/10/19  Symptom Onset Time: 1905  Stroke Team Called Date: 10/10/19  Stroke Team Called Time: 2120  Stroke Team Arrival Date: 10/10/19  Stroke Team Arrival Time: 2122    NIH Scale:      Modified Antrim Score: 1  Holley Coma Scale:    ABCD2 Score:    [id number] Score:   HAS -BLED Score:   ICH Score:   Hunt & Stroud Classification:      Hemorrhagic change of an Ischemic Stroke: Does this  patient have an ischemic stroke with hemorrhagic changes? No     Neurologic Chief Complaint: Right sided weakness.     Subjective:     Interval History: Patient is seen for follow-up neurological assessment and treatment recommendations. No acute events overnight. Patient had no complaints. He is tolerating PO and having normal BM. Remains disoriented to time, but pleasant. Placement pending.     HPI, Past Medical, Family, and Social History remains the same as documented in the initial encounter.     Review of Systems   Constitutional: Negative for appetite change.   HENT: Negative for trouble swallowing.    Eyes: Negative for photophobia and visual disturbance.   Respiratory: Negative for cough and shortness of breath.    Gastrointestinal: Negative for abdominal pain, diarrhea, nausea and vomiting.   Genitourinary: Negative for difficulty urinating.   Neurological: Negative for light-headedness, numbness and headaches.     Scheduled Meds:   amLODIPine  10 mg Oral Daily    apixaban  2.5 mg Oral BID    atorvastatin  40 mg Oral Daily    carvedilol  3.125 mg Oral BID    donepezil  5 mg Oral QHS    furosemide  20 mg Oral Daily    levothyroxine  25 mcg Oral Daily    senna  8.6 mg Oral Daily     Continuous Infusions:  PRN Meds:hydrALAZINE, influenza, sodium chloride 0.9%    Objective:     Vital Signs (Most Recent):  Temp: 97.6 °F (36.4 °C) (10/30/19 1302)  Pulse: 81 (10/30/19 1302)  Resp: 18 (10/30/19 1302)  BP: (!) 127/58 (10/30/19 1302)  SpO2: 95 % (10/30/19 1302)  BP Location: Left arm    Vital Signs Range (Last 24H):  Temp:  [97.3 °F (36.3 °C)-98.1 °F (36.7 °C)]   Pulse:  [59-81]   Resp:  [18-19]   BP: (117-148)/(58-73)   SpO2:  [93 %-97 %]   BP Location: Left arm    Physical Exam   Constitutional: He appears well-developed and well-nourished. No distress.   HENT:   Mouth/Throat: Oropharynx is clear and moist and mucous membranes are normal.   Eyes: Pupils are equal, round, and reactive to light. Conjunctivae  are normal. No scleral icterus.   Cardiovascular: Normal rate, regular rhythm, normal heart sounds and normal pulses.   No murmur heard.  Pulmonary/Chest: Effort normal and breath sounds normal. No respiratory distress.   Abdominal: Soft. Normal appearance and bowel sounds are normal. He exhibits no distension. There is no tenderness.   Musculoskeletal: He exhibits no edema.   Neurological: He is alert.   Skin: Skin is warm and dry. No bruising noted.     Neurological Exam:   LOC: alert  Attention Span: Good   Language: No aphasia  Articulation: No dysarthria  Orientation: Not oriented to person, place, and time  Pupils (CN II, III): PERRL  Facial Movement (CN VII): Symmetric facial expression    Motor: Arm left  Normal 5/5  Leg left  Normal 5/5  Arm right  Normal 5/5  Leg right Normal 5/5  Tone: Normal tone throughout    Laboratory:  CMP: No results for input(s): GLUCOSE, CALCIUM, ALBUMIN, PROT, NA, K, CO2, CL, BUN, CREATININE, ALKPHOS, ALT, AST, BILITOT in the last 24 hours.     CBC:   Recent Labs   Lab 10/28/19  0306   WBC 7.67   RBC 4.76   HGB 12.5*   HCT 41.1      MCV 86   MCH 26.3*   MCHC 30.4*     Lipid Panel: No results for input(s): CHOL, LDLCALC, HDL, TRIG in the last 168 hours.     Hgb A1C: No results for input(s): HGBA1C in the last 168 hours.     TSH: No results for input(s): TSH in the last 168 hours.    Diagnostic Results     Brain Imaging   CTH 10/10: No acute intracranial abnormalities identified. Chronic involutional and chronic ischemic changes. There is partially calcified mass demonstrated lateral to the cavernous sinus medial right temporal lobe adjacent to ICA.  This would favor calcified meningioma.  Differential would include aneurysm. CTA may be obtained for further evaluation.    MRI brain 10/11: The examination is limited due to motion artifact however there are areas of abnormal T2/FLAIR/diffusion signal abnormality consistent with areas of acute ischemia/infarct involving the  cerebral hemispheres bilaterally, as discussed above. The suspected intracranial meningioma adjacent to the supraclinoid right internal carotid artery seen on the CT examination is not well evaluated on this examination in part due to the motion artifact.    Vessel Imaging   CTA multiphase 10/10: Limited evaluation due to suboptimal contrast timing. Occluded left ICA with reconstitution of the distal/supraclinoid segment by retrograde flow through [id number]. Occluded intracranial segment of the left vertebral terminating at the level of left PICA origin.  Right vertebral artery supply the basilar artery.  Basilar artery is small in caliber with wall irregularity and intermittent occlusions. 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 The estimated ejection fraction is 35% Concentric left ventricular remodeling. Left ventricular diastolic dysfunction. Local segmental wall motion abnormalities. Severe left atrial enlargement.      Rodrigo Wagner MD  Comprehensive Stroke Center  Department of Vascular Neurology   [facility]

## 2019-10-30 NOTE — ASSESSMENT & PLAN NOTE
-Difficult placement due to patient living in [place] and visiting daughter when stroke occurred - therapy teams recommending inpatient rehab  -At this time, patient is unable to fly back to [place]. He is not capable of flying due to inability to ambulate without trained medical assistance, impaired functional mobility, impaired cardiopulmonary response to activity, urine and bowel incontinence, and decreased mental capacity related to stroke and underlying diagnosis of dementia.   -10/17: Spoke to Dr. Krishna of [facility] [employer] who requested documentation of pt's current functional status stating why pt is unable to take commercial flight back to [place]. CM sent updated notes.   -10/23-24: Dr. Krishna requesting phone call with staff physician to discuss further details regarding -SNF placement for patient. Staff attempted call yesterday, however no answer, left voicemail. Will re-attempt phone call today. Pt remains medically and neurologically stable at this time.  -10/25: Called Dr. Krishna, no answer. Will re-attempt tomorrow. Patient's daughter contacted by SAW/MALINA. She is unable to provided 24 hour care.     Plan:   -Patient accepted to [facility]; need insurance auth.

## 2019-10-30 NOTE — ASSESSMENT & PLAN NOTE
-Stroke risk factor  -Noted on Care Everywhere; S/P RCA stent in 7/2018; was prescribed Plavix, Statin, and Ranexa,  but unclear if patient was taking them.    Plan:   -Continue high-intensity Statin daily and holding ASA with daily use of Eliquis for AF.

## 2019-10-30 NOTE — ASSESSMENT & PLAN NOTE
-Stroke risk factor.  -Home regimen: Spironolactone.     Plan:   -Goal SBP < 160  -At goal on current regimen: Coreg 3.125 mg BID, and Norvasc 10 mg daily.

## 2019-10-30 NOTE — PLAN OF CARE
Ongoing therapeutic trials of problem solving and reasoning skills warranted to determine whether pt at cognitive-linguistic baseline.     THIAGO Loya, CCC-SLP  [phone]  10/30/2019

## 2019-10-30 NOTE — PLAN OF CARE
Pt is progressing towards goals as expected. Goals remain appropriate continue with current POC.     Maricel Pruitt, PT, DPT  10/30/2019      Problem: Physical Therapy Goal  Goal: Physical Therapy Goal  Description  Goals to be met by: 19     Patient will increase functional independence with mobility by performin. Supine to sit with Minimal Assistance - met 10/16  1a. Supine to sit with Supervision with bed flat - not met  2. Sit to supine with Minimal Assistance - Not met  3. Sit to stand transfer with Stand-by Assistance - met 10/21/19  4. Ascend/descend 3 stairs with right Handrail with Supervision - Not met  5. Lower extremity exercise program x 20 reps per handout, with assistance as needed - Not met  6. Added on 10/13: Bed to chair transfer with RW and supervision - Not met  7. Added on 10/13: Gait x 300 ft with RW and SBA (including turns and straight lines) - met 10/16  7a. Gait x 300 ft with bilateral SPC and SBA (including turns and straight lines) (revised 10/17)- not met       Outcome: Ongoing, Progressing

## 2019-10-30 NOTE — ASSESSMENT & PLAN NOTE
-1 cm nodule of right lung seen on CTA; no prior history of tobacco use.     Plan:   -For a part solid nodule 6 mm or greater, Fleischner Society 2017 guidelines recommend follow up with non-contrast chest CT at 3-6 months to confirm persistence. If this nodule is unchanged and the solid component remains <6 mm, annual follow up CT should be performed for 5 years  -Previously discussed with daughter.

## 2019-10-30 NOTE — ASSESSMENT & PLAN NOTE
-Stroke risk factor  -Most recent TTE on 10/11 significant for EF of 35%, diastolic dysfunction, wall motion abnormalities, severe left atrial enlargement, and moderate AS.   -Likely ischemic etiology with history of CAD.   -Home regimen: Lasix 20 mg and Spironolactone.   -Pro-BNP from Feb 2019 >3000.   -Appears euvolemic on exam.     Plan:   -Initiated Coreg 3.125mg BID.   -Held Spironolactone    -Initially held Lasix for AMRIT, however reviewed outside records from [facility], and Cr range 1.5 -2.6. Patient likely at baseline. Restarted Lasix on 10/15 at reduced-dose 20 mg daily.    -Daily weights and strict I/O's.

## 2019-10-30 NOTE — ASSESSMENT & PLAN NOTE
-BUN/Cr elevated on admission, initially improved, then worsening again. Likely at baseline per outside chart review of [facility] (Cr range 1.5 - 2.6)  -Appears euvolemic on exam  -Urine Na and Cr normal.     Plan:   -Lasix restarted at reduced dose.   -Continue to monitor volume status.   -Renally dose all medications.

## 2019-10-30 NOTE — ASSESSMENT & PLAN NOTE
-Stroke risk factor  -CHADSVASC: 7.   -HASBLED: 2  -Per Care Everywhere, not previously on AC; Dr. Menjivar (Cardiologist) had report patient not a good candidate due to risk of falls, non-compliance, and dementia.  -Remains rate-controlled at this time.    Plan:   -Continue beta-blocker and Eliquis 2.5 mg BID.   -Telemetry ordered.

## 2019-10-30 NOTE — ASSESSMENT & PLAN NOTE
This is an 85 year old male with PMH chronic Afib (not on AC), CAD (S/P stent), HTN, combined CHF, and hypothyroidism who received IV-tPA at OSH on 10/10 after presenting with right-sided weakness, aphasia, and left gaze deviation. CTA MP showed a chronically occluded left ICA from it's origin to the supraclinoid segment with filling of the MCA via the anterior communicating artery, along with substantial burden of intracranial and extracranial atherosclerotic disease, including occluded V4 with an intermittently occluded basilar. No EVT was pursued. TTE on 10/11 showed an EF of 35% and severe left atrial enlargement,however no thrombus. MRI brain on 10/11 demonstrated scattered areas of infarct in the bilateral hemispheres, predominantly in the right frontal lobe. Etiology suspected likely hypoperfusion/watershed in the setting of vascular abnormalities, though differential also includes cardioembolic in the setting of AF and ischemic cardiomyopathy. NIHSS of 8 on admission. PT and SLP consulted. Stable for discharge and pending placement.     Antithrombotics for secondary stroke prevention:  Eliquis 2.5 mg BID     Statins for secondary stroke prevention and hyperlipidemia, if present:   Statins: Atorvastatin- 40 mg daily     Aggressive risk factor modification: HTN, HLD, Diet, Exercise, A-Fib, CAD     Rehab efforts: The patient has been evaluated by a stroke team provider and the therapy needs have been fully considered based off the presenting complaints and exam findings. The following therapy evaluations are needed: PT evaluate and treat, OT evaluate and treat, SLP evaluate and treat, PM&R evaluate for appropriate placement     Diagnostics ordered/pending: None    VTE prophylaxis: Eliquis 2.5 mg BID, SCDs    BP parameters: SBP <160.     Disposition: SNF; placement pending.

## 2019-10-30 NOTE — PLAN OF CARE
NO ACUTE CHANGES NOTED.  PATIENT REMAINS CONFUSED TO PLACE, TIME, & SITUATION.  ORIENTED TO SELF.  REMAINS IMPULSIVE & CONTINUES TO GET OUT OF BED FREQUENTLY, UNASSISTED.  BED ALARMS & AVASYS TELESITTER MONITOR IN USE.  PATIENT DID SEEM TO SLEEP A COUPLE HOURS MORE TONIGHT THAN PREVIOUS NIGHT SHIFT.  NO COMPLAINTS VOICED.  MONITORING CLOSELY.

## 2019-10-30 NOTE — ASSESSMENT & PLAN NOTE
-Area of cytotoxic cerebral edema identified when reviewing brain imaging in the territory of the R/L middle cerebral artery. There is not mass effect associated with it.   -The pattern is suggestive of cardioembolic etiology.    Plan:   -We will continue to monitor the patients clinical exam for any worsening of symptoms which may indicate expansion of the stroke or the area of the edema resulting in the clinical change.

## 2019-10-31 LAB
BASOPHILS # BLD AUTO: 0.02 K/UL (ref 0–0.2)
BASOPHILS NFR BLD: 0.3 % (ref 0–1.9)
DIFFERENTIAL METHOD: ABNORMAL
EOSINOPHIL # BLD AUTO: 0.2 K/UL (ref 0–0.5)
EOSINOPHIL NFR BLD: 2.6 % (ref 0–8)
ERYTHROCYTE [DISTWIDTH] IN BLOOD BY AUTOMATED COUNT: 14 % (ref 11.5–14.5)
HCT VFR BLD AUTO: 40.2 % (ref 40–54)
HGB BLD-MCNC: 12.4 G/DL (ref 14–18)
IMM GRANULOCYTES # BLD AUTO: 0.02 K/UL (ref 0–0.04)
IMM GRANULOCYTES NFR BLD AUTO: 0.3 % (ref 0–0.5)
LYMPHOCYTES # BLD AUTO: 1.5 K/UL (ref 1–4.8)
LYMPHOCYTES NFR BLD: 22 % (ref 18–48)
MCH RBC QN AUTO: 26.4 PG (ref 27–31)
MCHC RBC AUTO-ENTMCNC: 30.8 G/DL (ref 32–36)
MCV RBC AUTO: 86 FL (ref 82–98)
MONOCYTES # BLD AUTO: 0.9 K/UL (ref 0.3–1)
MONOCYTES NFR BLD: 13.7 % (ref 4–15)
NEUTROPHILS # BLD AUTO: 4.2 K/UL (ref 1.8–7.7)
NEUTROPHILS NFR BLD: 61.1 % (ref 38–73)
NRBC BLD-RTO: 0 /100 WBC
PLATELET # BLD AUTO: 171 K/UL (ref 150–350)
PMV BLD AUTO: 10.3 FL (ref 9.2–12.9)
RBC # BLD AUTO: 4.69 M/UL (ref 4.6–6.2)
WBC # BLD AUTO: 6.86 K/UL (ref 3.9–12.7)

## 2019-10-31 PROCEDURE — 25000003 PHARM REV CODE 250: Performed by: STUDENT IN AN ORGANIZED HEALTH CARE EDUCATION/TRAINING PROGRAM

## 2019-10-31 PROCEDURE — 25000003 PHARM REV CODE 250: Performed by: PHYSICIAN ASSISTANT

## 2019-10-31 PROCEDURE — 20600001 HC STEP DOWN PRIVATE ROOM

## 2019-10-31 PROCEDURE — 25000003 PHARM REV CODE 250: Performed by: FAMILY MEDICINE

## 2019-10-31 PROCEDURE — 99232 PR SUBSEQUENT HOSPITAL CARE,LEVL II: ICD-10-PCS | Mod: GC,,, | Performed by: PSYCHIATRY & NEUROLOGY

## 2019-10-31 PROCEDURE — 85025 COMPLETE CBC W/AUTO DIFF WBC: CPT

## 2019-10-31 PROCEDURE — 97530 THERAPEUTIC ACTIVITIES: CPT

## 2019-10-31 PROCEDURE — 99232 SBSQ HOSP IP/OBS MODERATE 35: CPT | Mod: GC,,, | Performed by: PSYCHIATRY & NEUROLOGY

## 2019-10-31 PROCEDURE — 36415 COLL VENOUS BLD VENIPUNCTURE: CPT

## 2019-10-31 PROCEDURE — 25000003 PHARM REV CODE 250: Performed by: NURSE PRACTITIONER

## 2019-10-31 PROCEDURE — 25000003 PHARM REV CODE 250: Performed by: PSYCHIATRY & NEUROLOGY

## 2019-10-31 RX ORDER — RAMELTEON 8 MG/1
8 TABLET ORAL NIGHTLY PRN
Status: DISCONTINUED | OUTPATIENT
Start: 2019-10-31 | End: 2019-11-03

## 2019-10-31 RX ADMIN — DONEPEZIL HYDROCHLORIDE 5 MG: 5 TABLET, FILM COATED ORAL at 08:10

## 2019-10-31 RX ADMIN — RAMELTEON 8 MG: 8 TABLET ORAL at 08:10

## 2019-10-31 RX ADMIN — ATORVASTATIN CALCIUM 40 MG: 20 TABLET, FILM COATED ORAL at 09:10

## 2019-10-31 RX ADMIN — FUROSEMIDE 20 MG: 20 TABLET ORAL at 09:10

## 2019-10-31 RX ADMIN — CARVEDILOL 3.12 MG: 3.12 TABLET, FILM COATED ORAL at 08:10

## 2019-10-31 RX ADMIN — CARVEDILOL 3.12 MG: 3.12 TABLET, FILM COATED ORAL at 09:10

## 2019-10-31 RX ADMIN — APIXABAN 2.5 MG: 2.5 TABLET, FILM COATED ORAL at 09:10

## 2019-10-31 RX ADMIN — SENNOSIDES 8.6 MG: 8.6 TABLET, FILM COATED ORAL at 09:10

## 2019-10-31 RX ADMIN — APIXABAN 2.5 MG: 2.5 TABLET, FILM COATED ORAL at 08:10

## 2019-10-31 RX ADMIN — AMLODIPINE BESYLATE 10 MG: 10 TABLET ORAL at 09:10

## 2019-10-31 RX ADMIN — LEVOTHYROXINE SODIUM 25 MCG: 25 TABLET ORAL at 05:10

## 2019-10-31 NOTE — PLAN OF CARE
"PATIENT REMAINS CONFUSED & FREQUENTLY GETS OUT OF BED WITHOUT ASSISTANCE.  BED ALARM & TELE SITTER IN USE.  PATIENT APPEARED TO HAVE INCREASED CONFUSION & SEEMED A LITTLE 'LOOPY" THIS SHIFT.  ATTEMPTED TO REORIENT MULTIPLE TIMES.  HE BECOMES AGITATED UPON ATTEMPTING TO LEAVE HIS ROOM.  APPEARED RESTLESS AT TIMES THIS SHIFT.  EASILY REDIRECTED & ABLE TO CALM PATIENT.  NO COMPLAINTS OF PAIN OR NEEDS.  TOILET ING, FOOD, & PO FLUIDS PROVIDED FREQUENTLY.  UNSTEADY GAIT STILL OBSERVED.    "

## 2019-10-31 NOTE — PLAN OF CARE
Pt had a non-eventful day besides confusion and getting in and out of bed without asking for assistance. Pt is very unsteady. Currently sitting up in bed with son at bedside. No s/s of distress noted. No needs expressed at this time. Side rails up for safety. Bed low locked position. Call light within reach.

## 2019-10-31 NOTE — ASSESSMENT & PLAN NOTE
-Stroke risk factor  -Most recent TTE on 10/11 significant for EF of 35%, diastolic dysfunction, wall motion abnormalities, severe left atrial enlargement, and moderate AS.   -Likely ischemic etiology with history of CAD.   -Home regimen: Lasix 20 mg and Spironolactone.   -Pro-BNP from Feb 2019 >3000.   -Appears euvolemic on exam.     Plan:   -Initiated Coreg 3.125mg BID.   -Held Spironolactone    -Initially held Lasix for AMRIT, however reviewed outside records from [facility], and Cr range 1.5 -2.6. Patient likely at baseline. Restarted Lasix on 10/15 at reduced-dose 20 mg daily.    -Daily weights and strict I/O's.

## 2019-10-31 NOTE — PLAN OF CARE
Problem: Occupational Therapy Goal  Goal: Occupational Therapy Goal  Description  Goals to be met by: 11/4    Patient will increase functional independence with ADLs by performing:    UE Dressing with Supervision.  LE Dressing with Supervision.  Grooming while standing with Supervision.  Toileting from toilet with Supervision for hygiene and clothing management.   Toilet transfer with Supervision.      Outcome: Ongoing, Progressing    Fransisco Vick OTR/YA  10/31/2019

## 2019-10-31 NOTE — SUBJECTIVE & OBJECTIVE
Neurologic Chief Complaint: Right sided weakness.     Subjective:     Interval History: Patient is seen for follow-up neurological assessment and treatment recommendations. RN reporting issues with agitation overnight, however patient easily redirectable and not requiring chemical sedation. Patient had no complaints. He is tolerating PO and having normal BM. Remains disoriented to time, but pleasant. Placement pending, however CM informed patient will have insurance as of 11/01.     HPI, Past Medical, Family, and Social History remains the same as documented in the initial encounter.     Review of Systems   Constitutional: Negative for appetite change.   HENT: Negative for trouble swallowing.    Eyes: Negative for photophobia and visual disturbance.   Respiratory: Negative for cough and shortness of breath.    Gastrointestinal: Negative for abdominal pain, diarrhea, nausea and vomiting.   Genitourinary: Negative for difficulty urinating.   Neurological: Negative for light-headedness, numbness and headaches.     Scheduled Meds:   amLODIPine  10 mg Oral Daily    apixaban  2.5 mg Oral BID    atorvastatin  40 mg Oral Daily    carvedilol  3.125 mg Oral BID    donepezil  5 mg Oral QHS    furosemide  20 mg Oral Daily    levothyroxine  25 mcg Oral Daily    senna  8.6 mg Oral Daily     Continuous Infusions:  PRN Meds:acetaminophen, hydrALAZINE, influenza, ondansetron, sodium chloride 0.9%    Objective:     Vital Signs (Most Recent):  Temp: 98.1 °F (36.7 °C) (10/31/19 0800)  Pulse: 72 (10/31/19 0800)  Resp: 18 (10/31/19 0800)  BP: (!) 132/91 (10/31/19 0800)  SpO2: 95 % (10/31/19 0800)  BP Location: Left arm    Vital Signs Range (Last 24H):  Temp:  [97 °F (36.1 °C)-98.9 °F (37.2 °C)]   Pulse:  [61-88]   Resp:  [16-18]   BP: (125-134)/(57-91)   SpO2:  [92 %-99 %]   BP Location: Left arm    Physical Exam   Constitutional: He appears well-developed and well-nourished. No distress.   HENT:   Mouth/Throat: Oropharynx is  clear and moist and mucous membranes are normal.   Eyes: Pupils are equal, round, and reactive to light. Conjunctivae are normal. No scleral icterus.   Cardiovascular: Normal rate, regular rhythm, normal heart sounds and normal pulses.   No murmur heard.  Pulmonary/Chest: Effort normal and breath sounds normal. No respiratory distress.   Abdominal: Soft. Normal appearance and bowel sounds are normal. He exhibits no distension. There is no tenderness.   Musculoskeletal: He exhibits no edema.   Neurological: He is alert.   Skin: Skin is warm and dry. No bruising noted.     Neurological Exam:   LOC: alert  Attention Span: Good   Language: No aphasia  Articulation: No dysarthria  Orientation: Not oriented to person, place, and time  Pupils (CN II, III): PERRL  Facial Movement (CN VII): Symmetric facial expression    Motor: Arm left  Normal 5/5  Leg left  Normal 5/5  Arm right  Normal 5/5  Leg right Normal 5/5  Tone: Normal tone throughout    Laboratory:  CMP: No results for input(s): GLUCOSE, CALCIUM, ALBUMIN, PROT, NA, K, CO2, CL, BUN, CREATININE, ALKPHOS, ALT, AST, BILITOT in the last 24 hours.     CBC:   Recent Labs   Lab 10/31/19  0543   WBC 6.86   RBC 4.69   HGB 12.4*   HCT 40.2      MCV 86   MCH 26.4*   MCHC 30.8*     Lipid Panel: No results for input(s): CHOL, LDLCALC, HDL, TRIG in the last 168 hours.     Hgb A1C: No results for input(s): HGBA1C in the last 168 hours.     TSH: No results for input(s): TSH in the last 168 hours.    Diagnostic Results     Brain Imaging   CTH 10/10: No acute intracranial abnormalities identified. Chronic involutional and chronic ischemic changes. There is partially calcified mass demonstrated lateral to the cavernous sinus medial right temporal lobe adjacent to ICA.  This would favor calcified meningioma.  Differential would include aneurysm. CTA may be obtained for further evaluation.    MRI brain 10/11: The examination is limited due to motion artifact however there are  areas of abnormal T2/FLAIR/diffusion signal abnormality consistent with areas of acute ischemia/infarct involving the cerebral hemispheres bilaterally, as discussed above. The suspected intracranial meningioma adjacent to the supraclinoid right internal carotid artery seen on the CT examination is not well evaluated on this examination in part due to the motion artifact.    Vessel Imaging   CTA multiphase 10/10: Limited evaluation due to suboptimal contrast timing. Occluded left ICA with reconstitution of the distal/supraclinoid segment by retrograde flow through [id number]. Occluded intracranial segment of the left vertebral terminating at the level of left PICA origin.  Right vertebral artery supply the basilar artery.  Basilar artery is small in caliber with wall irregularity and intermittent occlusions. 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 The estimated ejection fraction is 35% Concentric left ventricular remodeling. Left ventricular diastolic dysfunction. Local segmental wall motion abnormalities. Severe left atrial enlargement.

## 2019-10-31 NOTE — PT/OT/SLP PROGRESS
Occupational Therapy   Treatment    Name: Izaiah Douglas  MRN: [id number]  Admitting Diagnosis:  Acute arterial ischemic stroke, multifocal, multiple vascular territories       Recommendations:     Discharge Recommendations: nursing facility, skilled  Discharge Equipment Recommendations:  (tbd)  Barriers to discharge:  Decreased caregiver support    Assessment:     Izaiah Douglas is a 85 y.o. male with a medical diagnosis of Acute arterial ischemic stroke, multifocal, multiple vascular territories.  He presents with impairments listed below.Pt displayed impaired cognition in session, causing decreased overall safety awareness. Pt presented w/ deficits for sitting balance, but possibly d/t pt being confused. Pt displayed global deconditioning requiring increased assist for ADLs and mobility at this time. Pt would benefit from skilled OT services to improve independence and overall occupational functioning.     Performance deficits affecting function are weakness, impaired endurance, impaired self care skills, impaired functional mobilty, gait instability, impaired balance, decreased lower extremity function, impaired cognition, decreased safety awareness.     Rehab Prognosis:  Good; patient would benefit from acute skilled OT services to address these deficits and reach maximum level of function.       Plan:     Patient to be seen 3 x/week to address the above listed problems via self-care/home management, therapeutic activities, therapeutic exercises  · Plan of Care Expires: 11/10/19  · Plan of Care Reviewed with: patient    Subjective     Pain/Comfort:  · Pain Rating 1: 0/10  · Pain Rating Post-Intervention 1: 0/10    Objective:     Communicated with: RN prior to session.  Patient found HOB elevated with telemetry, peripheral IV upon OT entry to room.    General Precautions: Standard, fall   Orthopedic Precautions:N/A   Braces: N/A     Occupational Performance:     Bed Mobility:    · Patient completed Scooting/Bridging  with maximal assistance  · Patient completed Supine to Sit with maximal assistance  · Patient completed Sit to Supine with maximal assistance     AMPA 6 Click ADL: 19    Treatment & Education:  Pt sat EOB ~2 min at max a w/ posterior trunk lean. Pt resisting therapist and attempting to return to bed. Pt initially agreeable  to work w/ OT but was confused once EOB.  Pt answered A&O incorrectly 0/4.   Pt educated on POC.     Patient left HOB elevated with all lines intact, call button in reach and bed alarm onEducation:      GOALS:   Multidisciplinary Problems     Occupational Therapy Goals        Problem: Occupational Therapy Goal    Goal Priority Disciplines Outcome Interventions   Occupational Therapy Goal     OT, PT/OT Ongoing, Progressing    Description:  Goals to be met by: 11/4    Patient will increase functional independence with ADLs by performing:    UE Dressing with Supervision.  LE Dressing with Supervision.  Grooming while standing with Supervision.  Toileting from toilet with Supervision for hygiene and clothing management.   Toilet transfer with Supervision.                       Time Tracking:     OT Date of Treatment: 10/31/19  OT Start Time: 1343  OT Stop Time: 1352  OT Total Time (min): 9 min    Billable Minutes:Therapeutic Activity 9 minutes     Fransisco Vick, OT  10/31/2019

## 2019-10-31 NOTE — ASSESSMENT & PLAN NOTE
-Difficult placement due to patient living in [place] and visiting daughter when stroke occurred - therapy teams recommending inpatient rehab  -At this time, patient is unable to fly back to [place]. He is not capable of flying due to inability to ambulate without trained medical assistance, impaired functional mobility, impaired cardiopulmonary response to activity, urine and bowel incontinence, and decreased mental capacity related to stroke and underlying diagnosis of dementia.   -10/17: Spoke to Dr. Krishna of [facility] [employer] who requested documentation of pt's current functional status stating why pt is unable to take commercial flight back to [place]. CM sent updated notes.   -10/23-24: Dr. Krishna requesting phone call with staff physician to discuss further details regarding -SNF placement for patient. Staff attempted call yesterday, however no answer, left voicemail. Will re-attempt phone call today. Pt remains medically and neurologically stable at this time.  -10/25: Called Dr. Krishna, no answer. Will re-attempt tomorrow. Patient's daughter contacted by [facility]. She is unable to provided 24 hour care.     Plan:   -Patient accepted to [facility]; need insurance auth.

## 2019-10-31 NOTE — ASSESSMENT & PLAN NOTE
-Noted onset of hyponatremia on 10/12, decreasing to 131 at the lowest.   -SIADH suspected due to euvolemic appearance and no contributing hyperglycemia, reason to suspect pseudohyponatremia, use of thiazide diuretic, or signs volume overload on exam.   -No TSH abnormality or signs of glucocorticoid deficiency.   -Suspect onset secondary to stroke.   -Sodium improved status post fluid restriction and Na tablets.     Plan:   -1L fluid restriction daily.

## 2019-10-31 NOTE — ASSESSMENT & PLAN NOTE
-BUN/Cr elevated on admission, initially improved, then worsening again. Likely at baseline per outside chart review of [facility] (Cr range 1.5 - 2.6)  -Appears euvolemic on exam  -Urine Na and Cr normal.     Plan:   -Lasix restarted at reduced dose.   -Continue to monitor volume status.   -Renally dose all medications.

## 2019-10-31 NOTE — PROGRESS NOTES
[facility]  Vascular Neurology  Comprehensive Stroke Center  Progress Note    Assessment/Plan:     * Acute arterial ischemic stroke, multifocal, multiple vascular territories  This is an 85 year old male with PMH chronic Afib (not on AC), CAD (S/P stent), HTN, combined CHF, and hypothyroidism who received IV-tPA at OSH on 10/10 after presenting with right-sided weakness, aphasia, and left gaze deviation. CTA MP showed a chronically occluded left ICA from it's origin to the supraclinoid segment with filling of the MCA via the anterior communicating artery, along with substantial burden of intracranial and extracranial atherosclerotic disease, including occluded V4 with an intermittently occluded basilar. No EVT was pursued. TTE on 10/11 showed an EF of 35% and severe left atrial enlargement,however no thrombus. MRI brain on 10/11 demonstrated scattered areas of infarct in the bilateral hemispheres, predominantly in the right frontal lobe. Etiology suspected likely hypoperfusion/watershed in the setting of vascular abnormalities, though differential also includes cardioembolic in the setting of AF and ischemic cardiomyopathy. NIHSS of 8 on admission. PT and SLP consulted. Stable for discharge and pending placement.     Antithrombotics for secondary stroke prevention:  Eliquis 2.5 mg BID     Statins for secondary stroke prevention and hyperlipidemia, if present:   Statins: Atorvastatin- 40 mg daily     Aggressive risk factor modification: HTN, HLD, Diet, Exercise, A-Fib, CAD     Rehab efforts: The patient has been evaluated by a stroke team provider and the therapy needs have been fully considered based off the presenting complaints and exam findings. The following therapy evaluations are needed: PT evaluate and treat, OT evaluate and treat, SLP evaluate and treat, PM&R evaluate for appropriate placement     Diagnostics ordered/pending: None    VTE prophylaxis: Eliquis 2.5 mg BID, SCDs    BP  parameters: SBP <160.     Disposition: SNF; placement pending.     AMRIT (acute kidney injury)  -BUN/Cr elevated on admission, initially improved, then worsening again. Likely at baseline per outside chart review of [facility] (Cr range 1.5 - 2.6)  -Appears euvolemic on exam  -Urine Na and Cr normal.     Plan:   -Lasix restarted at reduced dose.   -Continue to monitor volume status.   -Renally dose all medications.     Person awaiting admission to adequate facility elsewhere  -Difficult placement due to patient living in [place] and visiting daughter when stroke occurred - therapy teams recommending inpatient rehab  -At this time, patient is unable to fly back to [place]. He is not capable of flying due to inability to ambulate without trained medical assistance, impaired functional mobility, impaired cardiopulmonary response to activity, urine and bowel incontinence, and decreased mental capacity related to stroke and underlying diagnosis of dementia.   -10/17: Spoke to Dr. Krishna of [facility] [employer] who requested documentation of pt's current functional status stating why pt is unable to take commercial flight back to [place]. CM sent updated notes.   -10/23-24: Dr. Krishna requesting phone call with staff physician to discuss further details regarding -SNF placement for patient. Staff attempted call yesterday, however no answer, left voicemail. Will re-attempt phone call today. Pt remains medically and neurologically stable at this time.  -10/25: Called Dr. Krishna, no answer. Will re-attempt tomorrow. Patient's daughter contacted by SAW/MALINA. She is unable to provided 24 hour care.     Plan:   -Patient accepted to [facility]; need insurance auth.     SIADH (syndrome of inappropriate ADH production)  -Noted onset of hyponatremia on 10/12, decreasing to 131 at the lowest.   -SIADH suspected due to euvolemic appearance and no contributing hyperglycemia, reason to suspect pseudohyponatremia, use of  thiazide diuretic, or signs volume overload on exam.   -No TSH abnormality or signs of glucocorticoid deficiency.   -Suspect onset secondary to stroke.   -Sodium improved status post fluid restriction and Na tablets.     Plan:   -1L fluid restriction daily.     Dementia  Plan:   -Continue Donepezil 5 mg daily.    Intracranial atherosclerosis  See assessment for occlusion of left ICA.     Occlusion of left carotid artery  -Seen on CTA on 10/11.   -Suspect chronic occlusion.     Plan:   -Continue current secondary stroke prevention.     Nodule of right lung  -1 cm nodule of right lung seen on CTA; no prior history of tobacco use.     Plan:   -For a part solid nodule 6 mm or greater, Fleischner Society 2017 guidelines recommend follow up with non-contrast chest CT at 3-6 months to confirm persistence. If this nodule is unchanged and the solid component remains <6 mm, annual follow up CT should be performed for 5 years  -Previously discussed with daughter.     Cytotoxic cerebral edema  -Area of cytotoxic cerebral edema identified when reviewing brain imaging in the territory of the R/L middle cerebral artery. There is not mass effect associated with it.   -The pattern is suggestive of cardioembolic etiology.    Plan:   -We will continue to monitor the patients clinical exam for any worsening of symptoms which may indicate expansion of the stroke or the area of the edema resulting in the clinical change.     Coronary artery disease involving native coronary artery of native heart without angina pectoris  -Stroke risk factor  -Noted on Care Everywhere; S/P RCA stent in 7/2018; was prescribed Plavix, Statin, and Ranexa,  but unclear if patient was taking them.    Plan:   -Continue high-intensity Statin daily and holding ASA with daily use of Eliquis for AF.     Paroxysmal atrial fibrillation  -Stroke risk factor  -CHADSVASC: 7.   -HASBLED: 2  -Per Care Everywhere, not previously on AC; Dr. Menjivar (Cardiologist) had  report patient not a good candidate due to risk of falls, non-compliance, and dementia.  -Remains rate-controlled at this time.    Plan:   -Continue beta-blocker and Eliquis 2.5 mg BID.   -Telemetry ordered.     Chronic combined systolic and diastolic congestive heart failure  -Stroke risk factor  -Most recent TTE on 10/11 significant for EF of 35%, diastolic dysfunction, wall motion abnormalities, severe left atrial enlargement, and moderate AS.   -Likely ischemic etiology with history of CAD.   -Home regimen: Lasix 20 mg and Spironolactone.   -Pro-BNP from Feb 2019 >3000.   -Appears euvolemic on exam.     Plan:   -Initiated Coreg 3.125mg BID.   -Held Spironolactone    -Initially held Lasix for AMRIT, however reviewed outside records from [facility], and Cr range 1.5 -2.6. Patient likely at baseline. Restarted Lasix on 10/15 at reduced-dose 20 mg daily.    -Daily weights and strict I/O's.     Acquired hypothyroidism  -Stroke risk factor  -TSH 3.032    Plan:   -Continue Synthroid 25 mcg daily.     Essential hypertension  -Stroke risk factor.  -Home regimen: Spironolactone.     Plan:   -Goal SBP < 160  -At goal on current regimen: Coreg 3.125 mg BID, and Norvasc 10 mg daily.        Hospital Course:   10/12: Etiology likely cardioembolic. Will start Eliquis 2.5 mg BID. Creatinine trending down - may start patient on Eliquis 5 mg BID if continue to decrease. Left ICA chronically occluded; no need to vasculare intervention.   10/13: Spoke with PT; patient would benefit from inpatient rehab placement. Norvasc 5 mg ordered. Urine studies ordered for hyponatremia - suspect to be due to dehydration.   10/14: Patient with SIADH but sodium stable; 1 L fluid restriction ordered  10/15: Irritation around right hand peripheral IV site. Sodium 131 today. Discussed with daughter that he is on fluid restriction and she will stop providing him with outside drinks.     10/16: Sodium increasing to 132. Bactroban ordered for  right hand IV infiltration. Working on placement.   10/17: Sodium continues to improve. Creatinine 1.5 likely near baseline. Decreased Eliquis to 2.5 mg. Right hand erythema stable.   10/19: Sodium 134. Awake and alert but remains confused. No pain. Pending placement  10/20: Complained of chest discomfort overnight. EKG showed no ST wave changes; troponin normal.   10/21: Renal function improving. Hyponatremic, continue 1L fluid restriction. Will likely restart lasix tomorrow; no current signs of fluid overload. Neurological exam unchanged.   10/22: Reviewed outside imaging, renal function likely at baseline; restarted home Lasix at reduced dose of 20 mg daily and continue to monitor daily weights and strict I/O's.   10/23: Attempting to transfer patient to a bed on [facility] floor. Staff physician to contact patient's insurance company to discuss further details regarding -SNF placement.  10/24: Staff physician contacted patient's insurance company yesterday, however no answer; left voicemail. Will re-attempt phone call today. Remains medically and neurologically stable for discharge.   10/25: Called Dr. Krishna; no answer. Will re-attempt tomorrow. Patient's daughter contacted by SAW/MALINA. She is unable to provided 24 hour care. Patient accepted to [facility], need insurance authorization  10/26-10/30: No acute events overnight. Neurologically unchanged. Awaiting placement.   10/31: No acute events overnight. CM informed patient will have insurance as of 11/01.     STROKE DOCUMENTATION   Acute Stroke Times   Last Known Normal Date: 10/10/19  Last Known Normal Time: 1905  Symptom Onset Date: 10/10/19  Symptom Onset Time: 1905  Stroke Team Called Date: 10/10/19  Stroke Team Called Time: 2120  Stroke Team Arrival Date: 10/10/19  Stroke Team Arrival Time: 2122    NIH Scale:  1a. Level of Consciousness: 1-->Not alert, but arousable by minor stimulation to obey, answer, or respond  1b. LOC Questions: 2-->Answers neither question  correctly  1c. LOC Commands: 0-->Performs both tasks correctly  2. Best Gaze: 0-->Normal  3. Visual: 0-->No visual loss  4. Facial Palsy: 1-->Minor paralysis (flattened nasolabial fold, asymmetry on smiling)  5a. Motor Arm, Left: 2-->Some effort against gravity, limb cannot get to or maintain (if cued) 90 (or 45) degrees, drifts down to bed, but has some effort against gravity  5b. Motor Arm, Right: 0-->No drift, limb holds 90 (or 45) degrees for full 10 secs  6a. Motor Leg, Left: 2-->Some effort against gravity, leg falls to bed by 5 secs, but has some effort against gravity  6b. Motor Leg, Right: 0-->No drift, leg holds 30 degree position for full 5 secs  7. Limb Ataxia: 0-->Absent  8. Sensory: 0-->Normal, no sensory loss  9. Best Language: 0-->No aphasia, normal  10. Dysarthria: 1-->Mild-to-moderate dysarthria, patient slurs at least some words and, at worst, can be understood with some difficulty  11. Extinction and Inattention (formerly Neglect): 0-->No abnormality  Total (NIH Stroke Scale): 9       Modified Archuleta Score: 1  Holley Coma Scale:    ABCD2 Score:    [id number] Score:   HAS -BLED Score:   ICH Score:   Hunt & Stroud Classification:      Hemorrhagic change of an Ischemic Stroke: Does this patient have an ischemic stroke with hemorrhagic changes? No     Neurologic Chief Complaint: Right sided weakness.     Subjective:     Interval History: Patient is seen for follow-up neurological assessment and treatment recommendations. RN reporting issues with agitation overnight, however patient easily redirectable and not requiring chemical sedation. Patient had no complaints. He is tolerating PO and having normal BM. Remains disoriented to time, but pleasant. Placement pending, however CM informed patient will have insurance as of 11/01.     HPI, Past Medical, Family, and Social History remains the same as documented in the initial encounter.     Review of Systems   Constitutional: Negative for appetite change.    HENT: Negative for trouble swallowing.    Eyes: Negative for photophobia and visual disturbance.   Respiratory: Negative for cough and shortness of breath.    Gastrointestinal: Negative for abdominal pain, diarrhea, nausea and vomiting.   Genitourinary: Negative for difficulty urinating.   Neurological: Negative for light-headedness, numbness and headaches.     Scheduled Meds:   amLODIPine  10 mg Oral Daily    apixaban  2.5 mg Oral BID    atorvastatin  40 mg Oral Daily    carvedilol  3.125 mg Oral BID    donepezil  5 mg Oral QHS    furosemide  20 mg Oral Daily    levothyroxine  25 mcg Oral Daily    senna  8.6 mg Oral Daily     Continuous Infusions:  PRN Meds:acetaminophen, hydrALAZINE, influenza, ondansetron, sodium chloride 0.9%    Objective:     Vital Signs (Most Recent):  Temp: 98.1 °F (36.7 °C) (10/31/19 0800)  Pulse: 72 (10/31/19 0800)  Resp: 18 (10/31/19 0800)  BP: (!) 132/91 (10/31/19 0800)  SpO2: 95 % (10/31/19 0800)  BP Location: Left arm    Vital Signs Range (Last 24H):  Temp:  [97 °F (36.1 °C)-98.9 °F (37.2 °C)]   Pulse:  [61-88]   Resp:  [16-18]   BP: (125-134)/(57-91)   SpO2:  [92 %-99 %]   BP Location: Left arm    Physical Exam   Constitutional: He appears well-developed and well-nourished. No distress.   HENT:   Mouth/Throat: Oropharynx is clear and moist and mucous membranes are normal.   Eyes: Pupils are equal, round, and reactive to light. Conjunctivae are normal. No scleral icterus.   Cardiovascular: Normal rate, regular rhythm, normal heart sounds and normal pulses.   No murmur heard.  Pulmonary/Chest: Effort normal and breath sounds normal. No respiratory distress.   Abdominal: Soft. Normal appearance and bowel sounds are normal. He exhibits no distension. There is no tenderness.   Musculoskeletal: He exhibits no edema.   Neurological: He is alert.   Skin: Skin is warm and dry. No bruising noted.     Neurological Exam:   LOC: alert  Attention Span: Good   Language: No  aphasia  Articulation: No dysarthria  Orientation: Not oriented to person, place, and time  Pupils (CN II, III): PERRL  Facial Movement (CN VII): Symmetric facial expression    Motor: Arm left  Normal 5/5  Leg left  Normal 5/5  Arm right  Normal 5/5  Leg right Normal 5/5  Tone: Normal tone throughout    Laboratory:  CMP: No results for input(s): GLUCOSE, CALCIUM, ALBUMIN, PROT, NA, K, CO2, CL, BUN, CREATININE, ALKPHOS, ALT, AST, BILITOT in the last 24 hours.     CBC:   Recent Labs   Lab 10/31/19  0543   WBC 6.86   RBC 4.69   HGB 12.4*   HCT 40.2      MCV 86   MCH 26.4*   MCHC 30.8*     Lipid Panel: No results for input(s): CHOL, LDLCALC, HDL, TRIG in the last 168 hours.     Hgb A1C: No results for input(s): HGBA1C in the last 168 hours.     TSH: No results for input(s): TSH in the last 168 hours.    Diagnostic Results     Brain Imaging   CTH 10/10: No acute intracranial abnormalities identified. Chronic involutional and chronic ischemic changes. There is partially calcified mass demonstrated lateral to the cavernous sinus medial right temporal lobe adjacent to ICA.  This would favor calcified meningioma.  Differential would include aneurysm. CTA may be obtained for further evaluation.    MRI brain 10/11: The examination is limited due to motion artifact however there are areas of abnormal T2/FLAIR/diffusion signal abnormality consistent with areas of acute ischemia/infarct involving the cerebral hemispheres bilaterally, as discussed above. The suspected intracranial meningioma adjacent to the supraclinoid right internal carotid artery seen on the CT examination is not well evaluated on this examination in part due to the motion artifact.    Vessel Imaging   CTA multiphase 10/10: Limited evaluation due to suboptimal contrast timing. Occluded left ICA with reconstitution of the distal/supraclinoid segment by retrograde flow through [id number]. Occluded intracranial segment of the left vertebral  terminating at the level of left PICA origin.  Right vertebral artery supply the basilar artery.  Basilar artery is small in caliber with wall irregularity and intermittent occlusions. 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 The estimated ejection fraction is 35% Concentric left ventricular remodeling. Left ventricular diastolic dysfunction. Local segmental wall motion abnormalities. Severe left atrial enlargement.      Rodrigo Wagner MD  Comprehensive Stroke Center  Department of Vascular Neurology   [facility]

## 2019-11-01 PROBLEM — F03.90 DEMENTIA: Status: RESOLVED | Noted: 2019-10-13 | Resolved: 2019-11-01

## 2019-11-01 LAB
ANION GAP SERPL CALC-SCNC: 7 MMOL/L (ref 8–16)
BUN SERPL-MCNC: 35 MG/DL (ref 8–23)
CALCIUM SERPL-MCNC: 8.6 MG/DL (ref 8.7–10.5)
CHLORIDE SERPL-SCNC: 102 MMOL/L (ref 95–110)
CO2 SERPL-SCNC: 27 MMOL/L (ref 23–29)
CREAT SERPL-MCNC: 1.5 MG/DL (ref 0.5–1.4)
EST. GFR  (AFRICAN AMERICAN): 48.4 ML/MIN/1.73 M^2
EST. GFR  (NON AFRICAN AMERICAN): 41.8 ML/MIN/1.73 M^2
GLUCOSE SERPL-MCNC: 79 MG/DL (ref 70–110)
POTASSIUM SERPL-SCNC: 4.3 MMOL/L (ref 3.5–5.1)
SODIUM SERPL-SCNC: 136 MMOL/L (ref 136–145)

## 2019-11-01 PROCEDURE — 25000003 PHARM REV CODE 250: Performed by: PHYSICIAN ASSISTANT

## 2019-11-01 PROCEDURE — 25000003 PHARM REV CODE 250: Performed by: STUDENT IN AN ORGANIZED HEALTH CARE EDUCATION/TRAINING PROGRAM

## 2019-11-01 PROCEDURE — 99233 PR SUBSEQUENT HOSPITAL CARE,LEVL III: ICD-10-PCS | Mod: GC,,, | Performed by: PSYCHIATRY & NEUROLOGY

## 2019-11-01 PROCEDURE — 80048 BASIC METABOLIC PNL TOTAL CA: CPT

## 2019-11-01 PROCEDURE — 97530 THERAPEUTIC ACTIVITIES: CPT

## 2019-11-01 PROCEDURE — 99233 SBSQ HOSP IP/OBS HIGH 50: CPT | Mod: GC,,, | Performed by: PSYCHIATRY & NEUROLOGY

## 2019-11-01 PROCEDURE — 20600001 HC STEP DOWN PRIVATE ROOM

## 2019-11-01 PROCEDURE — 36415 COLL VENOUS BLD VENIPUNCTURE: CPT

## 2019-11-01 PROCEDURE — 25000003 PHARM REV CODE 250: Performed by: PSYCHIATRY & NEUROLOGY

## 2019-11-01 PROCEDURE — 25000003 PHARM REV CODE 250: Performed by: NURSE PRACTITIONER

## 2019-11-01 PROCEDURE — 97535 SELF CARE MNGMENT TRAINING: CPT

## 2019-11-01 PROCEDURE — 94761 N-INVAS EAR/PLS OXIMETRY MLT: CPT

## 2019-11-01 PROCEDURE — 25000003 PHARM REV CODE 250: Performed by: FAMILY MEDICINE

## 2019-11-01 RX ADMIN — AMLODIPINE BESYLATE 10 MG: 10 TABLET ORAL at 09:11

## 2019-11-01 RX ADMIN — SENNOSIDES 8.6 MG: 8.6 TABLET, FILM COATED ORAL at 09:11

## 2019-11-01 RX ADMIN — APIXABAN 2.5 MG: 2.5 TABLET, FILM COATED ORAL at 09:11

## 2019-11-01 RX ADMIN — RAMELTEON 8 MG: 8 TABLET ORAL at 08:11

## 2019-11-01 RX ADMIN — FUROSEMIDE 20 MG: 20 TABLET ORAL at 09:11

## 2019-11-01 RX ADMIN — APIXABAN 2.5 MG: 2.5 TABLET, FILM COATED ORAL at 08:11

## 2019-11-01 RX ADMIN — CARVEDILOL 3.12 MG: 3.12 TABLET, FILM COATED ORAL at 08:11

## 2019-11-01 RX ADMIN — ATORVASTATIN CALCIUM 40 MG: 20 TABLET, FILM COATED ORAL at 09:11

## 2019-11-01 RX ADMIN — DONEPEZIL HYDROCHLORIDE 5 MG: 5 TABLET, FILM COATED ORAL at 08:11

## 2019-11-01 RX ADMIN — LEVOTHYROXINE SODIUM 25 MCG: 25 TABLET ORAL at 09:11

## 2019-11-01 RX ADMIN — CARVEDILOL 3.12 MG: 3.12 TABLET, FILM COATED ORAL at 09:11

## 2019-11-01 NOTE — PLAN OF CARE
Problem: Fall Injury Risk  Goal: Absence of Fall and Fall-Related Injury  Outcome: Ongoing, Progressing     Problem: Adult Inpatient Plan of Care  Goal: Plan of Care Review  Outcome: Ongoing, Progressing   POC reviewed with pt and friends.  Pt verbalized understanding. VSS. No neuro status changes. Pt encouraged to stay up, sat in the chair for a few hrs. Pt also ambulated a few times in hallway.  Fall  and safety precautions maintained. Telesitter at bedside. Pt advised to call staff for assistance, call light is within reach. Will continue to monitor.

## 2019-11-01 NOTE — PT/OT/SLP PROGRESS
"Physical Therapy Treatment    Patient Name:  Izaiah Douglas   MRN:  [id number]    Recommendations:     Discharge Recommendations:  nursing facility, skilled   Discharge Equipment Recommendations: (TBD)   Barriers to discharge: Inaccessible home (Steps to enter and w/in his home)    Assessment:     Izaiah Douglas is a 85 y.o. male admitted with a medical diagnosis of Acute arterial ischemic stroke, multifocal, multiple vascular territories.  He presents with the following impairments/functional limitations:  impaired functional mobilty, impaired self care skills, gait instability, impaired balance, impaired cognition, decreased safety awareness .    Pt jonah session well w/ good participation. He was able to inc gait distance and also required less assistance for ambulation. He is most limited by dementia and decreased safety awareness. He will continue PT POC.    Rehab Prognosis: Good; patient would benefit from acute skilled PT services to address these deficits and reach maximum level of function.    Recent Surgery: * No surgery found *      Plan:     During this hospitalization, patient to be seen 3 x/week to address the identified rehab impairments via gait training, therapeutic activities, therapeutic exercises, neuromuscular re-education and progress toward the following goals:    · Plan of Care Expires:  11/08/19    Subjective     Chief Complaint: "I'm starving." (nurse was notified)  Patient/Family Comments/goals: "I've got to pee like a race horse."  Pain/Comfort:  · Pain Rating 1: 0/10      Objective:     Communicated with nursing prior to session.  Patient found supine with telemetry upon PT entry to room.     General Precautions: Standard, fall   Orthopedic Precautions:N/A   Braces: N/A     Functional Mobility:  · Bed Mobility:   · Supine>sit on bed w/ SPV  · HOB elevated and w/ side rail  · Transfers:   · Sit>stand from EOB w/ RW and SBA for safety  · Stand pivot transfer w/ bedside chair w/ RW and " CGA  · Cues for hand placement and controlled descent  · Gait:   · ~200ft w/ RW and CGA for safety  · Cues for posture and to remain inside RW  · Balance:   · Static stand while pt used urinal, w/ grab bar and CGA/SBA for safety, ~2min      AM-PAC 6 CLICK MOBILITY  Turning over in bed (including adjusting bedclothes, sheets and blankets)?: 4  Sitting down on and standing up from a chair with arms (e.g., wheelchair, bedside commode, etc.): 3  Moving from lying on back to sitting on the side of the bed?: 3  Moving to and from a bed to a chair (including a wheelchair)?: 3  Need to walk in hospital room?: 3  Climbing 3-5 steps with a railing?: 3  Basic Mobility Total Score: 19       Therapeutic Activities and Exercises:   Pt was educated on use of call light for assistance/safety. Pt also re educated on PT role and POC. Pt verb understanding.    Patient left up in chair with all lines intact, call button in reach, nurse notified and [employer]IRST[employer] System camera present..    GOALS:   Multidisciplinary Problems     Physical Therapy Goals        Problem: Physical Therapy Goal    Goal Priority Disciplines Outcome Goal Variances Interventions   Physical Therapy Goal     PT, PT/OT Ongoing, Progressing     Description:  Goals to be met by: 19     Patient will increase functional independence with mobility by performin. Supine to sit with Minimal Assistance - met 10/16  1a. Supine to sit with Supervision with bed flat - not met  2. Sit to supine with Minimal Assistance - Not met  3. Sit to stand transfer with Stand-by Assistance - met 10/21/19  4. Ascend/descend 3 stairs with right Handrail with Supervision - Not met  5. Lower extremity exercise program x 20 reps per handout, with assistance as needed - Not met  6. Added on 10/13: Bed to chair transfer with RW and supervision - Not met  7. Added on 10/13: Gait x 300 ft with RW and SBA (including turns and straight lines)- not met  7a. Gait x 300 ft with bilateral SPC and SBA  (including turns and straight lines) (revised 10/17)- not met                         Time Tracking:     PT Received On: 11/01/19  PT Start Time: 1547     PT Stop Time: 1603  PT Total Time (min): 16 min     Billable Minutes: Therapeutic Activity 16 and Total Time 16    Treatment Type: Treatment  PT/PTA: PT     PTA Visit Number: 0     Hannah Lizama, PT  11/01/2019

## 2019-11-01 NOTE — ASSESSMENT & PLAN NOTE
-BUN/Cr elevated on admission, initially improved, then worsening again. Likely at baseline per outside chart review of [facility] (Cr range 1.5 - 2.6)  -Appears euvolemic on exam  -Urine Na and Cr normal.     Plan:   -Lasix restarted at reduced dose.   -Continue to monitor volume status.   -Renally dose all medications.

## 2019-11-01 NOTE — PLAN OF CARE
11/01/19 1219   Post-Acute Status   Post-Acute Authorization Placement   Post-Acute Placement Status Referrals Sent       Re-referral made to [facility]. Pt now has PHN as of 11/1/19. Asked [facility] to re-evaluate him for rehab. Awaiting their answer. SW in contact with CM and Medical staff. Will continue to follow and offer support as needed.     Noah Thomas, JOLENE  [facility]   Ext. [phone]

## 2019-11-01 NOTE — NURSING
Notified by telemetry pt HR dropped to mid 30's, current rate is 61. Stroke team notified instructed to monitor.

## 2019-11-01 NOTE — SUBJECTIVE & OBJECTIVE
Neurologic Chief Complaint: Right sided weakness.     Subjective:     Interval History: Patient is seen for follow-up neurological assessment and treatment recommendations. Patient seen this morning while sitting in chair eating breakfast. Patient had no complaints. He is tolerating PO and having normal BM. Remains disoriented to time, but pleasant. Placement pending, however CM informed patient will have insurance as of today.     HPI, Past Medical, Family, and Social History remains the same as documented in the initial encounter.     Review of Systems   Constitutional: Negative for appetite change.   HENT: Negative for trouble swallowing.    Eyes: Negative for photophobia and visual disturbance.   Respiratory: Negative for cough and shortness of breath.    Gastrointestinal: Negative for abdominal pain, diarrhea, nausea and vomiting.   Genitourinary: Negative for difficulty urinating.   Neurological: Negative for light-headedness, numbness and headaches.     Scheduled Meds:   amLODIPine  10 mg Oral Daily    apixaban  2.5 mg Oral BID    atorvastatin  40 mg Oral Daily    carvedilol  3.125 mg Oral BID    donepezil  5 mg Oral QHS    furosemide  20 mg Oral Daily    levothyroxine  25 mcg Oral Daily    senna  8.6 mg Oral Daily     Continuous Infusions:  PRN Meds:acetaminophen, hydrALAZINE, influenza, ondansetron, ramelteon, sodium chloride 0.9%    Objective:     Vital Signs (Most Recent):  Temp: 98 °F (36.7 °C) (11/01/19 1238)  Pulse: (!) 56 (11/01/19 1238)  Resp: 18 (11/01/19 1238)  BP: (!) 154/68 (11/01/19 1238)  SpO2: 95 % (11/01/19 1238)  BP Location: Right arm    Vital Signs Range (Last 24H):  Temp:  [97.3 °F (36.3 °C)-98 °F (36.7 °C)]   Pulse:  [49-77]   Resp:  [17-18]   BP: (131-173)/(63-81)   SpO2:  [94 %-96 %]   BP Location: Right arm    Physical Exam   Constitutional: He appears well-developed and well-nourished. No distress.   HENT:   Mouth/Throat: Oropharynx is clear and moist and mucous membranes are  normal.   Eyes: Pupils are equal, round, and reactive to light. Conjunctivae are normal. No scleral icterus.   Cardiovascular: Normal rate, regular rhythm, normal heart sounds and normal pulses.   No murmur heard.  Pulmonary/Chest: Effort normal and breath sounds normal. No respiratory distress.   Abdominal: Soft. Normal appearance and bowel sounds are normal. He exhibits no distension. There is no tenderness.   Musculoskeletal: He exhibits no edema.   Neurological: He is alert.   Skin: Skin is warm and dry. No bruising noted.     Neurological Exam:   LOC: alert  Attention Span: Good   Language: No aphasia  Articulation: No dysarthria  Orientation: Not oriented to person, place, and time  Pupils (CN II, III): PERRL  Facial Movement (CN VII): Symmetric facial expression    Motor: Arm left  Normal 5/5  Leg left  Normal 5/5  Arm right  Normal 5/5  Leg right Normal 5/5  Tone: Normal tone throughout    Laboratory:  CMP:   Recent Labs   Lab 11/01/19  0558   CALCIUM 8.6*      K 4.3   CO2 27      BUN 35*   CREATININE 1.5*        CBC:   Recent Labs   Lab 10/31/19  0543   WBC 6.86   RBC 4.69   HGB 12.4*   HCT 40.2      MCV 86   MCH 26.4*   MCHC 30.8*     Lipid Panel: No results for input(s): CHOL, LDLCALC, HDL, TRIG in the last 168 hours.     Hgb A1C: No results for input(s): HGBA1C in the last 168 hours.     TSH: No results for input(s): TSH in the last 168 hours.    Diagnostic Results     Brain Imaging   CT 10/10: No acute intracranial abnormalities identified. Chronic involutional and chronic ischemic changes. There is partially calcified mass demonstrated lateral to the cavernous sinus medial right temporal lobe adjacent to ICA.  This would favor calcified meningioma.  Differential would include aneurysm. CTA may be obtained for further evaluation.    MRI brain 10/11: The examination is limited due to motion artifact however there are areas of abnormal T2/FLAIR/diffusion signal abnormality consistent  with areas of acute ischemia/infarct involving the cerebral hemispheres bilaterally, as discussed above. The suspected intracranial meningioma adjacent to the supraclinoid right internal carotid artery seen on the CT examination is not well evaluated on this examination in part due to the motion artifact.    Vessel Imaging   CTA multiphase 10/10: Limited evaluation due to suboptimal contrast timing. Occluded left ICA with reconstitution of the distal/supraclinoid segment by retrograde flow through [id number]. Occluded intracranial segment of the left vertebral terminating at the level of left PICA origin.  Right vertebral artery supply the basilar artery.  Basilar artery is small in caliber with wall irregularity and intermittent occlusions. 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 The estimated ejection fraction is 35% Concentric left ventricular remodeling. Left ventricular diastolic dysfunction. Local segmental wall motion abnormalities. Severe left atrial enlargement.

## 2019-11-01 NOTE — ASSESSMENT & PLAN NOTE
-Difficult placement due to patient living in [place] and visiting daughter when stroke occurred - therapy teams recommending inpatient rehab  -At this time, patient is unable to fly back to [place]. He is not capable of flying due to inability to ambulate without trained medical assistance, impaired functional mobility, impaired cardiopulmonary response to activity, urine and bowel incontinence, and decreased mental capacity related to stroke and underlying diagnosis of dementia.   -10/17: Spoke to Dr. Krishna of [facility] who requested documentation of pt's current functional status stating why pt is unable to take commercial flight back to [place]. CM sent updated notes.   -10/23-24: Dr. Krishna requesting phone call with staff physician to discuss further details regarding -SNF placement for patient. Staff attempted call yesterday, however no answer, left voicemail. Will re-attempt phone call today. Pt remains medically and neurologically stable at this time.  -10/25: Patient's daughter contacted by SAW/MALINA. She is unable to provided 24 hour care.     Plan:   -Patient accepted to [facility]; need insurance auth.

## 2019-11-01 NOTE — PLAN OF CARE
POC reviewed with pt. Verbalized understanding. VSS. Neuro exam remains unchanged. Fall precautions maintained. Telesitter at bedside. Pt advised to call staff for assistance, call light is within reach. Will continue to monitor, with all safety measures met.

## 2019-11-01 NOTE — ASSESSMENT & PLAN NOTE
-Stroke risk factor  -Most recent TTE on 10/11 significant for EF of 35%, diastolic dysfunction, wall motion abnormalities, severe left atrial enlargement, and moderate AS.   -Likely ischemic etiology with history of CAD.   -Home regimen: Lasix 20 mg and Spironolactone.   -Pro-BNP from Feb 2019 >3000.   -Appears euvolemic on exam.     Plan:   -Initiated Coreg 3.125mg BID.   -Held Spironolactone    -Initially held Lasix for [facility], however reviewed outside records from [facility], and Cr range 1.5 -2.6. Patient likely at baseline. Restarted Lasix on 10/15 at reduced-dose 20 mg daily.    -Daily weights and strict I/O's.

## 2019-11-01 NOTE — PT/OT/SLP PROGRESS
Occupational Therapy   Treatment    Name: Izaiah Douglas  MRN: [id number]  Admitting Diagnosis:  Acute arterial ischemic stroke, multifocal, multiple vascular territories       Recommendations:     Discharge Recommendations: nursing facility, skilled  Discharge Equipment Recommendations:  (tbd)  Barriers to discharge:  Decreased caregiver support    Assessment:     Izaiah Douglas is a 85 y.o. male with a medical diagnosis of Acute arterial ischemic stroke, multifocal, multiple vascular territories.  He presents with the deficits listed below.  Pt tolerated session well as demonstrated by his ability to perform toilet transfer and grooming at bathroom sink while standing with CGA with HHA.  He demonstrates decreased safety awareness and requires cueing for performing sit to stand transfers with staff assistance and for reaching back for arm of chair before sitting.  Performance deficits affecting function are weakness, impaired endurance, impaired self care skills, impaired functional mobilty, gait instability, impaired balance, impaired cognition, decreased lower extremity function, decreased safety awareness.     Rehab Prognosis:  Good; patient would benefit from acute skilled OT services to address these deficits and reach maximum level of function.       Plan:     Patient to be seen 3 x/week to address the above listed problems via self-care/home management, therapeutic activities, therapeutic exercises  · Plan of Care Expires: 11/10/19  · Plan of Care Reviewed with: patient    Subjective     Pain/Comfort:  · Pain Rating 1: 0/10  · Pain Rating Post-Intervention 1: 0/10    Objective:     Communicated with: nurse prior to session.  Patient found HOB elevated with telemetry upon OT entry to room.    General Precautions: Standard, fall   Orthopedic Precautions:N/A   Braces: N/A     Occupational Performance:     Bed Mobility:    · Patient completed Rolling/Turning to Right with stand by assistance  · Patient completed  Scooting/Bridging with stand by assistance  · Patient completed Supine to Sit with stand by assistance with cueing for hand placement on bed rail and for pushing up from bed with B hands   · Patient completed Sit to Supine with stand by assistance     Functional Mobility/Transfers:  · Patient completed Sit <> Stand Transfer with stand by assistance with no assistive device   · Patient completed Stand <> Sit Transfer with stand by assistance with straight cane  · Patient completed Bedside Chair  <>  Bed Transfer with contact guard assistance with hand-held assist  · Patient completed Toilet Transfer Step Transfer technique with contact guard assistance with hand-held assist  · Functional Mobility: Pt ambulated from EOB to toilet back to room with CGA with HHA.  Pt grabbed cane from room wall to ambulate to bedside chair with CGA.       Activities of Daily Living:  · Grooming: contact guard assistance with hand-held assist to brush teeth and wash hands while standing at sink.  Pt required cueing to operate sink.   · Upper Body Dressing: moderate assistance with assistance to thread LUE through arm of robe  · Toileting: minimal assistance with transfer and managing hospital gown before sitting down on toilet.  He required cues to use grab bar when performing transfer.       [facility] 6 Click ADL: 19    Treatment & Education:  Pt edu on plan of care and safety when performing functional transfers and self care tasks.    - White board updated  - Self care tasks completed-- as noted above     Patient left HOB elevated with all lines intact and call button in reachEducation:      GOALS:   Multidisciplinary Problems     Occupational Therapy Goals        Problem: Occupational Therapy Goal    Goal Priority Disciplines Outcome Interventions   Occupational Therapy Goal     OT, PT/OT Ongoing, Progressing    Description:  Goals to be met by: 11/4    Patient will increase functional independence with ADLs by performing:    UE Dressing  with Supervision.  LE Dressing with Supervision.  Grooming while standing with Supervision.  Toileting from toilet with Supervision for hygiene and clothing management.   Toilet transfer with Supervision.                       Time Tracking:     OT Date of Treatment: 11/01/19  OT Start Time: 1019  OT Stop Time: 1042  OT Total Time (min): 23 min    Billable Minutes:Self Care/Home Management 10  Therapeutic Activity 13    MARCELO Izaguirre  11/1/2019

## 2019-11-01 NOTE — PLAN OF CARE
11/01/19 1622   Post-Acute Status   Post-Acute Authorization Placement   Post-Acute Placement Status Referrals Sent       [facility].  SW in contact with CM and Medical staff. Will continue to follow and offer support as needed.     Noah Thomas, JOLENE  [facility]   Ext. [phone]

## 2019-11-01 NOTE — PLAN OF CARE
LTGs remain appropriate. Pt will continue PT POC.  Hannah Lizama, PT  2019    Problem: Physical Therapy Goal  Goal: Physical Therapy Goal  Description  Goals to be met by: 19     Patient will increase functional independence with mobility by performin. Supine to sit with Minimal Assistance - met 10/16  1a. Supine to sit with Supervision with bed flat - not met  2. Sit to supine with Minimal Assistance - Not met  3. Sit to stand transfer with Stand-by Assistance - met 10/21/19  4. Ascend/descend 3 stairs with right Handrail with Supervision - Not met  5. Lower extremity exercise program x 20 reps per handout, with assistance as needed - Not met  6. Added on 10/13: Bed to chair transfer with RW and supervision - Not met  7. Added on 10/13: Gait x 300 ft with RW and SBA (including turns and straight lines)- not met  7a. Gait x 300 ft with bilateral SPC and SBA (including turns and straight lines) (revised 10/17)- not met        Outcome: Ongoing, Progressing

## 2019-11-01 NOTE — PLAN OF CARE
Spoke with PHN. States takes 48 hours to get information for plan number and ID that would be needed to submitted for insurance auth. In touch with [facility] who states should have bed available next week and can submit for auth. [facility] declined and rec snf.

## 2019-11-01 NOTE — PROGRESS NOTES
[facility]  Vascular Neurology  Comprehensive Stroke Center  Progress Note    Assessment/Plan:     * Acute arterial ischemic stroke, multifocal, multiple vascular territories  This is an 85 year old male with PMH chronic Afib (not on AC), CAD (S/P stent), HTN, combined CHF, and hypothyroidism who received IV-tPA at OSH on 10/10 after presenting with right-sided weakness, aphasia, and left gaze deviation. CTA MP showed a chronically occluded left ICA from it's origin to the supraclinoid segment with filling of the MCA via the anterior communicating artery, along with substantial burden of intracranial and extracranial atherosclerotic disease, including occluded V4 with an intermittently occluded basilar. No EVT was pursued. TTE on 10/11 showed an EF of 35% and severe left atrial enlargement,however no thrombus. MRI brain on 10/11 demonstrated scattered areas of infarct in the bilateral hemispheres, predominantly in the right frontal lobe. Etiology suspected likely hypoperfusion/watershed in the setting of vascular abnormalities, though differential also includes cardioembolic in the setting of AF and ischemic cardiomyopathy. NIHSS of 8 on admission. PT and SLP consulted. Stable for discharge and pending placement.     Antithrombotics for secondary stroke prevention:  Eliquis 2.5 mg BID     Statins for secondary stroke prevention and hyperlipidemia, if present:   Statins: Atorvastatin- 40 mg daily     Aggressive risk factor modification: HTN, HLD, Diet, Exercise, A-Fib, CAD     Rehab efforts: The patient has been evaluated by a stroke team provider and the therapy needs have been fully considered based off the presenting complaints and exam findings. The following therapy evaluations are needed: PT evaluate and treat, OT evaluate and treat, SLP evaluate and treat, PM&R evaluate for appropriate placement     Diagnostics ordered/pending: None    VTE prophylaxis: Eliquis 2.5 mg BID, SCDs    BP  parameters: SBP <160.     Disposition: SNF; placement pending.     AMRIT (acute kidney injury)  -BUN/Cr elevated on admission, initially improved, then worsening again. Likely at baseline per outside chart review of [facility] (Cr range 1.5 - 2.6)  -Appears euvolemic on exam  -Urine Na and Cr normal.     Plan:   -Lasix restarted at reduced dose.   -Continue to monitor volume status.   -Renally dose all medications.     Person awaiting admission to adequate facility elsewhere  -Difficult placement due to patient living in [place] and visiting daughter when stroke occurred - therapy teams recommending inpatient rehab  -At this time, patient is unable to fly back to [place]. He is not capable of flying due to inability to ambulate without trained medical assistance, impaired functional mobility, impaired cardiopulmonary response to activity, urine and bowel incontinence, and decreased mental capacity related to stroke and underlying diagnosis of dementia.   -10/17: Spoke to Dr. Krishna of [facility] [email or web address] who requested documentation of pt's current functional status stating why pt is unable to take commercial flight back to [place]. CM sent updated notes.   -10/23-24: Dr. Krishna requesting phone call with staff physician to discuss further details regarding -SNF placement for patient. Staff attempted call yesterday, however no answer, left voicemail. Will re-attempt phone call today. Pt remains medically and neurologically stable at this time.  -10/25: Patient's daughter contacted by SAW/MALINA. She is unable to provided 24 hour care.     Plan:   -Patient accepted to [facility]; need insurance authorization; pending.     SIADH (syndrome of inappropriate ADH production)  -Noted onset of hyponatremia on 10/12, decreasing to 131 at the lowest.   -SIADH suspected due to euvolemic appearance and no contributing hyperglycemia, reason to suspect pseudohyponatremia, use of thiazide diuretic, or signs volume  overload on exam.   -No TSH abnormality or signs of glucocorticoid deficiency.   -Suspect onset secondary to stroke.   -Sodium improved status post fluid restriction and Na tablets.     Plan:   -1L fluid restriction daily.     Dementia  Plan:   -Continue Donepezil 5 mg daily.    Intracranial atherosclerosis  See assessment for occlusion of left ICA.     Occlusion of left carotid artery  -Seen on CTA on 10/11.   -Suspect chronic occlusion.     Plan:   -Continue current secondary stroke prevention.     Nodule of right lung  -1 cm nodule of right lung seen on CTA; no prior history of tobacco use.     Plan:   -For a part solid nodule 6 mm or greater, Fleischner Society 2017 guidelines recommend follow up with non-contrast chest CT at 3-6 months to confirm persistence. If this nodule is unchanged and the solid component remains <6 mm, annual follow up CT should be performed for 5 years  -Previously discussed with daughter.     Cytotoxic cerebral edema  -Area of cytotoxic cerebral edema identified when reviewing brain imaging in the territory of the R/L middle cerebral artery. There is not mass effect associated with it.   -The pattern is suggestive of cardioembolic etiology.    Plan:   -We will continue to monitor the patients clinical exam for any worsening of symptoms which may indicate expansion of the stroke or the area of the edema resulting in the clinical change.     Coronary artery disease involving native coronary artery of native heart without angina pectoris  -Stroke risk factor  -Noted on Care Everywhere; S/P RCA stent in 7/2018; was prescribed Plavix, Statin, and Ranexa,  but unclear if patient was taking them.    Plan:   -Continue high-intensity Statin daily and holding ASA with daily use of Eliquis for AF.     Paroxysmal atrial fibrillation  -Stroke risk factor  -CHADSVASC: 7.   -HASBLED: 2  -Per Care Everywhere, not previously on AC; Dr. Menjivar (Cardiologist) had report patient not a good candidate due  to risk of falls, non-compliance, and dementia.  -Remains rate-controlled at this time.    Plan:   -Continue beta-blocker and Eliquis 2.5 mg BID.   -Telemetry ordered.     Chronic combined systolic and diastolic congestive heart failure  -Stroke risk factor  -Most recent TTE on 10/11 significant for EF of 35%, diastolic dysfunction, wall motion abnormalities, severe left atrial enlargement, and moderate AS.   -Likely ischemic etiology with history of CAD.   -Home regimen: Lasix 20 mg and Spironolactone.   -Pro-BNP from Feb 2019 >3000.   -Appears euvolemic on exam.     Plan:   -Initiated Coreg 3.125mg BID.   -Held Spironolactone    -Initially held Lasix for AMRIT, however reviewed outside records from [facility], and Cr range 1.5 -2.6. Patient likely at baseline. Restarted Lasix on 10/15 at reduced-dose 20 mg daily.    -Daily weights and strict I/O's.     Acquired hypothyroidism  -Stroke risk factor  -TSH 3.032    Plan:   -Continue Synthroid 25 mcg daily.     Essential hypertension  -Stroke risk factor.  -Home regimen: Spironolactone.     Plan:   -Goal SBP < 160  -At goal on current regimen: Coreg 3.125 mg BID, and Norvasc 10 mg daily.        Hospital Course:   10/12: Etiology likely cardioembolic. Will start Eliquis 2.5 mg BID. Creatinine trending down - may start patient on Eliquis 5 mg BID if continue to decrease. Left ICA chronically occluded; no need to vasculare intervention.   10/13: Spoke with PT; patient would benefit from inpatient rehab placement. Norvasc 5 mg ordered. Urine studies ordered for hyponatremia - suspect to be due to dehydration.   10/14: Patient with SIADH but sodium stable; 1 L fluid restriction ordered  10/15: Irritation around right hand peripheral IV site. Sodium 131 today. Discussed with daughter that he is on fluid restriction and she will stop providing him with outside drinks.     10/16: Sodium increasing to 132. Bactroban ordered for right hand IV infiltration. Working on  placement.   10/17: Sodium continues to improve. Creatinine 1.5 likely near baseline. Decreased Eliquis to 2.5 mg. Right hand erythema stable.   10/19: Sodium 134. Awake and alert but remains confused. No pain. Pending placement  10/20: Complained of chest discomfort overnight. EKG showed no ST wave changes; troponin normal.   10/21: Renal function improving. Hyponatremic, continue 1L fluid restriction. Will likely restart lasix tomorrow; no current signs of fluid overload. Neurological exam unchanged.   10/22: Reviewed outside imaging, renal function likely at baseline; restarted home Lasix at reduced dose of 20 mg daily and continue to monitor daily weights and strict I/O's.   10/23: Attempting to transfer patient to a bed on [facility] floor. Staff physician to contact patient's insurance company to discuss further details regarding -SNF placement.  10/24: Staff physician contacted patient's insurance company yesterday, however no answer; left voicemail. Will re-attempt phone call today. Remains medically and neurologically stable for discharge.   10/25: Called Dr. Krishna; no answer. Will re-attempt tomorrow. Patient's daughter contacted by SAW/MALINA. She is unable to provided 24 hour care. Patient accepted to [facility], need insurance authorization  10/26-10/30: No acute events overnight. Neurologically unchanged. Awaiting placement.   10/31: No acute events overnight. CM informed patient will have insurance as of 11/01.     STROKE DOCUMENTATION   Acute Stroke Times   Last Known Normal Date: 10/10/19  Last Known Normal Time: 1905  Symptom Onset Date: 10/10/19  Symptom Onset Time: 1905  Stroke Team Called Date: 10/10/19  Stroke Team Called Time: 2120  Stroke Team Arrival Date: 10/10/19  Stroke Team Arrival Time: 2122    NIH Scale:  1a. Level of Consciousness: 0-->Alert, keenly responsive  1b. LOC Questions: 2-->Answers neither question correctly  1c. LOC Commands: 0-->Performs both tasks correctly  2. Best Gaze:  0-->Normal  3. Visual: 0-->No visual loss  4. Facial Palsy: 1-->Minor paralysis (flattened nasolabial fold, asymmetry on smiling)  5a. Motor Arm, Left: 1-->Drift, limb holds 90 (or 45) degrees, but drifts down before full 10 seconds, does not hit bed or other support  5b. Motor Arm, Right: 0-->No drift, limb holds 90 (or 45) degrees for full 10 secs  6a. Motor Leg, Left: 1-->Drift, leg falls by the end of the 5-sec period but does not hit bed  6b. Motor Leg, Right: 0-->No drift, leg holds 30 degree position for full 5 secs  7. Limb Ataxia: 0-->Absent  8. Sensory: 0-->Normal, no sensory loss  9. Best Language: 0-->No aphasia, normal  10. Dysarthria: 1-->Mild-to-moderate dysarthria, patient slurs at least some words and, at worst, can be understood with some difficulty  11. Extinction and Inattention (formerly Neglect): 0-->No abnormality  Total (NIH Stroke Scale): 6       Modified [place] Score: 1  Holley Coma Scale:    ABCD2 Score:    [id number] Score:   HAS -BLED Score:   ICH Score:   Hunt & Stroud Classification:      Hemorrhagic change of an Ischemic Stroke: Does this patient have an ischemic stroke with hemorrhagic changes? No     Neurologic Chief Complaint: Right sided weakness.     Subjective:     Interval History: Patient is seen for follow-up neurological assessment and treatment recommendations. Patient seen this morning while sitting in chair eating breakfast. Patient had no complaints. He is tolerating PO and having normal BM. Remains disoriented to time, but pleasant. Placement pending, however CM informed patient will have insurance as of today.     HPI, Past Medical, Family, and Social History remains the same as documented in the initial encounter.     Review of Systems   Constitutional: Negative for appetite change.   HENT: Negative for trouble swallowing.    Eyes: Negative for photophobia and visual disturbance.   Respiratory: Negative for cough and shortness of breath.    Gastrointestinal: Negative  for abdominal pain, diarrhea, nausea and vomiting.   Genitourinary: Negative for difficulty urinating.   Neurological: Negative for light-headedness, numbness and headaches.     Scheduled Meds:   amLODIPine  10 mg Oral Daily    apixaban  2.5 mg Oral BID    atorvastatin  40 mg Oral Daily    carvedilol  3.125 mg Oral BID    donepezil  5 mg Oral QHS    furosemide  20 mg Oral Daily    levothyroxine  25 mcg Oral Daily    senna  8.6 mg Oral Daily     Continuous Infusions:  PRN Meds:acetaminophen, hydrALAZINE, influenza, ondansetron, ramelteon, sodium chloride 0.9%    Objective:     Vital Signs (Most Recent):  Temp: 98 °F (36.7 °C) (11/01/19 1238)  Pulse: (!) 56 (11/01/19 1238)  Resp: 18 (11/01/19 1238)  BP: (!) 154/68 (11/01/19 1238)  SpO2: 95 % (11/01/19 1238)  BP Location: Right arm    Vital Signs Range (Last 24H):  Temp:  [97.3 °F (36.3 °C)-98 °F (36.7 °C)]   Pulse:  [49-77]   Resp:  [17-18]   BP: (131-173)/(63-81)   SpO2:  [94 %-96 %]   BP Location: Right arm    Physical Exam   Constitutional: He appears well-developed and well-nourished. No distress.   HENT:   Mouth/Throat: Oropharynx is clear and moist and mucous membranes are normal.   Eyes: Pupils are equal, round, and reactive to light. Conjunctivae are normal. No scleral icterus.   Cardiovascular: Normal rate, regular rhythm, normal heart sounds and normal pulses.   No murmur heard.  Pulmonary/Chest: Effort normal and breath sounds normal. No respiratory distress.   Abdominal: Soft. Normal appearance and bowel sounds are normal. He exhibits no distension. There is no tenderness.   Musculoskeletal: He exhibits no edema.   Neurological: He is alert.   Skin: Skin is warm and dry. No bruising noted.     Neurological Exam:   LOC: alert  Attention Span: Good   Language: No aphasia  Articulation: No dysarthria  Orientation: Not oriented to person, place, and time  Pupils (CN II, III): PERRL  Facial Movement (CN VII): Symmetric facial expression    Motor: Arm  left  Normal 5/5  Leg left  Normal 5/5  Arm right  Normal 5/5  Leg right Normal 5/5  Tone: Normal tone throughout    Laboratory:  CMP:   Recent Labs   Lab 11/01/19  0558   CALCIUM 8.6*      K 4.3   CO2 27      BUN 35*   CREATININE 1.5*        CBC:   Recent Labs   Lab 10/31/19  0543   WBC 6.86   RBC 4.69   HGB 12.4*   HCT 40.2      MCV 86   MCH 26.4*   MCHC 30.8*     Lipid Panel: No results for input(s): CHOL, LDLCALC, HDL, TRIG in the last 168 hours.     Hgb A1C: No results for input(s): HGBA1C in the last 168 hours.     TSH: No results for input(s): TSH in the last 168 hours.    Diagnostic Results     Brain Imaging   CTH 10/10: No acute intracranial abnormalities identified. Chronic involutional and chronic ischemic changes. There is partially calcified mass demonstrated lateral to the cavernous sinus medial right temporal lobe adjacent to ICA.  This would favor calcified meningioma.  Differential would include aneurysm. CTA may be obtained for further evaluation.    MRI brain 10/11: The examination is limited due to motion artifact however there are areas of abnormal T2/FLAIR/diffusion signal abnormality consistent with areas of acute ischemia/infarct involving the cerebral hemispheres bilaterally, as discussed above. The suspected intracranial meningioma adjacent to the supraclinoid right internal carotid artery seen on the CT examination is not well evaluated on this examination in part due to the motion artifact.    Vessel Imaging   CTA multiphase 10/10: Limited evaluation due to suboptimal contrast timing. Occluded left ICA with reconstitution of the distal/supraclinoid segment by retrograde flow through [id number]. Occluded intracranial segment of the left vertebral terminating at the level of left PICA origin.  Right vertebral artery supply the basilar artery.  Basilar artery is small in caliber with wall irregularity and intermittent occlusions. 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 The estimated ejection fraction is 35% Concentric left ventricular remodeling. Left ventricular diastolic dysfunction. Local segmental wall motion abnormalities. Severe left atrial enlargement.      Rodrigo Wagner MD  Comprehensive Stroke Center  Department of Vascular Neurology   [facility]

## 2019-11-02 PROCEDURE — 20600001 HC STEP DOWN PRIVATE ROOM

## 2019-11-02 PROCEDURE — 25000003 PHARM REV CODE 250: Performed by: NURSE PRACTITIONER

## 2019-11-02 PROCEDURE — 25000003 PHARM REV CODE 250: Performed by: STUDENT IN AN ORGANIZED HEALTH CARE EDUCATION/TRAINING PROGRAM

## 2019-11-02 PROCEDURE — 25000003 PHARM REV CODE 250: Performed by: FAMILY MEDICINE

## 2019-11-02 PROCEDURE — 25000003 PHARM REV CODE 250: Performed by: PHYSICIAN ASSISTANT

## 2019-11-02 PROCEDURE — 99233 SBSQ HOSP IP/OBS HIGH 50: CPT | Mod: GC,,, | Performed by: PSYCHIATRY & NEUROLOGY

## 2019-11-02 PROCEDURE — 25000003 PHARM REV CODE 250: Performed by: PSYCHIATRY & NEUROLOGY

## 2019-11-02 PROCEDURE — 99233 PR SUBSEQUENT HOSPITAL CARE,LEVL III: ICD-10-PCS | Mod: GC,,, | Performed by: PSYCHIATRY & NEUROLOGY

## 2019-11-02 RX ADMIN — RISPERIDONE 0.5 MG: 1 SOLUTION ORAL at 02:11

## 2019-11-02 RX ADMIN — RAMELTEON 8 MG: 8 TABLET ORAL at 09:11

## 2019-11-02 RX ADMIN — CARVEDILOL 3.12 MG: 3.12 TABLET, FILM COATED ORAL at 09:11

## 2019-11-02 RX ADMIN — ATORVASTATIN CALCIUM 40 MG: 20 TABLET, FILM COATED ORAL at 09:11

## 2019-11-02 RX ADMIN — APIXABAN 2.5 MG: 2.5 TABLET, FILM COATED ORAL at 09:11

## 2019-11-02 RX ADMIN — APIXABAN 2.5 MG: 2.5 TABLET, FILM COATED ORAL at 08:11

## 2019-11-02 RX ADMIN — CARVEDILOL 3.12 MG: 3.12 TABLET, FILM COATED ORAL at 08:11

## 2019-11-02 RX ADMIN — SENNOSIDES 8.6 MG: 8.6 TABLET, FILM COATED ORAL at 09:11

## 2019-11-02 RX ADMIN — AMLODIPINE BESYLATE 10 MG: 10 TABLET ORAL at 09:11

## 2019-11-02 RX ADMIN — DONEPEZIL HYDROCHLORIDE 5 MG: 5 TABLET, FILM COATED ORAL at 08:11

## 2019-11-02 RX ADMIN — LEVOTHYROXINE SODIUM 25 MCG: 25 TABLET ORAL at 05:11

## 2019-11-02 RX ADMIN — FUROSEMIDE 20 MG: 20 TABLET ORAL at 09:11

## 2019-11-02 NOTE — ASSESSMENT & PLAN NOTE
-Stroke risk factor  -Most recent TTE on 10/11 significant for EF of 35%, diastolic dysfunction, wall motion abnormalities, severe left atrial enlargement, and moderate AS.   -Likely ischemic etiology with history of CAD.   -Home regimen: Lasix 20 mg and Spironolactone.   -Pro-BNP from Feb 2019 >3000.   -Appears euvolemic on exam.     Plan:   -Initiated Coreg 3.125mg BID.   -Holding Spironolactone    -Initially held Lasix for AMRIT, however reviewed outside records from [facility], and Cr range 1.5 -2.6. Patient likely at baseline. Restarted Lasix on 10/15 at reduced-dose 20 mg daily.    -Daily weights and strict I/O's.

## 2019-11-02 NOTE — ASSESSMENT & PLAN NOTE
-Difficult placement due to patient living in [place] and visiting daughter when stroke occurred - therapy teams recommending inpatient rehab  -At this time, patient is unable to fly back to [place]. He is not capable of flying due to inability to ambulate without trained medical assistance, impaired functional mobility, impaired cardiopulmonary response to activity, urine and bowel incontinence, and decreased mental capacity related to stroke and underlying diagnosis of dementia.   -10/17: Spoke to Dr. Krishna of [facility] [employer] who requested documentation of pt's current functional status stating why pt is unable to take commercial flight back to [place]. CM sent updated notes.   -10/23-24: Dr. Krishna requesting phone call with staff physician to discuss further details regarding -SNF placement for patient. Staff attempted call yesterday, however no answer, left voicemail. Will re-attempt phone call today. Pt remains medically and neurologically stable at this time.  -10/25: Patient's daughter contacted by SAW/MALINA. She is unable to provided 24 hour care.     Plan:   -Patient accepted to [facility]; need insurance auth.

## 2019-11-02 NOTE — PLAN OF CARE
POC reviewed with pt. michaelle understanding. VSS. Neuro exam remains unchanged. Pt requires redirection, restless at night. Fall precautions maintained. Pt advised to call staff for assistance, call light is within reach. Will continue to monitor, with all safety measures met.

## 2019-11-02 NOTE — ASSESSMENT & PLAN NOTE
-BUN/Cr elevated on admission, initially improved, then worsening again. Likely at baseline per outside chart review of [facility] (Cr range 1.5 - 2.6)  -Appears euvolemic on exam  -Urine Na and Cr normal.   -Cr back to baseline.     Plan:   -Lasix restarted at reduced dose.   -Continue to monitor volume status.   -Renally dose all medications.

## 2019-11-02 NOTE — PLAN OF CARE
Problem: Fall Injury Risk  Goal: Absence of Fall and Fall-Related Injury  Outcome: Ongoing, Progressing     Problem: Adult Inpatient Plan of Care  Goal: Plan of Care Review  Outcome: Ongoing, Progressing     Problem: Adjustment to Illness (Stroke, Ischemic/Transient Ischemic Attack)  Goal: Optimal Coping  Outcome: Ongoing, Progressing     Problem: Cerebral Tissue Perfusion Risk (Stroke, Ischemic/Transient Ischemic Attack)  Goal: Optimal Cerebral Tissue Perfusion  Outcome: Ongoing, Progressing     Problem: Communication Impairment (Stroke, Ischemic/Transient Ischemic Attack)  Goal: Improved Communication Skills  Outcome: Ongoing, Progressing  POC reviewed with pt and family, verbalized understanding. VSS. No neuro status changes. Pt requires frequent redirection, and wanders around room and tries to get out into the hallway. Fall precautions and safety measures met, call light is within reach. Will continue to monitor.

## 2019-11-02 NOTE — SUBJECTIVE & OBJECTIVE
Neurologic Chief Complaint: Right sided weakness.     Subjective:     Interval History: Patient seen this afternoon while sitting in chair reading newspaper. Patient had no complaints. He is tolerating PO and having normal BM. Remains disoriented to time, but pleasant. Placement pending, however CM informed patient will need at least two business days starting from 11/01 to obtain insurance authorization.     HPI, Past Medical, Family, and Social History remains the same as documented in the initial encounter.     Review of Systems   Constitutional: Negative for appetite change.   HENT: Negative for trouble swallowing.    Eyes: Negative for photophobia and visual disturbance.   Respiratory: Negative for cough and shortness of breath.    Gastrointestinal: Negative for abdominal pain, diarrhea, nausea and vomiting.   Genitourinary: Negative for difficulty urinating.   Neurological: Negative for light-headedness, numbness and headaches.     Scheduled Meds:   amLODIPine  10 mg Oral Daily    apixaban  2.5 mg Oral BID    atorvastatin  40 mg Oral Daily    carvedilol  3.125 mg Oral BID    donepezil  5 mg Oral QHS    furosemide  20 mg Oral Daily    levothyroxine  25 mcg Oral Daily    senna  8.6 mg Oral Daily     Continuous Infusions:  PRN Meds:acetaminophen, hydrALAZINE, influenza, ondansetron, ramelteon, sodium chloride 0.9%    Objective:     Vital Signs (Most Recent):  Temp: 97.4 °F (36.3 °C) (11/02/19 1108)  Pulse: (!) 59 (11/02/19 1144)  Resp: 17 (11/02/19 1108)  BP: 131/62 (11/02/19 1108)  SpO2: (!) 94 % (11/02/19 1108)  BP Location: Right arm    Vital Signs Range (Last 24H):  Temp:  [96.2 °F (35.7 °C)-98 °F (36.7 °C)]   Pulse:  [46-73]   Resp:  [17-18]   BP: (131-145)/(61-69)   SpO2:  [93 %-96 %]   BP Location: Right arm    Physical Exam   Constitutional: He appears well-developed and well-nourished. No distress.   HENT:   Mouth/Throat: Oropharynx is clear and moist and mucous membranes are normal.   Eyes:  Pupils are equal, round, and reactive to light. Conjunctivae are normal. No scleral icterus.   Cardiovascular: Normal rate, regular rhythm, normal heart sounds and normal pulses.   No murmur heard.  Pulmonary/Chest: Effort normal and breath sounds normal. No respiratory distress.   Abdominal: Soft. Normal appearance and bowel sounds are normal. He exhibits no distension. There is no tenderness.   Musculoskeletal: He exhibits no edema.   Neurological: He is alert.   Skin: Skin is warm and dry. No bruising noted.     Neurological Exam:   LOC: alert  Attention Span: Good   Language: No aphasia  Articulation: No dysarthria  Orientation: Not oriented to person, place, and time  Pupils (CN II, III): PERRL  Facial Movement (CN VII): Symmetric facial expression    Motor: Arm left  Normal 5/5  Leg left  Normal 5/5  Arm right  Normal 5/5  Leg right Normal 5/5  Tone: Normal tone throughout    Laboratory:  CMP:   No results for input(s): GLUCOSE, CALCIUM, ALBUMIN, PROT, NA, K, CO2, CL, BUN, CREATININE, ALKPHOS, ALT, AST, BILITOT in the last 24 hours.     CBC:   Recent Labs   Lab 10/31/19  0543   WBC 6.86   RBC 4.69   HGB 12.4*   HCT 40.2      MCV 86   MCH 26.4*   MCHC 30.8*     Lipid Panel: No results for input(s): CHOL, LDLCALC, HDL, TRIG in the last 168 hours.     Hgb A1C: No results for input(s): HGBA1C in the last 168 hours.     TSH: No results for input(s): TSH in the last 168 hours.    Diagnostic Results     Brain Imaging   CTH 10/10: No acute intracranial abnormalities identified. Chronic involutional and chronic ischemic changes. There is partially calcified mass demonstrated lateral to the cavernous sinus medial right temporal lobe adjacent to ICA.  This would favor calcified meningioma.  Differential would include aneurysm. CTA may be obtained for further evaluation.    MRI brain 10/11: The examination is limited due to motion artifact however there are areas of abnormal T2/FLAIR/diffusion signal abnormality  consistent with areas of acute ischemia/infarct involving the cerebral hemispheres bilaterally, as discussed above. The suspected intracranial meningioma adjacent to the supraclinoid right internal carotid artery seen on the CT examination is not well evaluated on this examination in part due to the motion artifact.    Vessel Imaging   CTA multiphase 10/10: Limited evaluation due to suboptimal contrast timing. Occluded left ICA with reconstitution of the distal/supraclinoid segment by retrograde flow through [id number]. Occluded intracranial segment of the left vertebral terminating at the level of left PICA origin.  Right vertebral artery supply the basilar artery.  Basilar artery is small in caliber with wall irregularity and intermittent occlusions. 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 The estimated ejection fraction is 35% Concentric left ventricular remodeling. Left ventricular diastolic dysfunction. Local segmental wall motion abnormalities. Severe left atrial enlargement.

## 2019-11-02 NOTE — ASSESSMENT & PLAN NOTE
-Noted onset of hyponatremia on 10/12, decreasing to 131 at the lowest.   -SIADH suspected due to euvolemic appearance and no contributing hyperglycemia, reason to suspect pseudohyponatremia, use of thiazide diuretic, or signs volume overload on exam.   -No TSH abnormality or signs of glucocorticoid deficiency.   -Suspect onset secondary to stroke.   -Sodium improved status post fluid restriction and Na tablets.     Plan:   -Continue 1L fluid restriction daily.

## 2019-11-02 NOTE — PROGRESS NOTES
[facility]  Vascular Neurology  Comprehensive Stroke Center  Progress Note    Assessment/Plan:     * Acute arterial ischemic stroke, multifocal, multiple vascular territories  This is an 85 year old male with PMH chronic Afib (not on AC), CAD (S/P stent), HTN, combined CHF, and hypothyroidism who received IV-tPA at OSH on 10/10 after presenting with right-sided weakness, aphasia, and left gaze deviation. CTA MP showed a chronically occluded left ICA from it's origin to the supraclinoid segment with filling of the MCA via the anterior communicating artery, along with substantial burden of intracranial and extracranial atherosclerotic disease, including occluded V4 with an intermittently occluded basilar. No EVT was pursued. TTE on 10/11 showed an EF of 35% and severe left atrial enlargement,however no thrombus. MRI brain on 10/11 demonstrated scattered areas of infarct in the bilateral hemispheres, predominantly in the right frontal lobe. Etiology suspected likely hypoperfusion/watershed in the setting of vascular abnormalities, though differential also includes cardioembolic in the setting of AF and ischemic cardiomyopathy. NIHSS of 8 on admission. PT and SLP consulted. Stable for discharge and pending placement.     Antithrombotics for secondary stroke prevention:  Eliquis 2.5 mg BID     Statins for secondary stroke prevention and hyperlipidemia, if present:   Statins: Atorvastatin- 40 mg daily     Aggressive risk factor modification: HTN, HLD, Diet, Exercise, A-Fib, CAD     Rehab efforts: The patient has been evaluated by a stroke team provider and the therapy needs have been fully considered based off the presenting complaints and exam findings. The following therapy evaluations are needed: PT evaluate and treat, OT evaluate and treat, SLP evaluate and treat, PM&R evaluate for appropriate placement     Diagnostics ordered/pending: None    VTE prophylaxis: Eliquis 2.5 mg BID, SCDs    BP  parameters: SBP <160.     Disposition: SNF; placement pending.     AMRIT (acute kidney injury)  -BUN/Cr elevated on admission, initially improved, then worsening again. Likely at baseline per outside chart review of [facility] (Cr range 1.5 - 2.6)  -Appears euvolemic on exam  -Urine Na and Cr normal.   -Cr back to baseline.     Plan:   -Lasix restarted at reduced dose.   -Continue to monitor volume status.   -Renally dose all medications.     Person awaiting admission to adequate facility elsewhere  -Difficult placement due to patient living in [place] and visiting daughter when stroke occurred - therapy teams recommending inpatient rehab  -At this time, patient is unable to fly back to [place]. He is not capable of flying due to inability to ambulate without trained medical assistance, impaired functional mobility, impaired cardiopulmonary response to activity, urine and bowel incontinence, and decreased mental capacity related to stroke and underlying diagnosis of dementia.   -10/17: Spoke to Dr. Krishna of [facility] who requested documentation of pt's current functional status stating why pt is unable to take commercial flight back to [place]. MALINA sent updated notes.   -10/23-24: Dr. Krishna requesting phone call with staff physician to discuss further details regarding -SNF placement for patient. Staff attempted call yesterday, however no answer, left voicemail. Will re-attempt phone call today. Pt remains medically and neurologically stable at this time.  -10/25: Patient's daughter contacted by SAW/MALINA. She is unable to provided 24 hour care.     Plan:   -Patient accepted to [facility]; need insurance auth.     SIADH (syndrome of inappropriate ADH production)  -Noted onset of hyponatremia on 10/12, decreasing to 131 at the lowest.   -SIADH suspected due to euvolemic appearance and no contributing hyperglycemia, reason to suspect pseudohyponatremia, use of thiazide diuretic, or signs volume  overload on exam.   -No TSH abnormality or signs of glucocorticoid deficiency.   -Suspect onset secondary to stroke.   -Sodium improved status post fluid restriction and Na tablets.     Plan:   -Continue 1L fluid restriction daily.     Intracranial atherosclerosis  See assessment for occlusion of left ICA.     Occlusion of left carotid artery  -Seen on CTA on 10/11.   -Suspect chronic occlusion.     Plan:   -Continue current secondary stroke prevention.     Nodule of right lung  -1 cm nodule of right lung seen on CTA; no prior history of tobacco use.     Plan:   -For a part solid nodule 6 mm or greater, Fleischner Society 2017 guidelines recommend follow up with non-contrast chest CT at 3-6 months to confirm persistence. If this nodule is unchanged and the solid component remains <6 mm, annual follow up CT should be performed for 5 years  -Previously discussed with daughter.     Cytotoxic cerebral edema  -Area of cytotoxic cerebral edema identified when reviewing brain imaging in the territory of the R/L middle cerebral artery. There is not mass effect associated with it.   -The pattern is suggestive of cardioembolic etiology.    Plan:   -We will continue to monitor the patients clinical exam for any worsening of symptoms which may indicate expansion of the stroke or the area of the edema resulting in the clinical change.     Coronary artery disease involving native coronary artery of native heart without angina pectoris  -Stroke risk factor  -Noted on Care Everywhere; S/P RCA stent in 7/2018; was prescribed Plavix, Statin, and Ranexa,  but unclear if patient was taking them.    Plan:   -Continue high-intensity Statin daily and holding ASA with daily use of Eliquis for AF.     Paroxysmal atrial fibrillation  -Stroke risk factor  -CHADSVASC: 7.   -HASBLED: 2  -Per Care Everywhere, not previously on AC; Dr. Menjivar (Cardiologist) had report patient not a good candidate due to risk of falls, non-compliance, and  dementia.  -Remains rate-controlled at this time.    Plan:   -Continue beta-blocker and Eliquis 2.5 mg BID.   -Telemetry ordered.     Chronic combined systolic and diastolic congestive heart failure  -Stroke risk factor  -Most recent TTE on 10/11 significant for EF of 35%, diastolic dysfunction, wall motion abnormalities, severe left atrial enlargement, and moderate AS.   -Likely ischemic etiology with history of CAD.   -Home regimen: Lasix 20 mg and Spironolactone.   -Pro-BNP from Feb 2019 >3000.   -Appears euvolemic on exam.     Plan:   -Initiated Coreg 3.125mg BID.   -Holding Spironolactone    -Initially held Lasix for AMRIT, however reviewed outside records from [facility], and Cr range 1.5 -2.6. Patient likely at baseline. Restarted Lasix on 10/15 at reduced-dose 20 mg daily.    -Daily weights and strict I/O's.     Acquired hypothyroidism  -Stroke risk factor  -TSH 3.032    Plan:   -Continue Synthroid 25 mcg daily.     Essential hypertension  -Stroke risk factor.  -Home regimen: Spironolactone.     Plan:   -Goal SBP < 160  -At goal on current regimen: Coreg 3.125 mg BID, and Norvasc 10 mg daily.        Hospital Course:   10/12: Etiology likely cardioembolic. Will start Eliquis 2.5 mg BID. Creatinine trending down - may start patient on Eliquis 5 mg BID if continue to decrease. Left ICA chronically occluded; no need to vasculare intervention.   10/13: Spoke with PT; patient would benefit from inpatient rehab placement. Norvasc 5 mg ordered. Urine studies ordered for hyponatremia - suspect to be due to dehydration.   10/14: Patient with SIADH but sodium stable; 1 L fluid restriction ordered  10/15: Irritation around right hand peripheral IV site. Sodium 131 today. Discussed with daughter that he is on fluid restriction and she will stop providing him with outside drinks.     10/16: Sodium increasing to 132. Bactroban ordered for right hand IV infiltration. Working on placement.   10/17: Sodium continues  to improve. Creatinine 1.5 likely near baseline. Decreased Eliquis to 2.5 mg. Right hand erythema stable.   10/19: Sodium 134. Awake and alert but remains confused. No pain. Pending placement  10/20: Complained of chest discomfort overnight. EKG showed no ST wave changes; troponin normal.   10/21: Renal function improving. Hyponatremic, continue 1L fluid restriction. Will likely restart lasix tomorrow; no current signs of fluid overload. Neurological exam unchanged.   10/22: Reviewed outside imaging, renal function likely at baseline; restarted home Lasix at reduced dose of 20 mg daily and continue to monitor daily weights and strict I/O's.   10/23: Attempting to transfer patient to a bed on [facility] floor. Staff physician to contact patient's insurance company to discuss further details regarding -SNF placement.  10/24: Staff physician contacted patient's insurance company yesterday, however no answer; left voicemail. Will re-attempt phone call today. Remains medically and neurologically stable for discharge.   10/25: Called Dr. Krishna; no answer. Will re-attempt tomorrow. Patient's daughter contacted by SAW/MALINA. She is unable to provided 24 hour care. Patient accepted to [facility], need insurance authorization  10/26-10/30: No acute events overnight. Neurologically unchanged. Awaiting placement.   10/31: No acute events overnight. CM informed patient will have insurance as of 11/01.     STROKE DOCUMENTATION   Acute Stroke Times   Last Known Normal Date: 10/10/19  Last Known Normal Time: 1905  Symptom Onset Date: 10/10/19  Symptom Onset Time: 1905  Stroke Team Called Date: 10/10/19  Stroke Team Called Time: 2120  Stroke Team Arrival Date: 10/10/19  Stroke Team Arrival Time: 2122    NIH Scale:  1a. Level of Consciousness: 0-->Alert, keenly responsive  1b. LOC Questions: 2-->Answers neither question correctly  1c. LOC Commands: 0-->Performs both tasks correctly  2. Best Gaze: 0-->Normal  3. Visual: 0-->No visual loss  4.  Facial Palsy: 1-->Minor paralysis (flattened nasolabial fold, asymmetry on smiling)  5a. Motor Arm, Left: 1-->Drift, limb holds 90 (or 45) degrees, but drifts down before full 10 seconds, does not hit bed or other support  5b. Motor Arm, Right: 0-->No drift, limb holds 90 (or 45) degrees for full 10 secs  6a. Motor Leg, Left: 1-->Drift, leg falls by the end of the 5-sec period but does not hit bed  6b. Motor Leg, Right: 0-->No drift, leg holds 30 degree position for full 5 secs  7. Limb Ataxia: 0-->Absent  8. Sensory: 0-->Normal, no sensory loss  9. Best Language: 0-->No aphasia, normal  10. Dysarthria: 1-->Mild-to-moderate dysarthria, patient slurs at least some words and, at worst, can be understood with some difficulty  11. Extinction and Inattention (formerly Neglect): 0-->No abnormality  Total (NIH Stroke Scale): 6       Modified [place] Score: 1  [place] Coma Scale:    ABCD2 Score:    [id number] Score:   HAS -BLED Score:   ICH Score:   Hunt & Stroud Classification:      Hemorrhagic change of an Ischemic Stroke: Does this patient have an ischemic stroke with hemorrhagic changes? No     Neurologic Chief Complaint: Right sided weakness.     Subjective:     Interval History: Patient seen this afternoon while sitting in chair reading newspaper. Patient had no complaints. He is tolerating PO and having normal BM. Remains disoriented to time, but pleasant. Placement pending, however CM informed patient will need at least two business days starting from 11/01 to obtain insurance authorization.     HPI, Past Medical, Family, and Social History remains the same as documented in the initial encounter.     Review of Systems   Constitutional: Negative for appetite change.   HENT: Negative for trouble swallowing.    Eyes: Negative for photophobia and visual disturbance.   Respiratory: Negative for cough and shortness of breath.    Gastrointestinal: Negative for abdominal pain, diarrhea, nausea and vomiting.   Genitourinary:  Negative for difficulty urinating.   Neurological: Negative for light-headedness, numbness and headaches.     Scheduled Meds:   amLODIPine  10 mg Oral Daily    apixaban  2.5 mg Oral BID    atorvastatin  40 mg Oral Daily    carvedilol  3.125 mg Oral BID    donepezil  5 mg Oral QHS    furosemide  20 mg Oral Daily    levothyroxine  25 mcg Oral Daily    senna  8.6 mg Oral Daily     Continuous Infusions:  PRN Meds:acetaminophen, hydrALAZINE, influenza, ondansetron, ramelteon, sodium chloride 0.9%    Objective:     Vital Signs (Most Recent):  Temp: 97.4 °F (36.3 °C) (11/02/19 1108)  Pulse: (!) 59 (11/02/19 1144)  Resp: 17 (11/02/19 1108)  BP: 131/62 (11/02/19 1108)  SpO2: (!) 94 % (11/02/19 1108)  BP Location: Right arm    Vital Signs Range (Last 24H):  Temp:  [96.2 °F (35.7 °C)-98 °F (36.7 °C)]   Pulse:  [46-73]   Resp:  [17-18]   BP: (131-145)/(61-69)   SpO2:  [93 %-96 %]   BP Location: Right arm    Physical Exam   Constitutional: He appears well-developed and well-nourished. No distress.   HENT:   Mouth/Throat: Oropharynx is clear and moist and mucous membranes are normal.   Eyes: Pupils are equal, round, and reactive to light. Conjunctivae are normal. No scleral icterus.   Cardiovascular: Normal rate, regular rhythm, normal heart sounds and normal pulses.   No murmur heard.  Pulmonary/Chest: Effort normal and breath sounds normal. No respiratory distress.   Abdominal: Soft. Normal appearance and bowel sounds are normal. He exhibits no distension. There is no tenderness.   Musculoskeletal: He exhibits no edema.   Neurological: He is alert.   Skin: Skin is warm and dry. No bruising noted.     Neurological Exam:   LOC: alert  Attention Span: Good   Language: No aphasia  Articulation: No dysarthria  Orientation: Not oriented to person, place, and time  Pupils (CN II, III): PERRL  Facial Movement (CN VII): Symmetric facial expression    Motor: Arm left  Normal 5/5  Leg left  Normal 5/5  Arm right  Normal 5/5  Leg  right Normal 5/5  Tone: Normal tone throughout    Laboratory:  CMP:   No results for input(s): GLUCOSE, CALCIUM, ALBUMIN, PROT, NA, K, CO2, CL, BUN, CREATININE, ALKPHOS, ALT, AST, BILITOT in the last 24 hours.     CBC:   Recent Labs   Lab 10/31/19  0543   WBC 6.86   RBC 4.69   HGB 12.4*   HCT 40.2      MCV 86   MCH 26.4*   MCHC 30.8*     Lipid Panel: No results for input(s): CHOL, LDLCALC, HDL, TRIG in the last 168 hours.     Hgb A1C: No results for input(s): HGBA1C in the last 168 hours.     TSH: No results for input(s): TSH in the last 168 hours.    Diagnostic Results     Brain Imaging   CTH 10/10: No acute intracranial abnormalities identified. Chronic involutional and chronic ischemic changes. There is partially calcified mass demonstrated lateral to the cavernous sinus medial right temporal lobe adjacent to ICA.  This would favor calcified meningioma.  Differential would include aneurysm. CTA may be obtained for further evaluation.    MRI brain 10/11: The examination is limited due to motion artifact however there are areas of abnormal T2/FLAIR/diffusion signal abnormality consistent with areas of acute ischemia/infarct involving the cerebral hemispheres bilaterally, as discussed above. The suspected intracranial meningioma adjacent to the supraclinoid right internal carotid artery seen on the CT examination is not well evaluated on this examination in part due to the motion artifact.    Vessel Imaging   CTA multiphase 10/10: Limited evaluation due to suboptimal contrast timing. Occluded left ICA with reconstitution of the distal/supraclinoid segment by retrograde flow through [id number]. Occluded intracranial segment of the left vertebral terminating at the level of left PICA origin.  Right vertebral artery supply the basilar artery.  Basilar artery is small in caliber with wall irregularity and intermittent occlusions. Saccular aneurysm arising from the supraclinoid segment of the right  ICA    Cardiac Imaging   TTE 10/11: Moderately decreased left ventricular systolic function. The estimated ejection fraction is 35% Concentric left ventricular remodeling. Left ventricular diastolic dysfunction. Local segmental wall motion abnormalities. Severe left atrial enlargement.      Rodrigo Wagner MD  [facility]  Department of Vascular Neurology   [facility]

## 2019-11-03 PROCEDURE — 25000003 PHARM REV CODE 250: Performed by: FAMILY MEDICINE

## 2019-11-03 PROCEDURE — 25000003 PHARM REV CODE 250: Performed by: STUDENT IN AN ORGANIZED HEALTH CARE EDUCATION/TRAINING PROGRAM

## 2019-11-03 PROCEDURE — 99233 PR SUBSEQUENT HOSPITAL CARE,LEVL III: ICD-10-PCS | Mod: GC,,, | Performed by: PSYCHIATRY & NEUROLOGY

## 2019-11-03 PROCEDURE — 25000003 PHARM REV CODE 250: Performed by: PHYSICIAN ASSISTANT

## 2019-11-03 PROCEDURE — 20600001 HC STEP DOWN PRIVATE ROOM

## 2019-11-03 PROCEDURE — 99233 SBSQ HOSP IP/OBS HIGH 50: CPT | Mod: GC,,, | Performed by: PSYCHIATRY & NEUROLOGY

## 2019-11-03 PROCEDURE — 25000003 PHARM REV CODE 250: Performed by: PSYCHIATRY & NEUROLOGY

## 2019-11-03 RX ADMIN — DONEPEZIL HYDROCHLORIDE 5 MG: 5 TABLET, FILM COATED ORAL at 08:11

## 2019-11-03 RX ADMIN — QUETIAPINE FUMARATE 12.5 MG: 25 TABLET, FILM COATED ORAL at 09:11

## 2019-11-03 RX ADMIN — APIXABAN 2.5 MG: 2.5 TABLET, FILM COATED ORAL at 08:11

## 2019-11-03 RX ADMIN — AMLODIPINE BESYLATE 10 MG: 10 TABLET ORAL at 08:11

## 2019-11-03 RX ADMIN — RAMELTEON 8 MG: 8 TABLET ORAL at 08:11

## 2019-11-03 RX ADMIN — LEVOTHYROXINE SODIUM 25 MCG: 25 TABLET ORAL at 06:11

## 2019-11-03 RX ADMIN — CARVEDILOL 3.12 MG: 3.12 TABLET, FILM COATED ORAL at 08:11

## 2019-11-03 RX ADMIN — ATORVASTATIN CALCIUM 40 MG: 20 TABLET, FILM COATED ORAL at 08:11

## 2019-11-03 RX ADMIN — FUROSEMIDE 20 MG: 20 TABLET ORAL at 08:11

## 2019-11-03 NOTE — ASSESSMENT & PLAN NOTE
-Stroke risk factor  -Most recent TTE on 10/11 significant for EF of 35%, diastolic dysfunction, wall motion abnormalities, severe left atrial enlargement, and moderate AS.   -Likely ischemic etiology with history of CAD.   -Home regimen: Lasix 20 mg and Spironolactone.   -Pro-BNP from Feb 2019 >3000.   -Appears euvolemic on exam.     Plan:   -Initiated Coreg 3.125mg BID.   -Holding Spironolactone    -Initially held Lasix for AMRIT, however reviewed outside records from [facility], and Cr range 1.5 -2.6. Patient likely at baseline. Restarted Lasix on 10/15 at reduced-dose 20 mg daily.    -Daily weights and strict I/O's.

## 2019-11-03 NOTE — PLAN OF CARE
Reviewed plan of care with patient and available family, verbalized understanding. Vital signs stable throughout shift, neuro status remains unchanged.  Patient encouraged to use call light for assistance to ensure fall safety. Fall and safety precautions intact. Patient lying quietly in bed, bed in lowest position, side rails up x 2, call light in reach.  Will continue to monitor.          Problem: Fall Injury Risk  Goal: Absence of Fall and Fall-Related Injury  Outcome: Ongoing, Progressing     Problem: Adult Inpatient Plan of Care  Goal: Plan of Care Review  Outcome: Ongoing, Progressing  Goal: Patient-Specific Goal (Individualization)  Description  Admit Date:  10/11/2019    Admit Dx: L MCA     Past Medical History:  No date: CHF (congestive heart failure)  No date: Hypertension  No date: Stroke      Comment:  TIA  No date: Thyroid disease    Past Surgical History:  No date: AORTA SURGERY  No date: CORONARY ANGIOPLASTY WITH STENT PLACEMENT  No date: REPAIR OF ANEURYSM    Individualization:   1.     Restraints: (date/time/why or N/A)       Outcome: Ongoing, Progressing  Goal: Absence of Hospital-Acquired Illness or Injury  Outcome: Ongoing, Progressing  Goal: Optimal Comfort and Wellbeing  Outcome: Ongoing, Progressing  Goal: Readiness for Transition of Care  Outcome: Ongoing, Progressing  Goal: Rounds/Family Conference  Outcome: Ongoing, Progressing     Problem: Adjustment to Illness (Stroke, Ischemic/Transient Ischemic Attack)  Goal: Optimal Coping  Outcome: Ongoing, Progressing     Problem: Bowel Elimination Impaired (Stroke, Ischemic/Transient Ischemic Attack)  Goal: Effective Bowel Elimination  Outcome: Ongoing, Progressing     Problem: Cerebral Tissue Perfusion Risk (Stroke, Ischemic/Transient Ischemic Attack)  Goal: Optimal Cerebral Tissue Perfusion  Outcome: Ongoing, Progressing     Problem: Communication Impairment (Stroke, Ischemic/Transient Ischemic Attack)  Goal: Improved Communication  Skills  Outcome: Ongoing, Progressing     Problem: Eating/Swallowing Impairment (Stroke, Ischemic/Transient Ischemic Attack)  Goal: Oral Intake without Aspiration  Outcome: Ongoing, Progressing     Problem: Functional Ability Impaired (Stroke, Ischemic/Transient Ischemic Attack)  Goal: Optimal Functional Ability  Outcome: Ongoing, Progressing     Problem: Hemodynamic Instability (Stroke, Ischemic/Transient Ischemic Attack)  Goal: Vital Signs Remain in Desired Range  Outcome: Ongoing, Progressing     Problem: Pain (Stroke, Ischemic/Transient Ischemic Attack)  Goal: Acceptable Pain Control  Outcome: Ongoing, Progressing     Problem: Sensorimotor Impairment (Stroke, Ischemic/Transient Ischemic Attack)  Goal: Improved Sensorimotor Function  Outcome: Ongoing, Progressing     Problem: Urinary Elimination Impaired (Stroke, Ischemic/Transient Ischemic Attack)  Goal: Effective Urinary Elimination  Outcome: Ongoing, Progressing     Problem: Skin Injury Risk Increased  Goal: Skin Health and Integrity  Outcome: Ongoing, Progressing     Problem: Infection  Goal: Infection Symptom Resolution  Outcome: Ongoing, Progressing

## 2019-11-03 NOTE — ASSESSMENT & PLAN NOTE
-BUN/Cr elevated on admission, initially improved, then worsening again. Likely at baseline per outside chart review of [facility] (Cr range 1.5 - 2.6)  -Appears euvolemic on exam  -Urine Na and Cr normal.   -Cr back to baseline.     Plan:   -Lasix restarted at reduced dose.   -Continue to monitor volume status.   -Renally dose all medications.

## 2019-11-03 NOTE — SUBJECTIVE & OBJECTIVE
Neurologic Chief Complaint: Right sided weakness.     Subjective:     Interval History: Patient seen this afternoon after eating lunch. Patient had no complaints. He is tolerating PO and having normal BM. Remains disoriented to time, but pleasant. Ambulating around room with assistance of walker. No agitation reported by RN staff.     HPI, Past Medical, Family, and Social History remains the same as documented in the initial encounter.     Review of Systems   Constitutional: Negative for appetite change.   HENT: Negative for trouble swallowing.    Eyes: Negative for photophobia and visual disturbance.   Respiratory: Negative for cough and shortness of breath.    Gastrointestinal: Negative for abdominal pain, diarrhea, nausea and vomiting.   Genitourinary: Negative for difficulty urinating.   Neurological: Negative for light-headedness, numbness and headaches.     Scheduled Meds:   amLODIPine  10 mg Oral Daily    apixaban  2.5 mg Oral BID    atorvastatin  40 mg Oral Daily    carvedilol  3.125 mg Oral BID    donepezil  5 mg Oral QHS    furosemide  20 mg Oral Daily    levothyroxine  25 mcg Oral Daily    senna  8.6 mg Oral Daily     Continuous Infusions:  PRN Meds:acetaminophen, hydrALAZINE, influenza, ondansetron, ramelteon, sodium chloride 0.9%    Objective:     Vital Signs (Most Recent):  Temp: 97.5 °F (36.4 °C) (11/03/19 0829)  Pulse: (!) 56 (11/03/19 1138)  Resp: 18 (11/03/19 0829)  BP: 139/64 (11/03/19 0829)  SpO2: 95 % (11/03/19 0829)  BP Location: Left arm    Vital Signs Range (Last 24H):  Temp:  [97.5 °F (36.4 °C)-98.1 °F (36.7 °C)]   Pulse:  [56-91]   Resp:  [15-18]   BP: (127-139)/(61-77)   SpO2:  [95 %]   BP Location: Left arm    Physical Exam   Constitutional: He appears well-developed and well-nourished. No distress.   HENT:   Mouth/Throat: Oropharynx is clear and moist and mucous membranes are normal.   Eyes: Pupils are equal, round, and reactive to light. Conjunctivae are normal. No scleral  icterus.   Cardiovascular: Normal rate, regular rhythm, normal heart sounds and normal pulses.   No murmur heard.  Pulmonary/Chest: Effort normal and breath sounds normal. No respiratory distress.   Abdominal: Soft. Normal appearance and bowel sounds are normal. He exhibits no distension. There is no tenderness.   Musculoskeletal: He exhibits no edema.   Neurological: He is alert.   Skin: Skin is warm and dry. No bruising noted.     Neurological Exam:   LOC: alert  Attention Span: Good   Language: No aphasia  Articulation: No dysarthria  Orientation: Not oriented to person, place, and time  Pupils (CN II, III): PERRL  Facial Movement (CN VII): Symmetric facial expression    Motor: Arm left  Normal 5/5  Leg left  Normal 5/5  Arm right  Normal 5/5  Leg right Normal 5/5  Tone: Normal tone throughout    Laboratory:  CMP:   No results for input(s): GLUCOSE, CALCIUM, ALBUMIN, PROT, NA, K, CO2, CL, BUN, CREATININE, ALKPHOS, ALT, AST, BILITOT in the last 24 hours.     CBC:   Recent Labs   Lab 10/31/19  0543   WBC 6.86   RBC 4.69   HGB 12.4*   HCT 40.2      MCV 86   MCH 26.4*   MCHC 30.8*     Lipid Panel: No results for input(s): CHOL, LDLCALC, HDL, TRIG in the last 168 hours.     Hgb A1C: No results for input(s): HGBA1C in the last 168 hours.     TSH: No results for input(s): TSH in the last 168 hours.    Diagnostic Results     Brain Imaging   [facility] 10/10: No acute intracranial abnormalities identified. Chronic involutional and chronic ischemic changes. There is partially calcified mass demonstrated lateral to the cavernous sinus medial right temporal lobe adjacent to ICA.  This would favor calcified meningioma.  Differential would include aneurysm. CTA may be obtained for further evaluation.    MRI brain 10/11: The examination is limited due to motion artifact however there are areas of abnormal T2/FLAIR/diffusion signal abnormality consistent with areas of acute ischemia/infarct involving the cerebral hemispheres  bilaterally, as discussed above. The suspected intracranial meningioma adjacent to the supraclinoid right internal carotid artery seen on the CT examination is not well evaluated on this examination in part due to the motion artifact.    Vessel Imaging   CTA multiphase 10/10: Limited evaluation due to suboptimal contrast timing. Occluded left ICA with reconstitution of the distal/supraclinoid segment by retrograde flow through [id number]. Occluded intracranial segment of the left vertebral terminating at the level of left PICA origin.  Right vertebral artery supply the basilar artery.  Basilar artery is small in caliber with wall irregularity and intermittent occlusions. 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 The estimated ejection fraction is 35% Concentric left ventricular remodeling. Left ventricular diastolic dysfunction. Local segmental wall motion abnormalities. Severe left atrial enlargement.

## 2019-11-03 NOTE — PROGRESS NOTES
[facility]  Vascular Neurology  Comprehensive Stroke Center  Progress Note    Assessment/Plan:     * Acute arterial ischemic stroke, multifocal, multiple vascular territories  This is an 85 year old male with PMH chronic Afib (not on AC), CAD (S/P stent), HTN, combined CHF, and hypothyroidism who received IV-tPA at OSH on 10/10 after presenting with right-sided weakness, aphasia, and left gaze deviation. CTA MP showed a chronically occluded left ICA from it's origin to the supraclinoid segment with filling of the MCA via the anterior communicating artery, along with substantial burden of intracranial and extracranial atherosclerotic disease, including occluded V4 with an intermittently occluded basilar. No EVT was pursued. TTE on 10/11 showed an EF of 35% and severe left atrial enlargement,however no thrombus. MRI brain on 10/11 demonstrated scattered areas of infarct in the bilateral hemispheres, predominantly in the right frontal lobe. Etiology suspected likely hypoperfusion/watershed in the setting of vascular abnormalities, though differential also includes cardioembolic in the setting of AF and ischemic cardiomyopathy. NIHSS of 8 on admission. PT and SLP consulted. Stable for discharge and pending placement.     Antithrombotics for secondary stroke prevention:  Eliquis 2.5 mg BID     Statins for secondary stroke prevention and hyperlipidemia, if present:   Statins: Atorvastatin- 40 mg daily     Aggressive risk factor modification: HTN, HLD, Diet, Exercise, A-Fib, CAD     Rehab efforts: The patient has been evaluated by a stroke team provider and the therapy needs have been fully considered based off the presenting complaints and exam findings. The following therapy evaluations are needed: PT evaluate and treat, OT evaluate and treat, SLP evaluate and treat, PM&R evaluate for appropriate placement     Diagnostics ordered/pending: None    VTE prophylaxis: Eliquis 2.5 mg BID, SCDs    BP  parameters: SBP <160.     Disposition: SNF; placement pending.     AMRIT (acute kidney injury)  -BUN/Cr elevated on admission, initially improved, then worsening again. Likely at baseline per outside chart review of [facility] (Cr range 1.5 - 2.6)  -Appears euvolemic on exam  -Urine Na and Cr normal.   -Cr back to baseline.     Plan:   -Lasix restarted at reduced dose.   -Continue to monitor volume status.   -Renally dose all medications.     Person awaiting admission to adequate facility elsewhere  -Difficult placement due to patient living in [place] and visiting daughter when stroke occurred - therapy teams recommending inpatient rehab  -Patient is unable to fly back to [place]. He is not capable of flying due to inability to ambulate without trained medical assistance, impaired functional mobility, impaired cardiopulmonary response to activity, urine and bowel incontinence, and decreased mental capacity related to stroke and underlying diagnosis of dementia.   -Daughter is unable to provide care.     Plan:   -Patient accepted to [facility]; needs insurance authorization.     SIADH (syndrome of inappropriate ADH production)  -Noted onset of hyponatremia on 10/12, decreasing to 131 at the lowest.   -SIADH suspected due to euvolemic appearance and no contributing hyperglycemia, reason to suspect pseudohyponatremia, use of thiazide diuretic, or signs volume overload on exam.   -No TSH abnormality or signs of glucocorticoid deficiency.   -Suspect onset secondary to stroke.   -Sodium improved status post fluid restriction and Na tablets.     Plan:   -Continue 1L fluid restriction daily.     Intracranial atherosclerosis  See assessment for occlusion of left ICA.     Occlusion of left carotid artery  -Seen on CTA on 10/11.   -Suspect chronic occlusion.     Plan:   -Continue current secondary stroke prevention.     Nodule of right lung  -1 cm nodule of right lung seen on CTA; no prior history of tobacco  use.     Plan:   -For a part solid nodule 6 mm or greater, Fleischner Society 2017 guidelines recommend follow up with non-contrast chest CT at 3-6 months to confirm persistence. If this nodule is unchanged and the solid component remains <6 mm, annual follow up CT should be performed for 5 years  -Previously discussed with daughter.     Cytotoxic cerebral edema  -Area of cytotoxic cerebral edema identified when reviewing brain imaging in the territory of the R/L middle cerebral artery. There is not mass effect associated with it.   -The pattern is suggestive of cardioembolic etiology.    Plan:   -We will continue to monitor the patients clinical exam for any worsening of symptoms which may indicate expansion of the stroke or the area of the edema resulting in the clinical change.     Coronary artery disease involving native coronary artery of native heart without angina pectoris  -Stroke risk factor  -Noted on Care Everywhere; S/P RCA stent in 7/2018; was prescribed Plavix, Statin, and Ranexa,  but unclear if patient was taking them.    Plan:   -Continue high-intensity Statin daily and holding ASA with daily use of Eliquis for AF.     Paroxysmal atrial fibrillation  -Stroke risk factor  -CHADSVASC: 7.   -HASBLED: 2  -Per Care Everywhere, not previously on AC; Dr. Menjivar (Cardiologist) had report patient not a good candidate due to risk of falls, non-compliance, and dementia.  -Remains rate-controlled at this time.    Plan:   -Continue beta-blocker and Eliquis 2.5 mg BID.   -Telemetry ordered.     Chronic combined systolic and diastolic congestive heart failure  -Stroke risk factor  -Most recent TTE on 10/11 significant for EF of 35%, diastolic dysfunction, wall motion abnormalities, severe left atrial enlargement, and moderate AS.   -Likely ischemic etiology with history of CAD.   -Home regimen: Lasix 20 mg and Spironolactone.   -Pro-BNP from Feb 2019 >3000.   -Appears euvolemic on exam.     Plan:   -Initiated  Coreg 3.125mg BID.   -Holding Spironolactone    -Initially held Lasix for AMRIT, however reviewed outside records from [facility], and Cr range 1.5 -2.6. Patient likely at baseline. Restarted Lasix on 10/15 at reduced-dose 20 mg daily.    -Daily weights and strict I/O's.     Acquired hypothyroidism  -Stroke risk factor  -TSH 3.032    Plan:   -Continue Synthroid 25 mcg daily.     Essential hypertension  -Stroke risk factor.  -Home regimen: Spironolactone.     Plan:   -Goal SBP < 160  -At goal on current regimen: Coreg 3.125 mg BID, and Norvasc 10 mg daily.        Hospital Course:   10/12: Etiology likely cardioembolic. Will start Eliquis 2.5 mg BID. Creatinine trending down - may start patient on Eliquis 5 mg BID if continue to decrease. Left ICA chronically occluded; no need to vasculare intervention.   10/13: Spoke with PT; patient would benefit from inpatient rehab placement. Norvasc 5 mg ordered. Urine studies ordered for hyponatremia - suspect to be due to dehydration.   10/14: Patient with SIADH but sodium stable; 1 L fluid restriction ordered  10/15: Irritation around right hand peripheral IV site. Sodium 131 today. Discussed with daughter that he is on fluid restriction and she will stop providing him with outside drinks.     10/16: Sodium increasing to 132. Bactroban ordered for right hand IV infiltration. Working on placement.   10/17: Sodium continues to improve. Creatinine 1.5 likely near baseline. Decreased Eliquis to 2.5 mg. Right hand erythema stable.   10/19: Sodium 134. Awake and alert but remains confused. No pain. Pending placement  10/20: Complained of chest discomfort overnight. EKG showed no ST wave changes; troponin normal.   10/21: Renal function improving. Hyponatremic, continue 1L fluid restriction. Will likely restart lasix tomorrow; no current signs of fluid overload. Neurological exam unchanged.   10/22: Reviewed outside imaging, renal function likely at baseline; restarted home  Lasix at reduced dose of 20 mg daily and continue to monitor daily weights and strict I/O's.   10/23: Attempting to transfer patient to a bed on [facility] floor. Staff physician to contact patient's insurance company to discuss further details regarding -SNF placement.  10/24: Staff physician contacted patient's insurance company yesterday, however no answer; left voicemail. Will re-attempt phone call today. Remains medically and neurologically stable for discharge.   10/25: Called Dr. Krishna; no answer. Will re-attempt tomorrow. Patient's daughter contacted by SAW/MALINA. She is unable to provided 24 hour care. Patient accepted to [facility], need insurance authorization  10/26-10/30: No acute events overnight. Neurologically unchanged. Awaiting placement.   10/31: No acute events overnight. CM informed patient will have insurance as of 11/01.     STROKE DOCUMENTATION   Acute Stroke Times   Last Known Normal Date: 10/10/19  Last Known Normal Time: 1905  Symptom Onset Date: 10/10/19  Symptom Onset Time: 1905  Stroke Team Called Date: 10/10/19  Stroke Team Called Time: 2120  Stroke Team Arrival Date: 10/10/19  Stroke Team Arrival Time: 2122    NIH Scale:  1a. Level of Consciousness: 0-->Alert, keenly responsive  1b. LOC Questions: 2-->Answers neither question correctly  1c. LOC Commands: 0-->Performs both tasks correctly  2. Best Gaze: 0-->Normal  3. Visual: 0-->No visual loss  4. Facial Palsy: 1-->Minor paralysis (flattened nasolabial fold, asymmetry on smiling)  5a. Motor Arm, Left: 1-->Drift, limb holds 90 (or 45) degrees, but drifts down before full 10 seconds, does not hit bed or other support  5b. Motor Arm, Right: 0-->No drift, limb holds 90 (or 45) degrees for full 10 secs  6a. Motor Leg, Left: 1-->Drift, leg falls by the end of the 5-sec period but does not hit bed  6b. Motor Leg, Right: 0-->No drift, leg holds 30 degree position for full 5 secs  7. Limb Ataxia: 0-->Absent  8. Sensory: 0-->Normal, no sensory loss  9.  Best Language: 0-->No aphasia, normal  10. Dysarthria: 1-->Mild-to-moderate dysarthria, patient slurs at least some words and, at worst, can be understood with some difficulty  11. Extinction and Inattention (formerly Neglect): 0-->No abnormality  Total (NIH Stroke Scale): 6       Modified Rice Score: 1  Overton Coma Scale:    ABCD2 Score:    [id number] Score:   HAS -BLED Score:   ICH Score:   Hunt & Stroud Classification:      Hemorrhagic change of an Ischemic Stroke: Does this patient have an ischemic stroke with hemorrhagic changes? No     Neurologic Chief Complaint: Right sided weakness.     Subjective:     Interval History: Patient seen this afternoon after eating lunch. Patient had no complaints. He is tolerating PO and having normal BM. Remains disoriented to time, but pleasant. Ambulating around room with assistance of walker. No agitation reported by RN staff.     HPI, Past Medical, Family, and Social History remains the same as documented in the initial encounter.     Review of Systems   Constitutional: Negative for appetite change.   HENT: Negative for trouble swallowing.    Eyes: Negative for photophobia and visual disturbance.   Respiratory: Negative for cough and shortness of breath.    Gastrointestinal: Negative for abdominal pain, diarrhea, nausea and vomiting.   Genitourinary: Negative for difficulty urinating.   Neurological: Negative for light-headedness, numbness and headaches.     Scheduled Meds:   amLODIPine  10 mg Oral Daily    apixaban  2.5 mg Oral BID    atorvastatin  40 mg Oral Daily    carvedilol  3.125 mg Oral BID    donepezil  5 mg Oral QHS    furosemide  20 mg Oral Daily    levothyroxine  25 mcg Oral Daily    senna  8.6 mg Oral Daily     Continuous Infusions:  PRN Meds:acetaminophen, hydrALAZINE, influenza, ondansetron, ramelteon, sodium chloride 0.9%    Objective:     Vital Signs (Most Recent):  Temp: 97.5 °F (36.4 °C) (11/03/19 0829)  Pulse: (!) 56 (11/03/19 1138)  Resp: 18  (11/03/19 0829)  BP: 139/64 (11/03/19 0829)  SpO2: 95 % (11/03/19 0829)  BP Location: Left arm    Vital Signs Range (Last 24H):  Temp:  [97.5 °F (36.4 °C)-98.1 °F (36.7 °C)]   Pulse:  [56-91]   Resp:  [15-18]   BP: (127-139)/(61-77)   SpO2:  [95 %]   BP Location: Left arm    Physical Exam   Constitutional: He appears well-developed and well-nourished. No distress.   HENT:   Mouth/Throat: Oropharynx is clear and moist and mucous membranes are normal.   Eyes: Pupils are equal, round, and reactive to light. Conjunctivae are normal. No scleral icterus.   Cardiovascular: Normal rate, regular rhythm, normal heart sounds and normal pulses.   No murmur heard.  Pulmonary/Chest: Effort normal and breath sounds normal. No respiratory distress.   Abdominal: Soft. Normal appearance and bowel sounds are normal. He exhibits no distension. There is no tenderness.   Musculoskeletal: He exhibits no edema.   Neurological: He is alert.   Skin: Skin is warm and dry. No bruising noted.     Neurological Exam:   LOC: alert  Attention Span: Good   Language: No aphasia  Articulation: No dysarthria  Orientation: Not oriented to person, place, and time  Pupils (CN II, III): PERRL  Facial Movement (CN VII): Symmetric facial expression    Motor: Arm left  Normal 5/5  Leg left  Normal 5/5  Arm right  Normal 5/5  Leg right Normal 5/5  Tone: Normal tone throughout    Laboratory:  CMP:   No results for input(s): GLUCOSE, CALCIUM, ALBUMIN, PROT, NA, K, CO2, CL, BUN, CREATININE, ALKPHOS, ALT, AST, BILITOT in the last 24 hours.     CBC:   Recent Labs   Lab 10/31/19  0543   WBC 6.86   RBC 4.69   HGB 12.4*   HCT 40.2      MCV 86   MCH 26.4*   MCHC 30.8*     Lipid Panel: No results for input(s): CHOL, LDLCALC, HDL, TRIG in the last 168 hours.     Hgb A1C: No results for input(s): HGBA1C in the last 168 hours.     TSH: No results for input(s): TSH in the last 168 hours.    Diagnostic Results     Brain Imaging   [facility] 10/10: No acute intracranial  abnormalities identified. Chronic involutional and chronic ischemic changes. There is partially calcified mass demonstrated lateral to the cavernous sinus medial right temporal lobe adjacent to ICA.  This would favor calcified meningioma.  Differential would include aneurysm. CTA may be obtained for further evaluation.    MRI brain 10/11: The examination is limited due to motion artifact however there are areas of abnormal T2/FLAIR/diffusion signal abnormality consistent with areas of acute ischemia/infarct involving the cerebral hemispheres bilaterally, as discussed above. The suspected intracranial meningioma adjacent to the supraclinoid right internal carotid artery seen on the CT examination is not well evaluated on this examination in part due to the motion artifact.    Vessel Imaging   CTA multiphase 10/10: Limited evaluation due to suboptimal contrast timing. Occluded left ICA with reconstitution of the distal/supraclinoid segment by retrograde flow through [id number]. Occluded intracranial segment of the left vertebral terminating at the level of left PICA origin.  Right vertebral artery supply the basilar artery.  Basilar artery is small in caliber with wall irregularity and intermittent occlusions. 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 The estimated ejection fraction is 35% Concentric left ventricular remodeling. Left ventricular diastolic dysfunction. Local segmental wall motion abnormalities. Severe left atrial enlargement.      Rodrigo Wagner MD  Comprehensive Stroke Center  Department of Vascular Neurology   [facility]

## 2019-11-04 PROCEDURE — 99233 PR SUBSEQUENT HOSPITAL CARE,LEVL III: ICD-10-PCS | Mod: ,,, | Performed by: PSYCHIATRY & NEUROLOGY

## 2019-11-04 PROCEDURE — 25000003 PHARM REV CODE 250: Performed by: PHYSICIAN ASSISTANT

## 2019-11-04 PROCEDURE — 25000003 PHARM REV CODE 250: Performed by: FAMILY MEDICINE

## 2019-11-04 PROCEDURE — 25000003 PHARM REV CODE 250: Performed by: NURSE PRACTITIONER

## 2019-11-04 PROCEDURE — 97116 GAIT TRAINING THERAPY: CPT

## 2019-11-04 PROCEDURE — 20600001 HC STEP DOWN PRIVATE ROOM

## 2019-11-04 PROCEDURE — 97530 THERAPEUTIC ACTIVITIES: CPT

## 2019-11-04 PROCEDURE — 99233 SBSQ HOSP IP/OBS HIGH 50: CPT | Mod: ,,, | Performed by: PSYCHIATRY & NEUROLOGY

## 2019-11-04 PROCEDURE — 25000003 PHARM REV CODE 250: Performed by: PSYCHIATRY & NEUROLOGY

## 2019-11-04 PROCEDURE — 25000003 PHARM REV CODE 250: Performed by: STUDENT IN AN ORGANIZED HEALTH CARE EDUCATION/TRAINING PROGRAM

## 2019-11-04 RX ORDER — QUETIAPINE FUMARATE 25 MG/1
25 TABLET, FILM COATED ORAL ONCE
Status: COMPLETED | OUTPATIENT
Start: 2019-11-04 | End: 2019-11-04

## 2019-11-04 RX ADMIN — SENNOSIDES 8.6 MG: 8.6 TABLET, FILM COATED ORAL at 09:11

## 2019-11-04 RX ADMIN — APIXABAN 2.5 MG: 2.5 TABLET, FILM COATED ORAL at 09:11

## 2019-11-04 RX ADMIN — CARVEDILOL 3.12 MG: 3.12 TABLET, FILM COATED ORAL at 09:11

## 2019-11-04 RX ADMIN — DONEPEZIL HYDROCHLORIDE 5 MG: 5 TABLET, FILM COATED ORAL at 09:11

## 2019-11-04 RX ADMIN — LEVOTHYROXINE SODIUM 25 MCG: 25 TABLET ORAL at 05:11

## 2019-11-04 RX ADMIN — QUETIAPINE FUMARATE 25 MG: 25 TABLET ORAL at 02:11

## 2019-11-04 RX ADMIN — QUETIAPINE FUMARATE 12.5 MG: 25 TABLET, FILM COATED ORAL at 09:11

## 2019-11-04 RX ADMIN — FUROSEMIDE 20 MG: 20 TABLET ORAL at 09:11

## 2019-11-04 RX ADMIN — ATORVASTATIN CALCIUM 40 MG: 20 TABLET, FILM COATED ORAL at 09:11

## 2019-11-04 RX ADMIN — AMLODIPINE BESYLATE 10 MG: 10 TABLET ORAL at 09:11

## 2019-11-04 NOTE — NURSING
POC reviewed with patient and family. Verbalized understanding. VSS. Neuro status unchanged. Patient showing signs of restlessness. Sitter in room to monitor patient. Safety measures maintained.  Call light within reach, bed alarm activated, bed in lowest position, side rails up x 2. Will continue to monitor.

## 2019-11-04 NOTE — PT/OT/SLP PROGRESS
Speech Language Pathology  Patient Not Seen      Izaiah Douglas  MRN: [id number]    Patient not seen today secondary to patient not able to maintain appropriate alertness for treatment despite max A.  . Will follow-up next scheduled service day pending appropriateness.     Emily Abadie, ROSA-SLP

## 2019-11-04 NOTE — ASSESSMENT & PLAN NOTE
-BUN/Cr elevated on admission, initially improved, then worsening again. Likely at baseline per outside chart review of [facility] (Cr range 1.5 - 2.6)  -Appears euvolemic on exam  -Urine Na and Cr normal.   -Cr back to baseline.     Plan:   -Lasix restarted at reduced dose.   -Continue to monitor volume status.   -Renally dose all medications.

## 2019-11-04 NOTE — ASSESSMENT & PLAN NOTE
-Difficult placement due to patient living in [place] and visiting daughter when stroke occurred - therapy teams recommending inpatient rehab  -Patient is unable to fly back to [place]. He is not capable of flying due to inability to ambulate without trained medical assistance, impaired functional mobility, impaired cardiopulmonary response to activity, urine and bowel incontinence, and decreased mental capacity related to stroke and underlying diagnosis of dementia.   -Daughter is unable to provide care.     Plan:   -Patient accepted to [facility]; needs insurance authorization.

## 2019-11-04 NOTE — PT/OT/SLP PROGRESS
Occupational Therapy   Treatment    Name: Izaiah Douglas  MRN: [id number]  Admitting Diagnosis:  Acute arterial ischemic stroke, multifocal, multiple vascular territories       Recommendations:     Discharge Recommendations: nursing facility, skilled  Discharge Equipment Recommendations:  (TBD)  Barriers to discharge:  Decreased caregiver support    Assessment:     Izaiah Douglas is a 85 y.o. male with a medical diagnosis of Acute arterial ischemic stroke, multifocal, multiple vascular territories.  He presents with performance deficits including weakness, impaired endurance, impaired self care skills, gait instability, impaired functional mobilty, impaired balance, decreased coordination, decreased safety awareness. Pt would continue to benefit from OT to maximize functional independence and safety. Recommend SNF upon D/C as pt is unsafe to return home at current functional level.     Rehab Prognosis:  Good; patient would benefit from acute skilled OT services to address these deficits and reach maximum level of function.       Plan:     Patient to be seen 3 x/week to address the above listed problems via self-care/home management, therapeutic activities, therapeutic exercises  · Plan of Care Expires: 11/10/19  · Plan of Care Reviewed with: patient    Subjective     Pain/Comfort:  · Pain Rating 1: 0/10  · Pain Rating Post-Intervention 1: 0/10    Objective:     Communicated with: RN prior to session.  Patient found with HOB elevated with bed alarm, telemetry, peripheral IV, SCD([employer] camera, sitter) upon OT entry to room.    General Precautions: Standard, fall   Orthopedic Precautions:N/A   Braces: N/A     Occupational Performance:     Bed Mobility:    · Patient completed Scooting/Bridging with stand by assistance to reposition in bed  · Patient completed Supine to Sit with stand by assistance with HOB elevated  · Patient completed Sit to Supine with stand by assistance     Functional Mobility/Transfers:  · Patient  completed Sit <> Stand Transfer with contact guard assistance with rolling walker from EOB  · Functional Mobility: Within room household distance to bathroom with contact guard assistance with rolling walker    Activities of Daily Living:  · Toileting: contact guard assistance standing to use toilet        [facility] 6 Click ADL: 19    Treatment & Education:  Pt required encouragement to participate in OOB activity this date-- requesting bed level urinal to use the bathroom; able to ambulate to bathroom and declined further mobility and ADLs, requesting to return to bed; able to reposition self in bed but refused to scoot higher, thus feet hanging over foot board but pt wishing to be left alone    Patient left HOB elevated with all lines intact, call button in reach and bed alarm onEducation:      GOALS:   Multidisciplinary Problems     Occupational Therapy Goals        Problem: Occupational Therapy Goal    Goal Priority Disciplines Outcome Interventions   Occupational Therapy Goal     OT, PT/OT Ongoing, Progressing    Description:  Updated Goals to be met by: 11/11/19    Patient will increase functional independence with ADLs by performing:    UE Dressing with Supervision.  LE Dressing with Supervision.  Grooming while standing with Supervision.  Toileting from toilet with Supervision for hygiene and clothing management.   Toilet transfer with Supervision.  Complete functional mobility household distance with Supervision using AD as needed.    Goals to be met by: 11/4    Patient will increase functional independence with ADLs by performing:    UE Dressing with Supervision.  LE Dressing with Supervision.  Grooming while standing with Supervision.  Toileting from toilet with Supervision for hygiene and clothing management.   Toilet transfer with Supervision.                        Time Tracking:     OT Date of Treatment: 11/04/19  OT Start Time: 0915  OT Stop Time: 0925  OT Total Time (min): 10 min    Billable  Minutes:Therapeutic Activity 10 minutes    BRIGHT Snider  11/4/2019

## 2019-11-04 NOTE — PT/OT/SLP PROGRESS
Physical Therapy Treatment    Patient Name:  Izaiah Douglas   MRN:  [id number]    Recommendations:     Discharge Recommendations:  nursing facility, skilled   Discharge Equipment Recommendations: (TBD)   Barriers to discharge: Inaccessible home and Decreased caregiver support, decreased safety awareness    Assessment:     Izaiah Douglas is a 85 y.o. male admitted with a medical diagnosis of Acute arterial ischemic stroke, multifocal, multiple vascular territories.  He presents with the following impairments/functional limitations:  weakness, impaired self care skills, impaired functional mobilty, gait instability, impaired balance, impaired cognition, decreased safety awareness, decreased lower extremity function. Pt tolerated session well. He had several LOBs while using 2 straight canes for ambulation. He had 1 minor LOB with the rolling walker. He continues to be limited by his dementia and decreased safety awareness. At this time he is not safe for D/C home due to his impaired safety awareness and impaired balance.    Rehab Prognosis: Good; patient would benefit from acute skilled PT services to address these deficits and reach maximum level of function.    Recent Surgery: * No surgery found *      Plan:     During this hospitalization, patient to be seen 3 x/week to address the identified rehab impairments via gait training, therapeutic activities, therapeutic exercises, neuromuscular re-education and progress toward the following goals:    · Plan of Care Expires:  11/08/19    Subjective     Chief Complaint: wants to get out of his room  Patient/Family Comments/goals: return home and to PLOF  Pain/Comfort:  · Pain Rating 1: 0/10  · Pain Rating Post-Intervention 1: 0/10      Objective:     Pt's bed alarm was going off and pt was found OOB with 2 straight canes attempting to ambulate to restroom. Pt had telemetry, non-active peripheral IV upon PT entry to room.     General Precautions: Standard, fall   Orthopedic  Precautions:N/A   Braces: N/A     Functional Mobility:  · Bed Mobility:     · Scooting: stand by assistance and to HOB  · Bridging: stand by assistance  · Supine to Sit: stand by assistance  · Sit to Supine: stand by assistance  · Transfers:     · Sit to Stand:  contact guard assistance with RW  · Gait:   · 10 ft to toilet bu 2 SCs being used as walking stick, CGA at pt hips, 2 LOBs requiring Murphy from PT. Pt attempted to pick urinal up out of toilet and then from trash can after PT disposed of urinal. Max VCs to keep patient from picking up urinal.  · 68 ft x 2, RW, slight increase in stability, 1 minor LOB with pt turning. VCs for maintaining appropriate distance from walker  · Dynamic Standing Balance: Murphy for LOBs with ambualtion using 2 SCs and a RW.      AM-PAC 6 CLICK MOBILITY  Turning over in bed (including adjusting bedclothes, sheets and blankets)?: 4  Sitting down on and standing up from a chair with arms (e.g., wheelchair, bedside commode, etc.): 3  Moving from lying on back to sitting on the side of the bed?: 3  Moving to and from a bed to a chair (including a wheelchair)?: 3  Need to walk in hospital room?: 3  Climbing 3-5 steps with a railing?: 2  Basic Mobility Total Score: 18       Therapeutic Activities and Exercises:   Patient educated on role of therapy, goals of session, and benefits of mobilizing. Discussed PT plan of care during hospitalization. Patient educated that they need to call for assistance to mobilize out of bed. Patient educated on how their diagnosis impacts their mobility within PT scope of practice.       Patient left HOB elevated with all lines intact, call button in reach, bed alarm on and nursing notified. Fabians confirmed visualization of pt on camera MC21 in patients room.    GOALS:   Multidisciplinary Problems     Physical Therapy Goals        Problem: Physical Therapy Goal    Goal Priority Disciplines Outcome Goal Variances Interventions   Physical Therapy Goal     PT,  PT/OT Ongoing, Progressing     Description:  Goals to be met by: 19, goals extended to 11/15/2019    Patient will increase functional independence with mobility by performin. Supine to sit with Minimal Assistance - met 10/16   1a. Supine to sit with Supervision with bed flat - not met  2. Sit to supine with Minimal Assistance - Not met  3. Sit to stand transfer with Stand-by Assistance - met 10/21/19  4. Ascend/descend 3 stairs with right Handrail with Supervision - Not met  5. Lower extremity exercise program x 20 reps per handout, with assistance as needed - Not met  6. Added on 10/13: Bed to chair transfer with RW and supervision - Not met  7. Added on 10/13: Gait x 300 ft with RW and SBA (including turns and straight lines)- not met   7a. Gait x 300 ft with bilateral SPC and SBA (including turns and straight lines) (revised 10/17)- not met                          Time Tracking:     PT Received On: 19  PT Start Time: 1147     PT Stop Time: 1204  PT Total Time (min): 17 min     Billable Minutes: Gait Training 17    Treatment Type: Treatment  PT/PTA: PT     PTA Visit Number: 0     Maricel Pruitt, PT  2019

## 2019-11-04 NOTE — PLAN OF CARE
Continue OT plan of care.    Problem: Occupational Therapy Goal  Goal: Occupational Therapy Goal  Description  Updated Goals to be met by: 11/11/19    Patient will increase functional independence with ADLs by performing:    UE Dressing with Supervision.  LE Dressing with Supervision.  Grooming while standing with Supervision.  Toileting from toilet with Supervision for hygiene and clothing management.   Toilet transfer with Supervision.  Complete functional mobility household distance with Supervision using AD as needed.    Goals to be met by: 11/4    Patient will increase functional independence with ADLs by performing:    UE Dressing with Supervision.  LE Dressing with Supervision.  Grooming while standing with Supervision.  Toileting from toilet with Supervision for hygiene and clothing management.   Toilet transfer with Supervision.       Outcome: Ongoing, Progressing

## 2019-11-04 NOTE — NURSING
Vital signs stable throughout shift, neuro status remains unchanged.  Patient encouraged to use call light for assistance to ensure fall safety. Fall and safety precautions intact. bed in lowest position, side rails up x 2, call light in reach.  Will continue to monitor.

## 2019-11-04 NOTE — NURSING
POC reviewed with family. Verbalized understanding. Patient asleep. No sign of distress noted. VSS. Safety measures maintained. Call light within reach, bed locked, side rails up x2, bed alarm activated, bed in lowest position. Will continue to monitor.

## 2019-11-04 NOTE — PLAN OF CARE
Pt has saambaa as of 11/1/19. Obtained member number today, [id number]. Called Brianne with SNF x2, awaiting return call on bed and when able to submit for auth.      11/04/19 1300   Discharge Reassessment   Assessment Type Discharge Planning Reassessment   Discharge Plan A Skilled Nursing Facility   Discharge Plan B Rehab   Anticipated Discharge Disposition SNF

## 2019-11-04 NOTE — PLAN OF CARE
Pt is progressing towards goals as expected. Goals updated and appropriate continue with current POC.     Maricel Pruitt, PT, DPT  2019          Problem: Physical Therapy Goal  Goal: Physical Therapy Goal  Description  Goals to be met by: 19, goals extended to 11/15/2019    Patient will increase functional independence with mobility by performin. Supine to sit with Minimal Assistance - met 10/16   1a. Supine to sit with Supervision with bed flat - not met  2. Sit to supine with Minimal Assistance - Not met  3. Sit to stand transfer with Stand-by Assistance - met 10/21/19  4. Ascend/descend 3 stairs with right Handrail with Supervision - Not met  5. Lower extremity exercise program x 20 reps per handout, with assistance as needed - Not met  6. Added on 10/13: Bed to chair transfer with RW and supervision - Not met  7. Added on 10/13: Gait x 300 ft with RW and SBA (including turns and straight lines)- not met   7a. Gait x 300 ft with bilateral SPC and SBA (including turns and straight lines) (revised 10/17)- not met         2019 1636 by Maricel Pruitt PT  Outcome: Ongoing, Progressing

## 2019-11-04 NOTE — PROGRESS NOTES
[facility]  Vascular Neurology  Comprehensive Stroke Center  Progress Note    Assessment/Plan:     * Acute arterial ischemic stroke, multifocal, multiple vascular territories  This is an 85 year old male with PMH chronic Afib (not on AC), CAD (S/P stent), HTN, combined CHF, and hypothyroidism who received IV-tPA at OSH on 10/10 after presenting with right-sided weakness, aphasia, and left gaze deviation. CTA MP showed a chronically occluded left ICA from it's origin to the supraclinoid segment with filling of the MCA via the anterior communicating artery, along with substantial burden of intracranial and extracranial atherosclerotic disease, including occluded V4 with an intermittently occluded basilar. No EVT was pursued. TTE on 10/11 showed an EF of 35% and severe left atrial enlargement,however no thrombus. MRI brain on 10/11 demonstrated scattered areas of infarct in the bilateral hemispheres, predominantly in the right frontal lobe. Etiology suspected likely hypoperfusion/watershed in the setting of vascular abnormalities, though differential also includes cardioembolic in the setting of AF and ischemic cardiomyopathy. NIHSS of 8 on admission. PT and SLP consulted. Stable for discharge and pending placement.     Antithrombotics for secondary stroke prevention:  Eliquis 2.5 mg BID     Statins for secondary stroke prevention and hyperlipidemia, if present:   Statins: Atorvastatin- 40 mg daily     Aggressive risk factor modification: HTN, HLD, Diet, Exercise, A-Fib, CAD     Rehab efforts: The patient has been evaluated by a stroke team provider and the therapy needs have been fully considered based off the presenting complaints and exam findings. The following therapy evaluations are needed: PT evaluate and treat, OT evaluate and treat, SLP evaluate and treat, PM&R evaluate for appropriate placement     Diagnostics ordered/pending: None    VTE prophylaxis: Eliquis 2.5 mg BID, SCDs    BP  parameters: SBP <160.     Disposition: SNF; placement pending.     AMRIT (acute kidney injury)  -BUN/Cr elevated on admission, initially improved, then worsening again. Likely at baseline per outside chart review of [facility] (Cr range 1.5 - 2.6)  -Appears euvolemic on exam  -Urine Na and Cr normal.   -Cr back to baseline.     Plan:   -Lasix restarted at reduced dose.   -Continue to monitor volume status.   -Renally dose all medications.     Person awaiting admission to adequate facility elsewhere  -Difficult placement due to patient living in [place] and visiting daughter when stroke occurred - therapy teams recommending inpatient rehab  -Patient is unable to fly back to [place]. He is not capable of flying due to inability to ambulate without trained medical assistance, impaired functional mobility, impaired cardiopulmonary response to activity, urine and bowel incontinence, and decreased mental capacity related to stroke and underlying diagnosis of dementia.   -Daughter is unable to provide care.     Plan:   -Patient accepted to [facility]; needs insurance authorization.       Erythema of hand  -- Patient previously with erythema of R hand from IV infiltration.  -- Bactroban cream TID X 10 days (end date 10/26)  -- Elevation of R hand   -- Pt denied complaints of pain on exam today.    SIADH (syndrome of inappropriate ADH production)  -Noted onset of hyponatremia on 10/12, decreasing to 131 at the lowest.   -SIADH suspected due to euvolemic appearance and no contributing hyperglycemia, reason to suspect pseudohyponatremia, use of thiazide diuretic, or signs volume overload on exam.   -No TSH abnormality or signs of glucocorticoid deficiency.   -Suspect onset secondary to stroke.   -Sodium improved status post fluid restriction and Na tablets.     Plan:   -Continue 1L fluid restriction daily.     Intracranial atherosclerosis  See assessment for occlusion of left ICA.     Occlusion of left carotid  artery  -Seen on CTA on 10/11.   -Suspect chronic occlusion.     Plan:   -Continue current secondary stroke prevention.     Nodule of right lung  -1 cm nodule of right lung seen on CTA; no prior history of tobacco use.     Plan:   -For a part solid nodule 6 mm or greater, Fleischner Society 2017 guidelines recommend follow up with non-contrast chest CT at 3-6 months to confirm persistence. If this nodule is unchanged and the solid component remains <6 mm, annual follow up CT should be performed for 5 years  -Previously discussed with daughter.     Cytotoxic cerebral edema  -Area of cytotoxic cerebral edema identified when reviewing brain imaging in the territory of the R/L middle cerebral artery. There is not mass effect associated with it.   -The pattern is suggestive of cardioembolic etiology.    Plan:   -We will continue to monitor the patients clinical exam for any worsening of symptoms which may indicate expansion of the stroke or the area of the edema resulting in the clinical change.     Coronary artery disease involving native coronary artery of native heart without angina pectoris  -Stroke risk factor  -Noted on Care Everywhere; S/P RCA stent in 7/2018; was prescribed Plavix, Statin, and Ranexa,  but unclear if patient was taking them.    Plan:   -Continue high-intensity Statin daily and holding ASA with daily use of Eliquis for AF.     Paroxysmal atrial fibrillation  -Stroke risk factor  -CHADSVASC: 7.   -HASBLED: 2  -Per Care Everywhere, not previously on AC; Dr. Menjivar (Cardiologist) had report patient not a good candidate due to risk of falls, non-compliance, and dementia.  -Remains rate-controlled at this time.    Plan:   -Continue beta-blocker and Eliquis 2.5 mg BID.   -Telemetry ordered.     Chronic combined systolic and diastolic congestive heart failure  -Stroke risk factor  -Most recent TTE on 10/11 significant for EF of 35%, diastolic dysfunction, wall motion abnormalities, severe left atrial  enlargement, and moderate AS.   -Likely ischemic etiology with history of CAD.   -Home regimen: Lasix 20 mg and Spironolactone.   -Pro-BNP from Feb 2019 >3000.   -Appears euvolemic on exam.     Plan:   -Initiated Coreg 3.125mg BID.   -Holding Spironolactone    -Initially held Lasix for AMRIT, however reviewed outside records from [facility], and Cr range 1.5 -2.6. Patient likely at baseline. Restarted Lasix on 10/15 at reduced-dose 20 mg daily.    -Daily weights and strict I/O's.     Acquired hypothyroidism  -Stroke risk factor  -TSH 3.032    Plan:   -Continue Synthroid 25 mcg daily.     Essential hypertension  -Stroke risk factor.  -Home regimen: Spironolactone.     Plan:   -Goal SBP < 160  -At goal on current regimen: Coreg 3.125 mg BID, and Norvasc 10 mg daily.          10/12: Etiology likely cardioembolic. Will start Eliquis 2.5 mg BID. Creatinine trending down - may start patient on Eliquis 5 mg BID if continue to decrease. Left ICA chronically occluded; no need to vasculare intervention.   10/13: Spoke with PT; patient would benefit from inpatient rehab placement. Norvasc 5 mg ordered. Urine studies ordered for hyponatremia - suspect to be due to dehydration.   10/14: Patient with SIADH but sodium stable; 1 L fluid restriction ordered  10/15: Irritation around right hand peripheral IV site. Sodium 131 today. Discussed with daughter that he is on fluid restriction and she will stop providing him with outside drinks.     10/16: Sodium increasing to 132. Bactroban ordered for right hand IV infiltration. Working on placement.   10/17: Sodium continues to improve. Creatinine 1.5 likely near baseline. Decreased Eliquis to 2.5 mg. Right hand erythema stable.   10/19: Sodium 134. Awake and alert but remains confused. No pain. Pending placement  10/20: Complained of chest discomfort overnight. EKG showed no ST wave changes; troponin normal.   10/21: Renal function improving. Hyponatremic, continue 1L fluid  restriction. Will likely restart lasix tomorrow; no current signs of fluid overload. Neurological exam unchanged.   10/22: Reviewed outside imaging, renal function likely at baseline; restarted home Lasix at reduced dose of 20 mg daily and continue to monitor daily weights and strict I/O's.   10/23: Attempting to transfer patient to a bed on [facility] floor. Staff physician to contact patient's insurance company to discuss further details regarding -SNF placement.  10/24: Staff physician contacted patient's insurance company yesterday, however no answer; left voicemail. Will re-attempt phone call today. Remains medically and neurologically stable for discharge.   10/25: Called Dr. Krishna; no answer. Will re-attempt tomorrow. Patient's daughter contacted by [facility]ALINA. She is unable to provided 24 hour care. Patient accepted to [facility], need insurance authorization  10/26-10/30: No acute events overnight. Neurologically unchanged. Awaiting placement.   10/31: No acute events overnight. CM informed patient will have insurance as of 11/01.   11/04: Patient agitated today. Once time dose of seroquel ordered. Will monitor for effect and consider adding day time dose if necessary.     STROKE DOCUMENTATION   Acute Stroke Times   Last Known Normal Date: 10/10/19  Last Known Normal Time: 1905  Symptom Onset Date: 10/10/19  Symptom Onset Time: 1905  Stroke Team Called Date: 10/10/19  Stroke Team Called Time: 2120  Stroke Team Arrival Date: 10/10/19  Stroke Team Arrival Time: 2122    NIH Scale:  1a. Level of Consciousness: 0-->Alert, keenly responsive  1b. LOC Questions: 2-->Answers neither question correctly  1c. LOC Commands: 0-->Performs both tasks correctly  2. Best Gaze: 0-->Normal  3. Visual: 0-->No visual loss  4. Facial Palsy: 1-->Minor paralysis (flattened nasolabial fold, asymmetry on smiling)  5a. Motor Arm, Left: 1-->Drift, limb holds 90 (or 45) degrees, but drifts down before full 10 seconds, does not hit bed or other  support  5b. Motor Arm, Right: 0-->No drift, limb holds 90 (or 45) degrees for full 10 secs  6a. Motor Leg, Left: 1-->Drift, leg falls by the end of the 5-sec period but does not hit bed  6b. Motor Leg, Right: 0-->No drift, leg holds 30 degree position for full 5 secs  7. Limb Ataxia: 0-->Absent  8. Sensory: 0-->Normal, no sensory loss  9. Best Language: 0-->No aphasia, normal  10. Dysarthria: 1-->Mild-to-moderate dysarthria, patient slurs at least some words and, at worst, can be understood with some difficulty  11. Extinction and Inattention (formerly Neglect): 0-->No abnormality  Total (NIH Stroke Scale): 6       Modified Isabel Score: 1  Holley Coma Scale:    ABCD2 Score:    [id number] Score:   HAS -BLED Score:   ICH Score:   Hunt & Stroud Classification:      Hemorrhagic change of an Ischemic Stroke: Does this patient have an ischemic stroke with hemorrhagic changes? No     Neurologic Chief Complaint: R-sided weakness and aphasia    Subjective:     Interval History: Patient is seen for follow-up neurological assessment and treatment recommendations:     Patient agitated today. Once time dose of seroquel ordered. Will monitor for effect and consider adding day time dose if necessary.       HPI, Past Medical, Family, and Social History remains the same as documented in the initial encounter.     Review of Systems   Constitutional: Negative for fatigue and fever.   Gastrointestinal: Negative for nausea and vomiting.   Neurological: Positive for speech difficulty and weakness.   Psychiatric/Behavioral: Positive for agitation. Negative for confusion and decreased concentration.     Scheduled Meds:   amLODIPine  10 mg Oral Daily    apixaban  2.5 mg Oral BID    atorvastatin  40 mg Oral Daily    carvedilol  3.125 mg Oral BID    donepezil  5 mg Oral QHS    furosemide  20 mg Oral Daily    levothyroxine  25 mcg Oral Daily    QUEtiapine  12.5 mg Oral QHS    senna  8.6 mg Oral Daily     Continuous Infusions:  PRN  Meds:acetaminophen, hydrALAZINE, influenza, ondansetron, sodium chloride 0.9%    Objective:     Vital Signs (Most Recent):  Temp: 97 °F (36.1 °C) (11/04/19 1245)  Pulse: 60 (11/04/19 1245)  Resp: 18 (11/04/19 1245)  BP: 130/60 (11/04/19 1245)  SpO2: (!) 94 % (11/04/19 1245)  BP Location: Left arm    Vital Signs Range (Last 24H):  Temp:  [96.9 °F (36.1 °C)-98.2 °F (36.8 °C)]   Pulse:  [52-66]   Resp:  [16-18]   BP: (113-131)/(60-92)   SpO2:  [94 %-96 %]   BP Location: Left arm    Physical Exam   Constitutional: He appears well-developed and well-nourished. No distress.   HENT:   Head: Normocephalic and atraumatic.   Eyes: Conjunctivae and EOM are normal.   Cardiovascular: Normal rate.   Pulmonary/Chest: Effort normal. No respiratory distress.   Musculoskeletal: He exhibits no edema or deformity.   Neurological: He is alert. No sensory deficit. He exhibits normal muscle tone.   Skin: Skin is warm and dry.   Psychiatric: He expresses impulsivity. He is attentive.       Neurological Exam:   LOC: alert  Attention Span: Good   Language: No aphasia, questionable baseline mental status  Articulation: Dysarthria  Orientation: Oriented to person; Not oriented to age, time  Visual Fields: Full  EOM (CN III, IV, VI): Full/intact  Facial Movement (CN VII): Symmetric  Motor: Arm left  Paresis: 4/5  Leg left  Paresis: 4/5  Arm right  Normal 5/5  Leg right Normal 5/5  Sensation: Intact to light touch, temperature and vibration  Tone: Normal tone throughout    Laboratory:  CMP:   No results for input(s): GLUCOSE, CALCIUM, ALBUMIN, PROT, NA, K, CO2, CL, BUN, CREATININE, ALKPHOS, ALT, AST, BILITOT in the last 24 hours.  BMP:   Recent Labs   Lab 11/01/19  0558      K 4.3      CO2 27   BUN 35*   CREATININE 1.5*   CALCIUM 8.6*     CBC:   Recent Labs   Lab 10/31/19  0543   WBC 6.86   RBC 4.69   HGB 12.4*   HCT 40.2      MCV 86   MCH 26.4*   MCHC 30.8*     Lipid Panel: No results for input(s): CHOL, LDLCALC, HDL, TRIG in  the last 168 hours.  Coagulation: No results for input(s): PT, INR, APTT in the last 168 hours.  Platelet Aggregation Study: No results for input(s): PLTAGG, PLTAGINTERP, PLTAGREGLACO, ADPPLTAGGREG in the last 168 hours.  Hgb A1C: No results for input(s): HGBA1C in the last 168 hours.  TSH: No results for input(s): TSH in the last 168 hours.      Diagnostic Results     Brain/Vessel Imaging:     MRI Brain WO Contrast (10/11/2019) -         Areas of abnormal T2/FLAIR/diffusion signal abnormality consistent with areas of acute ischemia/infarct involving the cerebral hemispheres bilaterally.  There is signal abnormality associated with the left internal carotid artery likely corresponding to the appearance of occlusion on the CTA examination.     CTA Multiphase (10/10/2019) -      Occluded left ICA with reconstitution of the distal/supraclinoid segment by retrograde flow through [id number].  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  Right vertebral artery supply the basilar artery.  Basilar artery is small in caliber with wall irregularity and intermittent occlusions.  Partially calcified hyperattenuating lesion in the lateral aspect of the cavernous sinus abutting the distal right ICA, likely representing calcified meningioma.  Generalized cerebral volume loss and remote lacunar type infarct in the right caudate head.       Cardiac Imaging:     TTE (10/11/2019) -   · Moderately decreased left ventricular systolic function. The estimated ejection fraction is 35%.  · Concentric left ventricular remodeling.  · Left ventricular diastolic dysfunction.  · Local segmental wall motion abnormalities.  · Severe left atrial enlargement.  · Mild right atrial enlargement.  · Mild aortic regurgitation.  · Moderate aortic valve stenosis but difficult to appreciate in the setting of AF, depressed  EF, and low stroke volume.  · Aortic valve area is 1.39 cm2; peak velocity is 1.28 m/s; mean gradient is 4 mmHg.    Elevated central venous pressure (15 mm Hg).      Martínez Lawton NP  [facility]  Department of Vascular Neurology   [facility]

## 2019-11-04 NOTE — ASSESSMENT & PLAN NOTE
-Stroke risk factor  -Most recent TTE on 10/11 significant for EF of 35%, diastolic dysfunction, wall motion abnormalities, severe left atrial enlargement, and moderate AS.   -Likely ischemic etiology with history of CAD.   -Home regimen: Lasix 20 mg and Spironolactone.   -Pro-BNP from Feb 2019 >3000.   -Appears euvolemic on exam.     Plan:   -Initiated Coreg 3.125mg BID.   -Holding Spironolactone    -Initially held Lasix for AMRIT, however reviewed outside records from [facility], and Cr range 1.5 -2.6. Patient likely at baseline. Restarted Lasix on 10/15 at reduced-dose 20 mg daily.    -Daily weights and strict I/O's.

## 2019-11-04 NOTE — SUBJECTIVE & OBJECTIVE
Neurologic Chief Complaint: R-sided weakness and aphasia    Subjective:     Interval History: Patient is seen for follow-up neurological assessment and treatment recommendations:     Patient agitated today. Once time dose of seroquel ordered. Will monitor for effect and consider adding day time dose if necessary.       HPI, Past Medical, Family, and Social History remains the same as documented in the initial encounter.     Review of Systems   Constitutional: Negative for fatigue and fever.   Gastrointestinal: Negative for nausea and vomiting.   Neurological: Positive for speech difficulty and weakness.   Psychiatric/Behavioral: Positive for agitation. Negative for confusion and decreased concentration.     Scheduled Meds:   amLODIPine  10 mg Oral Daily    apixaban  2.5 mg Oral BID    atorvastatin  40 mg Oral Daily    carvedilol  3.125 mg Oral BID    donepezil  5 mg Oral QHS    furosemide  20 mg Oral Daily    levothyroxine  25 mcg Oral Daily    QUEtiapine  12.5 mg Oral QHS    senna  8.6 mg Oral Daily     Continuous Infusions:  PRN Meds:acetaminophen, hydrALAZINE, influenza, ondansetron, sodium chloride 0.9%    Objective:     Vital Signs (Most Recent):  Temp: 97 °F (36.1 °C) (11/04/19 1245)  Pulse: 60 (11/04/19 1245)  Resp: 18 (11/04/19 1245)  BP: 130/60 (11/04/19 1245)  SpO2: (!) 94 % (11/04/19 1245)  BP Location: Left arm    Vital Signs Range (Last 24H):  Temp:  [96.9 °F (36.1 °C)-98.2 °F (36.8 °C)]   Pulse:  [52-66]   Resp:  [16-18]   BP: (113-131)/(60-92)   SpO2:  [94 %-96 %]   BP Location: Left arm    Physical Exam   Constitutional: He appears well-developed and well-nourished. No distress.   HENT:   Head: Normocephalic and atraumatic.   Eyes: Conjunctivae and EOM are normal.   Cardiovascular: Normal rate.   Pulmonary/Chest: Effort normal. No respiratory distress.   Musculoskeletal: He exhibits no edema or deformity.   Neurological: He is alert. No sensory deficit. He exhibits normal muscle tone.    Skin: Skin is warm and dry.   Psychiatric: He expresses impulsivity. He is attentive.       Neurological Exam:   LOC: alert  Attention Span: Good   Language: No aphasia, questionable baseline mental status  Articulation: Dysarthria  Orientation: Oriented to person; Not oriented to age, time  Visual Fields: Full  EOM (CN III, IV, VI): Full/intact  Facial Movement (CN VII): Symmetric  Motor: Arm left  Paresis: 4/5  Leg left  Paresis: 4/5  Arm right  Normal 5/5  Leg right Normal 5/5  Sensation: Intact to light touch, temperature and vibration  Tone: Normal tone throughout    Laboratory:  CMP:   No results for input(s): GLUCOSE, CALCIUM, ALBUMIN, PROT, NA, K, CO2, CL, BUN, CREATININE, ALKPHOS, ALT, AST, BILITOT in the last 24 hours.  BMP:   Recent Labs   Lab 11/01/19  0558      K 4.3      CO2 27   BUN 35*   CREATININE 1.5*   CALCIUM 8.6*     CBC:   Recent Labs   Lab 10/31/19  0543   WBC 6.86   RBC 4.69   HGB 12.4*   HCT 40.2      MCV 86   MCH 26.4*   MCHC 30.8*     Lipid Panel: No results for input(s): CHOL, LDLCALC, HDL, TRIG in the last 168 hours.  Coagulation: No results for input(s): PT, INR, APTT in the last 168 hours.  Platelet Aggregation Study: No results for input(s): PLTAGG, PLTAGINTERP, PLTAGREGLACO, ADPPLTAGGREG in the last 168 hours.  Hgb A1C: No results for input(s): HGBA1C in the last 168 hours.  TSH: No results for input(s): TSH in the last 168 hours.      Diagnostic Results     Brain/Vessel Imaging:     MRI Brain WO Contrast (10/11/2019) -         Areas of abnormal T2/FLAIR/diffusion signal abnormality consistent with areas of acute ischemia/infarct involving the cerebral hemispheres bilaterally.  There is signal abnormality associated with the left internal carotid artery likely corresponding to the appearance of occlusion on the CTA examination.     CTA Multiphase (10/10/2019) -      Occluded left ICA with reconstitution of the distal/supraclinoid segment by retrograde flow  through [id number].  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  Right vertebral artery supply the basilar artery.  Basilar artery is small in caliber with wall irregularity and intermittent occlusions.  Partially calcified hyperattenuating lesion in the lateral aspect of the cavernous sinus abutting the distal right ICA, likely representing calcified meningioma.  Generalized cerebral volume loss and remote lacunar type infarct in the right caudate head.       Cardiac Imaging:     TTE (10/11/2019) -   · Moderately decreased left ventricular systolic function. The estimated ejection fraction is 35%.  · Concentric left ventricular remodeling.  · Left ventricular diastolic dysfunction.  · Local segmental wall motion abnormalities.  · Severe left atrial enlargement.  · Mild right atrial enlargement.  · Mild aortic regurgitation.  · Moderate aortic valve stenosis but difficult to appreciate in the setting of AF, depressed EF, and low stroke volume.  · Aortic valve area is 1.39 cm2; peak velocity is 1.28 m/s; mean gradient is 4 mmHg.    Elevated central venous pressure (15 mm Hg).

## 2019-11-04 NOTE — NURSING
Patient showing signs of agitation and restlessness and aggressiveness towards PCT. Doctor notified.

## 2019-11-04 NOTE — NURSING
POC reviewed with family. Patient asleep. Family verbalized understanding. VSS. Neuro status unchanged.Patient denies headache and pain throughout shift. No signs of distress noted.. Safety measures maintained. Call light within reach, bed alarm activated, bed in lowest position, side rails up x 2. Will continue to monitor.

## 2019-11-04 NOTE — PLAN OF CARE
Pt is progressing towards goals as expected. Goals extended and appropriate continue with current POC.     Maricel Pruitt, PT, DPT  2019      Problem: Physical Therapy Goal  Goal: Physical Therapy Goal  Description  Goals to be met by: 19, goals extended to 11/15/2019    Patient will increase functional independence with mobility by performin. Supine to sit with Minimal Assistance - met 10/16   1a. Supine to sit with Supervision with bed flat - not met  2. Sit to supine with Minimal Assistance - Not met  3. Sit to stand transfer with Stand-by Assistance - met 10/21/19  4. Ascend/descend 3 stairs with right Handrail with Supervision - Not met  5. Lower extremity exercise program x 20 reps per handout, with assistance as needed - Not met  6. Added on 10/13: Bed to chair transfer with RW and supervision - Not met  7. Added on 10/13: Gait x 300 ft with RW and SBA (including turns and straight lines)- not met   7a. Gait x 300 ft with bilateral SPC and SBA (including turns and straight lines) (revised 10/17)- not met         Outcome: Ongoing, Progressing

## 2019-11-05 LAB
BACTERIA #/AREA URNS AUTO: ABNORMAL /HPF
BILIRUB UR QL STRIP: NEGATIVE
CLARITY UR REFRACT.AUTO: CLEAR
COLOR UR AUTO: YELLOW
GLUCOSE UR QL STRIP: NEGATIVE
HGB UR QL STRIP: NEGATIVE
KETONES UR QL STRIP: NEGATIVE
LEUKOCYTE ESTERASE UR QL STRIP: ABNORMAL
MICROSCOPIC COMMENT: ABNORMAL
NITRITE UR QL STRIP: NEGATIVE
PH UR STRIP: 6 [PH] (ref 5–8)
PROT UR QL STRIP: NEGATIVE
RBC #/AREA URNS AUTO: 2 /HPF (ref 0–4)
SP GR UR STRIP: 1.01 (ref 1–1.03)
URN SPEC COLLECT METH UR: ABNORMAL
WBC #/AREA URNS AUTO: 19 /HPF (ref 0–5)

## 2019-11-05 PROCEDURE — 99233 PR SUBSEQUENT HOSPITAL CARE,LEVL III: ICD-10-PCS | Mod: ,,, | Performed by: PSYCHIATRY & NEUROLOGY

## 2019-11-05 PROCEDURE — 25000003 PHARM REV CODE 250: Performed by: STUDENT IN AN ORGANIZED HEALTH CARE EDUCATION/TRAINING PROGRAM

## 2019-11-05 PROCEDURE — 25000003 PHARM REV CODE 250: Performed by: NURSE PRACTITIONER

## 2019-11-05 PROCEDURE — 87086 URINE CULTURE/COLONY COUNT: CPT

## 2019-11-05 PROCEDURE — 25000003 PHARM REV CODE 250: Performed by: FAMILY MEDICINE

## 2019-11-05 PROCEDURE — 63600175 PHARM REV CODE 636 W HCPCS: Performed by: NURSE PRACTITIONER

## 2019-11-05 PROCEDURE — 25000003 PHARM REV CODE 250: Performed by: PHYSICIAN ASSISTANT

## 2019-11-05 PROCEDURE — 92526 ORAL FUNCTION THERAPY: CPT

## 2019-11-05 PROCEDURE — 97803 MED NUTRITION INDIV SUBSEQ: CPT

## 2019-11-05 PROCEDURE — 81001 URINALYSIS AUTO W/SCOPE: CPT

## 2019-11-05 PROCEDURE — 99233 SBSQ HOSP IP/OBS HIGH 50: CPT | Mod: ,,, | Performed by: PSYCHIATRY & NEUROLOGY

## 2019-11-05 PROCEDURE — 94761 N-INVAS EAR/PLS OXIMETRY MLT: CPT

## 2019-11-05 PROCEDURE — 25000003 PHARM REV CODE 250: Performed by: PSYCHIATRY & NEUROLOGY

## 2019-11-05 PROCEDURE — 20600001 HC STEP DOWN PRIVATE ROOM

## 2019-11-05 RX ORDER — CEFTRIAXONE 1 G/1
1 INJECTION, POWDER, FOR SOLUTION INTRAMUSCULAR; INTRAVENOUS
Status: DISCONTINUED | OUTPATIENT
Start: 2019-11-05 | End: 2019-11-06

## 2019-11-05 RX ADMIN — LEVOTHYROXINE SODIUM 25 MCG: 25 TABLET ORAL at 06:11

## 2019-11-05 RX ADMIN — APIXABAN 2.5 MG: 2.5 TABLET, FILM COATED ORAL at 08:11

## 2019-11-05 RX ADMIN — CARVEDILOL 3.12 MG: 3.12 TABLET, FILM COATED ORAL at 08:11

## 2019-11-05 RX ADMIN — DONEPEZIL HYDROCHLORIDE 5 MG: 5 TABLET, FILM COATED ORAL at 08:11

## 2019-11-05 RX ADMIN — ATORVASTATIN CALCIUM 40 MG: 20 TABLET, FILM COATED ORAL at 08:11

## 2019-11-05 RX ADMIN — SENNOSIDES 8.6 MG: 8.6 TABLET, FILM COATED ORAL at 08:11

## 2019-11-05 RX ADMIN — CEFTRIAXONE SODIUM 1 G: 1 INJECTION, POWDER, FOR SOLUTION INTRAMUSCULAR; INTRAVENOUS at 08:11

## 2019-11-05 RX ADMIN — AMLODIPINE BESYLATE 10 MG: 10 TABLET ORAL at 08:11

## 2019-11-05 RX ADMIN — FUROSEMIDE 20 MG: 20 TABLET ORAL at 08:11

## 2019-11-05 RX ADMIN — QUETIAPINE FUMARATE 12.5 MG: 25 TABLET, FILM COATED ORAL at 08:11

## 2019-11-05 NOTE — ASSESSMENT & PLAN NOTE
-BUN/Cr elevated on admission, initially improved, then worsening again. Likely at baseline per outside chart review of [facility] (Cr range 1.5 - 2.6)  -Appears euvolemic on exam  -Urine Na and Cr normal.   -Cr back to baseline.     Plan:   -Lasix restarted at reduced dose.   -Continue to monitor volume status.   -Renally dose all medications.

## 2019-11-05 NOTE — ASSESSMENT & PLAN NOTE
Plan:   -Continue Donepezil 5 mg daily.      11/5: patient agitated overnight. Acute confusion vs baseline dementia. UA ordered to rule out confusion r/t UTI

## 2019-11-05 NOTE — ASSESSMENT & PLAN NOTE
-Noted onset of hyponatremia on 10/12, decreasing to 131 at the lowest.   -SIADH suspected due to euvolemic appearance and no contributing hyperglycemia, reason to suspect pseudohyponatremia, use of thiazide diuretic, or signs volume overload on exam.   -No TSH abnormality or signs of glucocorticoid deficiency.   -Suspect onset secondary to stroke.   -Sodium improved status post fluid restriction and Na tablets.     Plan:   -Continue 1L fluid restriction daily.   RESOLVED

## 2019-11-05 NOTE — PROGRESS NOTES
[facility]  Adult Nutrition  Progress Note    SUMMARY       Recommendations    1. Continue current diet as tolerated. If po intake poor, recommend adding Boost Plus with meals. 3. RD following.  Goals: 1. Pt's intake meals >75% x 7 days.  Nutrition Goal Status: progressing towards goal  Communication of RD Recs: (POC)    Reason for Assessment    Reason For Assessment: RD follow-up  Diagnosis: cardiac disease(CVA)  Relevant Medical History: HTN, thyroid disease, CHF, stroke, dementia  Interdisciplinary Rounds: did not attend  General Information Comments: Pt with 75% intake meals, nourished per previous RD notes. Pt is elderly, was not aware of low sodium diet and says his family prepares his meals. Attempted low sodium diet education, but pt does not appear to be a good candidate.  Nutrition Discharge Planning: d/c on cardiac diet, texture per SLP recommendations    Nutrition Risk Screen    Nutrition Risk Screen: no indicators present    Nutrition/Diet History    Spiritual, Cultural Beliefs, Amish Practices, Values that Affect Care: no  Factors Affecting Nutritional Intake: None identified at this time    Anthropometrics    Temp: 97.3 °F (36.3 °C)  Height Method: Stated  Height: 6' (182.9 cm)  Height (inches): 72 in  Weight Method: Bed Scale  Weight: 91 kg (200 lb 9.9 oz)  Weight (lb): 200.62 lb  Ideal Body Weight (IBW), Male: 178 lb  % Ideal Body Weight, Male (lb): 100.82 lb  BMI (Calculated): 24.4  BMI Grade: 18.5-24.9 - normal       Lab/Procedures/Meds    Pertinent Labs Reviewed: reviewed  Pertinent Labs Comments: BUN 35, Creat 1.5  Pertinent Medications Reviewed: reviewed  Pertinent Medications Comments: lasix, statin, levothyroxine    Estimated/Assessed Needs    Weight Used For Calorie Calculations: 86.5 kg (190 lb 11.2 oz)  Energy Calorie Requirements (kcal): 1985 kcal/day  Energy Need Method: [place](x 1.25 PAL)  Protein Requirements:  g/day(1-1.2 g/kg)  Weight Used For  Protein Calculations: 86.5 kg (190 lb 11.2 oz)     Estimated Fluid Requirement Method: RDA Method(or per MD)  RDA Method (mL): 1985  CHO Requirement: 0      Nutrition Prescription Ordered    Current Diet Order: Cardiac diet, 1500ml fluid restriction  Nutrition Order Comments: 0    Evaluation of Received Nutrient/Fluid Intake    I/O: -6.7L since admission  Energy Calories Required: other (see comments)  Protein Required: other (see comments)  Fluid Required: other (see comments)  Comments: LBM 11/05  Tolerance: tolerating  % Intake of Estimated Energy Needs: 75 - 100 %  % Meal Intake: 75 - 100 %    Nutrition Risk    Level of Risk/Frequency of Follow-up: low     Assessment and Plan    No nutrition diagnosis at this time.    Monitor and Evaluation    Food and Nutrient Intake: energy intake, food and beverage intake  Food and Nutrient Adminstration: diet order  Physical Activity and Function: nutrition-related ADLs and IADLs  Anthropometric Measurements: weight, weight change, body mass index  Biochemical Data, Medical Tests and Procedures: electrolyte and renal panel, gastrointestinal profile, glucose/endocrine profile  Nutrition-Focused Physical Findings: overall appearance     Malnutrition Assessment    Pt does not meet criteria for malnutrition at this time.       Nutrition Follow-Up    RD Follow-up?: Yes

## 2019-11-05 NOTE — PLAN OF CARE
PATIENT REMAINS CONFUSED & ATTEMPTS TO GET OUT OF BED PER SELF & CLIMBS OVER SIDE RAILS.  BED ALARM & TELESITTER ON & IN USE.  PATIENT APPEARED TO SLEEP A FEW HOURS.  AWAKENED X3 FOR BATHROOM VOIDING.  HE ATTEMPTS TO AMBULATE INTO THE HALLWAY.  UNSTEADY GAIT NOTED.  EDUCATED & REORIENTED TO TIME & PLACE.  PATIENT AGITATED BUT AMBULATES BACK TO BED.  NO BM NOTED.  NO INCONTINENT EPISODES.  UNEVENTFUL NIGHT.

## 2019-11-05 NOTE — SUBJECTIVE & OBJECTIVE
Neurologic Chief Complaint: R-sided weakness and aphasia    Subjective:     Interval History: Patient is seen for follow-up neurological assessment and treatment recommendations:      Patient agitated overnight. UA ordered to rule out confusion r/t UTI.     HPI, Past Medical, Family, and Social History remains the same as documented in the initial encounter.     Review of Systems   Constitutional: Negative for fatigue and fever.   Gastrointestinal: Negative for nausea and vomiting.   Neurological: Positive for speech difficulty and weakness.   Psychiatric/Behavioral: Positive for agitation. Negative for confusion and decreased concentration.     Scheduled Meds:   amLODIPine  10 mg Oral Daily    apixaban  2.5 mg Oral BID    atorvastatin  40 mg Oral Daily    carvedilol  3.125 mg Oral BID    donepezil  5 mg Oral QHS    furosemide  20 mg Oral Daily    levothyroxine  25 mcg Oral Daily    QUEtiapine  12.5 mg Oral QHS    senna  8.6 mg Oral Daily     Continuous Infusions:  PRN Meds:acetaminophen, hydrALAZINE, influenza, ondansetron, sodium chloride 0.9%    Objective:     Vital Signs (Most Recent):  Temp: 97.3 °F (36.3 °C) (11/05/19 1229)  Pulse: 66 (11/05/19 1229)  Resp: 18 (11/05/19 1229)  BP: 129/72 (11/05/19 1229)  SpO2: 96 % (11/05/19 1229)  BP Location: Right arm    Vital Signs Range (Last 24H):  Temp:  [96.4 °F (35.8 °C)-98 °F (36.7 °C)]   Pulse:  [42-72]   Resp:  [12-18]   BP: (129-155)/(62-76)   SpO2:  [91 %-96 %]   BP Location: Right arm    Physical Exam   Constitutional: He appears well-developed and well-nourished. No distress.   HENT:   Head: Normocephalic and atraumatic.   Eyes: Conjunctivae and EOM are normal.   Cardiovascular: Normal rate.   Pulmonary/Chest: Effort normal. No respiratory distress.   Musculoskeletal: He exhibits no edema or deformity.   Neurological: He is alert. No sensory deficit. He exhibits normal muscle tone.   Skin: Skin is warm and dry.   Psychiatric: He expresses impulsivity.  He is attentive.       Neurological Exam:   LOC: alert  Attention Span: Good   Language: No aphasia, questionable baseline mental status  Articulation: Dysarthria  Orientation: Oriented to person; Not oriented to age, time  Visual Fields: Full  EOM (CN III, IV, VI): Full/intact  Facial Movement (CN VII): Symmetric  Motor: Arm left  Paresis: 4/5  Leg left  Paresis: 4/5  Arm right  Normal 5/5  Leg right Normal 5/5  Sensation: Intact to light touch, temperature and vibration  Tone: Normal tone throughout    Laboratory:  CMP:   No results for input(s): GLUCOSE, CALCIUM, ALBUMIN, PROT, NA, K, CO2, CL, BUN, CREATININE, ALKPHOS, ALT, AST, BILITOT in the last 24 hours.  BMP:   Recent Labs   Lab 11/01/19  0558      K 4.3      CO2 27   BUN 35*   CREATININE 1.5*   CALCIUM 8.6*     CBC:   Recent Labs   Lab 10/31/19  0543   WBC 6.86   RBC 4.69   HGB 12.4*   HCT 40.2      MCV 86   MCH 26.4*   MCHC 30.8*     Lipid Panel: No results for input(s): CHOL, LDLCALC, HDL, TRIG in the last 168 hours.  Coagulation: No results for input(s): PT, INR, APTT in the last 168 hours.  Platelet Aggregation Study: No results for input(s): PLTAGG, PLTAGINTERP, PLTAGREGLACO, ADPPLTAGGREG in the last 168 hours.  Hgb A1C: No results for input(s): HGBA1C in the last 168 hours.  TSH: No results for input(s): TSH in the last 168 hours.      Diagnostic Results     Brain/Vessel Imaging:     MRI Brain WO Contrast (10/11/2019) -         Areas of abnormal T2/FLAIR/diffusion signal abnormality consistent with areas of acute ischemia/infarct involving the cerebral hemispheres bilaterally.  There is signal abnormality associated with the left internal carotid artery likely corresponding to the appearance of occlusion on the CTA examination.     CTA Multiphase (10/10/2019) -      Occluded left ICA with reconstitution of the distal/supraclinoid segment by retrograde flow through [id number].  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  Right vertebral artery supply the basilar artery.  Basilar artery is small in caliber with wall irregularity and intermittent occlusions.  Partially calcified hyperattenuating lesion in the lateral aspect of the cavernous sinus abutting the distal right ICA, likely representing calcified meningioma.  Generalized cerebral volume loss and remote lacunar type infarct in the right caudate head.       Cardiac Imaging:     TTE (10/11/2019) -   · Moderately decreased left ventricular systolic function. The estimated ejection fraction is 35%.  · Concentric left ventricular remodeling.  · Left ventricular diastolic dysfunction.  · Local segmental wall motion abnormalities.  · Severe left atrial enlargement.  · Mild right atrial enlargement.  · Mild aortic regurgitation.  · Moderate aortic valve stenosis but difficult to appreciate in the setting of AF, depressed EF, and low stroke volume.  · Aortic valve area is 1.39 cm2; peak velocity is 1.28 m/s; mean gradient is 4 mmHg.    Elevated central venous pressure (15 mm Hg).

## 2019-11-05 NOTE — PLAN OF CARE
Problem: SLP Goal  Goal: SLP Goal  Description  Speech Therapy Short Term Goals  Goal expected to be met by 11/6  1. Pt will tolerate regular consistency solid diet and thin liquids without overt clinical signs aspiration.  2. Pt will orient x4 ind'ly.   3. Pt will participate in therapeutic trials of problem solving and reasoning skills to determine whether at cognitive-linguistic baseline.      Outcome: Ongoing, Progressing     Pt needs dentures for meals.     THIAGO Cali, CCC-SLP  11/5/2019

## 2019-11-05 NOTE — PLAN OF CARE
Patient is awake and alert, oriented to self, disoriented to time, place and situation. Pleasant and calm, easily redirected. Gets up to walk, independent to bathroom and ambulates in escobar. Will continue to monitor.

## 2019-11-05 NOTE — PT/OT/SLP PROGRESS
"Speech Language Pathology Treatment    Patient Name:  Izaiah Douglas   MRN:  [id number]  Admitting Diagnosis: Acute arterial ischemic stroke, multifocal, multiple vascular territories    Recommendations:                 General Recommendations:  Speech/language therapy and Cognitive-linguistic therapy  Diet recommendations:  Regular, Liquid Diet Level: Thin   Aspiration Precautions: Avoid talking while eating, Check for pocketing/oral residue, HOB to 90 degrees and Standard aspiration precautions   General Precautions: Standard, fall  Communication strategies:  none    Subjective     "I am getting up now"  Patient goals: to go to bathroom    Pain/Comfort:  · Pain Rating 1: 0/10  · Pain Rating Post-Intervention 1: 0/10    Objective:     Has the patient been evaluated by SLP for swallowing?   Yes  Keep patient NPO? No   Current Respiratory Status: room air      Upon entering the room, pt attempting to get out of bed and go to the bathroom. Pt needed max cues to stay seated until physical therapist arrived to aid pt. Pt had been eating breakfast and was noted to have large amounts of food still in oral cavity. Pt reporting that he needed his teeth. Pt's dentures were found and pt had to orally expel food first. Pt then able to put his dentures in. Pt needed cues to take small bites and did allow therapist to help cut up his food into smaller bites. No overt s/s of aspiration noted and pt with better mastication/clearance of food once dentures in place. Reviewed importance of waiting for help prior to standing up but pt will need frequent cues/reminders. White board updated.    Assessment:     Izaiah Douglas is a 85 y.o. male with an SLP diagnosis of Cognitive-Linguistic Impairment and oral dysphagia. He presents with impaired safety and judgement.    Goals:   Multidisciplinary Problems     SLP Goals        Problem: SLP Goal    Goal Priority Disciplines Outcome   SLP Goal     SLP Ongoing, Progressing   Description:  Speech " Therapy Short Term Goals  Goal expected to be met by 11/6  1. Pt will tolerate regular consistency solid diet and thin liquids without overt clinical signs aspiration.  2. Pt will orient x4 ind'ly.   3. Pt will participate in therapeutic trials of problem solving and reasoning skills to determine whether at cognitive-linguistic baseline.                       Plan:     · Patient to be seen:  2 x/week   · Plan of Care expires:  11/09/19  · Plan of Care reviewed with:  patient   · SLP Follow-Up:  Yes       Discharge recommendations:  nursing facility, skilled       Time Tracking:     SLP Treatment Date:   11/05/19  Speech Start Time:  0840  Speech Stop Time:  0854     Speech Total Time (min):  14 min    Billable Minutes: Treatment Swallowing Dysfunction 14    THIAGO Cali, CCC-SLP  11/05/2019

## 2019-11-06 ENCOUNTER — HOSPITAL ENCOUNTER (INPATIENT)
Facility: HOSPITAL | Age: 84
LOS: 2 days | Discharge: PSYCHIATRIC OR SUBSTANCE USE FACILITY | DRG: 064 | End: 2019-11-08
Attending: INTERNAL MEDICINE | Admitting: INTERNAL MEDICINE
Payer: MEDICARE

## 2019-11-06 VITALS
WEIGHT: 200.63 LBS | OXYGEN SATURATION: 97 % | HEART RATE: 64 BPM | DIASTOLIC BLOOD PRESSURE: 66 MMHG | HEIGHT: 72 IN | RESPIRATION RATE: 16 BRPM | TEMPERATURE: 98 F | SYSTOLIC BLOOD PRESSURE: 130 MMHG | BODY MASS INDEX: 27.17 KG/M2

## 2019-11-06 DIAGNOSIS — I63.9 CEREBROVASCULAR ACCIDENT (CVA), UNSPECIFIED MECHANISM: ICD-10-CM

## 2019-11-06 PROBLEM — L53.9 ERYTHEMA OF HAND: Status: RESOLVED | Noted: 2019-10-16 | Resolved: 2019-11-06

## 2019-11-06 PROBLEM — Z75.1 PERSON AWAITING ADMISSION TO ADEQUATE FACILITY ELSEWHERE: Status: RESOLVED | Noted: 2019-10-16 | Resolved: 2019-11-06

## 2019-11-06 PROBLEM — E22.2 SIADH (SYNDROME OF INAPPROPRIATE ADH PRODUCTION): Status: RESOLVED | Noted: 2019-10-14 | Resolved: 2019-11-06

## 2019-11-06 PROBLEM — N39.0 COMPLICATED URINARY TRACT INFECTION: Status: ACTIVE | Noted: 2019-11-06

## 2019-11-06 PROBLEM — N17.9 AKI (ACUTE KIDNEY INJURY): Status: RESOLVED | Noted: 2019-10-20 | Resolved: 2019-11-06

## 2019-11-06 PROCEDURE — 25000003 PHARM REV CODE 250: Performed by: STUDENT IN AN ORGANIZED HEALTH CARE EDUCATION/TRAINING PROGRAM

## 2019-11-06 PROCEDURE — 99233 PR SUBSEQUENT HOSPITAL CARE,LEVL III: ICD-10-PCS | Mod: ,,, | Performed by: PSYCHIATRY & NEUROLOGY

## 2019-11-06 PROCEDURE — 25000003 PHARM REV CODE 250: Performed by: FAMILY MEDICINE

## 2019-11-06 PROCEDURE — 97116 GAIT TRAINING THERAPY: CPT

## 2019-11-06 PROCEDURE — 99233 SBSQ HOSP IP/OBS HIGH 50: CPT | Mod: ,,, | Performed by: PSYCHIATRY & NEUROLOGY

## 2019-11-06 PROCEDURE — 25000003 PHARM REV CODE 250: Performed by: NURSE PRACTITIONER

## 2019-11-06 PROCEDURE — 97530 THERAPEUTIC ACTIVITIES: CPT

## 2019-11-06 PROCEDURE — 11000004 HC SNF PRIVATE

## 2019-11-06 PROCEDURE — 25000003 PHARM REV CODE 250: Performed by: PHYSICIAN ASSISTANT

## 2019-11-06 RX ORDER — SODIUM CHLORIDE 0.9 % (FLUSH) 0.9 %
10 SYRINGE (ML) INJECTION
Status: CANCELLED | OUTPATIENT
Start: 2019-11-06

## 2019-11-06 RX ORDER — ONDANSETRON 8 MG/1
8 TABLET, ORALLY DISINTEGRATING ORAL EVERY 8 HOURS PRN
Status: DISCONTINUED | OUTPATIENT
Start: 2019-11-06 | End: 2019-11-07 | Stop reason: HOSPADM

## 2019-11-06 RX ORDER — AMOXICILLIN 250 MG
1 CAPSULE ORAL 2 TIMES DAILY
Status: DISCONTINUED | OUTPATIENT
Start: 2019-11-06 | End: 2019-11-07 | Stop reason: HOSPADM

## 2019-11-06 RX ORDER — DONEPEZIL HYDROCHLORIDE 5 MG/1
5 TABLET, FILM COATED ORAL NIGHTLY
Status: CANCELLED | OUTPATIENT
Start: 2019-11-06

## 2019-11-06 RX ORDER — CALCIUM CARBONATE 200(500)MG
500 TABLET,CHEWABLE ORAL 2 TIMES DAILY PRN
Status: DISCONTINUED | OUTPATIENT
Start: 2019-11-06 | End: 2019-11-07 | Stop reason: HOSPADM

## 2019-11-06 RX ORDER — SENNOSIDES 8.6 MG/1
8.6 TABLET ORAL DAILY
Status: DISCONTINUED | OUTPATIENT
Start: 2019-11-07 | End: 2019-11-07 | Stop reason: HOSPADM

## 2019-11-06 RX ORDER — HYDRALAZINE HYDROCHLORIDE 20 MG/ML
10 INJECTION INTRAMUSCULAR; INTRAVENOUS EVERY 8 HOURS PRN
Status: CANCELLED | OUTPATIENT
Start: 2019-11-06

## 2019-11-06 RX ORDER — SENNOSIDES 8.6 MG/1
8.6 TABLET ORAL DAILY
Status: CANCELLED | OUTPATIENT
Start: 2019-11-07

## 2019-11-06 RX ORDER — SULFAMETHOXAZOLE AND TRIMETHOPRIM 800; 160 MG/1; MG/1
1 TABLET ORAL 2 TIMES DAILY
Qty: 6 TABLET | Refills: 0 | Status: ON HOLD
Start: 2019-11-06 | End: 2019-11-07

## 2019-11-06 RX ORDER — DONEPEZIL HYDROCHLORIDE 5 MG/1
5 TABLET, FILM COATED ORAL NIGHTLY
Status: DISCONTINUED | OUTPATIENT
Start: 2019-11-06 | End: 2019-11-07 | Stop reason: HOSPADM

## 2019-11-06 RX ORDER — LEVOTHYROXINE SODIUM 25 UG/1
25 TABLET ORAL DAILY
Status: DISCONTINUED | OUTPATIENT
Start: 2019-11-07 | End: 2019-11-07 | Stop reason: HOSPADM

## 2019-11-06 RX ORDER — ONDANSETRON 8 MG/1
8 TABLET, ORALLY DISINTEGRATING ORAL EVERY 8 HOURS PRN
Status: CANCELLED | OUTPATIENT
Start: 2019-11-06

## 2019-11-06 RX ORDER — SULFAMETHOXAZOLE AND TRIMETHOPRIM 800; 160 MG/1; MG/1
1 TABLET ORAL 2 TIMES DAILY
Status: CANCELLED | OUTPATIENT
Start: 2019-11-06 | End: 2019-11-09

## 2019-11-06 RX ORDER — RAMELTEON 8 MG/1
8 TABLET ORAL NIGHTLY PRN
Status: DISCONTINUED | OUTPATIENT
Start: 2019-11-06 | End: 2019-11-07 | Stop reason: HOSPADM

## 2019-11-06 RX ORDER — AMLODIPINE BESYLATE 10 MG/1
10 TABLET ORAL DAILY
Status: DISCONTINUED | OUTPATIENT
Start: 2019-11-07 | End: 2019-11-07 | Stop reason: HOSPADM

## 2019-11-06 RX ORDER — FUROSEMIDE 20 MG/1
20 TABLET ORAL DAILY
Status: DISCONTINUED | OUTPATIENT
Start: 2019-11-07 | End: 2019-11-07 | Stop reason: HOSPADM

## 2019-11-06 RX ORDER — ATORVASTATIN CALCIUM 20 MG/1
40 TABLET, FILM COATED ORAL DAILY
Status: CANCELLED | OUTPATIENT
Start: 2019-11-07

## 2019-11-06 RX ORDER — HYDRALAZINE HYDROCHLORIDE 20 MG/ML
10 INJECTION INTRAMUSCULAR; INTRAVENOUS EVERY 8 HOURS PRN
Status: DISCONTINUED | OUTPATIENT
Start: 2019-11-06 | End: 2019-11-06

## 2019-11-06 RX ORDER — CARVEDILOL 3.12 MG/1
3.12 TABLET ORAL 2 TIMES DAILY
Status: DISCONTINUED | OUTPATIENT
Start: 2019-11-06 | End: 2019-11-07 | Stop reason: HOSPADM

## 2019-11-06 RX ORDER — AMOXICILLIN 250 MG
1 CAPSULE ORAL 2 TIMES DAILY
Status: CANCELLED | OUTPATIENT
Start: 2019-11-06

## 2019-11-06 RX ORDER — AMLODIPINE BESYLATE 10 MG/1
10 TABLET ORAL DAILY
Status: CANCELLED | OUTPATIENT
Start: 2019-11-07

## 2019-11-06 RX ORDER — AMLODIPINE BESYLATE 10 MG/1
10 TABLET ORAL DAILY
Qty: 30 TABLET | Refills: 0
Start: 2019-11-07 | End: 2020-11-06

## 2019-11-06 RX ORDER — SODIUM CHLORIDE 0.9 % (FLUSH) 0.9 %
10 SYRINGE (ML) INJECTION
Status: DISCONTINUED | OUTPATIENT
Start: 2019-11-06 | End: 2019-11-07

## 2019-11-06 RX ORDER — CALCIUM CARBONATE 200(500)MG
500 TABLET,CHEWABLE ORAL 2 TIMES DAILY PRN
Status: CANCELLED | OUTPATIENT
Start: 2019-11-06

## 2019-11-06 RX ORDER — CARVEDILOL 3.12 MG/1
3.12 TABLET ORAL 2 TIMES DAILY
Qty: 60 TABLET | Refills: 0
Start: 2019-11-06 | End: 2020-11-05

## 2019-11-06 RX ORDER — FUROSEMIDE 20 MG/1
20 TABLET ORAL DAILY
Status: CANCELLED | OUTPATIENT
Start: 2019-11-07

## 2019-11-06 RX ORDER — ATORVASTATIN CALCIUM 40 MG/1
40 TABLET, FILM COATED ORAL DAILY
Qty: 30 TABLET | Refills: 0
Start: 2019-11-07 | End: 2020-11-06

## 2019-11-06 RX ORDER — QUETIAPINE FUMARATE 25 MG/1
25 TABLET, FILM COATED ORAL NIGHTLY
Qty: 30 TABLET | Refills: 0
Start: 2019-11-06 | End: 2020-11-05

## 2019-11-06 RX ORDER — ACETAMINOPHEN 325 MG/1
650 TABLET ORAL EVERY 6 HOURS PRN
Status: DISCONTINUED | OUTPATIENT
Start: 2019-11-06 | End: 2019-11-07 | Stop reason: HOSPADM

## 2019-11-06 RX ORDER — RAMELTEON 8 MG/1
8 TABLET ORAL NIGHTLY PRN
Status: CANCELLED | OUTPATIENT
Start: 2019-11-06

## 2019-11-06 RX ORDER — ATORVASTATIN CALCIUM 20 MG/1
40 TABLET, FILM COATED ORAL DAILY
Status: DISCONTINUED | OUTPATIENT
Start: 2019-11-07 | End: 2019-11-07 | Stop reason: HOSPADM

## 2019-11-06 RX ORDER — CARVEDILOL 3.12 MG/1
3.12 TABLET ORAL 2 TIMES DAILY
Status: CANCELLED | OUTPATIENT
Start: 2019-11-06

## 2019-11-06 RX ORDER — SULFAMETHOXAZOLE AND TRIMETHOPRIM 800; 160 MG/1; MG/1
1 TABLET ORAL 2 TIMES DAILY
Status: DISCONTINUED | OUTPATIENT
Start: 2019-11-06 | End: 2019-11-06 | Stop reason: HOSPADM

## 2019-11-06 RX ORDER — SULFAMETHOXAZOLE AND TRIMETHOPRIM 800; 160 MG/1; MG/1
1 TABLET ORAL 2 TIMES DAILY
Status: DISCONTINUED | OUTPATIENT
Start: 2019-11-06 | End: 2019-11-07 | Stop reason: HOSPADM

## 2019-11-06 RX ORDER — SENNOSIDES 8.6 MG/1
1 TABLET ORAL DAILY
Start: 2019-11-07

## 2019-11-06 RX ORDER — ACETAMINOPHEN 325 MG/1
650 TABLET ORAL EVERY 6 HOURS PRN
Status: CANCELLED | OUTPATIENT
Start: 2019-11-06

## 2019-11-06 RX ORDER — LEVOTHYROXINE SODIUM 25 UG/1
25 TABLET ORAL DAILY
Status: CANCELLED | OUTPATIENT
Start: 2019-11-07

## 2019-11-06 RX ADMIN — RAMELTEON 8 MG: 8 TABLET ORAL at 08:11

## 2019-11-06 RX ADMIN — LEVOTHYROXINE SODIUM 25 MCG: 25 TABLET ORAL at 05:11

## 2019-11-06 RX ADMIN — APIXABAN 2.5 MG: 2.5 TABLET, FILM COATED ORAL at 08:11

## 2019-11-06 RX ADMIN — DONEPEZIL HYDROCHLORIDE 5 MG: 5 TABLET, FILM COATED ORAL at 08:11

## 2019-11-06 RX ADMIN — SENNOSIDES,DOCUSATE SODIUM 1 TABLET: 8.6; 5 TABLET, FILM COATED ORAL at 10:11

## 2019-11-06 RX ADMIN — AMLODIPINE BESYLATE 10 MG: 10 TABLET ORAL at 09:11

## 2019-11-06 RX ADMIN — SENNOSIDES 8.6 MG: 8.6 TABLET, FILM COATED ORAL at 09:11

## 2019-11-06 RX ADMIN — ATORVASTATIN CALCIUM 40 MG: 20 TABLET, FILM COATED ORAL at 09:11

## 2019-11-06 RX ADMIN — APIXABAN 2.5 MG: 2.5 TABLET, FILM COATED ORAL at 09:11

## 2019-11-06 RX ADMIN — ACETAMINOPHEN 650 MG: 325 TABLET ORAL at 11:11

## 2019-11-06 RX ADMIN — QUETIAPINE FUMARATE 12.5 MG: 25 TABLET, FILM COATED ORAL at 10:11

## 2019-11-06 RX ADMIN — CARVEDILOL 3.12 MG: 3.12 TABLET, FILM COATED ORAL at 09:11

## 2019-11-06 RX ADMIN — FUROSEMIDE 20 MG: 20 TABLET ORAL at 09:11

## 2019-11-06 RX ADMIN — SULFAMETHOXAZOLE AND TRIMETHOPRIM 1 TABLET: 800; 160 TABLET ORAL at 08:11

## 2019-11-06 RX ADMIN — CARVEDILOL 3.12 MG: 3.12 TABLET, FILM COATED ORAL at 08:11

## 2019-11-06 NOTE — PT/OT/SLP PROGRESS
Occupational Therapy   Treatment    Name: Izaiah Douglas  MRN: [id number]  Admitting Diagnosis:  Acute arterial ischemic stroke, multifocal, multiple vascular territories       Recommendations:     Discharge Recommendations: nursing facility, skilled  Discharge Equipment Recommendations:  (TBD)  Barriers to discharge:  Decreased caregiver support    Assessment:     Izaiah Douglas is a 85 y.o. male with a medical diagnosis of Acute arterial ischemic stroke, multifocal, multiple vascular territories.  He presents with fair participation and motivation. Pt continues to be at risk for falls in large part due to cognitive impairments & poor safety awareness. Performance deficits affecting function are weakness, impaired endurance, impaired self care skills, impaired functional mobilty, impaired cognition, impaired balance, decreased upper extremity function, decreased ROM, decreased lower extremity function, decreased safety awareness.     Rehab Prognosis:  Fair; patient would benefit from acute skilled OT services to address these deficits and reach maximum level of function.       Plan:     Patient to be seen 3 x/week to address the above listed problems via self-care/home management, therapeutic activities, therapeutic exercises  · Plan of Care Expires: 11/10/19  · Plan of Care Reviewed with: patient    Subjective     Pain/Comfort:  · Pain Rating 1: 0/10  · Pain Rating Post-Intervention 1: 0/10    Objective:     Communicated with: RN prior to session.  Patient found supine with telemetry(RN present at start of session) upon OT entry to room.    General Precautions: Standard, fall, aspiration     Occupational Performance:     Bed Mobility:    · Patient completed Supine to Sit with stand by assistance  · Patient completed Sit to Supine with stand by assistance     Functional Mobility/Transfers:  · Patient completed Sit <> Stand Transfer with contact guard assistance  with  rolling walker   · Functional Mobility: CGA using  RW in room & down hallway    Activities of Daily Living:  · Upper Body Dressing: moderate assistance donning gown around back while seated EOB  · Toileting: contact guard assistance to urinate standing at toilet      [facility] 6 Click ADL: 16    Treatment & Education:  Pt declined performing therex.  Pt performed mildly dynamic standing activity with CGA using RW.  Pt had no further questions & when asked whether there were any concerns pt reported none.    Patient left supine with all lines intact, call button in reach, bed alarm on, RN notified, [employer] (notified camera personnel of end of session) present and white board updated.Education:      GOALS:   Multidisciplinary Problems     Occupational Therapy Goals        Problem: Occupational Therapy Goal    Goal Priority Disciplines Outcome Interventions   Occupational Therapy Goal     OT, PT/OT Ongoing, Progressing    Description:  Updated Goals to be met by: 11/11/19    Patient will increase functional independence with ADLs by performing:    UE Dressing with Supervision.  LE Dressing with Supervision.  Grooming while standing with Supervision.  Toileting from toilet with Supervision for hygiene and clothing management.   Toilet transfer with Supervision.  Complete functional mobility household distance with Supervision using AD as needed.                           Time Tracking:     OT Date of Treatment: 11/06/19  OT Start Time: 1016  OT Stop Time: 1033  OT Total Time (min): 17 min    Billable Minutes:Therapeutic Activity 17    BRIGHT Mancilla  11/6/2019

## 2019-11-06 NOTE — PT/OT/SLP PROGRESS
"Physical Therapy Treatment    Patient Name:  Izaiah Douglas   MRN:  [id number]    Recommendations:     Discharge Recommendations:  nursing facility, skilled   Discharge Equipment Recommendations: (TBD by next level of care)   Barriers to discharge: Inaccessible home and Decreased caregiver support    Assessment:     Izaiah Douglas is a 85 y.o. male admitted with a medical diagnosis of Acute arterial ischemic stroke, multifocal, multiple vascular territories.  He presents with the following impairments/functional limitations:  weakness, impaired self care skills, impaired functional mobilty, gait instability, impaired balance, impaired cognition, decreased upper extremity function, decreased lower extremity function, decreased safety awareness.  Pt tolerated session well. He demonstrates poor insight into balance deficits and does not decrease walking speed when instructed. Pt using 2 straight canes for ambulation incorrectly, depsite verbal cuing. Pt would beenfit from a RW, but PT suspects patient would not remember to use it.  He continues to be limited by his dementia and decreased safety awareness. At this time he is not safe for D/C home due to his impaired safety awareness and impaired balance.    Rehab Prognosis: Good; patient would benefit from acute skilled PT services to address these deficits and reach maximum level of function.    Recent Surgery: * No surgery found *      Plan:     During this hospitalization, patient to be seen 3 x/week to address the identified rehab impairments via gait training, therapeutic activities, therapeutic exercises, neuromuscular re-education and progress toward the following goals:    · Plan of Care Expires:  11/08/19    Subjective     Chief Complaint: "I want a hamburger"  Patient/Family Comments/goals: "Do you have a hamburger?"  Pain/Comfort:  · Pain Rating 1: 0/10  · Pain Rating Post-Intervention 1: 0/10      Objective:     Communicated with nursing prior to session.  " Patient found in wheel chair in hallway with nursing staff with telemetry upon PT entry to room.     General Precautions: Standard, fall, aspiration   Orthopedic Precautions:N/A   Braces: N/A     Functional Mobility:  · Transfers:     · Sit to Stand:  contact guard assistance with no AD. X 2 attempts. Pt had to be convinced to give PT x2 SCs to stand from chair.  · Gait: ~200 ft with SCs. Balance instability especially with turns. SCs being used as walking sticks. Pt not following VCs to slow down.  · Dynamic Standing Balance: gait instability present with ambulation in escobar.      AM-PAC 6 CLICK MOBILITY  Turning over in bed (including adjusting bedclothes, sheets and blankets)?: 4  Sitting down on and standing up from a chair with arms (e.g., wheelchair, bedside commode, etc.): 3  Moving from lying on back to sitting on the side of the bed?: 3  Moving to and from a bed to a chair (including a wheelchair)?: 3  Need to walk in hospital room?: 3  Climbing 3-5 steps with a railing?: 2  Basic Mobility Total Score: 18       Therapeutic Activities and Exercises:   Patient educated on role of therapy, goals of session, and benefits of mobilizing. All questions answered within PT scope of practice.       Patient left in wheel chair with all lines intact and nursing present.    GOALS:   Multidisciplinary Problems     Physical Therapy Goals        Problem: Physical Therapy Goal    Goal Priority Disciplines Outcome Goal Variances Interventions   Physical Therapy Goal     PT, PT/OT Ongoing, Progressing     Description:  Goals to be met by: 19, goals extended to 11/15/2019    Patient will increase functional independence with mobility by performin. Supine to sit with Minimal Assistance - met 10/16   1a. Supine to sit with Supervision with bed flat - not met  2. Sit to supine with Minimal Assistance - Not met  3. Sit to stand transfer with Stand-by Assistance - met 10/21/19  4. Ascend/descend 3 stairs with right  Handrail with Supervision - Not met  5. Lower extremity exercise program x 20 reps per handout, with assistance as needed - Not met  6. Added on 10/13: Bed to chair transfer with RW and supervision - Not met  7. Added on 10/13: Gait x 300 ft with RW and SBA (including turns and straight lines)- not met   7a. Gait x 300 ft with bilateral SPC and SBA (including turns and straight lines) (revised 10/17)- not met                          Time Tracking:     PT Received On: 11/06/19  PT Start Time: 1452     PT Stop Time: 1505  PT Total Time (min): 13 min     Billable Minutes: Gait Training 13    Treatment Type: Treatment  PT/PTA: PT     PTA Visit Number: 0     Maricel Pruitt, PT  11/06/2019

## 2019-11-06 NOTE — PLAN OF CARE
[facility]     Department of Hospital Medicine     [address] [place] [address]     [phone] after hours  [phone] fax       NURSING HOME ORDERS    11/06/2019    Admit to Nursing Home: Skilled Bed                                               Diagnoses:  Active Hospital Problems    Diagnosis  POA    *Acute arterial ischemic stroke, multifocal, multiple vascular territories [I63.89]  Yes    SIADH (syndrome of inappropriate ADH production) [E22.2]  Yes    Dementia [F03.90]  Yes    Nodule of right lung [R91.1]  Yes    Occlusion of left carotid artery [I65.22]  Yes    Intracranial atherosclerosis [I67.2]  Yes    Paroxysmal atrial fibrillation [I48.0]  Yes    Coronary artery disease involving native coronary artery of native heart without angina pectoris [I25.10]  Yes    Cytotoxic cerebral edema [G93.6]  Yes    Essential hypertension [I10]  Yes     - Hold antihypertensives in light of recent stroke and permissive HTN  - [place] when bed secured in NICU      Acquired hypothyroidism [E03.9]  Yes     - Continue home synthroid      Chronic combined systolic and diastolic congestive heart failure [I50.42]  Yes     - Unclear hx from daughter but home medications reflective CHF  - TTE tomorrow  - Judicious fluid administration        Resolved Hospital Problems    Diagnosis Date Resolved POA    AMRIT (acute kidney injury) [N17.9] 11/06/2019 Yes    Person awaiting admission to adequate facility elsewhere [Z75.1] 11/06/2019 Yes       Patient is homebound due to:  Acute arterial ischemic stroke, multifocal, multiple vascular territories    Allergies:  Review of patient's allergies indicates:   Allergen Reactions    Iodine and iodide containing products        Vitals:  Per facility protocol     Diet: cardiac diet, 1 liter fluid restriction, aspiration precautions     Acitivities: per tolerated     LABS:  Per facility protocol    Nursing Precautions:   -  Aspiration precautions:             - Total assistance with meals            -  Upright 90 degrees befor during and after meals             -  Suction at bedside          - Fall precautions per nursing home protocol   - Seizure precaution per correction protocol   - Decubitus precautions:        -  for positioning   - Pressure reducing foam mattress   - Turn patient every two hours. Use wedge pillows to anchor patient    CONSULTS:     Physical Therapy to evaluate and treat     Occupational Therapy to evaluate and treat     Speech Therapy  to evaluate and treat     Nutrition to evaluate and recommend diet     Psychiatry to evaluate and follow patients for delirium    MISCELLANEOUS CARE:              Colostomy Care:  Empty bag every shift and prn                                             Change and clean site every 48 hours     PEG Care:  Clean site every 24 hours     Langston Care: Empty Langston bag every shift.  Change Lang[place] every month     Routine Skin for Bedridden Patients:  Apply moisture barrier cream to all    skin folds and wet areas in perineal area daily and after baths and                           all bowel movements                     Medications: Discontinue all previous medication orders, if any. See new list below.     Avery Douglas   Home Medication Instructions MELINA:[id number]    Printed on:11/06/19 3765   Medication Information                      amLODIPine (NORVASC) 10 MG tablet  Take 1 tablet (10 mg total) by mouth once daily.             apixaban (ELIQUIS) 2.5 mg Tab  Take 1 tablet (2.5 mg total) by mouth 2 (two) times daily.             atorvastatin (LIPITOR) 40 MG tablet  Take 1 tablet (40 mg total) by mouth once daily.             carvedilol (COREG) 3.125 MG tablet  Take 1 tablet (3.125 mg total) by mouth 2 (two) times daily.             donepezil (ARICEPT ODT) 5 MG TbDL  Take 5 mg by mouth nightly.             furosemide (LASIX) 20 MG tablet  Take 20 mg by mouth 2 (two) times daily.              levothyroxine (SYNTHROID) 25 MCG tablet  Take 25 mcg by mouth once daily.             QUEtiapine (SEROQUEL) 25 MG Tab  Take 1 tablet (25 mg total) by mouth every evening.             senna (SENOKOT) 8.6 mg tablet  Take 1 tablet by mouth once daily.             sulfamethoxazole-trimethoprim 800-160mg (BACTRIM DS) 800-160 mg Tab  Take 1 tablet by mouth 2 (two) times daily. for 3 days                       _________________________________  Gertrudis Hernandez PA-C  11/06/2019

## 2019-11-06 NOTE — PLAN OF CARE
NO ACUTE CHANGES NOTED THIS SHIFT.  PATIENT CONTINUES TO SLEEP A FEW HOURS & GET UP TO VOID.  RESTLESS AT TIMES.  TECH, TRENIECE ROLLS PATIENT AROUND THE UNIT IN WHEELCHAIR THIS AM TO KEEP PATIENT CALM.  HE DENIES ANY PAIN.  REPORTS HE IS READY TO GO BACK TO [place] & TO FIND HIS DAUGHTER.  REORIENTED CONTINUOUSLY.  PERIODS OF AGITATION NOTED.  ANCEF GIVEN ON PM SHIFT.  NOTED PATIENT ITCHING TO FACE.  NOTED RED RASH TO FACE THIS AM.  SPLOTCHY REDNESS BUT NO RAISED BUMPS.  PATIENT REPORTS IT ITCHES STILL AT TIMES.  REAPPLIED TELE MONITOR MULTIPLE TIMES.  PATIENT CONTINUES TO PULL OFF MONITOR LEADS.

## 2019-11-06 NOTE — PLAN OF CARE
11/06/19 1644   Final Note   Assessment Type Final Discharge Note   Anticipated Discharge Disposition SNF   What phone number can be called within the next 1-3 days to see how you are doing after discharge? [phone]   Hospital Follow Up  Appt(s) scheduled? Yes   Discharge plans and expectations educations in teach back method with documentation complete? Yes   Right Care Referral Info   Post Acute Recommendation SNF / Sub-Acute Rehab   Facility Name [facility]

## 2019-11-06 NOTE — PLAN OF CARE
Problem: Occupational Therapy Goal  Goal: Occupational Therapy Goal  Description  Updated Goals to be met by: 11/11/19    Patient will increase functional independence with ADLs by performing:    UE Dressing with Supervision.  LE Dressing with Supervision.  Grooming while standing with Supervision.  Toileting from toilet with Supervision for hygiene and clothing management.   Toilet transfer with Supervision.  Complete functional mobility household distance with Supervision using AD as needed.          Outcome: Ongoing, Progressing     Goals remain appropriate

## 2019-11-06 NOTE — PLAN OF CARE
11/06/19 1514   Post-Acute Status   Post-Acute Authorization Placement   Post-Acute Placement Status Set-up Complete       Pt has been accepted by [facility].  Nurse can call report to [phone].  Transport setup by W/C for 4:30. SW in contact with CM and Medical staff.       Noah Thomas LMSW  [facility]   Ext. [phone]

## 2019-11-06 NOTE — PLAN OF CARE
Pt is progressing towards goals as expected. Goals remain appropriate continue with current POC.     Maricel Pruitt, PT, DPT  2019      Problem: Physical Therapy Goal  Goal: Physical Therapy Goal  Description  Goals to be met by: 19, goals extended to 11/15/2019    Patient will increase functional independence with mobility by performin. Supine to sit with Minimal Assistance - met 10/16   1a. Supine to sit with Supervision with bed flat - not met  2. Sit to supine with Minimal Assistance - Not met  3. Sit to stand transfer with Stand-by Assistance - met 10/21/19  4. Ascend/descend 3 stairs with right Handrail with Supervision - Not met  5. Lower extremity exercise program x 20 reps per handout, with assistance as needed - Not met  6. Added on 10/13: Bed to chair transfer with RW and supervision - Not met  7. Added on 10/13: Gait x 300 ft with RW and SBA (including turns and straight lines)- not met   7a. Gait x 300 ft with bilateral SPC and SBA (including turns and straight lines) (revised 10/17)- not met         Outcome: Ongoing, Progressing

## 2019-11-07 ENCOUNTER — HOSPITAL ENCOUNTER (EMERGENCY)
Facility: HOSPITAL | Age: 84
Discharge: HOME | End: 2019-11-07
Attending: EMERGENCY MEDICINE
Payer: MEDICARE

## 2019-11-07 VITALS
HEART RATE: 70 BPM | OXYGEN SATURATION: 96 % | SYSTOLIC BLOOD PRESSURE: 111 MMHG | TEMPERATURE: 98 F | HEIGHT: 72 IN | WEIGHT: 194 LBS | DIASTOLIC BLOOD PRESSURE: 55 MMHG | RESPIRATION RATE: 16 BRPM | BODY MASS INDEX: 26.28 KG/M2

## 2019-11-07 DIAGNOSIS — G30.1 LATE ONSET ALZHEIMER'S DISEASE WITHOUT BEHAVIORAL DISTURBANCE: Primary | ICD-10-CM

## 2019-11-07 DIAGNOSIS — F02.80 LATE ONSET ALZHEIMER'S DISEASE WITHOUT BEHAVIORAL DISTURBANCE: Primary | ICD-10-CM

## 2019-11-07 LAB
ANION GAP SERPL CALC-SCNC: 9 MMOL/L (ref 8–16)
BACTERIA UR CULT: NORMAL
BACTERIA UR CULT: NORMAL
BASOPHILS # BLD AUTO: 0.03 K/UL (ref 0–0.2)
BASOPHILS NFR BLD: 0.5 % (ref 0–1.9)
BUN SERPL-MCNC: 32 MG/DL (ref 8–23)
CALCIUM SERPL-MCNC: 8.6 MG/DL (ref 8.7–10.5)
CHLORIDE SERPL-SCNC: 100 MMOL/L (ref 95–110)
CO2 SERPL-SCNC: 28 MMOL/L (ref 23–29)
CREAT SERPL-MCNC: 1.8 MG/DL (ref 0.5–1.4)
DIFFERENTIAL METHOD: ABNORMAL
EOSINOPHIL # BLD AUTO: 0.2 K/UL (ref 0–0.5)
EOSINOPHIL NFR BLD: 3.7 % (ref 0–8)
ERYTHROCYTE [DISTWIDTH] IN BLOOD BY AUTOMATED COUNT: 14.3 % (ref 11.5–14.5)
EST. GFR  (AFRICAN AMERICAN): 38.8 ML/MIN/1.73 M^2
EST. GFR  (NON AFRICAN AMERICAN): 33.6 ML/MIN/1.73 M^2
GLUCOSE SERPL-MCNC: 74 MG/DL (ref 70–110)
HCT VFR BLD AUTO: 37.1 % (ref 40–54)
HGB BLD-MCNC: 11.3 G/DL (ref 14–18)
IMM GRANULOCYTES # BLD AUTO: 0.01 K/UL (ref 0–0.04)
IMM GRANULOCYTES NFR BLD AUTO: 0.2 % (ref 0–0.5)
LYMPHOCYTES # BLD AUTO: 1.8 K/UL (ref 1–4.8)
LYMPHOCYTES NFR BLD: 28.5 % (ref 18–48)
MAGNESIUM SERPL-MCNC: 1.8 MG/DL (ref 1.6–2.6)
MCH RBC QN AUTO: 26 PG (ref 27–31)
MCHC RBC AUTO-ENTMCNC: 30.5 G/DL (ref 32–36)
MCV RBC AUTO: 85 FL (ref 82–98)
MONOCYTES # BLD AUTO: 0.8 K/UL (ref 0.3–1)
MONOCYTES NFR BLD: 13.5 % (ref 4–15)
NEUTROPHILS # BLD AUTO: 3.4 K/UL (ref 1.8–7.7)
NEUTROPHILS NFR BLD: 53.6 % (ref 38–73)
NRBC BLD-RTO: 0 /100 WBC
PHOSPHATE SERPL-MCNC: 3 MG/DL (ref 2.7–4.5)
PLATELET # BLD AUTO: 165 K/UL (ref 150–350)
PMV BLD AUTO: 10.7 FL (ref 9.2–12.9)
POTASSIUM SERPL-SCNC: 4.1 MMOL/L (ref 3.5–5.1)
RBC # BLD AUTO: 4.35 M/UL (ref 4.6–6.2)
SODIUM SERPL-SCNC: 137 MMOL/L (ref 136–145)
WBC # BLD AUTO: 6.24 K/UL (ref 3.9–12.7)

## 2019-11-07 PROCEDURE — 99284 EMERGENCY DEPT VISIT MOD MDM: CPT | Mod: ,,, | Performed by: EMERGENCY MEDICINE

## 2019-11-07 PROCEDURE — 25000003 PHARM REV CODE 250: Performed by: INTERNAL MEDICINE

## 2019-11-07 PROCEDURE — 99284 PR EMERGENCY DEPT VISIT,LEVEL IV: ICD-10-PCS | Mod: ,,, | Performed by: EMERGENCY MEDICINE

## 2019-11-07 PROCEDURE — 80048 BASIC METABOLIC PNL TOTAL CA: CPT

## 2019-11-07 PROCEDURE — 85025 COMPLETE CBC W/AUTO DIFF WBC: CPT

## 2019-11-07 PROCEDURE — 84100 ASSAY OF PHOSPHORUS: CPT

## 2019-11-07 PROCEDURE — 11000004 HC SNF PRIVATE

## 2019-11-07 PROCEDURE — 83735 ASSAY OF MAGNESIUM: CPT

## 2019-11-07 PROCEDURE — 25000003 PHARM REV CODE 250: Performed by: PHYSICIAN ASSISTANT

## 2019-11-07 PROCEDURE — 99285 EMERGENCY DEPT VISIT HI MDM: CPT

## 2019-11-07 PROCEDURE — 36415 COLL VENOUS BLD VENIPUNCTURE: CPT

## 2019-11-07 RX ORDER — SENNOSIDES 8.6 MG/1
1 TABLET ORAL DAILY
Status: DISCONTINUED | OUTPATIENT
Start: 2019-11-08 | End: 2019-11-08 | Stop reason: HOSPADM

## 2019-11-07 RX ORDER — OLANZAPINE 10 MG/2ML
5 INJECTION, POWDER, FOR SOLUTION INTRAMUSCULAR 2 TIMES DAILY PRN
Status: DISCONTINUED | OUTPATIENT
Start: 2019-11-07 | End: 2019-11-08 | Stop reason: HOSPADM

## 2019-11-07 RX ORDER — FUROSEMIDE 20 MG/1
20 TABLET ORAL 2 TIMES DAILY
Status: DISCONTINUED | OUTPATIENT
Start: 2019-11-07 | End: 2019-11-08 | Stop reason: HOSPADM

## 2019-11-07 RX ORDER — ATORVASTATIN CALCIUM 20 MG/1
40 TABLET, FILM COATED ORAL DAILY
Status: DISCONTINUED | OUTPATIENT
Start: 2019-11-08 | End: 2019-11-08 | Stop reason: HOSPADM

## 2019-11-07 RX ORDER — ACETAMINOPHEN 325 MG/1
650 TABLET ORAL EVERY 6 HOURS PRN
Status: DISCONTINUED | OUTPATIENT
Start: 2019-11-07 | End: 2019-11-08 | Stop reason: HOSPADM

## 2019-11-07 RX ORDER — AMLODIPINE BESYLATE 10 MG/1
10 TABLET ORAL DAILY
Status: DISCONTINUED | OUTPATIENT
Start: 2019-11-08 | End: 2019-11-08 | Stop reason: HOSPADM

## 2019-11-07 RX ORDER — CALCIUM CARBONATE 200(500)MG
500 TABLET,CHEWABLE ORAL 2 TIMES DAILY PRN
Status: DISCONTINUED | OUTPATIENT
Start: 2019-11-07 | End: 2019-11-08 | Stop reason: HOSPADM

## 2019-11-07 RX ORDER — LEVOTHYROXINE SODIUM 25 UG/1
25 TABLET ORAL
Status: DISCONTINUED | OUTPATIENT
Start: 2019-11-08 | End: 2019-11-08 | Stop reason: HOSPADM

## 2019-11-07 RX ORDER — CARVEDILOL 3.12 MG/1
3.12 TABLET ORAL 2 TIMES DAILY
Status: DISCONTINUED | OUTPATIENT
Start: 2019-11-07 | End: 2019-11-08 | Stop reason: HOSPADM

## 2019-11-07 RX ORDER — RAMELTEON 8 MG/1
8 TABLET ORAL NIGHTLY PRN
Status: DISCONTINUED | OUTPATIENT
Start: 2019-11-07 | End: 2019-11-08 | Stop reason: HOSPADM

## 2019-11-07 RX ORDER — LORAZEPAM 2 MG/ML
0.5 INJECTION INTRAMUSCULAR 2 TIMES DAILY PRN
Status: DISCONTINUED | OUTPATIENT
Start: 2019-11-07 | End: 2019-11-08 | Stop reason: HOSPADM

## 2019-11-07 RX ORDER — DONEPEZIL HYDROCHLORIDE 5 MG/1
10 TABLET, FILM COATED ORAL NIGHTLY
Status: DISCONTINUED | OUTPATIENT
Start: 2019-11-07 | End: 2019-11-08 | Stop reason: HOSPADM

## 2019-11-07 RX ORDER — AMOXICILLIN 250 MG
1 CAPSULE ORAL 2 TIMES DAILY
Status: DISCONTINUED | OUTPATIENT
Start: 2019-11-07 | End: 2019-11-07

## 2019-11-07 RX ORDER — DIPHENHYDRAMINE HYDROCHLORIDE 50 MG/ML
12.5 INJECTION INTRAMUSCULAR; INTRAVENOUS 2 TIMES DAILY PRN
Status: DISCONTINUED | OUTPATIENT
Start: 2019-11-07 | End: 2019-11-08 | Stop reason: HOSPADM

## 2019-11-07 RX ORDER — QUETIAPINE FUMARATE 25 MG/1
50 TABLET, FILM COATED ORAL
Status: DISCONTINUED | OUTPATIENT
Start: 2019-11-07 | End: 2019-11-08 | Stop reason: HOSPADM

## 2019-11-07 RX ADMIN — SULFAMETHOXAZOLE AND TRIMETHOPRIM 1 TABLET: 800; 160 TABLET ORAL at 08:11

## 2019-11-07 RX ADMIN — CARVEDILOL 3.12 MG: 3.12 TABLET, FILM COATED ORAL at 08:11

## 2019-11-07 RX ADMIN — ATORVASTATIN CALCIUM 40 MG: 20 TABLET, FILM COATED ORAL at 08:11

## 2019-11-07 RX ADMIN — LEVOTHYROXINE SODIUM 25 MCG: 25 TABLET ORAL at 05:11

## 2019-11-07 RX ADMIN — RAMELTEON 8 MG: 8 TABLET ORAL at 08:11

## 2019-11-07 RX ADMIN — FUROSEMIDE 20 MG: 20 TABLET ORAL at 08:11

## 2019-11-07 RX ADMIN — AMLODIPINE BESYLATE 10 MG: 10 TABLET ORAL at 08:11

## 2019-11-07 RX ADMIN — QUETIAPINE FUMARATE 50 MG: 25 TABLET ORAL at 08:11

## 2019-11-07 RX ADMIN — APIXABAN 2.5 MG: 2.5 TABLET, FILM COATED ORAL at 08:11

## 2019-11-07 RX ADMIN — SENNOSIDES,DOCUSATE SODIUM 1 TABLET: 8.6; 5 TABLET, FILM COATED ORAL at 08:11

## 2019-11-07 RX ADMIN — DONEPEZIL HYDROCHLORIDE 10 MG: 5 TABLET, FILM COATED ORAL at 08:11

## 2019-11-07 RX ADMIN — SENNOSIDES 8.6 MG: 8.6 TABLET, FILM COATED ORAL at 08:11

## 2019-11-07 NOTE — DISCHARGE SUMMARY
[facility]  Vascular Neurology  Comprehensive Stroke Center  Discharge Summary     Summary:     Admit Date: 11/7/2019  9:54 AM    Discharge Date and Time:  11/07/2019 5:36 PM    Attending Physician: Bari Srivastava MD     Discharge Provider: Gertrudis Hernandez PA-C    History of Present Illness: No notes on file    Hospital Course: This is an 85 year old male with PMH chronic Afib (not on AC), CAD (S/P stent), HTN, combined CHF, and hypothyroidism who received IV-tPA at OSH on 10/10 after presenting with right-sided weakness, aphasia, and left gaze deviation. CTA MP showed a chronically occluded left ICA from it's origin to the supraclinoid segment with filling of the MCA via the anterior communicating artery, along with substantial burden of intracranial and extracranial atherosclerotic disease, including occluded V4 with an intermittently occluded basilar. No EVT was pursued. TTE on 10/11 showed an EF of 35% and severe left atrial enlargement,however no thrombus. MRI brain on 10/11 demonstrated scattered areas of infarct in the bilateral hemispheres, predominantly in the right frontal lobe. Etiology suspected likely hypoperfusion/watershed in the setting of vascular abnormalities, though differential also includes cardioembolic in the setting of AF and ischemic cardiomyopathy. PT, OT, ST evaluated patient and recommended skilled nursing facility. For secondary stroke prevention patient started on Eliquis 2.5 mg BID and atorvastatin 40mg qhs    Fluid restriction placed for SIADH now resolved.  Discharged on bactrim for complicated UTI  Seroquel for agitation      He will follow up in stroke clinic in 4-6 weeks       Stroke Etiology: Probable Extracranial Supra-Aortic Large Artery Atherosclerosis (ROSALVA)    STROKE DOCUMENTATION         NIH Scale:  Interval: 7 days or at discharge (whichever comes first)  1a. Level of Consciousness: 0-->Alert, keenly responsive  1b. LOC Questions: 2-->Answers  neither question correctly  1c. LOC Commands: 0-->Performs both tasks correctly  2. Best Gaze: 0-->Normal  3. Visual: 0-->No visual loss  4. Facial Palsy: 1-->Minor paralysis (flattened nasolabial fold, asymmetry on smiling)  5a. Motor Arm, Left: 0-->No drift, limb holds 90 (or 45) degrees for full 10 secs  5b. Motor Arm, Right: 0-->No drift, limb holds 90 (or 45) degrees for full 10 secs  6a. Motor Leg, Left: 0-->No drift, leg holds 30 degree position for full 5 secs  6b. Motor Leg, Right: 0-->No drift, leg holds 30 degree position for full 5 secs  7. Limb Ataxia: 0-->Absent  8. Sensory: 0-->Normal, no sensory loss  9. Best Language: 0-->No aphasia, normal  10. Dysarthria: 1-->Mild-to-moderate dysarthria, patient slurs at least some words and, at worst, can be understood with some difficulty  11. Extinction and Inattention (formerly Neglect): 0-->No abnormality  Total (NIH Stroke Scale): 4        Modified Isabel Score: 3  Gold Bar Coma Scale:    ABCD2 Score:    [id number] Score:   HAS -BLED Score:   ICH Score:   Hunt & Stroud Classification:       Assessment/Plan:     Diagnostic Results:  MRI Brain WO Contrast (10/11/2019) -         Areas of abnormal T2/FLAIR/diffusion signal abnormality consistent with areas of acute ischemia/infarct involving the cerebral hemispheres bilaterally.  There is signal abnormality associated with the left internal carotid artery likely corresponding to the appearance of occlusion on the CTA examination.     CTA Multiphase (10/10/2019) -      Occluded left ICA with reconstitution of the distal/supraclinoid segment by retrograde flow through [id number].  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  Right vertebral artery supply the basilar artery.  Basilar artery is small in caliber with wall irregularity and intermittent occlusions.  Partially calcified  hyperattenuating lesion in the lateral aspect of the cavernous sinus abutting the distal right ICA, likely representing calcified meningioma.  Generalized cerebral volume loss and remote lacunar type infarct in the right caudate head.        Cardiac Imaging:     TTE (10/11/2019) -   · Moderately decreased left ventricular systolic function. The estimated ejection fraction is 35%.  · Concentric left ventricular remodeling.  · Left ventricular diastolic dysfunction.  · Local segmental wall motion abnormalities.  · Severe left atrial enlargement.  · Mild right atrial enlargement.  · Mild aortic regurgitation.  · Moderate aortic valve stenosis but difficult to appreciate in the setting of AF, depressed EF, and low stroke volume.  · Aortic valve area is 1.39 cm2; peak velocity is 1.28 m/s; mean gradient is 4 mmHg.              Elevated central venous pressure (15 mm Hg      Interventions: None    Complications: None    Disposition: Discharged to Other Faci*    There are no hospital problems to display for this patient.    No new Assessment & Plan notes have been filed under this hospital service since the last note was generated.  Service: Vascular Neurology      Recommendations:     Post-discharge complication risks: Falls, Pneumonia, Skin breakdown, Urinary tract infections    Stroke Education given to: patient and family    Follow-up in Stroke Clinic in 4-6weeks     Discharge Plan:  Statin: Atorvastatin 40mg  Anticoagulant: Eliquis 2.5mg BID  Aggresive risk factor modification:  Hypertension  High Cholesterol  Diet  Exercise  Obesity    Medications:  Reconciled Home Medications:      Medication List      ASK your doctor about these medications    amLODIPine 10 MG tablet  Commonly known as:  NORVASC  Take 1 tablet (10 mg total) by mouth once daily.     apixaban 2.5 mg Tab  Commonly known as:  ELIQUIS  Take 1 tablet (2.5 mg total) by mouth 2 (two) times daily.     atorvastatin 40 MG tablet  Commonly known as:   LIPITOR  Take 1 tablet (40 mg total) by mouth once daily.     carvedilol 3.125 MG tablet  Commonly known as:  COREG  Take 1 tablet (3.125 mg total) by mouth 2 (two) times daily.     donepezil 5 MG Tbdl  Commonly known as:  ARICEPT ODT  Take 5 mg by mouth nightly.     furosemide 20 MG tablet  Commonly known as:  LASIX  Take 20 mg by mouth 2 (two) times daily.     levothyroxine 25 MCG tablet  Commonly known as:  SYNTHROID  Take 25 mcg by mouth once daily.     QUEtiapine 25 MG Tab  Commonly known as:  SEROQUEL  Take 1 tablet (25 mg total) by mouth every evening.     senna 8.6 mg tablet  Commonly known as:  SENOKOT  Take 1 tablet by mouth once daily.     sulfamethoxazole-trimethoprim 800-160mg 800-160 mg Tab  Commonly known as:  BACTRIM DS  Take 1 tablet by mouth 2 (two) times daily. for 3 days            Gertrudis Hernandez PA-C  [facility]  Department of Vascular Neurology   [facility]

## 2019-11-07 NOTE — ED NOTES
[facility] from [facility] for agitation and throwing food at nurses at facility.  Patient states he does not remember what happened and is calm and cooperative at this time.  Oriented to self and place.  Disoriented to time and situation.  Hx CVA.

## 2019-11-07 NOTE — ED PROVIDER NOTES
Encounter Date: 11/7/2019       History     Chief Complaint   Patient presents with    Agitation     skilled nursing stated that patient arrived to skilled yesterday and was throwing food at staff, wanted patient sent out for psych eval, patient has no complaints     85-year-old gentleman with history of dementia, recent CVA and UTI is being transferred from the skilled nursing facility with concern for agitation.  EMS reports that the patient threw some food during breakfast.  They report that the nursing facility told him that they would like a psychiatric evaluation and placement in a geriatric psychiatric facility.  They states that they are sending all the patient's belongings with him because he is discharged from their care.  EMS reports that the patient has been calm and cooperative with them.  He is requesting apple juice.    The history is provided by the EMS personnel. The history is limited by the condition of the patient and the absence of a caregiver.     Review of patient's allergies indicates:   Allergen Reactions    Iodine and iodide containing products      Past Medical History:   Diagnosis Date    Acute arterial ischemic stroke, multifocal, multiple vascular territories 10/10/2019    CAD (coronary artery disease) 10/11/2019    CHF (congestive heart failure)     Chronic combined systolic and diastolic congestive heart failure 10/10/2019    - Unclear hx from daughter but home medications reflective CHF - TTE tomorrow - Judicious fluid administration    Dementia 10/13/2019    Hypertension     Paroxysmal atrial fibrillation 10/11/2019    SIADH (syndrome of inappropriate ADH production) 10/14/2019    Stroke     TIA    Thyroid disease      Past Surgical History:   Procedure Laterality Date    AORTA SURGERY      CORONARY ANGIOPLASTY WITH STENT PLACEMENT      REPAIR OF ANEURYSM       No family history on file.  Social History     Tobacco Use    Smoking status: Never Smoker    Smokeless  tobacco: Current User     Types: Chew   Substance Use Topics    Alcohol use: Not Currently    Drug use: Never     Review of Systems   Unable to perform ROS: Dementia       Physical Exam     Initial Vitals [11/07/19 0946]   BP Pulse Resp Temp SpO2   (!) 111/55 70 16 98.2 °F (36.8 °C) 96 %      MAP       --         Physical Exam    Nursing note and vitals reviewed.  Constitutional: Vital signs are normal. He appears well-developed and well-nourished.   HENT:   Head: Normocephalic and atraumatic.   Mouth/Throat: Oropharynx is clear and moist.   Eyes: Conjunctivae are normal.   Neck: Trachea normal and normal range of motion. Neck supple.   Cardiovascular: Normal rate, regular rhythm, normal heart sounds and normal pulses.   Pulmonary/Chest: Breath sounds normal. No respiratory distress.   Abdominal: Soft. Normal appearance. There is no tenderness.   Musculoskeletal: Normal range of motion.   Neurological: He is alert.   Oriented to person   Skin: Skin is warm and dry.         ED Course   Procedures  Labs Reviewed - No data to display       Imaging Results    None          Medical Decision Making:   History:   I obtained history from: someone other than patient.  Old Medical Records: I decided to obtain old medical records.  Initial Assessment:   Evaluation of agitation  Differential Diagnosis:   Dementia, delirium, behavioral disturbance              Attending Attestation:             Attending ED Notes:   Reviewed the patient's documentation.  He has been discharged for less than 24 hr.  He had several documented episodes of having some agitation or behavioral disturbance that was easily redirectable.  Patient did not require advanced intervention or medication.  He is calm, cooperative here in the emergency department today.  Of note he is with all of his belongings.  I discussed the case with our  who has communicated with the skilled nursing facility.  There is some report that they were unaware his  diagnoses of dementia or his prior episodes of behavioral disturbance.  He is seen back at the facility today there was no intervention or escalation techniques attempted when the patient had his minor behavioral outbursts.  The patient has not been evaluated by physician at the skilled nursing facility.  He there is absolutely no indication to readmit the patient to the hospital.  There is absolutely no indication for psychiatric evaluation, PEC or geriatric psychiatric facility placement.  The patient will be returned to the skilled nursing facility.                        Clinical Impression:       ICD-10-CM ICD-9-CM   1. Late onset Alzheimer's disease without behavioral disturbance G30.1 331.0    F02.80 294.10         Disposition:   Disposition: Transferred                     Melvin Kelly MD  11/07/19 9111

## 2019-11-07 NOTE — NURSING
Trying to get patient to either sit or get back in bed, patient yelled obsity at me and threw hand full of his breakfast at me.

## 2019-11-07 NOTE — ASSESSMENT & PLAN NOTE
-Stroke risk factor  -Most recent TTE on 10/11 significant for EF of 35%, diastolic dysfunction, wall motion abnormalities, severe left atrial enlargement, and moderate AS.   -Likely ischemic etiology with history of CAD.   -Home regimen: Lasix 20 mg and Spironolactone.   -Pro-BNP from Feb 2019 >3000.   -Appears euvolemic on exam.     Plan:   -Initiated Coreg 3.125mg BID.   -Holding Spironolactone    -Initially held Lasix for AMRIT, however reviewed outside records from [facility], and Cr range 1.5 -2.6. Patient likely at baseline. Restarted Lasix on 10/15 at reduced-dose 20 mg daily.    -Daily weights and strict I/O's.

## 2019-11-07 NOTE — PLAN OF CARE
Pt can be transported back to [facility]. Nurse call report to [phone]. Pt will be going to Room #304.    SW informed nurse of above.       Shivani Corona, [facility]  [phone]

## 2019-11-07 NOTE — NURSING
Order received to discharge the patient back to the ED for psych evaluation.  Charge nurse spoke to patients daughter in [place] to advise her of her fathers behaviors and the POC.

## 2019-11-07 NOTE — NURSING
Admission note:   Pt admitted from [facility] to room 304 via w/c accompanied by [facility] transporter. Pt is AAO x1, skin warm and dry with ecchy skin and body mole to various parts of the body. LLE +edema. Cont of b/b and used restroom upon arrival.  No IV  Noted.

## 2019-11-07 NOTE — NURSING
Patient non compliant with verbal instructions to remain seated or in the bed.  Patient threw his cane across the room when charge nurse asked him to be seated in the chair and nurse reported this morning that the patient threw a handful of his breakfast at her when she attempted to have him get back in his bed.   was called and advised of patients behaviors.

## 2019-11-07 NOTE — DISCHARGE SUMMARY
This note has been moved to another encounter. If you have any questions, please contact HIM Chart Correction at [phone].

## 2019-11-07 NOTE — ASSESSMENT & PLAN NOTE
-Area of cytotoxic cerebral edema identified when reviewing brain imaging in the territory of the R/L middle cerebral artery. There is not mass effect associated with it.   -The pattern is suggestive of cardioembolic etiology.

## 2019-11-07 NOTE — HOSPITAL COURSE
This is an 85 year old male with PMH chronic Afib (not on AC), CAD (S/P stent), HTN, combined CHF, and hypothyroidism who received IV-tPA at OSH on 10/10 after presenting with right-sided weakness, aphasia, and left gaze deviation. CTA MP showed a chronically occluded left ICA from it's origin to the supraclinoid segment with filling of the MCA via the anterior communicating artery, along with substantial burden of intracranial and extracranial atherosclerotic disease, including occluded V4 with an intermittently occluded basilar. No EVT was pursued. TTE on 10/11 showed an EF of 35% and severe left atrial enlargement,however no thrombus. MRI brain on 10/11 demonstrated scattered areas of infarct in the bilateral hemispheres, predominantly in the right frontal lobe. Etiology suspected likely hypoperfusion/watershed in the setting of vascular abnormalities, though differential also includes cardioembolic in the setting of AF and ischemic cardiomyopathy. PT, OT, ST evaluated patient and recommended skilled nursing facility. For secondary stroke prevention patient started on Eliquis 2.5 mg BID and atorvastatin 40mg qhs    Fluid restriction placed for SIADH now resolved.  Discharged on bactrim for complicated UTI  Seroquel for agitation      He will follow up in stroke clinic in 4-6 weeks

## 2019-11-07 NOTE — PROGRESS NOTES
[facility]  Vascular Neurology  Comprehensive Stroke Center  Progress Note    Assessment/Plan:     * Acute arterial ischemic stroke, multifocal, multiple vascular territories  This is an 85 year old male with PMH chronic Afib (not on AC), CAD (S/P stent), HTN, combined CHF, and hypothyroidism who received IV-tPA at OSH on 10/10 after presenting with right-sided weakness, aphasia, and left gaze deviation. CTA MP showed a chronically occluded left ICA from it's origin to the supraclinoid segment with filling of the MCA via the anterior communicating artery, along with substantial burden of intracranial and extracranial atherosclerotic disease, including occluded V4 with an intermittently occluded basilar. No EVT was pursued. TTE on 10/11 showed an EF of 35% and severe left atrial enlargement,however no thrombus. MRI brain on 10/11 demonstrated scattered areas of infarct in the bilateral hemispheres, predominantly in the right frontal lobe. Etiology suspected likely hypoperfusion/watershed in the setting of vascular abnormalities, though differential also includes cardioembolic in the setting of AF and ischemic cardiomyopathy. NIHSS of 8 on admission. PT and SLP consulted. Stable for discharge and pending placement.     Antithrombotics for secondary stroke prevention:  Eliquis 2.5 mg BID     Statins for secondary stroke prevention and hyperlipidemia, if present:   Statins: Atorvastatin- 40 mg daily     Aggressive risk factor modification: HTN, HLD, Diet, Exercise, A-Fib, CAD     Rehab efforts: The patient has been evaluated by a stroke team provider and the therapy needs have been fully considered based off the presenting complaints and exam findings. The following therapy evaluations are needed: PT evaluate and treat, OT evaluate and treat, SLP evaluate and treat, PM&R evaluate for appropriate placement     Diagnostics ordered/pending: None    VTE prophylaxis: Eliquis 2.5 mg BID, SCDs    BP  parameters: SBP <160.     Disposition: SNF; placement pending.     Intracranial atherosclerosis  See assessment for occlusion of left ICA.     Cytotoxic cerebral edema  -Area of cytotoxic cerebral edema identified when reviewing brain imaging in the territory of the R/L middle cerebral artery. There is not mass effect associated with it.   -The pattern is suggestive of cardioembolic etiology.      Paroxysmal atrial fibrillation  -Stroke risk factor  -CHADSVASC: 7.   -HASBLED: 2  -Per Care Everywhere, not previously on AC; Dr. Menjivar (Cardiologist) had report patient not a good candidate due to risk of falls, non-compliance, and dementia.  -Remains rate-controlled at this time.    Plan:   -Continue beta-blocker and Eliquis 2.5 mg BID.   -Telemetry ordered.     Complicated urinary tract infection  Increased confusion   U/A with + WBC on microscopic   Discharged on 3 day course of bactrim     Dementia  Plan:   -Continue Donepezil 5 mg daily.        Occlusion of left carotid artery  -Seen on CTA on 10/11.   -Suspect chronic occlusion.     Plan:   -Continue current secondary stroke prevention.     Nodule of right lung  -1 cm nodule of right lung seen on CTA; no prior history of tobacco use.     Plan:   -For a part solid nodule 6 mm or greater, Fleischner Society 2017 guidelines recommend follow up with non-contrast chest CT at 3-6 months to confirm persistence. If this nodule is unchanged and the solid component remains <6 mm, annual follow up CT should be performed for 5 years  -Previously discussed with daughter.     Coronary artery disease involving native coronary artery of native heart without angina pectoris  -Stroke risk factor  -Noted on Care Everywhere; S/P RCA stent in 7/2018; was prescribed Plavix, Statin, and Ranexa,  but unclear if patient was taking them.    Plan:   -Continue high-intensity Statin daily and holding ASA with daily use of Eliquis for AF.     Chronic combined systolic and diastolic congestive  heart failure  -Stroke risk factor  -Most recent TTE on 10/11 significant for EF of 35%, diastolic dysfunction, wall motion abnormalities, severe left atrial enlargement, and moderate AS.   -Likely ischemic etiology with history of CAD.   -Home regimen: Lasix 20 mg and Spironolactone.   -Pro-BNP from Feb 2019 >3000.   -Appears euvolemic on exam.     Plan:   -Initiated Coreg 3.125mg BID.   -Holding Spironolactone    -Initially held Lasix for AMRIT, however reviewed outside records from [facility], and Cr range 1.5 -2.6. Patient likely at baseline. Restarted Lasix on 10/15 at reduced-dose 20 mg daily.    -Daily weights and strict I/O's.     Acquired hypothyroidism  -Stroke risk factor  -TSH 3.032    Plan:   -Continue Synthroid 25 mcg daily.     Essential hypertension  -Stroke risk factor.  -Home regimen: Spironolactone.     Plan:   -Goal SBP < 160  -At goal on current regimen: Coreg 3.125 mg BID, and Norvasc 10 mg daily.        10/12: Etiology likely cardioembolic. Will start Eliquis 2.5 mg BID. Creatinine trending down - may start patient on Eliquis 5 mg BID if continue to decrease. Left ICA chronically occluded; no need to vasculare intervention.   10/13: Spoke with PT; patient would benefit from inpatient rehab placement. Norvasc 5 mg ordered. Urine studies ordered for hyponatremia - suspect to be due to dehydration.   10/14: Patient with SIADH but sodium stable; 1 L fluid restriction ordered  10/15: Irritation around right hand peripheral IV site. Sodium 131 today. Discussed with daughter that he is on fluid restriction and she will stop providing him with outside drinks.     10/16: Sodium increasing to 132. Bactroban ordered for right hand IV infiltration. Working on placement.   10/17: Sodium continues to improve. Creatinine 1.5 likely near baseline. Decreased Eliquis to 2.5 mg. Right hand erythema stable.   10/19: Sodium 134. Awake and alert but remains confused. No pain. Pending placement  10/20:  Complained of chest discomfort overnight. EKG showed no ST wave changes; troponin normal.   10/21: Renal function improving. Hyponatremic, continue 1L fluid restriction. Will likely restart lasix tomorrow; no current signs of fluid overload. Neurological exam unchanged.   10/22: Reviewed outside imaging, renal function likely at baseline; restarted home Lasix at reduced dose of 20 mg daily and continue to monitor daily weights and strict I/O's.   10/23: Attempting to transfer patient to a bed on [facility] floor. Staff physician to contact patient's insurance company to discuss further details regarding -SNF placement.  10/24: Staff physician contacted patient's insurance company yesterday, however no answer; left voicemail. Will re-attempt phone call today. Remains medically and neurologically stable for discharge.   10/25: Called Dr. Krishna; no answer. Will re-attempt tomorrow. Patient's daughter contacted by [facility]. She is unable to provided 24 hour care. Patient accepted to [facility], need insurance authorization  10/26-10/30: No acute events overnight. Neurologically unchanged. Awaiting placement.   10/31: No acute events overnight. CM informed patient will have insurance as of 11/01.   11/04: Patient agitated today. Once time dose of seroquel ordered. Will monitor for effect and consider adding day time dose if necessary.   11/05: Patient agitated overnight. UA ordered to rule out confusion r/t UTI.   11/6 elevated WBC on U/A.  Discharged on 3 days of bactrim.      STROKE DOCUMENTATION   Acute Stroke Times   Last Known Normal Date: 10/10/19  Last Known Normal Time: 1905  Symptom Onset Date: 10/10/19  Symptom Onset Time: 1905  Stroke Team Called Date: 10/10/19  Stroke Team Called Time: 2120  Stroke Team Arrival Date: 10/10/19  Stroke Team Arrival Time: 2122    NIH Scale:  1a. Level of Consciousness: 0-->Alert, keenly responsive  1b. LOC Questions: 2-->Answers neither question correctly  1c. LOC Commands: 0-->Performs  both tasks correctly  2. Best Gaze: 0-->Normal  3. Visual: 0-->No visual loss  4. Facial Palsy: 1-->Minor paralysis (flattened nasolabial fold, asymmetry on smiling)  5a. Motor Arm, Left: 0-->No drift, limb holds 90 (or 45) degrees for full 10 secs  5b. Motor Arm, Right: 0-->No drift, limb holds 90 (or 45) degrees for full 10 secs  6a. Motor Leg, Left: 0-->No drift, leg holds 30 degree position for full 5 secs  6b. Motor Leg, Right: 0-->No drift, leg holds 30 degree position for full 5 secs  7. Limb Ataxia: 0-->Absent  8. Sensory: 0-->Normal, no sensory loss  9. Best Language: 0-->No aphasia, normal  10. Dysarthria: 1-->Mild-to-moderate dysarthria, patient slurs at least some words and, at worst, can be understood with some difficulty  11. Extinction and Inattention (formerly Neglect): 0-->No abnormality  Total (NIH Stroke Scale): 4       Modified Monson Score: 1  Holley Coma Scale:    ABCD2 Score:    [id number] Score:   HAS -BLED Score:   ICH Score:   Hunt & Stroud Classification:      Hemorrhagic change of an Ischemic Stroke: Does this patient have an ischemic stroke with hemorrhagic changes? No     Neurologic Chief Complaint: R-sided weakness and aphasia    Subjective:     Interval History: Patient is seen for follow-up neurological assessment and treatment recommendations:  elevated WBC on U/A.  Discharged on 3 days of bactrim.        HPI, Past Medical, Family, and Social History remains the same as documented in the initial encounter.     Review of Systems   Constitutional: Negative for fatigue and fever.   Gastrointestinal: Negative for nausea and vomiting.   Neurological: Positive for speech difficulty and weakness.   Psychiatric/Behavioral: Positive for agitation. Negative for confusion and decreased concentration.     Scheduled Meds:    Continuous Infusions:  PRN Meds:    Objective:     Vital Signs (Most Recent):  Temp: 98.1 °F (36.7 °C) (11/06/19 1600)  Pulse: 64 (11/06/19 1600)  Resp: 16 (11/06/19  1600)  BP: 130/66 (11/06/19 1600)  SpO2: 97 % (11/06/19 1600)  BP Location: Right leg    Vital Signs Range (Last 24H):  Temp:  [96.8 °F (36 °C)-98.1 °F (36.7 °C)]   Pulse:  [58-91]   Resp:  [16-20]   BP: (130-141)/(65-74)   SpO2:  [96 %-97 %]   BP Location: Right leg    Physical Exam   Constitutional: He appears well-developed and well-nourished. No distress.   HENT:   Head: Normocephalic and atraumatic.   Eyes: Conjunctivae and EOM are normal.   Cardiovascular: Normal rate.   Pulmonary/Chest: Effort normal. No respiratory distress.   Musculoskeletal: He exhibits no edema or deformity.   Neurological: He is alert. No sensory deficit. He exhibits normal muscle tone.   Skin: Skin is warm and dry.   Psychiatric: He expresses impulsivity. He is attentive.       Neurological Exam:   LOC: alert  Attention Span: Good   Language: No aphasia, questionable baseline mental status  Articulation: Dysarthria  Orientation: Oriented to person; Not oriented to age, time  Visual Fields: Full  EOM (CN III, IV, VI): Full/intact  Facial Movement (CN VII): Symmetric  Motor: Arm left  Paresis: 4/5  Leg left  Paresis: 4/5  Arm right  Normal 5/5  Leg right Normal 5/5  Sensation: Intact to light touch, temperature and vibration  Tone: Normal tone throughout    Laboratory:  CMP:   No results for input(s): GLUCOSE, CALCIUM, ALBUMIN, PROT, NA, K, CO2, CL, BUN, CREATININE, ALKPHOS, ALT, AST, BILITOT in the last 24 hours.  BMP:   Recent Labs   Lab 11/01/19  0558      K 4.3      CO2 27   BUN 35*   CREATININE 1.5*   CALCIUM 8.6*     CBC:   Recent Labs   Lab 10/31/19  0543   WBC 6.86   RBC 4.69   HGB 12.4*   HCT 40.2      MCV 86   MCH 26.4*   MCHC 30.8*     Lipid Panel: No results for input(s): CHOL, LDLCALC, HDL, TRIG in the last 168 hours.  Coagulation: No results for input(s): PT, INR, APTT in the last 168 hours.  Platelet Aggregation Study: No results for input(s): PLTAGG, PLTAGINTERP, PLTAGREGLACO, ADPPLTAGGREG in the last  168 hours.  Hgb A1C: No results for input(s): HGBA1C in the last 168 hours.  TSH: No results for input(s): TSH in the last 168 hours.      Diagnostic Results     Brain/Vessel Imaging:     MRI Brain WO Contrast (10/11/2019) -         Areas of abnormal T2/FLAIR/diffusion signal abnormality consistent with areas of acute ischemia/infarct involving the cerebral hemispheres bilaterally.  There is signal abnormality associated with the left internal carotid artery likely corresponding to the appearance of occlusion on the CTA examination.     CTA Multiphase (10/10/2019) -      Occluded left ICA with reconstitution of the distal/supraclinoid segment by retrograde flow through [id number].  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  Right vertebral artery supply the basilar artery.  Basilar artery is small in caliber with wall irregularity and intermittent occlusions.  Partially calcified hyperattenuating lesion in the lateral aspect of the cavernous sinus abutting the distal right ICA, likely representing calcified meningioma.  Generalized cerebral volume loss and remote lacunar type infarct in the right caudate head.       Cardiac Imaging:     TTE (10/11/2019) -   · Moderately decreased left ventricular systolic function. The estimated ejection fraction is 35%.  · Concentric left ventricular remodeling.  · Left ventricular diastolic dysfunction.  · Local segmental wall motion abnormalities.  · Severe left atrial enlargement.  · Mild right atrial enlargement.  · Mild aortic regurgitation.  · Moderate aortic valve stenosis but difficult to appreciate in the setting of AF, depressed EF, and low stroke volume.  · Aortic valve area is 1.39 cm2; peak velocity is 1.28 m/s; mean gradient is 4 mmHg.    Elevated central venous pressure (15 mm Hg).      Gertrudis Hernandez PA-C  Comprehensive  Stroke Center  Department of Vascular Neurology   [facility]

## 2019-11-08 VITALS
HEIGHT: 72 IN | TEMPERATURE: 98 F | RESPIRATION RATE: 18 BRPM | DIASTOLIC BLOOD PRESSURE: 80 MMHG | WEIGHT: 179.88 LBS | OXYGEN SATURATION: 97 % | BODY MASS INDEX: 24.36 KG/M2 | HEART RATE: 70 BPM | SYSTOLIC BLOOD PRESSURE: 140 MMHG

## 2019-11-08 PROCEDURE — 97110 THERAPEUTIC EXERCISES: CPT

## 2019-11-08 PROCEDURE — 92523 SPEECH SOUND LANG COMPREHEN: CPT

## 2019-11-08 PROCEDURE — 97165 OT EVAL LOW COMPLEX 30 MIN: CPT

## 2019-11-08 PROCEDURE — 25000003 PHARM REV CODE 250: Performed by: INTERNAL MEDICINE

## 2019-11-08 PROCEDURE — 97162 PT EVAL MOD COMPLEX 30 MIN: CPT

## 2019-11-08 PROCEDURE — 97116 GAIT TRAINING THERAPY: CPT

## 2019-11-08 PROCEDURE — 97535 SELF CARE MNGMENT TRAINING: CPT

## 2019-11-08 RX ADMIN — APIXABAN 2.5 MG: 2.5 TABLET, FILM COATED ORAL at 09:11

## 2019-11-08 RX ADMIN — CARVEDILOL 3.12 MG: 3.12 TABLET, FILM COATED ORAL at 09:11

## 2019-11-08 RX ADMIN — LEVOTHYROXINE SODIUM 25 MCG: 25 TABLET ORAL at 06:11

## 2019-11-08 RX ADMIN — AMLODIPINE BESYLATE 10 MG: 10 TABLET ORAL at 09:11

## 2019-11-08 RX ADMIN — SENNOSIDES 1 TABLET: 8.6 TABLET, FILM COATED ORAL at 09:11

## 2019-11-08 RX ADMIN — ATORVASTATIN CALCIUM 40 MG: 20 TABLET, FILM COATED ORAL at 09:11

## 2019-11-08 RX ADMIN — FUROSEMIDE 20 MG: 20 TABLET ORAL at 09:11

## 2019-11-08 NOTE — PROGRESS NOTES
[facility]                                  Skilled Nursing Facility                   Progress Note     Admit Date: 11/6/2019  KELSIE 11/8/2019  Principal Problem:  Cerebrovascular accident (CVA)   HPI obtained from patient interview and chart review     Chief Complaint: Establish Care/ Initial Visit     HPI:     Mr. Douglas is an 85 year old male with PMHx chronic Afib (not on AC), CAD (S/P stent), HTN, combined CHF, and hypothyroidism who received IV-tPA at OSH on 10/10 after presenting with right-sided weakness, aphasia, and left gaze deviation who presented to SNF following a hospitalization for CVA with right-sided weakness s/p TPA.  Admission to SNF secondary to continued weakness and debility.    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 No EVT was pursued. TTE on 10/11 showed an EF of 35% and severe left atrial enlargement,however no thrombus. MRI brain on 10/11 demonstrated scattered areas of infarct in the bilateral hemispheres, predominantly in the right frontal lobe. Etiology suspected likely hypoperfusion/watershed in the setting of vascular abnormalities, though differential also includes cardioembolic in the setting of AF and ischemic cardiomyopathy. For secondary stroke prevention patient started on Eliquis 2.5 mg BID and atorvastatin 40mg qhs.    Today, patient is very impulsive.  He is note walking up and down the halls with the use of 2 canes.  He has very unsteady gait.  Patient is very impulsive.  Patient is also confused.     Patient will be treated at [facility] with PT and OT to improve functional status and ability to perform ADLs.     Past Medical History:  Patient has a past medical history of Acute arterial ischemic stroke, multifocal, multiple vascular territories (10/10/2019), CAD (coronary artery disease) (10/11/2019), CHF (congestive heart failure), Chronic combined systolic and diastolic congestive heart failure (10/10/2019), Dementia (10/13/2019), Hypertension, Paroxysmal atrial fibrillation (10/11/2019), SIADH (syndrome of inappropriate ADH production) (10/14/2019), Stroke, and Thyroid disease.    Past Surgical History: Patient has a past surgical history that includes Aorta surgery; Repair of aneurysm; and Coronary angioplasty with stent.    Social History: Patient reports that he has never smoked. His smokeless tobacco use includes chew. He reports that he drank alcohol. He reports that he does not use drugs.    Family History:  No significant family history reported    Allergies: Patient is allergic to iodine and iodide containing products.    ROS  Constitutional: Negative for fever or fatigue.   Eyes: Negative for blurred vision, double vision and discharge.   Respiratory: Negative for cough, shortness of breath and wheezing.    Cardiovascular: Negative for chest pain, palpitations, and leg swelling.   Gastrointestinal: Negative for abdominal pain, constipation, diarrhea, nausea and vomiting.   Genitourinary: Negative for dysuria. + frequency and urgency.   Musculoskeletal:  + generalized weakness. Negative for back pain and myalgias.   Skin: Negative for itching and rash.   Neurological: Negative for dizziness, speech change, and headaches.   Psychiatric/Behavioral: Negative for depression. The patient is not nervous/anxious.      PEx  Temp:  [97.7 °F (36.5 °C)-97.9 °F (36.6 °C)]   Pulse:  [70-77]   Resp:  [17-20]   BP: (121-140)/(61-80)   SpO2:  [97 %]      Constitutional: Patient appears well-developed and in no distress   HENT:   Head: Normocephalic and atraumatic.   Eyes: Pupils are equal, round  Neck: Normal range of motion. Neck supple.    Cardiovascular: Normal rate, regular rhythm and normal heart sounds.    Pulmonary/Chest: Effort normal and breath sounds are clear  Abdominal: Soft. Bowel sounds are normal.   Musculoskeletal: Normal range of motion.  Generalized weakness, very unsteady gait  Neurological: Alert and disoriented to person, place, and time.  Patient did note that he was at a hospital but not which one.  Limited neuro assessment due to patient's confusion and refusal to participate.   Psychiatric: Normal mood and affect. Behavior is normal.   Skin: Skin is warm and dry.  Patient noted to have scattered areas of patchy dry skin.     No results for input(s): GLUCOSE, NA, K, CL, CO2, BUN, CREATININE, MG in the last 24 hours.    Invalid input(s):  CALCIUM    No results for input(s): WBC, RBC, HGB, HCT, PLT, MCV, MCH, MCHC in the last 24 hours.      Assessment and Plan:    Acute arterial ischemic stroke, multifocal, multiple vascular territories  Intracranial atherosclerosis  - continue  Eliquis 2.5 mg BID, Atorvastatin- 40 mg daily      Cytotoxic cerebral edema  -Area of cytotoxic cerebral edema identified when reviewing brain imaging in the territory of the R/L middle cerebral artery. There is not mass effect associated with it.   -The pattern is suggestive of cardioembolic etiology.      Paroxysmal atrial fibrillation  -Stroke risk factor  -Continue beta-blocker and Eliquis 2.5 mg BID.      Complicated urinary tract infection  - U/A with + WBC on microscopic   - Discharged on 3 day course of bactrim      Dementia  -Continue Donepezil 5 mg daily.     Occlusion of left carotid artery  -Seen on CTA on 10/11.   -Suspect chronic occlusion.   -Continue current secondary stroke prevention.      Nodule of right lung  -1 cm nodule of right lung seen on CTA; no prior history of tobacco use.   -For a part solid nodule 6 mm or greater, Fleischner Society 2017 guidelines recommend follow up with non-contrast chest CT at 3-6 months to confirm  persistence. If this nodule is unchanged and the solid component remains <6 mm, annual follow up CT should be performed for 5 years  -Previously discussed with daughter.      Coronary artery disease involving native coronary artery of native heart without angina pectoris  -Continue high-intensity Statin daily and holding ASA with daily use of Eliquis for AF.      Chronic combined systolic and diastolic congestive heart failure  -Most recent TTE on 10/11 significant for EF of 35%, diastolic dysfunction, wall motion abnormalities, severe left atrial enlargement, and moderate AS.   -Home regimen: Lasix 20 mg and Spironolactone, Coreg 3.125mg BID.     Acquired hypothyroidism  -Continue Synthroid 25 mcg daily.      Essential hypertension  -At goal on current regimen: Coreg 3.125 mg BID, and Norvasc 10 mg daily.        No future appointments.      I certify that SNF services are required to be given on an inpatient basis because Izaiah Douglas needs for skilled nursing care and/or skilled rehabilitation are required on a daily basis and such services can only practically be provided in a skilled nursing facility setting and are for an ongoing condition for which she received inpatient care in the hospital.     Total time of the visit 69 minutes [id number]  Non physical exam/ non charting time: 45 minutes   Description of non physical exam/non charting time: counseling patient on clinical conditions and therapies provided regarding delirium precautions, safety precautions, explaining the use for bedside care around, method to try to reduce impulsivity.  Extensive chart review completed including all consultation notes.  All pertinent laboratory and radiographical images reviewed.        Jeanie Carmona NP      DOS: 11/8/2019       Patient note was created using [employer].  Any errors in syntax or even information may not have been identified and edited on initial review prior to signing this note.

## 2019-11-08 NOTE — PT/OT/SLP EVAL
Occupational Therapy  Evaluation/ Treatment    Izaiah Douglas   MRN: [id number]   Admitting Diagnosis: CVA    OT Date of Treatment: 11/08/19   OT Start Time: 0830  OT Stop Time: 0928  OT Total Time (min): 58 min    Billable Minutes:  Evaluation 20  Self Care/Home Management 23  Therapeutic Exercise 15    Diagnosis:  CVA    Past Medical History:   Diagnosis Date    Acute arterial ischemic stroke, multifocal, multiple vascular territories 10/10/2019    CAD (coronary artery disease) 10/11/2019    CHF (congestive heart failure)     Chronic combined systolic and diastolic congestive heart failure 10/10/2019    - Unclear hx from daughter but home medications reflective CHF - TTE tomorrow - Judicious fluid administration    Dementia 10/13/2019    Hypertension     Paroxysmal atrial fibrillation 10/11/2019    SIADH (syndrome of inappropriate ADH production) 10/14/2019    Stroke     TIA    Thyroid disease       Past Surgical History:   Procedure Laterality Date    AORTA SURGERY      CORONARY ANGIOPLASTY WITH STENT PLACEMENT      REPAIR OF ANEURYSM           General Precautions: Standard, fall, aspiration  Orthopedic Precautions: N/A  Braces: N/A    Spiritual, Cultural Beliefs, Synagogue Practices, Values that Affect Care: no     Patient History:  Lives With: alone(Pt states he lives alone but chart states with spouce.)  Living Arrangements: house(but couldn't remember if single or 2 story. )  Home Accessibility: other (see comments)(Pt stated that he couldn't remember.)  Living Environment Comment: Pt indicated that he lives alone although chart Hx indicates that Pt lives with his spouce. He is unable to give specifics about his prior living situation or home environment.   Equipment Currently Used at Home: cane, straight(2 canes.)    Prior level of function:    Bed Mobility/Transfers: needs device((B) canes. Per Pt's report he required no assist .)  Grooming: independent  Bathing: independent  Upper Body Dressing:  independent  Lower Body Dressing: independent  Toileting: independent  Driving License: Yes(but not much anymore.)  Occupation: Retired  Type of Occupation: Pt reported no specific occupation when asked about work history.     Dominant hand: right    Subjective:  Communicated with nurse prior to session.    Chief Complaint: No complaints  Patient/Family stated goals: Pt wants to return home    Pain/Comfort  Pain Rating 1: 0/10  Pain Rating Post-Intervention 1: 0/10    Objective:   Patient found with: (no lines but AVASYS camera in place.)    Cognitive Exam:  Oriented to: Person  Follows Commands/attention: Follows one-step commands  Communication: clear/fluent  Memory:  Pt with significant memory deficits.  Safety awareness/insight to disability: impaired  Coping skills/emotional control: Inconsistent with occasional agitation noted.    Visual/perceptual:  Intact    Physical Exam:  Postural examination/scapula alignment:    -       Rounded shoulders  Skin integrity: Visible skin intact  Edema: None noted (B) UEs    Sensation:      -       Intact    Upper Extremity Range of Motion:  Right Upper Extremity: WFL  Left Upper Extremity: WFL    Upper Extremity Strength:  Right Upper Extremity: WFL  Left Upper Extremity: WFL   Strength: WFLs    Fine motor coordination:      -       Intact    Gross motor coordination: WFL    Occupational Performance:    Bed Mobility:    · Patient completed Rolling/Turning to Right with modified independence  · Patient completed Scooting/Bridging with supervision  · Patient completed Supine to Sit with supervision     Functional Mobility/Transfers:  · Patient completed Sit <> Stand Transfer with stand by assistance  with  (B) straight canes   · Patient completed Bed <> Chair Transfer using Step Transfer technique with stand by assistance with (B) canes  · Patient completed Toilet Transfer Step Transfer technique with stand by assistance with  (B) canes  · Functional Mobility: Pt ambulated  from EOB to toilet, stood to urinate, ambulated to sink and ambulated back to EOB. Ambulated a distance of 12 ft each way.    Activities of Daily Living:  · Feeding:  modified independence no assist  · Grooming: supervision standing sinkside  · Bathing: supervision with no assist required and Pt bathing sitting sinkside.  · Upper Body Dressing: set up only .  · Lower Body Dressing: stand by assistance donning pants , socks and tennis.  · Toileting: supervision standing to urinate.    [facility] 6 Click:  [facility] Total Score: 18    OT Exercises: UE Ergometer performed 10 minutes on UBE with Mod resistance. Pt needing several redirects back onto activity and then became irritated and discontinued activity despite prompting to resume activity.    Patient left up in chair with PCT present and [employer] monitor contacted.    Assessment:  Izaiah Douglas is a 85 y.o. male with a medical diagnosis of CVA. Pt presents with performance deficits of Physical skills including impaired functional mobility, strength, functional endurance, and  functional standing balance. Pt also demonstrating decreased cognitive function affecting problem solving, and adhearing to safety recommendations when performing Functional Activities and self care activities as well as functional mobility. Pt is motivated and would benefit from OT intervention to further his functional (I)ce and safety.      Rehab identified problem list/impairments: impaired endurance, impaired self care skills, impaired functional mobilty, gait instability, decreased safety awareness    Rehab potential is fair    Activity tolerance: Fair    Discharge recommendations: (TBD)     Barriers to discharge: (Unknown)     Equipment recommendations: (TBD)     GOALS:   Multidisciplinary Problems     Occupational Therapy Goals        Problem: Occupational Therapy Goal    Goal Priority Disciplines Outcome Interventions   Occupational Therapy Goal     OT, PT/OT     Description:  Goals to be met  by: 11/15/19     Patient will increase functional independence with ADLs by performing:    Feeding with Modified Payne.  UE Dressing with Modified Payne.  LE Dressing with Set-up Assistance and Supervision.  Grooming while seated with Set-up Assistance and Supervision.  Toileting from toilet with Set-up Assistance and Supervision for hygiene and clothing management.   Bathing from  shower chair/bench with Stand-by Assistance.  Supine to sit with Modified Payne.  Step transfer with Supervision with (B) straight canes.  Pt's caregiver with be competent with supervision when performing ADls and functional transfers.                      PLAN: Patient to be seen 5 x/week to address the above listed problems via self-care/home management, therapeutic activities, cognitive retraining  Plan of Care expires: 12/08/19  Plan of Care reviewed with: patient    Riaz Meredith OTR/L  11/08/2019

## 2019-11-08 NOTE — NURSING
Patient restless and only sleeps for approx 20 to 30 min intervals before attempting to get out of bed unassisted. Uses bilateral walking cane to ambulate to and from bathroom and around room. Gait very unsteady. Easily agitated when attempting to redirect him. Swung cane at [facility] when she was attempting to get him to sit on bed or chair to prevent him from falling. Urinary frequency noted. Patient prefers to ambulate to bathroom and use the urinal while standing in the bathroom. Gets upset when staff tries to redirect him to using the urinal at bedside.

## 2019-11-08 NOTE — HOSPITAL COURSE
Patient discharge from SNF due to erratic and impulsive behavior.  Patient was aggressive towards staff and was noted to throw items around the room.  He was unable to participate in any PT/OT.  Patient discharged into the care of [facility].

## 2019-11-08 NOTE — PT/OT/SLP EVAL
PhysicalTherapy   Evaluation    Izaiah Douglas   MRN: [id number]     PT Received On: 11/08/19  PT Start Time: 1101     PT Stop Time: 1145    PT Total Time (min): 44 min       Billable Minutes:  Evaluation 18, Gait Training 13 and Therapeutic Exercise 13 Total Time  26    Diagnosis: Cerebrovascular Accident  Past Medical History:   Diagnosis Date    Acute arterial ischemic stroke, multifocal, multiple vascular territories 10/10/2019    CAD (coronary artery disease) 10/11/2019    CHF (congestive heart failure)     Chronic combined systolic and diastolic congestive heart failure 10/10/2019    - Unclear hx from daughter but home medications reflective CHF - TTE tomorrow - Judicious fluid administration    Dementia 10/13/2019    Hypertension     Paroxysmal atrial fibrillation 10/11/2019    SIADH (syndrome of inappropriate ADH production) 10/14/2019    Stroke     TIA    Thyroid disease       Past Surgical History:   Procedure Laterality Date    AORTA SURGERY      CORONARY ANGIOPLASTY WITH STENT PLACEMENT      REPAIR OF ANEURYSM           General Precautions: Standard, fall  Orthopedic Precautions: N/A   Braces: N/A    Spiritual, Cultural Beliefs, Voodoo Practices, Values that Affect Care: no    Patient History:  Lives With: child(escobar), adult  Living Arrangements: house  Home Accessibility: stairs within home  Home Layout: Bedroom on 2nd floor, Bathroom on 2nd floor(15 steps to get to 2nd floor w/ RHR)  Stair Railings at Home: inside, present on right side  Transportation Anticipated: family or friend will provide  Living Environment Comment: Pt's daughter reports that Pt lives w/ her and her partner in a 2SH w/ no ALLIE and 15 steps to get to 2nd level w/ RHR present. Pt bedroom and bathroom are on the 2nd level of the home and the bathroom features a tub/shower combo w/ no grab bars and a standard toilet. Pt had 4 other daughters (currently unavailable) but the daughter who he will be living with will be able  "to provide assitance when she returns home from work related travel  but her partner is currently available to assist pt at home. Daughter rpts that they may have to hire a sitter upon d/c.  Equipment Currently Used at Home: cane, straight((2))  DME owned (not currently used): rolling walker    Previous Level of Function:  Pt and daughter unable to report PLOF PTA. Per acute care eval pt was amb w/ 2 SCs. Daughter rpts that pt was not working or driving PTA.  Ambulation Skills: needs device  Transfer Skills: needs device  ADL Skills: needs device  Work/Leisure Activity: unable to perform    Subjective:  Communicated with patient prior to session. Pt consented to today's therapy session. Pt states "I'm tired of this" after a brief episode of slight agitation.  Chief Complaint: None reported  Patient goals: pt report of goals limited by impaired cognition.    Pain/Comfort  Pain Rating 1: 0/10  Pain Rating Post-Intervention 1: 0/10    Objective:  Patient found HOB elevated with nursing personnel present.      Cognitive Exam:  Oriented to: Person  Follows Commands/attention: Inattentive and Follows one-step commands  Communication: clear/fluent  Safety awareness/insight to disability: impaired    Physical Exam:  Postural examination/scapula alignment:    -       Rounded shoulders  -       Forward head  -       Posterior pelvic tilt    Skin integrity: Visible skin intact  Edema: None noted     Sensation:      -       Intact    Upper Extremity Range of Motion:  Right Upper Extremity: WFL  Left Upper Extremity: Deficits: Unable to actively move extremity through ROM, Able to flex shoulder >90 degrees w/ PROM    Upper Extremity Strength:  Right Upper Extremity: WFL  Left Upper Extremity: Unable to perform manual muscle testing, at least 1/5 shoulder flexion    Lower Extremity Range of Motion:  Right Lower Extremity: WNL  Left Lower Extremity: WNL    Lower Extremity Strength:  Right Lower Extremity: WFL except 3+/5 HF  Left " Lower Extremity: WFL except 3+/5 HF     Fine motor coordination:     -       Intact    Gross motor coordination: WFL      AM-PAC 6 CLICK MOBILITY  Total Score:20    Bed Mobility:  Supine>Sit: from bed w/ Lorena    Transfers:  Sit<>Stand: From EOB w/ RW and CGA, to/from w/c w/ 2 SCs and CGA, to/from recumbent cross  w/ no AD   -Cues for proper hand placement  Stand Pivot Transfer: to/from w/c, recumbent cross  w/ no AD and Lars for stability and safety    Gait:  Amb x1 trial (132 ft) w/ 2 SCs and CGA for stability and safety   -  Pt utilizes step through gait pattern, but demo'd decreased stability and safety awareness during trial   - Cues for upright posture, and slow down benitez while ambulating to decrease risk of falls    Therex:  Seated exercises: AP/ LAQ, HF, 2x10    Balance:  Pt demo'd good static standing balance while performing toileting via urinal w/ RW and CGA (5 minutes), good static standing balance while performing toileting in bathroom w/ no UE support and Lars for stability and safety (3 minutes)    - Cues for safety, to keep one hand on RW while performing toileting   - Slight postural sway and decreased safety awareness    Additional Treatment:  Pt educated on PT role and PT POC.  Communication w/ pt's daughter via telephone for relevant history.    Recumbent cross  (2 minutes) to improve overall strength and endurance.   - Pt requested to cease activity.      Patient left up in chair with call button in reach and nursing personnel  present.    Assessment:  Izaiah Douglas is a 85 y.o. male with a medical diagnosis of Cerebrovascular accident. Pt tolerated today's session well but presented w/ impaired cognition, impulsive behavior, and decreased safety awareness. Upon evaluation pt was a poor historian and was unable to report where he lived at or who he lived with. Pt's daughter was contacted for verification of information but was unable to report pt's PLOF PTA to hospital.  PTA pt was amb w/ 2 SCs. Currently the PT requires CGA-Lars w/ transfers, balance in standing, and ambulation and will benefit from skilled physical therapy to increase safety awareness, LE strength, and level of independence in preparation for d/c. Pt will be unable to begin PT POC secondary to transfer to [facility].    Rehab identified problem list/impairments: weakness, impaired endurance, impaired self care skills, impaired functional mobilty, gait instability, impaired balance, impaired cognition, decreased coordination, decreased upper extremity function, decreased lower extremity function, decreased safety awareness, decreased ROM, impaired coordination    Rehab potential is fair.    Activity tolerance: Good    Discharge recommendations: home health PT     Barriers to discharge: Inaccessible home environment    Equipment recommendations: bath bench     GOALS:   Multidisciplinary Problems     Physical Therapy Goals        Problem: Physical Therapy Goal    Goal Priority Disciplines Outcome Goal Variances Interventions   Physical Therapy Goal     PT, PT/OT Adequate for Care Transition                     PLAN:    Pt d/c to [facility]  Plan of Care Expires: 12/08/19    Vinny Shaw, SPT 11/8/2019

## 2019-11-08 NOTE — DISCHARGE SUMMARY
[facility] Medicine  Discharge Summary      Patient Name: Izaiah Douglas  MRN: [id number]  Admission Date: 11/6/2019  Hospital Length of Stay: 2 days  Discharge Date and Time:  11/08/2019 3:06 PM  Attending Physician: Bianca Alberto MD   Discharging Provider: Jeanie Carmona NP  Primary Care Provider: Primary Doctor No      HPI:   Mr. Douglas is an 85 year old male with PMHx chronic Afib (not on AC), CAD (S/P stent), HTN, combined CHF, and hypothyroidism who received IV-tPA at OSH on 10/10 after presenting with right-sided weakness, aphasia, and left gaze deviation who presented to SNF following a hospitalization for CVA with right-sided weakness s/p TPA.  Admission to SNF secondary to continued weakness and debility.     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 No EVT was pursued. TTE on 10/11 showed an EF of 35% and severe left atrial enlargement,however no thrombus. MRI brain on 10/11 demonstrated scattered areas of infarct in the bilateral hemispheres, predominantly in the right frontal lobe. Etiology suspected likely hypoperfusion/watershed in the setting of vascular abnormalities, though differential also includes cardioembolic in the setting of AF and ischemic cardiomyopathy. For secondary stroke prevention patient started on Eliquis 2.5 mg BID and atorvastatin 40mg qhs.     Today, patient is very impulsive.  He is note walking up and down the halls with the use of 2 canes.  He has very unsteady gait.  Patient is very impulsive.  Patient is also confused.      Patient will be treated at [facility] with PT and OT to improve functional status and ability to perform ADLs.     * No surgery found *      Hospital Course:    Patient discharge from SNF due to erratic and impulsive behavior.  Patient was aggressive towards staff and was noted to throw items around the room.  He was unable to participate in any PT/OT.  Patient discharged into the care of [facility].      Consults:   Consults (From admission, onward)        Status Ordering Provider     Inpatient consult to Registered Dietitian/Nutritionist  Once     Provider:  (Not yet assigned)    Acknowledged RUBA MARTEL     Inpatient consult to [facility]  Once     Provider:  (Not yet assigned)    Completed URSZULA ROBERTS          No new Assessment & Plan notes have been filed under this hospital service since the last note was generated.  Service: Hospital Medicine    Final Active Diagnoses:    Diagnosis Date Noted POA    PRINCIPAL PROBLEM:  Cerebrovascular accident (CVA) [I63.9] 11/06/2019 Yes      Problems Resolved During this Admission:       Discharged Condition: fair    Disposition: [facility]    Follow Up:    Patient Instructions:   No discharge procedures on file.    Significant Diagnostic Studies: Labs:   BMP:   Recent Labs   Lab 11/07/19  0536   GLU 74      K 4.1      CO2 28   BUN 32*   CREATININE 1.8*   CALCIUM 8.6*   MG 1.8    and CBC   Recent Labs   Lab 11/07/19  0536   WBC 6.24   HGB 11.3*   HCT 37.1*          Pending Diagnostic Studies:     None         Medications:  Reconciled Home Medications:      Medication List      STOP taking these medications    sulfamethoxazole-trimethoprim 800-160mg 800-160 mg Tab  Commonly known as:  BACTRIM DS        ASK your doctor about these medications    amLODIPine 10 MG tablet  Commonly known as:  NORVASC  Take 1 tablet (10 mg total) by mouth once daily.     apixaban 2.5 mg Tab  Commonly known as:  ELIQUIS  Take 1 tablet (2.5 mg total) by mouth 2 (two) times daily.     atorvastatin 40 MG tablet  Commonly known as:  LIPITOR  Take 1 tablet (40 mg total) by mouth once daily.      carvedilol 3.125 MG tablet  Commonly known as:  COREG  Take 1 tablet (3.125 mg total) by mouth 2 (two) times daily.     donepezil 5 MG Tbdl  Commonly known as:  ARICEPT ODT  Take 5 mg by mouth nightly.     furosemide 20 MG tablet  Commonly known as:  LASIX  Take 20 mg by mouth 2 (two) times daily.     levothyroxine 25 MCG tablet  Commonly known as:  SYNTHROID  Take 25 mcg by mouth once daily.     QUEtiapine 25 MG Tab  Commonly known as:  SEROQUEL  Take 1 tablet (25 mg total) by mouth every evening.     senna 8.6 mg tablet  Commonly known as:  SENOKOT  Take 1 tablet by mouth once daily.     spironolactone 50 MG tablet  Commonly known as:  ALDACTONE  Take 50 mg by mouth once daily.            Indwelling Lines/Drains at time of discharge:   Lines/Drains/Airways     None                 Time spent on the discharge of patient: 0 minutes  Patient was seen and examined on the date of discharge and determined to be suitable for discharge.         Jeanie Carmona NP  Department of Hospital Medicine  [facility]

## 2019-11-08 NOTE — PLAN OF CARE
Problem: SLP Goal  Goal: SLP Goal  Description  Speech Language Pathology Goals  Goals expected to be met by 11/15:  1. Pt will orient x 4 given max cues and/or external aid.   2. Pt will recall 3/3 related words after a 1 minute given mod-max cues.   3. Pt will follow multi-step commands with 70% accuracy given mod-max cues.   4. Pt will answer moderately complex yes/no q's with 70% accuracy given mod cues.   5. Pt will complete simple problem solving/safety awareness tasks with 70% accuracy given max cues.   6. Pt will participate in further assessment of reading, writing, visual spatial, categorization, and word fluency.        Outcome: Ongoing, Progressing  Speech/language/cognitive evaluation completed.  SLP to follow 3x/wk.  It is assumed that pt is below cognitive baseline given new stroke , but history of premorbid dementia will likely hinder extent of progress.  THIAGO Gloria, CCC-SLP  Speech Language Pathologist  [phone]  11/8/2019

## 2019-11-08 NOTE — PLAN OF CARE
PT eval and treat completed today. Pt unable to begin PT POC secondary to transfer to [facility].  Vinny Shaw, SPT  11/8/2019    Problem: Physical Therapy Goal  Goal: Physical Therapy Goal  Outcome: Adequate for Care Transition

## 2019-11-08 NOTE — NURSING
Report called to Nurse Celia @ [phone] @ On license of [facility] and attempted to notify daughter huma Ng @ [phone]. Daughter did not answer call, Voicemail on and message left with info and call back name & number if necessary.

## 2019-11-08 NOTE — HPI
Mr. Douglas is an 85 year old male with PMHx chronic Afib (not on AC), CAD (S/P stent), HTN, combined CHF, and hypothyroidism who received IV-tPA at OSH on 10/10 after presenting with right-sided weakness, aphasia, and left gaze deviation who presented to SNF following a hospitalization for CVA with right-sided weakness s/p TPA.  Admission to SNF secondary to continued weakness and debility.     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 No EVT was pursued. TTE on 10/11 showed an EF of 35% and severe left atrial enlargement,however no thrombus. MRI brain on 10/11 demonstrated scattered areas of infarct in the bilateral hemispheres, predominantly in the right frontal lobe. Etiology suspected likely hypoperfusion/watershed in the setting of vascular abnormalities, though differential also includes cardioembolic in the setting of AF and ischemic cardiomyopathy. For secondary stroke prevention patient started on Eliquis 2.5 mg BID and atorvastatin 40mg qhs.     Today, patient is very impulsive.  He is note walking up and down the halls with the use of 2 canes.  He has very unsteady gait.  Patient is very impulsive.  Patient is also confused.      Patient will be treated at [facility] with PT and OT to improve functional status and ability to perform ADLs.

## 2019-11-08 NOTE — PT/OT/SLP EVAL
"Speech Language Pathology  Evaluation    Izaiah Douglas   MRN: [id number]   Admitting Diagnosis: CVA    Diet recommendations: Solid Diet Level: Regular  Liquid Diet Level: Thin HOB to 90 degrees, Monitor for s/s of aspiration and Standard aspiration precautions    SLP Treatment Date: 11/08/19  Speech Start Time: 1010     Speech Stop Time: 1023     Speech Total (min): 13 min       TREATMENT BILLABLE MINUTES:  Eval 13     Diagnosis: CVA    Past Medical History:   Diagnosis Date    Acute arterial ischemic stroke, multifocal, multiple vascular territories 10/10/2019    CAD (coronary artery disease) 10/11/2019    CHF (congestive heart failure)     Chronic combined systolic and diastolic congestive heart failure 10/10/2019    - Unclear hx from daughter but home medications reflective CHF - TTE tomorrow - Judicious fluid administration    Dementia 10/13/2019    Hypertension     Paroxysmal atrial fibrillation 10/11/2019    SIADH (syndrome of inappropriate ADH production) 10/14/2019    Stroke     TIA    Thyroid disease      Past Surgical History:   Procedure Laterality Date    AORTA SURGERY      CORONARY ANGIOPLASTY WITH STENT PLACEMENT      REPAIR OF ANEURYSM              General Precautions: Standard, fall, aspiration    Current Respiratory Status: room air     Social Hx: Patient lives in [place].      Subjective:  "I must have done something wrong." pt's response when asked why he was admitted to [facility].   "Where are my walking sticks? I'd like to get in that bed."              Objective:        Oral Musculature Evaluation  Oral Musculature: WFL  Dentition: upper and lower dentures  Volitional Cough: strong     Cognitive Status:  Behavioral Observations: alert, confused and cooperative-  Memory and Orientation: Pt was oriented to self and place/hospital ind'ly, but required an external aid to orient to "[facility]." Pt was not oriented to month,year, or situation. Following a 1 minute delay, pt recalled 2/3 unrelated " words given max cues (0/3 indly).   Attention: limited sustained attention; pt focused on getting back into bed  Problem Solving: Simple problem solving q's were answered with 75% accuracy over 2 trials.    Pragmatics: impaired  Executive Function: inhibition, insight/awareness and self-monitoring impaired    Auditory Comprehension: answers yes/no questions simple; 50% accuracy achieved for complex yes/no q's. Pt was able to follow 1-2 step commands, but did not follow a 3-step command.      Verbal Expression: Pt able to expressed basic wants/needs.  Confusion may hinder vebal expression at times.  Pt named 6/6 items in room ind'ly.     Motor Speech: wfl    Voice: wfl    Reading: tba    Writing: tba    Visual-Spatial: tba    Additional Treatment:  Education provided to pt regarding role of SLP, reason for cognitive evaluation, fall risks, need for assistance/supervision with ambulation, and SLP POC.  Pt unable to demonstrate full understanding.     Assessment:  Izaiah Douglas is a 85 y.o. male with a medical diagnosis of CVA and presents with cognitive-linguistic deficits.      Discharge recommendations: Discharge Facility/Level of Care Needs: home health speech therapy     Goals:   Multidisciplinary Problems     SLP Goals        Problem: SLP Goal    Goal Priority Disciplines Outcome   SLP Goal     SLP Ongoing, Progressing   Description:  Speech Language Pathology Goals  Goals expected to be met by 11/15:  1. Pt will orient x 4 given max cues and/or external aid.   2. Pt will recall 3/3 related words after a 1 minute given mod-max cues.   3. Pt will follow multi-step commands with 70% accuracy given mod-max cues.   4. Pt will answer moderately complex yes/no q's with 70% accuracy given mod cues.   5. Pt will complete simple problem solving/safety awareness tasks with 70% accuracy given max cues.   6. Pt will participate in further assessment of reading, writing, visual spatial, categorization, and word fluency.                           Plan:   Patient to be seen Therapy Frequency: 3 x/week   Planned Interventions: Cognitive-Linguistic Therapy  Plan of Care expires: 12/08/19  Plan of Care reviewed with: patient  SLP Follow-up?: Yes              THIAGO Gloria, CCC-SLP  11/08/2019      THIAGO Gloria, CCC-SLP  Speech Language Pathologist  [phone]  11/8/2019

## 2019-11-08 NOTE — NURSING
Attempted to call report to nurse Yang @ [facility], stated will call back. Number to unit given([phone]) and this nurse gave nurse Yang her name. Awaiting return call to give report.

## 2019-11-08 NOTE — PLAN OF CARE
Problem: Adult Inpatient Plan of Care  Goal: Plan of Care Review  Outcome: Ongoing, Progressing  Goal: Patient-Specific Goal (Individualization)  Outcome: Ongoing, Progressing  Goal: Absence of Hospital-Acquired Illness or Injury  Outcome: Ongoing, Progressing  Intervention: Identify and Manage Fall Risk  Flowsheets (Taken 11/8/2019 0641)  Safety Promotion/Fall Prevention: bed alarm set; commode/urinal/bedpan at bedside; assistive device/personal item within reach; Fall Risk signage in place; lighting adjusted; /camera at bedside  Goal: Optimal Comfort and Wellbeing  Outcome: Ongoing, Progressing  Goal: Readiness for Transition of Care  Outcome: Ongoing, Progressing  Goal: Rounds/Family Conference  Outcome: Ongoing, Progressing     Problem: Fall Injury Risk  Goal: Absence of Fall and Fall-Related Injury  Outcome: Ongoing, Progressing  Intervention: Promote Injury-Free Environment  Flowsheets (Taken 11/8/2019 0641)  Safety Promotion/Fall Prevention: bed alarm set; commode/urinal/bedpan at bedside; assistive device/personal item within reach; Fall Risk signage in place; lighting adjusted; /camera at bedside  Environmental Safety Modification: clutter free environment maintained; mobility aid in reach     Problem: Skin Injury Risk Increased  Goal: Skin Health and Integrity  Outcome: Ongoing, Progressing     Problem: Heart Failure Comorbidity  Goal: Maintenance of Heart Failure Symptom Control  Outcome: Ongoing, Progressing     Problem: Hypertension Comorbidity  Goal: Blood Pressure in Desired Range  Outcome: Ongoing, Progressing

## 2019-11-08 NOTE — NURSING
It was stated by CNA, April, that patient took nurses call light while she was attempting to conversate with patient and from no where, patient picked up the nurses call light and slug it @ her for no given reason.

## 2019-11-12 NOTE — H&P
Hospital Medicine  History and Physical Exam    Admit Date: 11/6/2019  KELSIE TBD  Principal Problem:  Cerebrovascular accident (CVA)   Primary care Physician: Primary Doctor No  Code status: Full Code    HPI:   86 y/o PMH of hypothyroidism, AFib, CAD with stents? on Plavix, sternotomy scar (unknown procedure), dementia, HTN presenting via transfer from [facility] for stroke evaluation s/p TPA. History provided mostly by daughter. According to his her, patient noted to have right-sided weakness while eating dinner at [facility] this evening at approximately 7:00pm. Patient was evaluated by telestroke at OSH and decision was made to give TPA. TPA bolus given at 8pm and infusion started at 8:10pm. [place] [facility] AAOx3 and following commands. His responses are somewhat delayed but he is able to verbalized correct answers. Daughter at bedside unable to elaborate on PMH and is a poor historian. Patient lives in [place] but all medical care in [place].  This is an 85 year old male with PMH chronic Afib (not on AC), CAD (S/P stent), HTN, combined CHF, and hypothyroidism who received IV-tPA at OSH on 10/10 after presenting with right-sided weakness, aphasia, and left gaze deviation. CTA MP showed a chronically occluded left ICA from it's origin to the supraclinoid segment with filling of the MCA via the anterior communicating artery, along with substantial burden of intracranial and extracranial atherosclerotic disease, including occluded V4 with an intermittently occluded basilar. No EVT was pursued. TTE on 10/11 showed an EF of 35% and severe left atrial enlargement,however no thrombus. MRI brain on 10/11 demonstrated scattered areas of infarct in the bilateral hemispheres, predominantly in the right frontal lobe. Etiology suspected likely hypoperfusion/watershed in the setting of vascular abnormalities, though differential also includes cardioembolic in the setting of AF and ischemic cardiomyopathy.  PT, OT, ST evaluated patient and recommended skilled nursing facility. For secondary stroke prevention patient started on Eliquis 2.5 mg BID and atorvastatin 40mg qhs     Fluid restriction placed for SIADH now resolved.  Discharged on bactrim for complicated UTI  Seroquel for agitation      He will follow up in stroke clinic in 4-6 weeks        Patient transferred to [facility] with PT and OT to improve functional status and ability to perform ADLs.       Past Medical History: Patient has a past medical history of Acute arterial ischemic stroke, multifocal, multiple vascular territories (10/10/2019), CAD (coronary artery disease) (10/11/2019), CHF (congestive heart failure), Chronic combined systolic and diastolic congestive heart failure (10/10/2019), Dementia (10/13/2019), Hypertension, Paroxysmal atrial fibrillation (10/11/2019), SIADH (syndrome of inappropriate ADH production) (10/14/2019), Stroke, and Thyroid disease.    Past Surgical History: Patient has a past surgical history that includes Aorta surgery; Repair of aneurysm; and Coronary angioplasty with stent.    Social History: Patient reports that he has never smoked. His smokeless tobacco use includes chew. He reports that he drank alcohol. He reports that he does not use drugs.    Family History: family history is not on file.    Medications: reviewed     Allergies: Patient is allergic to iodine and iodide containing products.    ROS  Constitutional: no fever or chills  Respiratory: no cough or shortness of breath  Cardiovascular: no chest pain or palpitations  Gastrointestinal: no nausea or vomiting, no abdominal pain or change in bowel habits  Genitourinary: no hematuria or dysuria  Integument/Breast: no rash or pruritis  Hematologic/Lymphatic: no easy bruising or lymphadenopathy  Musculoskeletal: no arthralgias or myalgias  Neurological: no seizures or tremors  Behavioral/Psych: no depression or anxiety    PEx     Body mass index is 24.4 kg/m².      General appearance: no distress  Mental status: Alert and oriented x 3  HEENT:  conjunctivae/corneas clear, PERRL  Neck: supple, thyroid not enlarged  Pulm:   normal respiratory effort, CTA B, no c/w/r  Card: RRR, no murmur  Abd: soft, NT, ND, BS present; no masses, no organomegaly  Ext: no edema  Pulses: 2+, symmetric  Skin: color, texture, turgor normal. No rashes or lesions  Neuro: CN II-XII grossly intact, no focal numbness or weakness, normal strength and tone        Ref. Range 10/26/2019 04:21   WBC Latest Ref Range: 3.90 - 12.70 K/uL 7.76   RBC Latest Ref Range: 4.60 - 6.20 M/uL 5.15   Hemoglobin Latest Ref Range: 14.0 - 18.0 g/dL 13.3 (L)   Hematocrit Latest Ref Range: 40.0 - 54.0 % 44.3   MCV Latest Ref Range: 82 - 98 fL 86   MCH Latest Ref Range: 27.0 - 31.0 pg 25.8 (L)   MCHC Latest Ref Range: 32.0 - 36.0 g/dL 30.0 (L)   RDW Latest Ref Range: 11.5 - 14.5 % 13.8   Platelets Latest Ref Range: 150 - 350 K/uL 185   MPV Latest Ref Range: 9.2 - 12.9 fL 10.3   Gran% Latest Ref Range: 38.0 - 73.0 % 64.0   Gran # (ANC) Latest Ref Range: 1.8 - 7.7 K/uL 5.0   Lymph% Latest Ref Range: 18.0 - 48.0 % 21.3   Lymph # Latest Ref Range: 1.0 - 4.8 K/uL 1.7   Mono% Latest Ref Range: 4.0 - 15.0 % 10.7   Mono # Latest Ref Range: 0.3 - 1.0 K/uL 0.8   Eosinophil% Latest Ref Range: 0.0 - 8.0 % 3.2   Eos # Latest Ref Range: 0.0 - 0.5 K/uL 0.3   Basophil% Latest Ref Range: 0.0 - 1.9 % 0.5   Baso # Latest Ref Range: 0.00 - 0.20 K/uL 0.04   nRBC Latest Ref Range: 0 /100 WBC 0   Differential Method Unknown Automated   Immature Grans (Abs) Latest Ref Range: 0.00 - 0.04 K/uL 0.02   Immature Granulocytes Latest Ref Range: 0.0 - 0.5 % 0.3   Sodium Latest Ref Range: 136 - 145 mmol/L 134 (L)   Potassium Latest Ref Range: 3.5 - 5.1 mmol/L 3.8   Chloride Latest Ref Range: 95 - 110 mmol/L 97   CO2 Latest Ref Range: 23 - 29 mmol/L 29   Anion Gap Latest Ref Range: 8 - 16 mmol/L 8   BUN, Bld Latest Ref Range: 8 - 23 mg/dL 29 (H)   Creatinine  Latest Ref Range: 0.5 - 1.4 mg/dL 1.4   eGFR if non African American Latest Ref Range: >60 mL/min/1.73 m^2 45.5 (A)   eGFR if African American Latest Ref Range: >60 mL/min/1.73 m^2 52.6 (A)   Glucose Latest Ref Range: 70 - 110 mg/dL 71   Calcium Latest Ref Range: 8.7 - 10.5 mg/dL 9.3   Phosphorus Latest Ref Range: 2.7 - 4.5 mg/dL 3.3   Magnesium Latest Ref Range: 1.6 - 2.6 mg/dL 1.9     Assessment and Plan:  Acute arterial ischemic stroke, multifocal, multiple vascular territories  This is an 85 year old male with PMH chronic Afib (not on AC), CAD (S/P stent), HTN, combined CHF, and hypothyroidism who received IV-tPA at OSH on 10/10 after presenting with right-sided weakness, aphasia, and left gaze deviation. CTA MP showed a chronically occluded left ICA from it's origin to the supraclinoid segment with filling of the MCA via the anterior communicating artery, along with substantial burden of intracranial and extracranial atherosclerotic disease, including occluded V4 with an intermittently occluded basilar. No EVT was pursued. TTE on 10/11 showed an EF of 35% and severe left atrial enlargement,however no thrombus. MRI brain on 10/11 demonstrated scattered areas of infarct in the bilateral hemispheres, predominantly in the right frontal lobe. Etiology suspected likely hypoperfusion/watershed in the setting of vascular abnormalities, though differential also includes cardioembolic in the setting of AF and ischemic cardiomyopathy. NIHSS of 8 on admission. PT and SLP consulted. Stable for discharge and pending placement.      Antithrombotics for secondary stroke prevention:  Eliquis 2.5 mg BID      Statins for secondary stroke prevention and hyperlipidemia, if present:   Statins: Atorvastatin- 40 mg daily      Aggressive risk factor modification: HTN, HLD, Diet, Exercise, A-Fib, CAD      Rehab efforts: The patient has been evaluated by a stroke team provider and the therapy needs have been fully considered based off  the presenting complaints and exam findings. The following therapy evaluations are needed: PT evaluate and treat, OT evaluate and treat, SLP evaluate and treat, PM&R evaluate for appropriate placement      Diagnostics ordered/pending: None     VTE prophylaxis: Eliquis 2.5 mg BID, SCDs     BP parameters: SBP <160.      Disposition: SNF; placement pending.      AMRIT (acute kidney injury)  -BUN/Cr elevated on admission, initially improved, then worsening again. Likely at baseline per outside chart review of [facility] (Cr range 1.5 - 2.6)  -Appears euvolemic on exam  -Urine Na and Cr normal.   -Cr back to baseline.      Plan:   -Lasix restarted at reduced dose.   -Continue to monitor volume status.   -Renally dose all medications.      Person awaiting admission to adequate facility elsewhere  -Difficult placement due to patient living in [place] and visiting daughter when stroke occurred - therapy teams recommending inpatient rehab  -Patient is unable to fly back to [place]. He is not capable of flying due to inability to ambulate without trained medical assistance, impaired functional mobility, impaired cardiopulmonary response to activity, urine and bowel incontinence, and decreased mental capacity related to stroke and underlying diagnosis of dementia.   -Daughter is unable to provide care.      Plan:   -Patient accepted to [facility]; needs insurance authorization.         Erythema of hand  -- Patient previously with erythema of R hand from IV infiltration.  -- Bactroban cream TID X 10 days (end date 10/26)  -- Elevation of R hand   -- Pt denied complaints of pain on exam today.     SIADH (syndrome of inappropriate ADH production)  -Noted onset of hyponatremia on 10/12, decreasing to 131 at the lowest.   -SIADH suspected due to euvolemic appearance and no contributing hyperglycemia, reason to suspect pseudohyponatremia, use of thiazide diuretic, or signs volume overload on exam.   -No TSH  abnormality or signs of glucocorticoid deficiency.   -Suspect onset secondary to stroke.   -Sodium improved status post fluid restriction and Na tablets.      Plan:   -Continue 1L fluid restriction daily.   RESOLVED     Dementia  Plan:   -Continue Donepezil 5 mg daily.        11/5: patient agitated overnight. Acute confusion vs baseline dementia. UA ordered to rule out confusion r/t UTI     Intracranial atherosclerosis  See assessment for occlusion of left ICA.      Occlusion of left carotid artery  -Seen on CTA on 10/11.   -Suspect chronic occlusion.      Plan:   -Continue current secondary stroke prevention.      Nodule of right lung  -1 cm nodule of right lung seen on CTA; no prior history of tobacco use.      Plan:   -For a part solid nodule 6 mm or greater, Fleischner Society 2017 guidelines recommend follow up with non-contrast chest CT at 3-6 months to confirm persistence. If this nodule is unchanged and the solid component remains <6 mm, annual follow up CT should be performed for 5 years  -Previously discussed with daughter.      Cytotoxic cerebral edema  -Area of cytotoxic cerebral edema identified when reviewing brain imaging in the territory of the R/L middle cerebral artery. There is not mass effect associated with it.   -The pattern is suggestive of cardioembolic etiology.     Plan:   -We will continue to monitor the patients clinical exam for any worsening of symptoms which may indicate expansion of the stroke or the area of the edema resulting in the clinical change.      Coronary artery disease involving native coronary artery of native heart without angina pectoris  -Stroke risk factor  -Noted on Care Everywhere; S/P RCA stent in 7/2018; was prescribed Plavix, Statin, and Ranexa,  but unclear if patient was taking them.     Plan:   -Continue high-intensity Statin daily and holding ASA with daily use of Eliquis for AF.      Paroxysmal atrial fibrillation  -Stroke risk factor  -CHADSVASC: 7.    -HASBLED: 2  -Per Care Everywhere, not previously on AC; Dr. Menjivar (Cardiologist) had report patient not a good candidate due to risk of falls, non-compliance, and dementia.  -Remains rate-controlled at this time.     Plan:   -Continue beta-blocker and Eliquis 2.5 mg BID.   -Telemetry ordered.      Chronic combined systolic and diastolic congestive heart failure  -Stroke risk factor  -Most recent TTE on 10/11 significant for EF of 35%, diastolic dysfunction, wall motion abnormalities, severe left atrial enlargement, and moderate AS.   -Likely ischemic etiology with history of CAD.   -Home regimen: Lasix 20 mg and Spironolactone.   -Pro-BNP from Feb 2019 >3000.   -Appears euvolemic on exam.      Plan:   -Initiated Coreg 3.125mg BID.   -Holding Spironolactone    -Initially held Lasix for AMRIT, however reviewed outside records from [facility], and Cr range 1.5 -2.6. Patient likely at baseline. Restarted Lasix on 10/15 at reduced-dose 20 mg daily.    -Daily weights and strict I/O's.      Acquired hypothyroidism  -Stroke risk factor  -TSH 3.032     Plan:   -Continue Synthroid 25 mcg daily.      Essential hypertension  -Stroke risk factor.  -Home regimen: Spironolactone.      Plan:   -Goal SBP < 160  -At goal on current regimen: Coreg 3.125 mg BID, and Norvasc 10 mg daily.      Continue the following medications for treatment of the indicated conditions:  ·  Indication Medication Dose Route  Frequency              No future appointments.        I certify that SNF services are required to be given on an inpatient basis because Izaiah Douglas needs for skilled nursing care and/or skilled rehabilitation are required on a daily basis and such services can only practically be provided in a skilled nursing facility setting and are for an ongoing condition for which she received inpatient care in the hospital.     Time (minutes) spent in care of the patient (Greater than 1/2 spent in direct face-to-face contact)  40    Bianca Alberto MD

## 2019-11-12 NOTE — H&P
Hospital Medicine  History and Physical Exam    Admit Date: 11/6/2019  KELSIE TBD  Principal Problem:  Cerebrovascular accident (CVA)   Primary care Physician: Primary Doctor No  Code status: Full Code    HPI:   84 y/o PMH of hypothyroidism, AFib, CAD with stents? on Plavix, sternotomy scar (unknown procedure), dementia, HTN presenting via transfer from [facility] for stroke evaluation s/p TPA. History provided mostly by daughter. According to his her, patient noted to have right-sided weakness while eating dinner at [facility] this evening at approximately 7:00pm. Patient was evaluated by telestroke at OSH and decision was made to give TPA. TPA bolus given at 8pm and infusion started at 8:10pm. Upon arrival to [facility], patient AAOx3 and following commands. His responses are somewhat delayed but he is able to verbalized correct answers. Daughter at bedside unable to elaborate on PMH and is a poor historian. Patient lives in [place] but all medical care in [place].  This is an 85 year old male with PMH chronic Afib (not on AC), CAD (S/P stent), HTN, combined CHF, and hypothyroidism who received IV-tPA at OSH on 10/10 after presenting with right-sided weakness, aphasia, and left gaze deviation. CTA MP showed a chronically occluded left ICA from it's origin to the supraclinoid segment with filling of the MCA via the anterior communicating artery, along with substantial burden of intracranial and extracranial atherosclerotic disease, including occluded V4 with an intermittently occluded basilar. No EVT was pursued. TTE on 10/11 showed an EF of 35% and severe left atrial enlargement,however no thrombus. MRI brain on 10/11 demonstrated scattered areas of infarct in the bilateral hemispheres, predominantly in the right frontal lobe. Etiology suspected likely hypoperfusion/watershed in the setting of vascular abnormalities, though differential also includes cardioembolic in the setting of AF and ischemic cardiomyopathy.  PT, OT, ST evaluated patient and recommended skilled nursing facility. For secondary stroke prevention patient started on Eliquis 2.5 mg BID and atorvastatin 40mg qhs     Fluid restriction placed for SIADH now resolved.  Discharged on bactrim for complicated UTI  Seroquel for agitation      He will follow up in stroke clinic in 4-6 weeks        Patient transferred to [facility] with PT and OT to improve functional status and ability to perform ADLs.       Past Medical History: Patient has a past medical history of Acute arterial ischemic stroke, multifocal, multiple vascular territories (10/10/2019), CAD (coronary artery disease) (10/11/2019), CHF (congestive heart failure), Chronic combined systolic and diastolic congestive heart failure (10/10/2019), Dementia (10/13/2019), Hypertension, Paroxysmal atrial fibrillation (10/11/2019), SIADH (syndrome of inappropriate ADH production) (10/14/2019), Stroke, and Thyroid disease.    Past Surgical History: Patient has a past surgical history that includes Aorta surgery; Repair of aneurysm; and Coronary angioplasty with stent.    Social History: Patient reports that he has never smoked. His smokeless tobacco use includes chew. He reports that he drank alcohol. He reports that he does not use drugs.    Family History: family history is not on file.    Medications: reviewed     Allergies: Patient is allergic to iodine and iodide containing products.    ROS  Constitutional: no fever or chills  Respiratory: no cough or shortness of breath  Cardiovascular: no chest pain or palpitations  Gastrointestinal: no nausea or vomiting, no abdominal pain or change in bowel habits  Genitourinary: no hematuria or dysuria  Integument/Breast: no rash or pruritis  Hematologic/Lymphatic: no easy bruising or lymphadenopathy  Musculoskeletal: no arthralgias or myalgias  Neurological: no seizures or tremors  Behavioral/Psych: no depression or anxiety    PEx     Body mass index is 24.4 kg/m².      General appearance: no distress  Mental status: Alert and oriented x 3  HEENT:  conjunctivae/corneas clear, PERRL  Neck: supple, thyroid not enlarged  Pulm:   normal respiratory effort, CTA B, no c/w/r  Card: RRR, no murmur  Abd: soft, NT, ND, BS present; no masses, no organomegaly  Ext: no edema  Pulses: 2+, symmetric  Skin: color, texture, turgor normal. No rashes or lesions  Neuro: CN II-XII grossly intact, no focal numbness or weakness, normal strength and tone        Ref. Range 10/26/2019 04:21   WBC Latest Ref Range: 3.90 - 12.70 K/uL 7.76   RBC Latest Ref Range: 4.60 - 6.20 M/uL 5.15   Hemoglobin Latest Ref Range: 14.0 - 18.0 g/dL 13.3 (L)   Hematocrit Latest Ref Range: 40.0 - 54.0 % 44.3   MCV Latest Ref Range: 82 - 98 fL 86   MCH Latest Ref Range: 27.0 - 31.0 pg 25.8 (L)   MCHC Latest Ref Range: 32.0 - 36.0 g/dL 30.0 (L)   RDW Latest Ref Range: 11.5 - 14.5 % 13.8   Platelets Latest Ref Range: 150 - 350 K/uL 185   MPV Latest Ref Range: 9.2 - 12.9 fL 10.3   Gran% Latest Ref Range: 38.0 - 73.0 % 64.0   Gran # (ANC) Latest Ref Range: 1.8 - 7.7 K/uL 5.0   Lymph% Latest Ref Range: 18.0 - 48.0 % 21.3   Lymph # Latest Ref Range: 1.0 - 4.8 K/uL 1.7   Mono% Latest Ref Range: 4.0 - 15.0 % 10.7   Mono # Latest Ref Range: 0.3 - 1.0 K/uL 0.8   Eosinophil% Latest Ref Range: 0.0 - 8.0 % 3.2   Eos # Latest Ref Range: 0.0 - 0.5 K/uL 0.3   Basophil% Latest Ref Range: 0.0 - 1.9 % 0.5   Baso # Latest Ref Range: 0.00 - 0.20 K/uL 0.04   nRBC Latest Ref Range: 0 /100 WBC 0   Differential Method Unknown Automated   Immature Grans (Abs) Latest Ref Range: 0.00 - 0.04 K/uL 0.02   Immature Granulocytes Latest Ref Range: 0.0 - 0.5 % 0.3   Sodium Latest Ref Range: 136 - 145 mmol/L 134 (L)   Potassium Latest Ref Range: 3.5 - 5.1 mmol/L 3.8   Chloride Latest Ref Range: 95 - 110 mmol/L 97   CO2 Latest Ref Range: 23 - 29 mmol/L 29   Anion Gap Latest Ref Range: 8 - 16 mmol/L 8   BUN, Bld Latest Ref Range: 8 - 23 mg/dL 29 (H)   Creatinine  Latest Ref Range: 0.5 - 1.4 mg/dL 1.4   eGFR if non African American Latest Ref Range: >60 mL/min/1.73 m^2 45.5 (A)   eGFR if African American Latest Ref Range: >60 mL/min/1.73 m^2 52.6 (A)   Glucose Latest Ref Range: 70 - 110 mg/dL 71   Calcium Latest Ref Range: 8.7 - 10.5 mg/dL 9.3   Phosphorus Latest Ref Range: 2.7 - 4.5 mg/dL 3.3   Magnesium Latest Ref Range: 1.6 - 2.6 mg/dL 1.9     Assessment and Plan:  Acute arterial ischemic stroke, multifocal, multiple vascular territories  This is an 85 year old male with PMH chronic Afib (not on AC), CAD (S/P stent), HTN, combined CHF, and hypothyroidism who received IV-tPA at OSH on 10/10 after presenting with right-sided weakness, aphasia, and left gaze deviation. CTA MP showed a chronically occluded left ICA from it's origin to the supraclinoid segment with filling of the MCA via the anterior communicating artery, along with substantial burden of intracranial and extracranial atherosclerotic disease, including occluded V4 with an intermittently occluded basilar. No EVT was pursued. TTE on 10/11 showed an EF of 35% and severe left atrial enlargement,however no thrombus. MRI brain on 10/11 demonstrated scattered areas of infarct in the bilateral hemispheres, predominantly in the right frontal lobe. Etiology suspected likely hypoperfusion/watershed in the setting of vascular abnormalities, though differential also includes cardioembolic in the setting of AF and ischemic cardiomyopathy. NIHSS of 8 on admission. PT and SLP consulted. Stable for discharge and pending placement.      Antithrombotics for secondary stroke prevention:  Eliquis 2.5 mg BID      Statins for secondary stroke prevention and hyperlipidemia, if present:   Statins: Atorvastatin- 40 mg daily      Aggressive risk factor modification: HTN, HLD, Diet, Exercise, A-Fib, CAD      Rehab efforts: The patient has been evaluated by a stroke team provider and the therapy needs have been fully considered based off  the presenting complaints and exam findings. The following therapy evaluations are needed: PT evaluate and treat, OT evaluate and treat, SLP evaluate and treat, PM&R evaluate for appropriate placement      Diagnostics ordered/pending: None     VTE prophylaxis: Eliquis 2.5 mg BID, SCDs     BP parameters: SBP <160.      Disposition: SNF; placement pending.      AMRIT (acute kidney injury)  -BUN/Cr elevated on admission, initially improved, then worsening again. Likely at baseline per outside chart review of [facility] (Cr range 1.5 - 2.6)  -Appears euvolemic on exam  -Urine Na and Cr normal.   -Cr back to baseline.      Plan:   -Lasix restarted at reduced dose.   -Continue to monitor volume status.   -Renally dose all medications.      Person awaiting admission to adequate facility elsewhere  -Difficult placement due to patient living in [place] and visiting daughter when stroke occurred - therapy teams recommending inpatient rehab  -Patient is unable to fly back to [place]. He is not capable of flying due to inability to ambulate without trained medical assistance, impaired functional mobility, impaired cardiopulmonary response to activity, urine and bowel incontinence, and decreased mental capacity related to stroke and underlying diagnosis of dementia.   -Daughter is unable to provide care.      Plan:   -Patient accepted to [facility]; needs insurance authorization.         Erythema of hand  -- Patient previously with erythema of R hand from IV infiltration.  -- Bactroban cream TID X 10 days (end date 10/26)  -- Elevation of R hand   -- Pt denied complaints of pain on exam today.     SIADH (syndrome of inappropriate ADH production)  -Noted onset of hyponatremia on 10/12, decreasing to 131 at the lowest.   -SIADH suspected due to euvolemic appearance and no contributing hyperglycemia, reason to suspect pseudohyponatremia, use of thiazide diuretic, or signs volume overload on exam.   -No TSH  abnormality or signs of glucocorticoid deficiency.   -Suspect onset secondary to stroke.   -Sodium improved status post fluid restriction and Na tablets.      Plan:   -Continue 1L fluid restriction daily.   RESOLVED     Dementia  Plan:   -Continue Donepezil 5 mg daily.        11/5: patient agitated overnight. Acute confusion vs baseline dementia. UA ordered to rule out confusion r/t UTI     Intracranial atherosclerosis  See assessment for occlusion of left ICA.      Occlusion of left carotid artery  -Seen on CTA on 10/11.   -Suspect chronic occlusion.      Plan:   -Continue current secondary stroke prevention.      Nodule of right lung  -1 cm nodule of right lung seen on CTA; no prior history of tobacco use.      Plan:   -For a part solid nodule 6 mm or greater, Fleischner Society 2017 guidelines recommend follow up with non-contrast chest CT at 3-6 months to confirm persistence. If this nodule is unchanged and the solid component remains <6 mm, annual follow up CT should be performed for 5 years  -Previously discussed with daughter.      Cytotoxic cerebral edema  -Area of cytotoxic cerebral edema identified when reviewing brain imaging in the territory of the R/L middle cerebral artery. There is not mass effect associated with it.   -The pattern is suggestive of cardioembolic etiology.     Plan:   -We will continue to monitor the patients clinical exam for any worsening of symptoms which may indicate expansion of the stroke or the area of the edema resulting in the clinical change.      Coronary artery disease involving native coronary artery of native heart without angina pectoris  -Stroke risk factor  -Noted on Care Everywhere; S/P RCA stent in 7/2018; was prescribed Plavix, Statin, and Ranexa,  but unclear if patient was taking them.     Plan:   -Continue high-intensity Statin daily and holding ASA with daily use of Eliquis for AF.      Paroxysmal atrial fibrillation  -Stroke risk factor  -CHADSVASC: 7.    -HASBLED: 2  -Per Care Everywhere, not previously on AC; Dr. Menjivar (Cardiologist) had report patient not a good candidate due to risk of falls, non-compliance, and dementia.  -Remains rate-controlled at this time.     Plan:   -Continue beta-blocker and Eliquis 2.5 mg BID.   -Telemetry ordered.      Chronic combined systolic and diastolic congestive heart failure  -Stroke risk factor  -Most recent TTE on 10/11 significant for EF of 35%, diastolic dysfunction, wall motion abnormalities, severe left atrial enlargement, and moderate AS.   -Likely ischemic etiology with history of CAD.   -Home regimen: Lasix 20 mg and Spironolactone.   -Pro-BNP from Feb 2019 >3000.   -Appears euvolemic on exam.      Plan:   -Initiated Coreg 3.125mg BID.   -Holding Spironolactone    -Initially held Lasix for AMRIT, however reviewed outside records from [facility], and Cr range 1.5 -2.6. Patient likely at baseline. Restarted Lasix on 10/15 at reduced-dose 20 mg daily.    -Daily weights and strict I/O's.      Acquired hypothyroidism  -Stroke risk factor  -TSH 3.032     Plan:   -Continue Synthroid 25 mcg daily.      Essential hypertension  -Stroke risk factor.  -Home regimen: Spironolactone.      Plan:   -Goal SBP < 160  -At goal on current regimen: Coreg 3.125 mg BID, and Norvasc 10 mg daily.      Continue the following medications for treatment of the indicated conditions:  ·  Indication Medication Dose Route  Frequency              No future appointments.        I certify that SNF services are required to be given on an inpatient basis because Izaiah Douglas needs for skilled nursing care and/or skilled rehabilitation are required on a daily basis and such services can only practically be provided in a skilled nursing facility setting and are for an ongoing condition for which she received inpatient care in the hospital.     Time (minutes) spent in care of the patient (Greater than 1/2 spent in direct face-to-face contact)  40    Bianca Alberto MD

## 2019-11-14 ENCOUNTER — HOSPITAL ENCOUNTER (EMERGENCY)
Facility: HOSPITAL | Age: 84
Discharge: PSYCHIATRIC OR SUBSTANCE USE FACILITY | End: 2019-11-14
Attending: EMERGENCY MEDICINE
Payer: MEDICARE

## 2019-11-14 VITALS
OXYGEN SATURATION: 99 % | SYSTOLIC BLOOD PRESSURE: 110 MMHG | TEMPERATURE: 99 F | DIASTOLIC BLOOD PRESSURE: 59 MMHG | RESPIRATION RATE: 14 BRPM | HEART RATE: 72 BPM

## 2019-11-14 DIAGNOSIS — M25.521 ELBOW PAIN, RIGHT: ICD-10-CM

## 2019-11-14 DIAGNOSIS — M25.569 KNEE PAIN: ICD-10-CM

## 2019-11-14 DIAGNOSIS — M79.606 LEG PAIN: ICD-10-CM

## 2019-11-14 DIAGNOSIS — S51.011A SKIN TEAR OF RIGHT ELBOW WITHOUT COMPLICATION, INITIAL ENCOUNTER: ICD-10-CM

## 2019-11-14 DIAGNOSIS — S00.511A ABRASION OF LIP, INITIAL ENCOUNTER: ICD-10-CM

## 2019-11-14 DIAGNOSIS — S00.93XA CONTUSION OF HEAD, UNSPECIFIED PART OF HEAD, INITIAL ENCOUNTER: ICD-10-CM

## 2019-11-14 DIAGNOSIS — S01.81XA FACIAL LACERATION, INITIAL ENCOUNTER: Primary | ICD-10-CM

## 2019-11-14 PROCEDURE — 25000003 PHARM REV CODE 250: Performed by: EMERGENCY MEDICINE

## 2019-11-14 PROCEDURE — 90715 TDAP VACCINE 7 YRS/> IM: CPT | Performed by: EMERGENCY MEDICINE

## 2019-11-14 PROCEDURE — 63600175 PHARM REV CODE 636 W HCPCS: Performed by: EMERGENCY MEDICINE

## 2019-11-14 PROCEDURE — 12011 RPR F/E/E/N/L/M 2.5 CM/<: CPT

## 2019-11-14 PROCEDURE — 99284 EMERGENCY DEPT VISIT MOD MDM: CPT | Mod: 25

## 2019-11-14 PROCEDURE — 90471 IMMUNIZATION ADMIN: CPT | Performed by: EMERGENCY MEDICINE

## 2019-11-14 RX ORDER — BACITRACIN 500 [USP'U]/G
OINTMENT TOPICAL
Status: COMPLETED | OUTPATIENT
Start: 2019-11-14 | End: 2019-11-14

## 2019-11-14 RX ORDER — LIDOCAINE HYDROCHLORIDE AND EPINEPHRINE 10; 10 MG/ML; UG/ML
5 INJECTION, SOLUTION INFILTRATION; PERINEURAL ONCE
Status: COMPLETED | OUTPATIENT
Start: 2019-11-14 | End: 2019-11-14

## 2019-11-14 RX ORDER — LIDOCAINE HYDROCHLORIDE 10 MG/ML
10 INJECTION INFILTRATION; PERINEURAL
Status: DISCONTINUED | OUTPATIENT
Start: 2019-11-14 | End: 2019-11-15 | Stop reason: HOSPADM

## 2019-11-14 RX ADMIN — BACITRACIN: 500 OINTMENT TOPICAL at 10:11

## 2019-11-14 RX ADMIN — LIDOCAINE HYDROCHLORIDE AND EPINEPHRINE 5 ML: 10; 10 INJECTION, SOLUTION INFILTRATION; PERINEURAL at 09:11

## 2019-11-14 RX ADMIN — CLOSTRIDIUM TETANI TOXOID ANTIGEN (FORMALDEHYDE INACTIVATED), CORYNEBACTERIUM DIPHTHERIAE TOXOID ANTIGEN (FORMALDEHYDE INACTIVATED), BORDETELLA PERTUSSIS TOXOID ANTIGEN (GLUTARALDEHYDE INACTIVATED), BORDETELLA PERTUSSIS FILAMENTOUS HEMAGGLUTININ ANTIGEN (FORMALDEHYDE INACTIVATED), BORDETELLA PERTUSSIS PERTACTIN ANTIGEN, AND BORDETELLA PERTUSSIS FIMBRIAE 2/3 ANTIGEN 0.5 ML: 5; 2; 2.5; 5; 3; 5 INJECTION, SUSPENSION INTRAMUSCULAR at 10:11

## 2019-11-14 NOTE — ED NOTES
Patient was brought into ED from [facility] for a fall that was witnessed. Patient fell forward by tripping and patient has laceration above right eye, right lip,  right elbow, and left shin. Patient did not lose loc and complained of HA after. Patient is on Blood thinners, eloquis. Patient was recently treated at [facility] for CVA that was treated with TPA on 10/11/2019. Patient is here non contested, has a history dementia. Patient baseline to orientation is person. Patient is awake, alert, verbal. EMS is unsure if family has been notified of patient arrival.

## 2019-11-15 NOTE — ED NOTES
Pt currently in bed, call light within reach. Cardiac monitor, continuous pulse ox and nibp in place. Pt requesting food, informed pt he can eat once results are in if there are no abnormalities. Pt v/u. Denies any other needs at this time. Denies pain.

## 2019-11-15 NOTE — ED NOTES
Patient stated that he needed to use urinal. Assisted patient with urinal, patient voided 150cc. Patient stated that he is hungry and would like to eat, placed tray at bedside for when patient is cleared to eat. MD stated that the patient can eat once the CT is clear.

## 2019-11-15 NOTE — ED PROVIDER NOTES
Encounter Date: 11/14/2019    SCRIBE #1 NOTE: I, Misti Vega, am scribing for, and in the presence of,  Dr. Russell . I have scribed the entire note.   SCRIBE #2 NOTE: I, Ernestina Vance, am scribing for, and in the presence of,  Anne-Marie Centeno. I have scribed the following portions of the note - Other sections scribed: Procedures.     History     Chief Complaint   Patient presents with    Fall     pt was brought to ED from [facility] for a fall. pt has hx of dementia. lac to right elbow, right lip,  right eyebrow, and left leg. pt's legal status is noncontested     Izaiah Douglas is a 85 y.o. male who  has a past medical history of Acute arterial ischemic stroke, multifocal, multiple vascular territories (10/10/2019), CAD (coronary artery disease) (10/11/2019), CHF (congestive heart failure), Chronic combined systolic and diastolic congestive heart failure (10/10/2019), Dementia (10/13/2019), Hypertension, Paroxysmal atrial fibrillation (10/11/2019), SIADH (syndrome of inappropriate ADH production) (10/14/2019), Stroke, and Thyroid disease.    The patient presents to the ED due to fall. Per EMS, patient was brought to the ED from [facility] after a fall that occurred PTA. Pt tripped, which caused him to fall and land on his face. He sustained multiple lacerations to his right eyebrow, right lip and left leg. Pt has a history of dementia and doesn't recall falling. He denies any leg pain, abdominal pain or LOC/syncope. Pt is currently on Eliquis.    The history is provided by the patient.     Review of patient's allergies indicates:   Allergen Reactions    Iodine and iodide containing products      Past Medical History:   Diagnosis Date    Acute arterial ischemic stroke, multifocal, multiple vascular territories 10/10/2019    CAD (coronary artery disease) 10/11/2019    CHF (congestive heart failure)     Chronic combined systolic and diastolic congestive heart failure 10/10/2019    - Unclear hx  from daughter but home medications reflective CHF - TTE tomorrow - Judicious fluid administration    Dementia 10/13/2019    Hypertension     Paroxysmal atrial fibrillation 10/11/2019    SIADH (syndrome of inappropriate ADH production) 10/14/2019    Stroke     TIA    Thyroid disease      Past Surgical History:   Procedure Laterality Date    AORTA SURGERY      CORONARY ANGIOPLASTY WITH STENT PLACEMENT      REPAIR OF ANEURYSM       No family history on file.  Social History     Tobacco Use    Smoking status: Never Smoker    Smokeless tobacco: Current User     Types: Chew   Substance Use Topics    Alcohol use: Not Currently    Drug use: Never     Review of Systems   Constitutional: Negative for chills and fever.   HENT: Negative for sore throat.    Respiratory: Negative for shortness of breath.    Cardiovascular: Negative for chest pain.   Gastrointestinal: Negative for constipation, diarrhea, nausea and vomiting.   Genitourinary: Negative for dysuria, frequency and urgency.   Musculoskeletal: Negative for back pain.   Skin: Positive for wound. Negative for rash.   Neurological: Negative for weakness.   Hematological: Does not bruise/bleed easily.   Psychiatric/Behavioral: Negative for agitation, behavioral problems and confusion.       Physical Exam     Initial Vitals [11/14/19 1753]   BP Pulse Resp Temp SpO2   (!) 151/67 68 18 -- 99 %      MAP       --         Physical Exam    Nursing note and vitals reviewed.  Constitutional: He appears well-developed and well-nourished. He is not diaphoretic. No distress.   HENT:   Head: Normocephalic. Head is with laceration (2 cm at R brow).   Nose: Nose normal.   2 cm laceration to lower aspect of lower eyebrow   Soft tissue ecchymosis to right zygomal region  Small laceration to the R lower lip; not through the vermillion border of the lip    Eyes: EOM are normal.   Neck: Normal range of motion. Neck supple.   No cervical spine tenderness       Cardiovascular:  Normal rate, regular rhythm and normal heart sounds.   No murmur heard.  Pulmonary/Chest: Breath sounds normal. No respiratory distress. He has no wheezes.   Abdominal: Soft. He exhibits no distension. There is no tenderness.   Musculoskeletal: Normal range of motion.   No lumbar spine tenderness   No bruising; no crepitance   Skin tear to the lateral proxial tibial region on left   Multiple abrasions to the right tibial region   2+ DP/PT pulses   Full ROM  Good perfusion to all extremities      Neurological: He is alert.   Skin: Skin is warm and dry. No rash noted.   Psychiatric: He has a normal mood and affect. Thought content normal.                 ED Course   Lac Repair  Date/Time: 11/14/2019 8:53 PM  Performed by: Anne-Marie Centeno MD  Authorized by: Anne-Marie Centeno MD   Body area: head/neck  Location details: right eyebrow  Laceration length: 2 cm  Foreign bodies: no foreign bodies  Tendon involvement: none  Nerve involvement: none  Vascular damage: no    Anesthesia:  Local anesthesia used: yes  Local Anesthetic: lidocaine 1% with epinephrine  Anesthetic total: 3 mL  Patient sedated: no  Preparation: Patient was prepped and draped in the usual sterile fashion.  Irrigation solution: saline  Irrigation method: syringe  Amount of cleaning: standard  Debridement: none  Degree of undermining: none  Skin closure: Ethilon and 5-0 nylon  Number of sutures: 6  Technique: simple            Labs Reviewed - No data to display       Imaging Results          X-Ray Knee 3 View Left (Final result)  Result time 11/14/19 20:27:03    Final result by Mic Scruggs MD (11/14/19 20:27:03)                 Impression:      1. No convincing acute displaced fracture or dislocation of the knee noting severe degenerative change.  There may be a minimal suprapatellar effusion.      Electronically signed by: Mic Scruggs MD  Date:    11/14/2019  Time:    20:27             Narrative:    EXAMINATION:  XR KNEE 3 VIEW  LEFT    CLINICAL HISTORY:  Pain in unspecified knee    TECHNIQUE:  AP, lateral, and Merchant views of the left knee were performed.    COMPARISON:  None    FINDINGS:  Three views left knee.    There is osteopenia.  There is severe degenerative change of the knee, the degree of which limits evaluation for acute displaced fracture.  There is a minimal suprapatellar effusion.  There may be sequela of prior anterior tibial avulsion.  No definite acute displaced fracture or dislocation of the knee.  There is a nonspecific subcutaneous sclerotic focus within the lateral soft tissues of the leg near the femoral metaphysis.  There is vascular calcification.                               X-Ray Tibia Fibula 2 View Left (Final result)  Result time 11/14/19 20:27:44    Final result by Mic Scruggs MD (11/14/19 20:27:44)                 Impression:      1. No convincing acute displaced fracture or dislocation of the tibia or fibula.      Electronically signed by: Mic Scruggs MD  Date:    11/14/2019  Time:    20:27             Narrative:    EXAMINATION:  XR TIBIA FIBULA 2 VIEW LEFT    CLINICAL HISTORY:  Pain in leg, unspecified    TECHNIQUE:  AP and lateral views of the left tibia and fibula were performed.    COMPARISON:  None.    FINDINGS:  Three views left tibia fibula.    Severe degenerative changes are noted of the knee.  There is osteopenia.  There may be prior anterior tibial avulsion injury.  There is vascular calcification.  The ankle appears grossly intact.                               X-Ray Elbow Complete Right (Final result)  Result time 11/14/19 20:28:19    Final result by Mic Scruggs MD (11/14/19 20:28:19)                 Impression:      As above      Electronically signed by: Mic Scruggs MD  Date:    11/14/2019  Time:    20:28             Narrative:    EXAMINATION:  XR ELBOW COMPLETE 3 VIEW RIGHT    CLINICAL HISTORY:  . Pain in right elbow    TECHNIQUE:  AP, lateral, and oblique views of the  right elbow were performed.    COMPARISON:  None    FINDINGS:  Three views right elbow.    No significant displacement of the anterior or posterior elbow fat pads.  Anterior humeral line and radiocapitellar line are in appropriate orientation.  Degenerative changes are noted of the olecranon.  There is mild edema overlying the olecranon.  No convincing acute displaced fracture or dislocation of the elbow.                               CT Cervical Spine Without Contrast (Final result)  Result time 11/14/19 20:06:52    Final result by April Sidhu MD (11/14/19 20:06:52)                 Impression:      No acute intracranial abnormality detected.    No acute cervical fracture.  Moderate to severe degenerative changes.  Heterogeneous right thyroid gland.    Right superolateral periorbital laceration.  No acute depressed maxillofacial fracture.  Mild fragmentation the exam septum.  Recommend correlation with site of pain.      Electronically signed by: April Sidhu  Date:    11/14/2019  Time:    20:06             Narrative:    EXAMINATION:  CT OF THE HEAD WITHOUT, CT CERVICAL SPINE AND CT MAXILLOFACIAL    CLINICAL HISTORY:  Head trauma, mental status change;; C-spine trauma, NEXUS/CCR positive, low risk;C-spine trauma, NEXUS/CCR positive, neg initially, collar for pain, f/u eval;; Maxface trauma blunt;    TECHNIQUE:  5 mm unenhanced axial images were obtained from the skull base to the vertex.  1.25 mm axial images were obtained through the cervical spine and the maxillofacial bones.  Coronal and sagittal reformatted images were provided.    COMPARISON:  MRI brain from 10/11/2019    FINDINGS:  CT head: Moderate cerebral atrophy and chronic small vessel ischemic changes are present.  A nonacute lacunar infarct is seen in the right caudate head.  There is no acute intracranial hemorrhage, territorial infarct or mass effect, or midline shift.  There is cerebellar atrophy.  In the visualized paranasal sinuses,  there is mild mucoperiosteal thickening seen in the right frontal sinus.  There is a small mucous retention cyst or polyp seen in the right maxillary sinus.    CT cervical spine: There is satisfactory alignment of the cervical spine.  There is no acute fracture or subluxation of the cervical spine.  There is severe multilevel degenerative change of the spine including endplate sclerosis, intervertebral disc space narrowing, and marginal osteophytes.  Vacuum phenomena is seen at C5/C6.  There is grade 1 anterolisthesis of C7 on T1.  At left C4/C5, there is moderate foraminal stenosis.  Bones are diffusely osteopenic.  There is a low-density lesion in the right heterogeneous thyroid gland measuring just under 12 mm.    CT maxillofacial: There is mild fragmentation about the nasal septum.  There is no definite acute fracture.  Recommend correlation cervical pain.  There is a right periorbital laceration in the superolateral region.  Mild mucoperiosteal thickening is seen in the right frontal and bilateral sphenoid sinuses a small mucous retention cyst or polyp is seen right maxillary sinus.  There is nasal septal deviation to left.  Ostiomeatal units are patent.  There is left middle turbinate adan bullosa.  There are degenerative changes of the left TMJ.                               CT Maxillofacial Without Contrast (Final result)  Result time 11/14/19 20:06:52    Final result by April Sidhu MD (11/14/19 20:06:52)                 Impression:      No acute intracranial abnormality detected.    No acute cervical fracture.  Moderate to severe degenerative changes.  Heterogeneous right thyroid gland.    Right superolateral periorbital laceration.  No acute depressed maxillofacial fracture.  Mild fragmentation the exam septum.  Recommend correlation with site of pain.      Electronically signed by: April Sidhu  Date:    11/14/2019  Time:    20:06             Narrative:    EXAMINATION:  CT OF THE HEAD  WITHOUT, CT CERVICAL SPINE AND CT MAXILLOFACIAL    CLINICAL HISTORY:  Head trauma, mental status change;; C-spine trauma, NEXUS/CCR positive, low risk;C-spine trauma, NEXUS/CCR positive, neg initially, collar for pain, f/u eval;; Maxface trauma blunt;    TECHNIQUE:  5 mm unenhanced axial images were obtained from the skull base to the vertex.  1.25 mm axial images were obtained through the cervical spine and the maxillofacial bones.  Coronal and sagittal reformatted images were provided.    COMPARISON:  MRI brain from 10/11/2019    FINDINGS:  CT head: Moderate cerebral atrophy and chronic small vessel ischemic changes are present.  A nonacute lacunar infarct is seen in the right caudate head.  There is no acute intracranial hemorrhage, territorial infarct or mass effect, or midline shift.  There is cerebellar atrophy.  In the visualized paranasal sinuses, there is mild mucoperiosteal thickening seen in the right frontal sinus.  There is a small mucous retention cyst or polyp seen in the right maxillary sinus.    CT cervical spine: There is satisfactory alignment of the cervical spine.  There is no acute fracture or subluxation of the cervical spine.  There is severe multilevel degenerative change of the spine including endplate sclerosis, intervertebral disc space narrowing, and marginal osteophytes.  Vacuum phenomena is seen at C5/C6.  There is grade 1 anterolisthesis of C7 on T1.  At left C4/C5, there is moderate foraminal stenosis.  Bones are diffusely osteopenic.  There is a low-density lesion in the right heterogeneous thyroid gland measuring just under 12 mm.    CT maxillofacial: There is mild fragmentation about the nasal septum.  There is no definite acute fracture.  Recommend correlation cervical pain.  There is a right periorbital laceration in the superolateral region.  Mild mucoperiosteal thickening is seen in the right frontal and bilateral sphenoid sinuses a small mucous retention cyst or polyp is seen  right maxillary sinus.  There is nasal septal deviation to left.  Ostiomeatal units are patent.  There is left middle turbinate adan bullosa.  There are degenerative changes of the left TMJ.                               CT Head Without Contrast (Final result)  Result time 11/14/19 20:06:52    Final result by April Sidhu MD (11/14/19 20:06:52)                 Impression:      No acute intracranial abnormality detected.    No acute cervical fracture.  Moderate to severe degenerative changes.  Heterogeneous right thyroid gland.    Right superolateral periorbital laceration.  No acute depressed maxillofacial fracture.  Mild fragmentation the exam septum.  Recommend correlation with site of pain.      Electronically signed by: April Sidhu  Date:    11/14/2019  Time:    20:06             Narrative:    EXAMINATION:  CT OF THE HEAD WITHOUT, CT CERVICAL SPINE AND CT MAXILLOFACIAL    CLINICAL HISTORY:  Head trauma, mental status change;; C-spine trauma, NEXUS/CCR positive, low risk;C-spine trauma, NEXUS/CCR positive, neg initially, collar for pain, f/u eval;; Maxface trauma blunt;    TECHNIQUE:  5 mm unenhanced axial images were obtained from the skull base to the vertex.  1.25 mm axial images were obtained through the cervical spine and the maxillofacial bones.  Coronal and sagittal reformatted images were provided.    COMPARISON:  MRI brain from 10/11/2019    FINDINGS:  CT head: Moderate cerebral atrophy and chronic small vessel ischemic changes are present.  A nonacute lacunar infarct is seen in the right caudate head.  There is no acute intracranial hemorrhage, territorial infarct or mass effect, or midline shift.  There is cerebellar atrophy.  In the visualized paranasal sinuses, there is mild mucoperiosteal thickening seen in the right frontal sinus.  There is a small mucous retention cyst or polyp seen in the right maxillary sinus.    CT cervical spine: There is satisfactory alignment of the cervical  spine.  There is no acute fracture or subluxation of the cervical spine.  There is severe multilevel degenerative change of the spine including endplate sclerosis, intervertebral disc space narrowing, and marginal osteophytes.  Vacuum phenomena is seen at C5/C6.  There is grade 1 anterolisthesis of C7 on T1.  At left C4/C5, there is moderate foraminal stenosis.  Bones are diffusely osteopenic.  There is a low-density lesion in the right heterogeneous thyroid gland measuring just under 12 mm.    CT maxillofacial: There is mild fragmentation about the nasal septum.  There is no definite acute fracture.  Recommend correlation cervical pain.  There is a right periorbital laceration in the superolateral region.  Mild mucoperiosteal thickening is seen in the right frontal and bilateral sphenoid sinuses a small mucous retention cyst or polyp is seen right maxillary sinus.  There is nasal septal deviation to left.  Ostiomeatal units are patent.  There is left middle turbinate adan bullosa.  There are degenerative changes of the left TMJ.                                 Medical Decision Making:   Clinical Tests:   Radiological Study: Ordered and Reviewed  ED Management:  - imaging of head, face, C spine and lower/upper extremities ordered  - care of patient handed off to Dr. SHARITA Centeno as of shift change; please see her notes/update as it pertains to final plan and disposition                    ED Course as of Nov 15 0759   Thu Nov 14, 2019 2120 Patient handed off to me from Dr. Russell at shift change at 8 PM for follow up imaging and laceration repair.  [facility] 84 [facility]  Denies LOC.  H/o HTN, atrial fibrillation on eliquis, CHF, CAD, CVA, dementia.    VSS, NAD, nontoxic appearing.  Alert and answering questions appropriately.  Reportedly this is his neurological baseline.  There is a 2 cm laceration superior to right eyebrow without bleeding, no FB.  EOMs intact  bilaterally.  No facial TTP, no TTP to nasal bridge.  Small superficial abrasion to right lower lip.  See photos.  Skin tears to right elbow and left anterior lower leg without bleeding.  Laceration irrigated and sutured in ED.  Skin tears cleaned and dressed.  Patient tolerated all procedures well.      CT head:  No acute intracranial abnormality detected.    CT cervical spine:  No acute cervical fracture.  Moderate to severe degenerative changes.  Heterogeneous right thyroid gland.    CT max/face:  Right superolateral periorbital laceration.  No acute depressed maxillofacial fracture.  Mild fragmentation the exam septum.  Recommend correlation with site of pain.    Other imaging negative for acute fx/dislocation    Low suspicion for nasal bone or septum fx.  No TTP, no ecchymoses. No epistaxis.  Patient tolerated PO.  He is stable for discharge.   Tetanus vaccination updated.      [LD]      ED Course User Index  [LD] Anne-Marie Centeno MD                Clinical Impression:       ICD-10-CM ICD-9-CM   1. Facial laceration, initial encounter S01.81XA 873.40   2. Knee pain M25.569 719.46   3. Leg pain M79.606 729.5   4. Elbow pain, right M25.521 719.42   5. Abrasion of lip, initial encounter S00.511A 910.0   6. Skin tear of right elbow without complication, initial encounter S51.011A 881.01   7. Contusion of head, unspecified part of head, initial encounter S00.93XA 920       I, Freeman Russell,  personally performed the services described in this documentation. All medical record entries made by the scribe were at my direction and in my presence.  I have reviewed the chart and agree that the record reflects my personal performance and is accurate and complete. Freeman Russell M.D. 8:00 AM11/15/2019                        Freeman Russell MD  11/15/19 0800

## 2019-11-15 NOTE — ED NOTES
MD at bedside for assessment. LOG roll performed with 3 person assist. MD inspected and palpated patient head to toe. Lacerations noted to face without tenderness, no bruising or tenderness to any spinal areas or chest. Patient does have bony prominence noted to left knee but patient is able to move freely without discomfort or pain. Positive pedal pulse noted to left lower extremity with trace edema noted to left lower extremity. Patient is awake and alert and is currently at baseline for orientation.

## 2020-01-15 ENCOUNTER — LAB VISIT (OUTPATIENT)
Dept: LAB | Facility: HOSPITAL | Age: 85
End: 2020-01-15
Attending: FAMILY MEDICINE
Payer: MEDICARE

## 2020-01-15 DIAGNOSIS — I10 ESSENTIAL HYPERTENSION, MALIGNANT: Primary | ICD-10-CM

## 2020-01-15 PROCEDURE — 82728 ASSAY OF FERRITIN: CPT

## 2020-01-15 PROCEDURE — 83540 ASSAY OF IRON: CPT

## 2020-01-15 PROCEDURE — 82607 VITAMIN B-12: CPT

## 2020-01-15 PROCEDURE — 80048 BASIC METABOLIC PNL TOTAL CA: CPT

## 2020-01-15 PROCEDURE — 82746 ASSAY OF FOLIC ACID SERUM: CPT

## 2020-01-16 LAB
ANION GAP SERPL CALC-SCNC: 7 MMOL/L (ref 8–16)
BUN SERPL-MCNC: 15 MG/DL (ref 8–23)
CALCIUM SERPL-MCNC: 9.3 MG/DL (ref 8.7–10.5)
CHLORIDE SERPL-SCNC: 96 MMOL/L (ref 95–110)
CO2 SERPL-SCNC: 29 MMOL/L (ref 23–29)
CREAT SERPL-MCNC: 1.5 MG/DL (ref 0.5–1.4)
EST. GFR  (AFRICAN AMERICAN): 48.4 ML/MIN/1.73 M^2
EST. GFR  (NON AFRICAN AMERICAN): 41.8 ML/MIN/1.73 M^2
FERRITIN SERPL-MCNC: 99 NG/ML (ref 20–300)
FOLATE SERPL-MCNC: 7.9 NG/ML (ref 4–24)
GLUCOSE SERPL-MCNC: 76 MG/DL (ref 70–110)
IRON SERPL-MCNC: 42 UG/DL (ref 45–160)
POTASSIUM SERPL-SCNC: 3.9 MMOL/L (ref 3.5–5.1)
SODIUM SERPL-SCNC: 132 MMOL/L (ref 136–145)
VIT B12 SERPL-MCNC: 409 PG/ML (ref 210–950)

## 2020-09-30 NOTE — ASSESSMENT & PLAN NOTE
-Stroke risk factor  -CHADSVASC: 7.   -HASBLED: 2  -Per Care Everywhere, not previously on AC; Dr. Menjivar (Cardiologist) had report patient not a good candidate due to risk of falls, non-compliance, and dementia.  -Remains rate-controlled at this time.    Plan:   -Continue beta-blocker and Eliquis 2.5 mg BID.   -Telemetry ordered.    Surgeon: Trey Yee M.D.

## 2021-03-04 NOTE — SUBJECTIVE & OBJECTIVE
Neurologic Chief Complaint: R-sided weakness and aphasia    Subjective:     Interval History: Patient is seen for follow-up neurological assessment and treatment recommendations:  elevated WBC on U/A.  Discharged on 3 days of bactrim.        HPI, Past Medical, Family, and Social History remains the same as documented in the initial encounter.     Review of Systems   Constitutional: Negative for fatigue and fever.   Gastrointestinal: Negative for nausea and vomiting.   Neurological: Positive for speech difficulty and weakness.   Psychiatric/Behavioral: Positive for agitation. Negative for confusion and decreased concentration.     Scheduled Meds:    Continuous Infusions:  PRN Meds:    Objective:     Vital Signs (Most Recent):  Temp: 98.1 °F (36.7 °C) (11/06/19 1600)  Pulse: 64 (11/06/19 1600)  Resp: 16 (11/06/19 1600)  BP: 130/66 (11/06/19 1600)  SpO2: 97 % (11/06/19 1600)  BP Location: Right leg    Vital Signs Range (Last 24H):  Temp:  [96.8 °F (36 °C)-98.1 °F (36.7 °C)]   Pulse:  [58-91]   Resp:  [16-20]   BP: (130-141)/(65-74)   SpO2:  [96 %-97 %]   BP Location: Right leg    Physical Exam   Constitutional: He appears well-developed and well-nourished. No distress.   HENT:   Head: Normocephalic and atraumatic.   Eyes: Conjunctivae and EOM are normal.   Cardiovascular: Normal rate.   Pulmonary/Chest: Effort normal. No respiratory distress.   Musculoskeletal: He exhibits no edema or deformity.   Neurological: He is alert. No sensory deficit. He exhibits normal muscle tone.   Skin: Skin is warm and dry.   Psychiatric: He expresses impulsivity. He is attentive.       Neurological Exam:   LOC: alert  Attention Span: Good   Language: No aphasia, questionable baseline mental status  Articulation: Dysarthria  Orientation: Oriented to person; Not oriented to age, time  Visual Fields: Full  EOM (CN III, IV, VI): Full/intact  Facial Movement (CN VII): Symmetric  Motor: Arm left  Paresis: 4/5  Leg left  Paresis: 4/5  Arm right   Normal 5/5  Leg right Normal 5/5  Sensation: Intact to light touch, temperature and vibration  Tone: Normal tone throughout    Laboratory:  CMP:   No results for input(s): GLUCOSE, CALCIUM, ALBUMIN, PROT, NA, K, CO2, CL, BUN, CREATININE, ALKPHOS, ALT, AST, BILITOT in the last 24 hours.  BMP:   Recent Labs   Lab 11/01/19  0558      K 4.3      CO2 27   BUN 35*   CREATININE 1.5*   CALCIUM 8.6*     CBC:   Recent Labs   Lab 10/31/19  0543   WBC 6.86   RBC 4.69   HGB 12.4*   HCT 40.2      MCV 86   MCH 26.4*   MCHC 30.8*     Lipid Panel: No results for input(s): CHOL, LDLCALC, HDL, TRIG in the last 168 hours.  Coagulation: No results for input(s): PT, INR, APTT in the last 168 hours.  Platelet Aggregation Study: No results for input(s): PLTAGG, PLTAGINTERP, PLTAGREGLACO, ADPPLTAGGREG in the last 168 hours.  Hgb A1C: No results for input(s): HGBA1C in the last 168 hours.  TSH: No results for input(s): TSH in the last 168 hours.      Diagnostic Results     Brain/Vessel Imaging:     MRI Brain WO Contrast (10/11/2019) -         Areas of abnormal T2/FLAIR/diffusion signal abnormality consistent with areas of acute ischemia/infarct involving the cerebral hemispheres bilaterally.  There is signal abnormality associated with the left internal carotid artery likely corresponding to the appearance of occlusion on the CTA examination.     CTA Multiphase (10/10/2019) -      Occluded left ICA with reconstitution of the distal/supraclinoid segment by retrograde flow through [id number].  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  Right vertebral artery supply the basilar artery.  Basilar artery is small in caliber with wall irregularity and intermittent occlusions.  Partially calcified hyperattenuating lesion in the lateral aspect of the cavernous sinus abutting the distal right  ICA, likely representing calcified meningioma.  Generalized cerebral volume loss and remote lacunar type infarct in the right caudate head.       Cardiac Imaging:     TTE (10/11/2019) -   · Moderately decreased left ventricular systolic function. The estimated ejection fraction is 35%.  · Concentric left ventricular remodeling.  · Left ventricular diastolic dysfunction.  · Local segmental wall motion abnormalities.  · Severe left atrial enlargement.  · Mild right atrial enlargement.  · Mild aortic regurgitation.  · Moderate aortic valve stenosis but difficult to appreciate in the setting of AF, depressed EF, and low stroke volume.  · Aortic valve area is 1.39 cm2; peak velocity is 1.28 m/s; mean gradient is 4 mmHg.    Elevated central venous pressure (15 mm Hg).   Cosentyx Pregnancy And Lactation Text: This medication is Pregnancy Category B and is considered safe during pregnancy. It is unknown if this medication is excreted in breast milk.

## 2021-05-06 NOTE — PT/OT/SLP PROGRESS
Speech Language Pathology      Izaiah Douglas  MRN: [id number]    Attempted to see pt this afternoon for continued trial cognitive-linguistic therapy.  Pt found asleep with nursing present.  Nursing requested ST allow pt to sleep given recent  limited ability to sleep. Nursing reported good tolerance of oral intake.  ST will continue to follow.      THIAGO Camacho, CCC-SLP  11/6/2019     [facility] Counseling: I discussed with the patient the risks of ixekizumab including but not limited to immunosuppression, serious infections, worsening of inflammatory bowel disease and drug reactions.  The patient understands that monitoring is required including a PPD at baseline and must alert us or the primary physician if symptoms of infection or other concerning signs are noted.

## 2021-10-29 NOTE — ASSESSMENT & PLAN NOTE
This is an 85 year old male with PMH chronic Afib (not on AC), CAD (S/P stent), HTN, combined CHF, and hypothyroidism who received IV-tPA at OSH on 10/10 after presenting with right-sided weakness, aphasia, and left gaze deviation. CTA MP showed a chronically occluded left ICA from it's origin to the supraclinoid segment with filling of the MCA via the anterior communicating artery, along with substantial burden of intracranial and extracranial atherosclerotic disease, including occluded V4 with an intermittently occluded basilar. No EVT was pursued. TTE on 10/11 showed an EF of 35% and severe left atrial enlargement,however no thrombus. MRI brain on 10/11 demonstrated scattered areas of infarct in the bilateral hemispheres, predominantly in the right frontal lobe. Etiology suspected likely hypoperfusion/watershed in the setting of vascular abnormalities, though differential also includes cardioembolic in the setting of AF and ischemic cardiomyopathy. NIHSS of 8 on admission. PT and SLP consulted. Stable for discharge and pending placement.     Antithrombotics for secondary stroke prevention:  Eliquis 2.5 mg BID     Statins for secondary stroke prevention and hyperlipidemia, if present:   Statins: Atorvastatin- 40 mg daily     Aggressive risk factor modification: HTN, HLD, Diet, Exercise, A-Fib, CAD     Rehab efforts: The patient has been evaluated by a stroke team provider and the therapy needs have been fully considered based off the presenting complaints and exam findings. The following therapy evaluations are needed: PT evaluate and treat, OT evaluate and treat, SLP evaluate and treat, PM&R evaluate for appropriate placement     Diagnostics ordered/pending: None    VTE prophylaxis: Eliquis 2.5 mg BID, SCDs    BP parameters: SBP <160.     Disposition: SNF; placement pending.    (4) rarely moist

## 2022-07-14 NOTE — ASSESSMENT & PLAN NOTE
-Stroke risk factor  -CHADSVASC: 7.   -HASBLED: 2  -Per Care Everywhere, not previously on AC; Dr. Menjivar (Cardiologist) had report patient not a good candidate due to risk of falls, non-compliance, and dementia.  -Remains rate-controlled at this time.    Plan:   -Continue beta-blocker and Eliquis 2.5 mg BID.   -Telemetry ordered.    yes

## 2023-09-14 NOTE — ASSESSMENT & PLAN NOTE
Stroke risk factor  TSH 3.032  -- Continue Synthroid 25mcg daily   Unique Flap 2 Text: Because of the full-thickness nature of the defect and to reduce risk for alar rim retraction, and after discussion of options and risk for alar retraction with this repair, a free cartilage graft was planned.  An incision through the anterior antihelix was made and the skin lifted in the periochondrial plane.  A square of cartilage sized to fit defect.
